# Patient Record
Sex: MALE | Race: WHITE | NOT HISPANIC OR LATINO | Employment: OTHER | URBAN - METROPOLITAN AREA
[De-identification: names, ages, dates, MRNs, and addresses within clinical notes are randomized per-mention and may not be internally consistent; named-entity substitution may affect disease eponyms.]

---

## 2017-01-03 ENCOUNTER — OFFICE VISIT (OUTPATIENT)
Dept: CARDIOLOGY | Facility: CLINIC | Age: 77
End: 2017-01-03
Payer: MEDICARE

## 2017-01-03 ENCOUNTER — CLINICAL SUPPORT (OUTPATIENT)
Dept: CARDIOLOGY | Facility: CLINIC | Age: 77
End: 2017-01-03
Payer: MEDICARE

## 2017-01-03 ENCOUNTER — LAB VISIT (OUTPATIENT)
Dept: LAB | Facility: HOSPITAL | Age: 77
End: 2017-01-03
Attending: FAMILY MEDICINE
Payer: MEDICARE

## 2017-01-03 VITALS
HEIGHT: 70 IN | HEART RATE: 53 BPM | DIASTOLIC BLOOD PRESSURE: 70 MMHG | BODY MASS INDEX: 27.06 KG/M2 | SYSTOLIC BLOOD PRESSURE: 146 MMHG | WEIGHT: 189 LBS

## 2017-01-03 DIAGNOSIS — E78.2 MIXED HYPERLIPIDEMIA: Chronic | ICD-10-CM

## 2017-01-03 DIAGNOSIS — Z86.73 HISTORY OF CVA (CEREBROVASCULAR ACCIDENT): ICD-10-CM

## 2017-01-03 DIAGNOSIS — Z79.01 CHRONIC ANTICOAGULATION: Chronic | ICD-10-CM

## 2017-01-03 DIAGNOSIS — I10 HTN (HYPERTENSION), BENIGN: Primary | Chronic | ICD-10-CM

## 2017-01-03 DIAGNOSIS — R53.82 CHRONIC FATIGUE: ICD-10-CM

## 2017-01-03 DIAGNOSIS — I65.23 OCCLUSION AND STENOSIS OF BILATERAL CAROTID ARTERIES: ICD-10-CM

## 2017-01-03 DIAGNOSIS — I63.239 CAROTID ARTERY STENOSIS WITH CEREBRAL INFARCTION: ICD-10-CM

## 2017-01-03 DIAGNOSIS — Z79.01 ANTICOAGULATED ON COUMADIN: ICD-10-CM

## 2017-01-03 DIAGNOSIS — R94.31 ABNORMAL ECG: ICD-10-CM

## 2017-01-03 DIAGNOSIS — I10 ESSENTIAL HYPERTENSION: Primary | ICD-10-CM

## 2017-01-03 DIAGNOSIS — R06.02 SHORTNESS OF BREATH: ICD-10-CM

## 2017-01-03 DIAGNOSIS — I77.9 BILATERAL CAROTID ARTERY DISEASE: Chronic | ICD-10-CM

## 2017-01-03 DIAGNOSIS — I10 ESSENTIAL HYPERTENSION: ICD-10-CM

## 2017-01-03 LAB
INR PPP: 2.9
PROTHROMBIN TIME: 29.4 SEC

## 2017-01-03 PROCEDURE — 3077F SYST BP >= 140 MM HG: CPT | Mod: S$GLB,,, | Performed by: INTERNAL MEDICINE

## 2017-01-03 PROCEDURE — 1126F AMNT PAIN NOTED NONE PRSNT: CPT | Mod: S$GLB,,, | Performed by: INTERNAL MEDICINE

## 2017-01-03 PROCEDURE — 3078F DIAST BP <80 MM HG: CPT | Mod: S$GLB,,, | Performed by: INTERNAL MEDICINE

## 2017-01-03 PROCEDURE — 1159F MED LIST DOCD IN RCRD: CPT | Mod: S$GLB,,, | Performed by: INTERNAL MEDICINE

## 2017-01-03 PROCEDURE — 99999 PR PBB SHADOW E&M-EST. PATIENT-LVL III: CPT | Mod: PBBFAC,,, | Performed by: INTERNAL MEDICINE

## 2017-01-03 PROCEDURE — 99499 UNLISTED E&M SERVICE: CPT | Mod: S$GLB,,, | Performed by: INTERNAL MEDICINE

## 2017-01-03 PROCEDURE — 1160F RVW MEDS BY RX/DR IN RCRD: CPT | Mod: S$GLB,,, | Performed by: INTERNAL MEDICINE

## 2017-01-03 PROCEDURE — 1157F ADVNC CARE PLAN IN RCRD: CPT | Mod: S$GLB,,, | Performed by: INTERNAL MEDICINE

## 2017-01-03 PROCEDURE — 93000 ELECTROCARDIOGRAM COMPLETE: CPT | Mod: S$GLB,,, | Performed by: INTERNAL MEDICINE

## 2017-01-03 PROCEDURE — 99214 OFFICE O/P EST MOD 30 MIN: CPT | Mod: S$GLB,,, | Performed by: INTERNAL MEDICINE

## 2017-01-03 RX ORDER — METOPROLOL SUCCINATE 50 MG/1
TABLET, EXTENDED RELEASE ORAL
Qty: 30 TABLET | Refills: 6 | Status: SHIPPED | OUTPATIENT
Start: 2017-01-03 | End: 2017-09-01 | Stop reason: DRUGHIGH

## 2017-01-03 NOTE — MR AVS SNAPSHOT
[facility] Cardiology  [address] [place] [address]  Phone: [phone]  Fax: [phone]                  Heraclio Wall   1/3/2017 11:20 AM   Office Visit    Description:  Male : 1940   Provider:  Gera Wells MD   Department:  [facility] - Cardiology           Reason for Visit     Hyperlipidemia     Hypertension     Carotid Artery Disease           Diagnoses this Visit        Comments    HTN (hypertension), benign    -  Primary     Mixed hyperlipidemia         Bilateral carotid artery disease         History of CVA (cerebrovascular accident)         Abnormal ECG         Chronic anticoagulation         Chronic fatigue         Shortness of breath         Carotid artery stenosis with cerebral infarction         Occlusion and stenosis of bilateral carotid arteries                To Do List           Future Appointments        Provider Department Dept Phone    2017 11:00 AM Ion Dumont MD [facility] Sleep Clinic [phone]    2017 1:00 PM [facility] [phone]    2017 1:15 PM Srinivas Kevin MD [facility] Ophthalmology [phone]      Goals (5 Years of Data)              Today    16    Blood Pressure <= 140/90   146/70  138/71  139/67    Notes - Note created  6/3/2016  8:58 AM by Nader MontanoD    It is important to consistently maintain a controlled blood pressure.      Exercise at least 150 minutes per week.           Maintain a low sodium diet           Take at least one BP reading per week at various times of the day             Follow-Up and Disposition     Return in about 1 year (around 1/3/2018).      [facility]     [facility] -  Assistance  Registered nurses in the [facility] provide clinical advisement, health education, appointment booking, and other advisory services.  Call for this free service at [phone].             Medications            Message regarding Medications     Verify the changes and/or additions to your medication regime listed below are the same as discussed with your clinician today.  If any of these changes or additions are incorrect, please notify your healthcare provider.        STOP taking these medications     RAPAFLO 8 mg Cap capsule TAKE 1 CAPSULE(8 MG) BY MOUTH EVERY DAY           Verify that the below list of medications is an accurate representation of the medications you are currently taking.  If none reported, the list may be blank. If incorrect, please contact your healthcare provider. Carry this list with you in case of emergency.           Current Medications     arginine 500 mg tablet Take 1 tablet by mouth 2 (two) times daily.    ascorbic acid (VITAMIN C) 1000 MG tablet Take 3 tablets by mouth Daily.    cholecalciferol, vitamin D3, 1,000 unit capsule Take 5 capsules by mouth Daily.    COUMADIN 2.5 mg tablet TAKE 1 TABLET BY MOUTH EVERY DAY    cyanocobalamin, vitamin B-12, 1,000 mcg/15 mL Liqd Take 1 drop by mouth once daily.     cycloSPORINE (RESTASIS) 0.05 % ophthalmic emulsion Place 0.4 mLs (1 drop total) into both eyes 2 (two) times daily.    dextran 70-hypromellose (ARTIFICIAL TEARS) ophthalmic solution Place 1 drop into both eyes Use as needed.    fluvastatin XL (LESCOL XL) 80 mg Tb24 Take 1 tablet (80 mg total) by mouth once daily.    furosemide (LASIX) 40 MG tablet Take 1 tablet (40 mg total) by mouth every morning.    HYDROCORTISONE ORAL Take 2.5 capsules by mouth once daily.    PROGESTERONE MISC 6.25 mg by Misc.(Non-Drug; Combo Route) route every evening.    PROTONIX 40 mg tablet TAKE 1 TABLET BY MOUTH DAILY    saw palmetto 160 MG capsule Take 1 capsule by mouth 2 (two) times daily.    selenium 200 mcg Tab Take 1 tablet by mouth Daily.    thyroid, pork, (ARMOUR THYROID) 60 mg Tab     TOPROL XL 50 mg 24 hr tablet TAKE 1 TABLET BY MOUTH EVERY DAY IF SYSTOLIC IS GREATER THAN 180 AND OR DIASTOLIC IS  "GREATER THAN 96. NEED PULSE TO BE GREATER THAN 60    UNABLE TO FIND medication name: Red out eye drops    UNABLE TO FIND Take by mouth once daily. medication name: Triiodo-L-Thyronine-Levothyroxine 4.5/19mcg    verapamil (VERELAN) 240 MG C24P TAKE ONE CAPSULE BY MOUTH TWICE A DAY           Clinical Reference Information           Vital Signs - Last Recorded  Most recent update: 1/3/2017 11:28 AM by Carolina Wild LPN    BP Pulse Ht Wt BMI    (!) 146/70 (BP Location: Left arm, Patient Position: Sitting, BP Method: Manual) (!) 53 5' 10" (1.778 m) 85.7 kg (189 lb) 27.12 kg/m2      Blood Pressure          Most Recent Value    BP  (!)  146/70      Allergies as of 1/3/2017     Milk    Olive Extract    Tea Tree    Apple    Benicar [Olmesartan]    Clonidine    Clonidine Hcl    Clopidogrel    Cardizem [Diltiazem Hcl]    Dicyclomine    Hydralazine Analogues    Lopressor [Metoprolol Tartrate]    Lipitor [Atorvastatin]    Norvasc [Amlodipine]    Pineapple    Bactrim [Sulfamethoxazole-trimethoprim]      Immunizations Administered on Date of Encounter - 1/3/2017     None      Orders Placed During Today's Visit     Future Labs/Procedures Expected by Expires    NM Myocardial Perfusion Spect Multi Pharmacologic  1/3/2017 1/3/2018    CAR Ultrasound doppler carotid bliateral  1/3/2018 (Approximate) 1/3/2018    NM Multi Pharm Stress Cardiac Component  As directed 1/3/2018      "

## 2017-01-03 NOTE — PROGRESS NOTES
Subjective:    Patient ID:  Heraclio Wall is a 76 y.o. male who presents for evaluation of Hyperlipidemia; Hypertension; and Carotid Artery Disease      HPI Mr. Wall returns for yearly  f/u.   His current medical conditions include HTN, carotid artery disease, hyperlipidemia.  Nonsmoker.  Past history pertinent for following:  H/o  CVA 7/13 and was put on Plavix (was on ASA at time). He had admission 5/14 for splenic infarct and had surgery. Dr. Metz did abdominal angio with report showing no evidence of mesenteric stenosis. SAIRA showed no intracardiac source of emboli. He was d/cd home on asa, plavix and coumadin was started. He is now on coumadin only.  Now here for f/u.  Has some mild SANTIAGO and fatigue with exercise outside.  No cp sxs.    He exercises 3 days/week and feels ok with exercise.  ecg today shows mild sinus bradycardia 53 bpm, t wave inversions V1-V3.  ecgs variably abnl in past.  HTN controlled, enrolled in digital HTN program.  No associated sxs.  Lipids controlled on statin tx.  No recurrent tia/cva sxs.  No abnl bleeding on coumadin.  Compliant with checks.  Echo shows normal LV function.   Carotid us shows no progression from a year ago.    Patient Active Problem List   Diagnosis    Hypogonadism male    Bilateral hearing loss    HTN (hypertension), benign    Dysphagia as late effect of stroke    Refraction error - Both Eyes    CHESTER on CPAP    Tinnitus of both ears    GERD (gastroesophageal reflux disease)    Hyperlipidemia    Anemia    Hypothyroidism due to acquired atrophy of thyroid    Bilateral carotid artery disease    Atherosclerosis of aorta    Chronic anticoagulation    Chronic kidney disease, stage 3    Abnormal ECG    Insufficiency of tear film of both eyes    History of CVA (cerebrovascular accident)    Numbness and tingling in left arm    Benign prostatic hyperplasia without lower urinary tract symptoms    Chronic fatigue    Shortness of breath     Past  "Medical History   Diagnosis Date    Anemia     Anxiety     Back pain      had some pain occasionally r/t a fall    Bilateral hearing loss     Cancer      skin cancer    Dermatitis     Disorder of kidney and ureter     Diverticulosis     Diverticulosis of colon (without mention of hemorrhage) 4/29/2014    DJD (degenerative joint disease)     Dysphagia as late effect of stroke     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   HENT: Negative.    Eyes: Negative.    Cardiovascular: Positive for dyspnea on exertion.   Respiratory: Positive for shortness of breath.    Endocrine: Negative.    Hematologic/Lymphatic: Negative.    Skin: Negative.    Musculoskeletal: Positive for arthritis.   Gastrointestinal: Positive for heartburn.   Genitourinary: Negative.    Neurological: Negative.    Psychiatric/Behavioral: Negative.    Allergic/Immunologic: Negative.        Visit Vitals    BP (!) 146/70 (BP Location: Left arm, Patient Position: Sitting, BP Method: Manual)    Pulse (!) 53    Ht 5' 10" (1.778 m)    Wt 85.7 kg (189 lb)    BMI 27.12 kg/m2       Wt Readings from Last 3 Encounters:   01/03/17 85.7 kg (189 lb)   12/12/16 85.8 kg (189 lb 2.5 oz)   10/03/16 84.1 kg (185 lb 6.5 oz)     Temp Readings from Last 3 Encounters:   12/12/16 97.4 °F (36.3 °C) (Tympanic)   06/01/16 98 °F (36.7 °C) (Tympanic)   01/18/16 98 °F (36.7 °C) (Oral)     BP Readings from Last 3 Encounters: "   01/03/17 (!) 146/70   12/12/16 124/82   10/03/16 (!) 160/78     Pulse Readings from Last 3 Encounters:   01/03/17 (!) 53   12/12/16 64   10/03/16 60          Objective:    Physical Exam   Constitutional: He is oriented to person, place, and time. He appears well-developed and well-nourished.   HENT:   Head: Normocephalic.   Neck: Normal range of motion. Neck supple. Normal carotid pulses, no hepatojugular reflux and no JVD present. Carotid bruit is not present. No thyromegaly present.   Cardiovascular: Regular rhythm, S1 normal and S2 normal.  Bradycardia present.  PMI is not displaced.  Exam reveals no S3, no S4, no distant heart sounds, no friction rub, no midsystolic click and no opening snap.    No murmur heard.  Pulses:       Radial pulses are 2+ on the right side, and 2+ on the left side.   Pulmonary/Chest: Effort normal and breath sounds normal. He has no wheezes. He has no rales.   Abdominal: Soft. Bowel sounds are normal. He exhibits no distension, no abdominal bruit, no ascites and no mass. There is no tenderness.   Musculoskeletal: He exhibits no edema.   Neurological: He is alert and oriented to person, place, and time.   Skin: Skin is warm.   Psychiatric: He has a normal mood and affect. His behavior is normal.   Nursing note and vitals reviewed.        I have reviewed all pertinent labs and cardiac studies.      Chemistry        Component Value Date/Time     11/02/2016 0916    K 4.9 11/02/2016 0916     11/02/2016 0916    CO2 30 (H) 11/02/2016 0916    BUN 15 11/02/2016 0916    CREATININE 1.4 11/02/2016 0916    GLU 97 11/02/2016 0916        Component Value Date/Time    CALCIUM 9.8 11/02/2016 0916    ALKPHOS 79 11/02/2016 0916    AST 28 11/02/2016 0916    ALT 21 11/02/2016 0916    BILITOT 1.1 (H) 11/02/2016 0916        Lab Results   Component Value Date    WBC 6.15 11/02/2016    HGB 13.7 (L) 11/02/2016    HCT 42.2 11/02/2016    MCV 95 11/02/2016     11/02/2016     Lab Results    Component Value Date    HGBA1C 5.2 06/01/2016     Lab Results   Component Value Date    CHOL 141 11/02/2016    CHOL 131 11/12/2015    CHOL 118 (L) 06/30/2014     Lab Results   Component Value Date    HDL 37 (L) 11/02/2016    HDL 44 11/12/2015    HDL 35 (L) 06/30/2014     Lab Results   Component Value Date    LDLCALC 74.4 11/02/2016    LDLCALC 69.4 11/12/2015    LDLCALC 62.2 (L) 06/30/2014     Lab Results   Component Value Date    TRIG 148 11/02/2016    TRIG 88 11/12/2015    TRIG 104 06/30/2014     Lab Results   Component Value Date    CHOLHDL 26.2 11/02/2016    CHOLHDL 33.6 11/12/2015    CHOLHDL 29.7 06/30/2014   TEST DESCRIPTION   Technical Quality: This is a technically challenging study. There is poor endocardial definition.     Aorta: The aortic root is normal in size, measuring 2.8 cm at sinotubular junction and 2.9 cm at Sinuses of Valsalva. The proximal ascending aorta is upper limit of normal, measuring 3.5 cm across.     Left Atrium: The left atrial volume index is normal, measuring 28.95 cc/m2.     Left Ventricle: The left ventricle is normal in size, with an end-diastolic diameter of 4.1 cm, and an end-systolic diameter of 2.9 cm. Posterior wall thickness is increased, with the septum measuring 1.3 cm and the posterior wall measuring 1.5 cm   across. Relative wall thickness was increased at 0.73, and the LV mass index was increased at 125.7 g/m2 consistent with concentric left ventricular hypertrophy. Global left ventricular systolic function appears normal. Visually estimated ejection   fraction is 55-60%. The LV Doppler derived stroke volume equals 73.0 ccs.   The E/e'(lat) is 7, consistent with normal diastolic function.     Right Atrium: The right atrium is normal in size, measuring 5.7 cm in length and 3.9 cm in width in the apical view.     Right Ventricle: The right ventricle is normal in size measuring 3.1 cm at the base in the apical right ventricle-focused view. Global right ventricular  systolic function appears normal. Tricuspid annular plane systolic excursion (TAPSE) is 2.1 cm. The   estimated PA systolic pressure is 34 mmHg.     Aortic Valve:  The aortic valve is mildly sclerotic with normal leaflet mobility.     Mitral Valve:  The mitral valve is normal in structure. There is mild mitral regurgitation.     Tricuspid Valve:  The tricuspid valve is normal in structure. There is mild tricuspid regurgitation.     Pulmonary Valve:  The pulmonic valve is not well seen.     IVC: IVC is normal in size and collapses > 50% with a sniff, suggesting normal right atrial pressure of 3 mmHg.     Intracavitary: There is no evidence of pericardial effusion, intracavity mass, thrombi, or vegetation.         CONCLUSIONS     1 - Normal left ventricular systolic function (EF 55-60%).     2 - Normal left ventricular diastolic function.     3 - Normal right ventricular systolic function .             This document has been electronically    SIGNED BY: Gera Wells MD On: 12/27/2016 16:53        TEST DESCRIPTION     RIGHT  The right Proximal Common Carotid Artery is visualized.   The right carotid bulb artery is visualized.   The right Mid Internal Carotid Artery has 20 - 39% stenosis.   There is acceleration in the right external carotid artery.   The right vertebral artery is visualized, associated with anterograde flow.   The right ICA/CCA ratio is: .97    LEFT  The left Mid Common Carotid Artery is visualized.   The left carotid bulb artery is visualized.   The left Distal Internal Carotid Artery has 0 - 19% stenosis.   The left external carotid artery is visualized.   The left vertebral artery is visualized, associated with anterograde flow.   The left ICA/CCA ratio is: .75      CONCLUSIONS   There is 20 - 39% right Internal Carotid stenosis.  There is 0 - 19% left Internal Carotid stenosis.          This document has been electronically    SIGNED BY: Gera Wells MD On: 12/27/2016 17:09      Lab Results    Component Value Date    INR 3.3 (H) 12/12/2016    INR 3.2 (H) 11/28/2016    INR 3.1 (H) 11/11/2016         Assessment:       1. HTN (hypertension), benign    2. Mixed hyperlipidemia    3. Bilateral carotid artery disease    4. History of CVA (cerebrovascular accident)    5. Abnormal ECG    6. Chronic anticoagulation    7. Chronic fatigue    8. Shortness of breath         Plan:             CHRONIC STABLE CV CONDITIONS.  TEST RESULTS REVIEWED IN DETAIL WITH PT.  HAS SOME SANTIAGO, FATIGUE WITH EXERTION AND ABNL ECG (INTERMITTENTLY ABNORMAL).  RECOMMEND STRESS MPI TO RULE OUT ISCHEMIA.  PHONE REVIEW FOR TEST RESULTS.  CONTINUE CURRENT MEDS.  CARDIAC DIET  F/U 1 YEAR WITH CAROTID US.

## 2017-01-11 ENCOUNTER — TELEPHONE (OUTPATIENT)
Dept: PULMONOLOGY | Facility: CLINIC | Age: 77
End: 2017-01-11

## 2017-01-11 DIAGNOSIS — G47.33 OSA (OBSTRUCTIVE SLEEP APNEA): Primary | ICD-10-CM

## 2017-01-12 ENCOUNTER — HOSPITAL ENCOUNTER (OUTPATIENT)
Dept: RADIOLOGY | Facility: HOSPITAL | Age: 77
Discharge: HOME | End: 2017-01-12
Attending: INTERNAL MEDICINE
Payer: MEDICARE

## 2017-01-12 ENCOUNTER — OFFICE VISIT (OUTPATIENT)
Dept: SLEEP MEDICINE | Facility: CLINIC | Age: 77
End: 2017-01-12
Payer: MEDICARE

## 2017-01-12 VITALS
HEIGHT: 70 IN | HEART RATE: 53 BPM | BODY MASS INDEX: 27.15 KG/M2 | OXYGEN SATURATION: 98 % | SYSTOLIC BLOOD PRESSURE: 162 MMHG | RESPIRATION RATE: 18 BRPM | DIASTOLIC BLOOD PRESSURE: 66 MMHG | WEIGHT: 189.63 LBS

## 2017-01-12 DIAGNOSIS — G47.33 OSA ON CPAP: Primary | Chronic | ICD-10-CM

## 2017-01-12 DIAGNOSIS — G47.33 OSA (OBSTRUCTIVE SLEEP APNEA): ICD-10-CM

## 2017-01-12 PROBLEM — R06.02 SHORTNESS OF BREATH: Status: RESOLVED | Noted: 2017-01-03 | Resolved: 2017-01-12

## 2017-01-12 PROCEDURE — 3078F DIAST BP <80 MM HG: CPT | Mod: S$GLB,,, | Performed by: INTERNAL MEDICINE

## 2017-01-12 PROCEDURE — 1157F ADVNC CARE PLAN IN RCRD: CPT | Mod: S$GLB,,, | Performed by: INTERNAL MEDICINE

## 2017-01-12 PROCEDURE — 99213 OFFICE O/P EST LOW 20 MIN: CPT | Mod: S$GLB,,, | Performed by: INTERNAL MEDICINE

## 2017-01-12 PROCEDURE — 1126F AMNT PAIN NOTED NONE PRSNT: CPT | Mod: S$GLB,,, | Performed by: INTERNAL MEDICINE

## 2017-01-12 PROCEDURE — 3077F SYST BP >= 140 MM HG: CPT | Mod: S$GLB,,, | Performed by: INTERNAL MEDICINE

## 2017-01-12 PROCEDURE — 1160F RVW MEDS BY RX/DR IN RCRD: CPT | Mod: S$GLB,,, | Performed by: INTERNAL MEDICINE

## 2017-01-12 PROCEDURE — 99999 PR PBB SHADOW E&M-EST. PATIENT-LVL III: CPT | Mod: PBBFAC,,, | Performed by: INTERNAL MEDICINE

## 2017-01-12 PROCEDURE — 1159F MED LIST DOCD IN RCRD: CPT | Mod: S$GLB,,, | Performed by: INTERNAL MEDICINE

## 2017-01-12 PROCEDURE — 71020 XR CHEST PA AND LATERAL: CPT | Mod: 26,,, | Performed by: RADIOLOGY

## 2017-01-12 NOTE — MR AVS SNAPSHOT
[facility]  [address] [place] [address]  Phone: [phone]                  Heraclio Wall   2017 11:00 AM   Office Visit    Description:  Male : 1940   Provider:  Ion Dumont MD   Department:  [facility] - Sleep Clinic           Reason for Visit     Sleep Apnea           Diagnoses this Visit        Comments    CHESTER on CPAP    -  Primary            To Do List           Future Appointments        Provider Department Dept Phone    2017 11:15 AM [facility] CARDIAC [facility] [phone]    2017 11:20 AM [facility] [phone]    2017 1:15 PM [facility] NM[facility] CARDIAC [facility] [phone]    2017 10:00 AM [place]ORY, [place] [facility] [phone]    2017 1:00 PM [facility] [phone]      Goals (5 Years of Data)              Today    1/5/17    1/3/17    Blood Pressure <= 140/90   162/66  132/89  146/70    Notes - Note created  6/3/2016  8:58 AM by Juana Allen PharmD    It is important to consistently maintain a controlled blood pressure.      Exercise at least 150 minutes per week.           Maintain a low sodium diet           Take at least one BP reading per week at various times of the day             Follow-Up and Disposition     Return in about 1 year (around 2018) for download.      [facility]     [facility] Care [facility] - / Assistance  Registered nurses in the [facility] provide clinical advisement, health education, appointment booking, and other advisory services.  Call for this free service at [phone].             Medications           Message regarding Medications     Verify the changes and/or additions to your medication regime listed below are the same as discussed with your clinician today.  If any of these changes or additions are incorrect,  please notify your healthcare provider.             Verify that the below list of medications is an accurate representation of the medications you are currently taking.  If none reported, the list may be blank. If incorrect, please contact your healthcare provider. Carry this list with you in case of emergency.           Current Medications     arginine 500 mg tablet Take 1 tablet by mouth 2 (two) times daily.    ascorbic acid (VITAMIN C) 1000 MG tablet Take 3 tablets by mouth Daily.    cholecalciferol, vitamin D3, 1,000 unit capsule Take 5 capsules by mouth Daily.    COUMADIN 2.5 mg tablet TAKE 1 TABLET BY MOUTH EVERY DAY    cyanocobalamin, vitamin B-12, 1,000 mcg/15 mL Liqd Take 1 drop by mouth once daily.     cycloSPORINE (RESTASIS) 0.05 % ophthalmic emulsion Place 0.4 mLs (1 drop total) into both eyes 2 (two) times daily.    dextran 70-hypromellose (ARTIFICIAL TEARS) ophthalmic solution Place 1 drop into both eyes Use as needed.    fluvastatin XL (LESCOL XL) 80 mg Tb24 Take 1 tablet (80 mg total) by mouth once daily.    furosemide (LASIX) 40 MG tablet Take 1 tablet (40 mg total) by mouth every morning.    HYDROCORTISONE ORAL Take 2.5 capsules by mouth once daily.    PROGESTERONE MISC 6.25 mg by Misc.(Non-Drug; Combo Route) route every evening.    PROTONIX 40 mg tablet TAKE 1 TABLET BY MOUTH DAILY    saw palmetto 160 MG capsule Take 1 capsule by mouth 2 (two) times daily.    selenium 200 mcg Tab Take 1 tablet by mouth Daily.    thyroid, pork, (ARMOUR THYROID) 60 mg Tab     TOPROL XL 50 mg 24 hr tablet TAKE 1 TABLET BY MOUTH EVERY DAY IF SYSTOLIC IS GREATER THAN 180 AND OR DIASTOLIC IS GREATER THAN 96. NEED PULSE TO BE GREATER THAN 60    UNABLE TO FIND medication name: Red out eye drops    UNABLE TO FIND Take by mouth once daily. medication name: Triiodo-L-Thyronine-Levothyroxine 4.5/19mcg    verapamil (VERELAN) 240 MG C24P TAKE ONE CAPSULE BY MOUTH TWICE A DAY           Clinical Reference Information          "  Vital Signs - Last Recorded  Most recent update: 1/12/2017 11:47 AM by Timothy Lomax LPN    BP Pulse Resp Ht Wt SpO2    (!) 162/66 (!) 53 18 5' 10" (1.778 m) 86 kg (189 lb 9.5 oz) 98%    BMI                27.2 kg/m2          Blood Pressure          Most Recent Value    BP  (!)  162/66      Allergies as of 1/12/2017     Milk    Olive Extract    Tea Tree    Apple    Benicar [Olmesartan]    Clonidine    Clonidine Hcl    Clopidogrel    Cardizem [Diltiazem Hcl]    Dicyclomine    Hydralazine Analogues    Lopressor [Metoprolol Tartrate]    Lipitor [Atorvastatin]    Norvasc [Amlodipine]    Pineapple    Bactrim [Sulfamethoxazole-trimethoprim]      Immunizations Administered on Date of Encounter - 1/12/2017     None      "

## 2017-01-12 NOTE — PROGRESS NOTES
Subjective:       Heraclio Wall is a 76 y.o. male  is a follow-up appointment.    I last saw him November 2013.  Physical last saw him he had a stroke  He has obstructive sleep apnea currently on CPAP 9 cm water pressure.    [place] sleepiness score is 2.  He is getting good benefit from the CPAP machine  The download shows he is using it greater than 4 hours 496.7% of the time.  Patient has used the device every night for the last 30 days.  His blood pressure is slightly elevated however he is enrolled in the ambulatory blood pressure monitoring program.  Bedtime is between 9:30 PM and 10 PM and wake up time between 7 AM and 8 AM.  Patient tells me that he may have some occasional noise from his machine.    I like him to make an appointment to review the device with Iris.      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  He  does not have any pertinent problems on file.  He  has a past surgical history that includes Circumcision, primary (1940); Tonsillectomy (1942); Adenoidectomy (1942); Cholecystectomy (2/2015); Cataract extraction (Bilateral); Small  intestine surgery (5/4/2014); and Eye surgery (Bilateral, 2012).  His family history includes Aortic aneurysm in his father; Cancer (age of onset: 64) in his sister; Cataracts in his mother; Glaucoma in his mother; Hearing loss in his sister; Heart disease in his mother; Hyperlipidemia in his mother; Hypertension in his father and mother; Peripheral vascular disease in his father; Stroke in his mother and sister. There is no history of Diabetes, Blindness, Macular degeneration, Retinal detachment, Strabismus, Asthma, COPD, Mental illness, Mental retardation, Drug abuse, Alcohol abuse, or Kidney disease.  He  reports that he has never smoked. He has never used smokeless tobacco. He reports that he does not drink alcohol or use illicit drugs.  He has a current medication list which includes the following prescription(s): arginine, ascorbic acid (vitamin c), cholecalciferol (vitamin d3), coumadin, cyanocobalamin (vitamin b-12), cyclosporine, dextran 70-hypromellose, fluvastatin xl, furosemide, hydrocortisone, progesterone, protonix, saw palmetto, selenium, thyroid (pork), toprol xl, UNABLE TO FIND, verapamil, and UNABLE TO FIND.  Current Outpatient Prescriptions on File Prior to Visit   Medication Sig Dispense Refill    arginine 500 mg tablet Take 1 tablet by mouth 2 (two) times daily.      ascorbic acid (VITAMIN C) 1000 MG tablet Take 3 tablets by mouth Daily.      cholecalciferol, vitamin D3, 1,000 unit capsule Take 5 capsules by mouth Daily.      COUMADIN 2.5 mg tablet TAKE 1 TABLET BY MOUTH EVERY DAY 30 tablet 6    cyanocobalamin, vitamin B-12, 1,000 mcg/15 mL Liqd Take 1 drop by mouth once daily.       cycloSPORINE (RESTASIS) 0.05 % ophthalmic emulsion Place 0.4 mLs (1 drop total) into both eyes 2 (two) times daily. 60 vial 12    dextran 70-hypromellose (ARTIFICIAL TEARS) ophthalmic solution Place 1 drop into both eyes Use as needed.      fluvastatin XL (LESCOL XL) 80 mg Tb24 Take 1 tablet (80 mg total) by  "mouth once daily. 90 tablet 3    furosemide (LASIX) 40 MG tablet Take 1 tablet (40 mg total) by mouth every morning. 90 tablet 1    HYDROCORTISONE ORAL Take 2.5 capsules by mouth once daily.      PROGESTERONE MISC 6.25 mg by Misc.(Non-Drug; Combo Route) route every evening.      PROTONIX 40 mg tablet TAKE 1 TABLET BY MOUTH DAILY 30 tablet 11    saw palmetto 160 MG capsule Take 1 capsule by mouth 2 (two) times daily.      selenium 200 mcg Tab Take 1 tablet by mouth Daily.      thyroid, pork, (ARMOUR THYROID) 60 mg Tab       TOPROL XL 50 mg 24 hr tablet TAKE 1 TABLET BY MOUTH EVERY DAY IF SYSTOLIC IS GREATER THAN 180 AND OR DIASTOLIC IS GREATER THAN 96. NEED PULSE TO BE GREATER THAN 60 30 tablet 6    UNABLE TO FIND Take by mouth once daily. medication name: Triiodo-L-Thyronine-Levothyroxine 4.5/19mcg      verapamil (VERELAN) 240 MG C24P TAKE ONE CAPSULE BY MOUTH TWICE A  capsule 1    UNABLE TO FIND medication name: Red out eye drops       No current facility-administered medications on file prior to visit.      He is allergic to milk; olive extract; tea tree; apple; benicar [olmesartan]; clonidine; clonidine hcl; clopidogrel; cardizem [diltiazem hcl]; dicyclomine; hydralazine analogues; lopressor [metoprolol tartrate]; lipitor [atorvastatin]; norvasc [amlodipine]; pineapple; and bactrim [sulfamethoxazole-trimethoprim]..    Review of Systems  A comprehensive review of systems was negative.      Objective:        Visit Vitals    BP (!) 162/66    Pulse (!) 53    Resp 18    Ht 5' 10" (1.778 m)    Wt 86 kg (189 lb 9.5 oz)    SpO2 98%    BMI 27.2 kg/m2       General Appearance:    Alert, cooperative, no distress, appears stated age   Head:    Normocephalic, without obvious abnormality, atraumatic   Throat:   Lips, mucosa, and tongue normal; teeth and gums normal   Neck:   Supple, symmetrical, trachea midline, no adenopathy;        thyroid:  No enlargement/tenderness/nodules; no carotid    bruit or " JVD   Lungs:     Clear to auscultation bilaterally, respirations unlabored   Chest wall:    No tenderness or deformity   Heart:    Regular rate and rhythm, S1 and S2 normal, no murmur, rub   or gallop   Abdomen:     Soft, non-tender, bowel sounds active all four quadrants,     no masses, no organomegaly   Extremities:   Extremities normal, atraumatic, no cyanosis or edema   Pulses:   2+ and symmetric all extremities   Skin:   Skin color, texture, turgor normal, no rashes or lesions   Lymph nodes:   Cervical, supraclavicular, and axillary nodes normal   Neurologic:   CNII-XII intact. Normal strength, sensation and reflexes       throughout         DOWNLOAD  12/13/2016 - 1/11/2017  YOB: 1940  Mask:  Compliance Summary  12/13/2016 - 1/11/2017 (30 days)  Days with Device Usage 30 days  Days without Device Usage 0 days  Percent Days with Device Usage 100.0%  Cumulative Usage 8 days 4 hrs. 14 mins. 5 secs.  Maximum Usage (1 Day) 8 hrs. 9 mins. 45 secs.  Average Usage (All Days) 6 hrs. 32 mins. 28 secs.  Average Usage (Days Used) 6 hrs. 32 mins. 28 secs.  Minimum Usage (1 Day) 3 hrs. 29 mins. 2 secs.  Percent of Days with Usage >= 4 Hours 96.7%  Percent of Days with Usage < 4 Hours 3.3%  Date Range  Printed By:    CXR reviewed    Heart size normal. Aortic sclerosis and ectasia. Multilevel degenerative change in the spine. No acute consolidation seen in the lungs. No pleural effusions. No change compared to November 29, 2014.  Assessment:      Problem List Items Addressed This Visit     CHESTER on CPAP - Primary (Chronic)        Complainant and benefits from PAP     Plan:      Return in about 1 year (around 1/12/2018) for download.    This note was prepared using voice recognition system and is likely to have sound alike errors that may have been overlooked even after proof reading.  Please call me with any questions    Discussed diagnosis, its evaluation, treatment and usual course. All questions  answered.    Thank you for the courtesy of participating in the care of this patient    Ion Dumont MD

## 2017-01-17 ENCOUNTER — HOSPITAL ENCOUNTER (OUTPATIENT)
Dept: RADIOLOGY | Facility: HOSPITAL | Age: 77
Discharge: HOME | End: 2017-01-17
Attending: INTERNAL MEDICINE
Payer: MEDICARE

## 2017-01-17 ENCOUNTER — TELEPHONE (OUTPATIENT)
Dept: CARDIOLOGY | Facility: CLINIC | Age: 77
End: 2017-01-17

## 2017-01-17 ENCOUNTER — CLINICAL SUPPORT (OUTPATIENT)
Dept: CARDIOLOGY | Facility: CLINIC | Age: 77
End: 2017-01-17
Payer: MEDICARE

## 2017-01-17 DIAGNOSIS — Z86.73 HISTORY OF CVA (CEREBROVASCULAR ACCIDENT): ICD-10-CM

## 2017-01-17 DIAGNOSIS — R53.82 CHRONIC FATIGUE: ICD-10-CM

## 2017-01-17 DIAGNOSIS — I77.9 BILATERAL CAROTID ARTERY DISEASE: Chronic | ICD-10-CM

## 2017-01-17 DIAGNOSIS — R06.02 SHORTNESS OF BREATH: ICD-10-CM

## 2017-01-17 DIAGNOSIS — R94.31 ABNORMAL ECG: ICD-10-CM

## 2017-01-17 LAB — DIASTOLIC DYSFUNCTION: NO

## 2017-01-17 PROCEDURE — 93015 CV STRESS TEST SUPVJ I&R: CPT | Mod: S$GLB,,, | Performed by: INTERNAL MEDICINE

## 2017-01-17 PROCEDURE — 78452 HT MUSCLE IMAGE SPECT MULT: CPT | Mod: 26,,, | Performed by: INTERNAL MEDICINE

## 2017-01-24 ENCOUNTER — PATIENT OUTREACH (OUTPATIENT)
Dept: OTHER | Facility: OTHER | Age: 77
End: 2017-01-24
Payer: MEDICARE

## 2017-01-24 NOTE — PROGRESS NOTES
Last 5 Patient Entered Readings                                                               Current 30 Day Average: 138/76     Recent Readings 1/22/2017 1/5/2017 12/28/2016 12/22/2016 12/10/2016    Systolic BP (mmHg) 144 132 138 139 141    Diastolic BP (mmHg) 70 89 71 67 70    Pulse 54 53 51 71 56          Follow up with Mr. Heraclio Wall completed. Patient is maintaining a low sodium diet and continuing his exercise regime. I spoke with his wife and she informed me that Mr. Wall is doing well and that they just missed a few readings because they have been busy with MD appts etc. He has some tests done and his cards MD reported to them that everything is looking good. Patient did not have any further questions or concerns. I will follow up in a few weeks to see how he is doing and progressing.

## 2017-02-01 ENCOUNTER — LAB VISIT (OUTPATIENT)
Dept: LAB | Facility: HOSPITAL | Age: 77
End: 2017-02-01
Attending: FAMILY MEDICINE
Payer: MEDICARE

## 2017-02-01 DIAGNOSIS — Z79.01 ANTICOAGULATED ON COUMADIN: ICD-10-CM

## 2017-02-01 LAB
INR PPP: 2.7
PROTHROMBIN TIME: 26.7 SEC

## 2017-02-01 PROCEDURE — 36415 COLL VENOUS BLD VENIPUNCTURE: CPT | Mod: PO

## 2017-02-01 PROCEDURE — 85610 PROTHROMBIN TIME: CPT

## 2017-02-02 ENCOUNTER — PATIENT MESSAGE (OUTPATIENT)
Dept: INTERNAL MEDICINE | Facility: CLINIC | Age: 77
End: 2017-02-02

## 2017-02-17 ENCOUNTER — PATIENT OUTREACH (OUTPATIENT)
Dept: OTHER | Facility: OTHER | Age: 77
End: 2017-02-17
Payer: MEDICARE

## 2017-02-17 NOTE — PROGRESS NOTES
Mr. Wall is doing ok. He has been having trouble sleeping the last few days. He turned up moisture on CPAP machine and that seemed to help, but still not sleeping well. Admits to slightly less activity recently because of other things going on. Also has been eating later than usual.     Last 5 Patient Entered Readings                                                               Current 30 Day Average: 144/71     Recent Readings 2/9/2017 1/26/2017 1/22/2017 1/5/2017 12/28/2016    Systolic BP (mmHg) 144 144 144 132 138    Diastolic BP (mmHg) 78 67 70 89 71    Pulse 56 50 54 53 51      BP slightly above goal  Continue monitoring  Track sleep pattern

## 2017-02-21 ENCOUNTER — PATIENT OUTREACH (OUTPATIENT)
Dept: OTHER | Facility: OTHER | Age: 77
End: 2017-02-21
Payer: MEDICARE

## 2017-02-21 NOTE — PROGRESS NOTES
Last 5 Patient Entered Readings                                                               Current 30 Day Average: 142/77     Recent Readings 2/19/2017 2/9/2017 1/26/2017 1/22/2017 1/5/2017    Systolic BP (mmHg) 138 144 144 144 132    Diastolic BP (mmHg) 95 78 67 70 89    Pulse 54 56 50 54 53          Follow up with Mr. Heraclio Wall completed. Patient is maintaining a low sodium diet and continuing his exercise regime. He reports that overall, he is doing well. Still having issues with getting a good nights sleep on days he goes to the gym. He is doing well with wearing his CPAP and even received a new mask recently which is much better and more effective for the patient. He denies symptoms related to his BP and he will take another reading today. Will continue to follow. Patient did not have any further questions or concerns. I will follow up in a few weeks to see how he is doing and progressing.

## 2017-03-02 ENCOUNTER — LAB VISIT (OUTPATIENT)
Dept: LAB | Facility: HOSPITAL | Age: 77
End: 2017-03-02
Attending: FAMILY MEDICINE
Payer: MEDICARE

## 2017-03-02 DIAGNOSIS — Z79.01 ANTICOAGULATED ON COUMADIN: ICD-10-CM

## 2017-03-02 LAB
INR PPP: 3.1
PROTHROMBIN TIME: 31 SEC

## 2017-03-02 PROCEDURE — 36415 COLL VENOUS BLD VENIPUNCTURE: CPT | Mod: PO

## 2017-03-02 PROCEDURE — 85610 PROTHROMBIN TIME: CPT

## 2017-03-03 ENCOUNTER — ANTI-COAG VISIT (OUTPATIENT)
Dept: INTERNAL MEDICINE | Facility: CLINIC | Age: 77
End: 2017-03-03

## 2017-03-03 ENCOUNTER — TELEPHONE (OUTPATIENT)
Dept: INTERNAL MEDICINE | Facility: CLINIC | Age: 77
End: 2017-03-03

## 2017-03-03 DIAGNOSIS — Z79.01 ANTICOAGULATED ON COUMADIN: Primary | ICD-10-CM

## 2017-03-03 NOTE — TELEPHONE ENCOUNTER
Notified pt of results and recommendations. He states,  likes the number to be 2.7 or under. He's thinking it's too high. He wants to know what  thinks. Informed pt that  is out of the office until Monday. I will send the message to her and call him then if  has any other recommendations. He verbalized understanding.

## 2017-03-06 NOTE — TELEPHONE ENCOUNTER
Called pt and let know that Dr. Christopher said 3.1 is fine and will repeat in 3 weeks.    There is no order in that I can see.  Can you enter order?      Pt also said he feels very fatigued and blah, wants an appt with you and then possibly more labs.  Did you want to work him in?  First available not until end of month

## 2017-03-06 NOTE — TELEPHONE ENCOUNTER
i need order for the PT/INR, none in for me to book lab.     Booked for Rebecca, 3-8-17 at 10:00am

## 2017-03-08 ENCOUNTER — LAB VISIT (OUTPATIENT)
Dept: LAB | Facility: HOSPITAL | Age: 77
End: 2017-03-08
Attending: PHYSICIAN ASSISTANT
Payer: MEDICARE

## 2017-03-08 ENCOUNTER — OFFICE VISIT (OUTPATIENT)
Dept: INTERNAL MEDICINE | Facility: CLINIC | Age: 77
End: 2017-03-08
Payer: MEDICARE

## 2017-03-08 VITALS
WEIGHT: 189 LBS | HEIGHT: 70 IN | DIASTOLIC BLOOD PRESSURE: 64 MMHG | HEART RATE: 64 BPM | BODY MASS INDEX: 27.06 KG/M2 | SYSTOLIC BLOOD PRESSURE: 120 MMHG | TEMPERATURE: 98 F

## 2017-03-08 DIAGNOSIS — R53.83 FATIGUE, UNSPECIFIED TYPE: Primary | ICD-10-CM

## 2017-03-08 DIAGNOSIS — Z79.01 ANTICOAGULATION GOAL OF INR 2 TO 3: ICD-10-CM

## 2017-03-08 DIAGNOSIS — E61.1 IRON DEFICIENCY: ICD-10-CM

## 2017-03-08 DIAGNOSIS — Z51.81 ANTICOAGULATION GOAL OF INR 2 TO 3: ICD-10-CM

## 2017-03-08 DIAGNOSIS — R53.83 FATIGUE, UNSPECIFIED TYPE: ICD-10-CM

## 2017-03-08 DIAGNOSIS — E03.4 HYPOTHYROIDISM DUE TO ACQUIRED ATROPHY OF THYROID: Chronic | ICD-10-CM

## 2017-03-08 LAB
BASOPHILS # BLD AUTO: 0.03 K/UL
BASOPHILS NFR BLD: 0.4 %
BILIRUB UR QL STRIP: NEGATIVE
CLARITY UR REFRACT.AUTO: ABNORMAL
COLOR UR AUTO: YELLOW
DIFFERENTIAL METHOD: ABNORMAL
EOSINOPHIL # BLD AUTO: 0.2 K/UL
EOSINOPHIL NFR BLD: 3.2 %
ERYTHROCYTE [DISTWIDTH] IN BLOOD BY AUTOMATED COUNT: 12.4 %
GLUCOSE UR QL STRIP: NEGATIVE
HCT VFR BLD AUTO: 39.9 %
HGB BLD-MCNC: 13.5 G/DL
HGB UR QL STRIP: NEGATIVE
KETONES UR QL STRIP: NEGATIVE
LEUKOCYTE ESTERASE UR QL STRIP: NEGATIVE
LYMPHOCYTES # BLD AUTO: 2.9 K/UL
LYMPHOCYTES NFR BLD: 39.7 %
MCH RBC QN AUTO: 31.3 PG
MCHC RBC AUTO-ENTMCNC: 33.8 %
MCV RBC AUTO: 92 FL
MICROSCOPIC COMMENT: NORMAL
MONOCYTES # BLD AUTO: 0.6 K/UL
MONOCYTES NFR BLD: 8.6 %
NEUTROPHILS # BLD AUTO: 3.5 K/UL
NEUTROPHILS NFR BLD: 47.8 %
NITRITE UR QL STRIP: NEGATIVE
PH UR STRIP: 7 [PH] (ref 5–8)
PLATELET # BLD AUTO: 188 K/UL
PMV BLD AUTO: 9.9 FL
PROT UR QL STRIP: NEGATIVE
RBC # BLD AUTO: 4.32 M/UL
RBC #/AREA URNS AUTO: 1 /HPF (ref 0–4)
SP GR UR STRIP: 1.01 (ref 1–1.03)
URN SPEC COLLECT METH UR: ABNORMAL
UROBILINOGEN UR STRIP-ACNC: NEGATIVE EU/DL
WBC # BLD AUTO: 7.21 K/UL
WBC #/AREA URNS AUTO: 0 /HPF (ref 0–5)

## 2017-03-08 PROCEDURE — 83540 ASSAY OF IRON: CPT

## 2017-03-08 PROCEDURE — 99499 UNLISTED E&M SERVICE: CPT | Mod: S$GLB,,, | Performed by: PHYSICIAN ASSISTANT

## 2017-03-08 PROCEDURE — 84439 ASSAY OF FREE THYROXINE: CPT

## 2017-03-08 PROCEDURE — 3074F SYST BP LT 130 MM HG: CPT | Mod: S$GLB,,, | Performed by: PHYSICIAN ASSISTANT

## 2017-03-08 PROCEDURE — 1159F MED LIST DOCD IN RCRD: CPT | Mod: S$GLB,,, | Performed by: PHYSICIAN ASSISTANT

## 2017-03-08 PROCEDURE — 1160F RVW MEDS BY RX/DR IN RCRD: CPT | Mod: S$GLB,,, | Performed by: PHYSICIAN ASSISTANT

## 2017-03-08 PROCEDURE — 99999 PR PBB SHADOW E&M-EST. PATIENT-LVL IV: CPT | Mod: PBBFAC,,, | Performed by: PHYSICIAN ASSISTANT

## 2017-03-08 PROCEDURE — 1126F AMNT PAIN NOTED NONE PRSNT: CPT | Mod: S$GLB,,, | Performed by: PHYSICIAN ASSISTANT

## 2017-03-08 PROCEDURE — 3078F DIAST BP <80 MM HG: CPT | Mod: S$GLB,,, | Performed by: PHYSICIAN ASSISTANT

## 2017-03-08 PROCEDURE — 36415 COLL VENOUS BLD VENIPUNCTURE: CPT | Mod: PO

## 2017-03-08 PROCEDURE — 85025 COMPLETE CBC W/AUTO DIFF WBC: CPT

## 2017-03-08 PROCEDURE — 80053 COMPREHEN METABOLIC PANEL: CPT

## 2017-03-08 PROCEDURE — 84443 ASSAY THYROID STIM HORMONE: CPT

## 2017-03-08 PROCEDURE — 1157F ADVNC CARE PLAN IN RCRD: CPT | Mod: S$GLB,,, | Performed by: PHYSICIAN ASSISTANT

## 2017-03-08 PROCEDURE — 99214 OFFICE O/P EST MOD 30 MIN: CPT | Mod: S$GLB,,, | Performed by: PHYSICIAN ASSISTANT

## 2017-03-08 NOTE — PROGRESS NOTES
Subjective:       Patient ID: Heraclio Wall is a 77 y.o. male.    Chief Complaint: Fatigue and Generalized Body Aches    HPI  Patient comes in today for generalized fatigue, cold intolerance.  He states he thinks it has to do with his Coumadin.  He's been on Coumadin for a while for prophylaxis of emboli.  His Coumadin level should be between 2 and 3.  Right now is 3.1.  Patient states he is wondering if it is too high but for the last year his INR is been around 3.  He has no GI symptoms, no urinary symptoms.  He has no headaches no dizziness.  He has some sinus congestion as on and off due to ALLERGIES.  No rashes.  He has no signs of bleeding.  He has no cough, no fever, chills, or night sweats.      Past Medical History:   Diagnosis Date    Anemia     Anxiety     Back pain     had some pain occasionally r/t a fall    Bilateral hearing loss     Cancer     skin cancer    Dermatitis     Disorder of kidney and ureter     Diverticulosis     Diverticulosis of colon (without mention of hemorrhage) 4/29/2014    DJD (degenerative joint disease)     Dysphagia as late effect of stroke     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    Gallstones 2012    asympt    GERD (gastroesophageal reflux disease)     HIATAL HERNIA    Hernia, diaphragmatic 6/12/12    HTN (hypertension), benign 1985    Hyperlipidemia     Hypogonadism male     Hypothyroidism     Lens replaced by other means 10/3/2013    Pterygium - Right Eye 10/3/2013    Sleep apnea, obstructive     Splenic infarct 5/4/14    Stroke 7/31/13    left lacunar    Thyroid disease     Trouble in sleeping     Unspecified vitamin D deficiency        Current Outpatient Prescriptions   Medication Sig Dispense Refill    arginine 500 mg tablet Take 1 tablet by mouth 2 (two) times daily.      ascorbic acid (VITAMIN C) 1000 MG tablet Take 3 tablets by mouth Daily.      cholecalciferol, vitamin D3, 1,000 unit capsule Take 5 capsules by mouth  Daily.      COUMADIN 2.5 mg tablet TAKE 1 TABLET BY MOUTH EVERY DAY 30 tablet 6    cyanocobalamin, vitamin B-12, 1,000 mcg/15 mL Liqd Take 1 drop by mouth once daily.       cycloSPORINE (RESTASIS) 0.05 % ophthalmic emulsion Place 0.4 mLs (1 drop total) into both eyes 2 (two) times daily. 60 vial 12    dextran 70-hypromellose (ARTIFICIAL TEARS) ophthalmic solution Place 1 drop into both eyes Use as needed.      fluvastatin XL (LESCOL XL) 80 mg Tb24 Take 1 tablet (80 mg total) by mouth once daily. 90 tablet 3    furosemide (LASIX) 40 MG tablet Take 1 tablet (40 mg total) by mouth every morning. 90 tablet 1    HYDROCORTISONE ORAL Take 2.5 capsules by mouth once daily.      PROGESTERONE MISC 6.25 mg by Misc.(Non-Drug; Combo Route) route every evening.      PROTONIX 40 mg tablet TAKE 1 TABLET BY MOUTH DAILY 30 tablet 11    saw palmetto 160 MG capsule Take 1 capsule by mouth 2 (two) times daily.      selenium 200 mcg Tab Take 1 tablet by mouth Daily.      thyroid, pork, (ARMOUR THYROID) 60 mg Tab       TOPROL XL 50 mg 24 hr tablet TAKE 1 TABLET BY MOUTH EVERY DAY IF SYSTOLIC IS GREATER THAN 180 AND OR DIASTOLIC IS GREATER THAN 96. NEED PULSE TO BE GREATER THAN 60 30 tablet 6    UNABLE TO FIND medication name: Red out eye drops      UNABLE TO FIND Take by mouth once daily. medication name: Triiodo-L-Thyronine-Levothyroxine 4.5/19mcg      verapamil (VERELAN) 240 MG C24P TAKE ONE CAPSULE BY MOUTH TWICE A  capsule 1     No current facility-administered medications for this visit.        Review of Systems   Constitutional: Positive for fatigue. Negative for fever and unexpected weight change.   HENT: Positive for congestion.    Respiratory: Negative.    Cardiovascular: Negative.    Gastrointestinal: Negative.    Endocrine: Negative.    Genitourinary: Negative.    Musculoskeletal: Negative.    Allergic/Immunologic: Negative.    Neurological: Negative.    Hematological: Negative.    Psychiatric/Behavioral:  "Negative.        Objective:   /64  Pulse 64  Temp 98.1 °F (36.7 °C) (Tympanic)   Ht 5' 10" (1.778 m)  Wt 85.7 kg (189 lb)  BMI 27.12 kg/m2     Physical Exam      Lab Results   Component Value Date    WBC 6.15 11/02/2016    HGB 13.7 (L) 11/02/2016    HCT 42.2 11/02/2016     11/02/2016    CHOL 141 11/02/2016    TRIG 148 11/02/2016    HDL 37 (L) 11/02/2016    ALT 21 11/02/2016    AST 28 11/02/2016     11/02/2016    K 4.9 11/02/2016     11/02/2016    CREATININE 1.4 11/02/2016    BUN 15 11/02/2016    CO2 30 (H) 11/02/2016    TSH 0.947 11/02/2016    PSA 1.5 11/02/2016    INR 3.1 (H) 03/02/2017    HGBA1C 5.2 06/01/2016       Assessment:       1. Fatigue, unspecified type    2. Iron deficiency    3. Anticoagulation goal of INR 2 to 3    4. Hypothyroidism due to acquired atrophy of thyroid        Plan:   Fatigue, unspecified type- very non specific.  Likely cold interolerace due to coumadin.  Will work up with labs.  Patient also has thyroid issues, on armor thyroid.   -     Comprehensive metabolic panel; Future; Expected date: 3/8/17  -     CBC auto differential; Future; Expected date: 3/8/17  -     TSH; Future; Expected date: 3/8/17  -     T4, free; Future; Expected date: 3/8/17  -     Cancel: POCT URINE DIPSTICK WITHOUT MICROSCOPE  -     URINALYSIS  Hypothyroid   TSH  T4, free   Iron deficiency  -     Iron and TIBC; Future; Expected date: 3/8/17    Anticoagulation goal of INR 2 to 3  Currently 3.1, without signs of bleeding.   "

## 2017-03-08 NOTE — MR AVS SNAPSHOT
[facility]  [address] [place] [address]  Phone: [phone]  Fax: [phone]                  Heraclio Wall   3/8/2017 10:00 AM   Office Visit    Description:  Male : 1940   Provider:  FINN Xavier   [facility]  [facility]           Reason for Visit     Fatigue     Generalized Body Aches           Diagnoses this Visit        Comments    Fatigue, unspecified type    -  Primary     Iron deficiency                To Do List           Future Appointments        Provider Department Dept Phone    3/8/2017 12:20 PM LAB[facility]ATORY, [place] [facility] [place] [phone]    3/27/2017 10:30 AM LAB[facility]ATORY, [place] [facility] [place] [phone]    2017 1:00 PM David Christopher MD [facility] [phone]    2017 1:15 PM Srinivas Kevin MD [facility] - Ophthalmology [phone]    2018 11:00 AM Ion Dumont MD [facility] - Sleep Clinic [phone]      Goals (5 Years of Data)              Today    3/3/17    2/28/17    Blood Pressure <= 140/90   120/64  120/64  120/64    Notes - Note created  6/3/2016  8:58 AM by Juana Allen, Cash    It is important to consistently maintain a controlled blood pressure.      Exercise at least 150 minutes per week.           Notes - Note created  2017  9:17 AM by Toyin Lim    Continue exercising 2-3 times per week.      Maintain a low sodium diet           Take at least one BP reading per week at various times of the day             [facility] On Call     [facility] On Call Nurse Care Line -  Assistance  Registered nurses in the [facility] On Call Center provide clinical advisement, health education, appointment booking, and other advisory services.  Call for this free service at [phone].             Medications           Message regarding Medications     Verify the changes and/or additions to your medication regime  listed below are the same as discussed with your clinician today.  If any of these changes or additions are incorrect, please notify your healthcare provider.             Verify that the below list of medications is an accurate representation of the medications you are currently taking.  If none reported, the list may be blank. If incorrect, please contact your healthcare provider. Carry this list with you in case of emergency.           Current Medications     arginine 500 mg tablet Take 1 tablet by mouth 2 (two) times daily.    ascorbic acid (VITAMIN C) 1000 MG tablet Take 3 tablets by mouth Daily.    cholecalciferol, vitamin D3, 1,000 unit capsule Take 5 capsules by mouth Daily.    COUMADIN 2.5 mg tablet TAKE 1 TABLET BY MOUTH EVERY DAY    cyanocobalamin, vitamin B-12, 1,000 mcg/15 mL Liqd Take 1 drop by mouth once daily.     cycloSPORINE (RESTASIS) 0.05 % ophthalmic emulsion Place 0.4 mLs (1 drop total) into both eyes 2 (two) times daily.    dextran 70-hypromellose (ARTIFICIAL TEARS) ophthalmic solution Place 1 drop into both eyes Use as needed.    fluvastatin XL (LESCOL XL) 80 mg Tb24 Take 1 tablet (80 mg total) by mouth once daily.    furosemide (LASIX) 40 MG tablet Take 1 tablet (40 mg total) by mouth every morning.    HYDROCORTISONE ORAL Take 2.5 capsules by mouth once daily.    PROGESTERONE MISC 6.25 mg by Misc.(Non-Drug; Combo Route) route every evening.    PROTONIX 40 mg tablet TAKE 1 TABLET BY MOUTH DAILY    saw palmetto 160 MG capsule Take 1 capsule by mouth 2 (two) times daily.    selenium 200 mcg Tab Take 1 tablet by mouth Daily.    thyroid, pork, (ARMOUR THYROID) 60 mg Tab     TOPROL XL 50 mg 24 hr tablet TAKE 1 TABLET BY MOUTH EVERY DAY IF SYSTOLIC IS GREATER THAN 180 AND OR DIASTOLIC IS GREATER THAN 96. NEED PULSE TO BE GREATER THAN 60    UNABLE TO FIND medication name: Red out eye drops    UNABLE TO FIND Take by mouth once daily. medication name: Triiodo-L-Thyronine-Levothyroxine 4.5/19mcg     "verapamil (VERELAN) 240 MG C24P TAKE ONE CAPSULE BY MOUTH TWICE A DAY           Clinical Reference Information           Your Vitals Were     BP Pulse Temp Height Weight BMI    120/64 64 98.1 °F (36.7 °C) (Tympanic) 5' 10" (1.778 m) 85.7 kg (189 lb) 27.12 kg/m2      Blood Pressure          Most Recent Value    BP  120/64      Allergies as of 3/8/2017     Milk    Olive Extract    Tea Tree    Apple    Benicar [Olmesartan]    Clonidine    Clonidine Hcl    Clopidogrel    Cardizem [Diltiazem Hcl]    Dicyclomine    Hydralazine Analogues    Lopressor [Metoprolol Tartrate]    Lipitor [Atorvastatin]    Norvasc [Amlodipine]    Pineapple    Bactrim [Sulfamethoxazole-trimethoprim]      Immunizations Administered on Date of Encounter - 3/8/2017     None      Orders Placed During Today's Visit      Normal Orders This Visit    POCT URINE DIPSTICK WITHOUT MICROSCOPE     Future Labs/Procedures Expected by Expires    CBC auto differential  3/8/2017 5/7/2018    Comprehensive metabolic panel  3/8/2017 5/7/2018    Iron and TIBC  3/8/2017 5/7/2018    T4, free  3/8/2017 5/7/2018    TSH  3/8/2017 5/7/2018      Language Assistance Services     ATTENTION: Language assistance services are available, free of charge. Please call [phone].      ATENCIÓN: Si aroldo maldonado, tiene a gonzalez disposición servicios gratuitos de asistencia lingüística. Llame al [phone].     [facility] Ý: N?u b?n nói Ti?ng Vi?t, có các d?ch v? h? tr? ngôn ng? mi?n phí dành cho b?n. G?i s? [phone].         [facility] complies with applicable Federal civil rights laws and does not discriminate on the basis of race, color, national origin, age, disability, or sex.        "

## 2017-03-09 ENCOUNTER — PATIENT OUTREACH (OUTPATIENT)
Dept: OTHER | Facility: OTHER | Age: 77
End: 2017-03-09
Payer: MEDICARE

## 2017-03-09 DIAGNOSIS — I10 ESSENTIAL HYPERTENSION: ICD-10-CM

## 2017-03-09 LAB
ALBUMIN SERPL BCP-MCNC: 4.1 G/DL
ALP SERPL-CCNC: 68 U/L
ALT SERPL W/O P-5'-P-CCNC: 25 U/L
ANION GAP SERPL CALC-SCNC: 10 MMOL/L
AST SERPL-CCNC: 27 U/L
BILIRUB SERPL-MCNC: 1.1 MG/DL
BUN SERPL-MCNC: 20 MG/DL
CALCIUM SERPL-MCNC: 9.2 MG/DL
CHLORIDE SERPL-SCNC: 105 MMOL/L
CO2 SERPL-SCNC: 26 MMOL/L
CREAT SERPL-MCNC: 1.5 MG/DL
EST. GFR  (AFRICAN AMERICAN): 50.5 ML/MIN/1.73 M^2
EST. GFR  (NON AFRICAN AMERICAN): 43.7 ML/MIN/1.73 M^2
GLUCOSE SERPL-MCNC: 89 MG/DL
IRON SERPL-MCNC: 68 UG/DL
POTASSIUM SERPL-SCNC: 4.8 MMOL/L
PROT SERPL-MCNC: 7.4 G/DL
SATURATED IRON: 17 %
SODIUM SERPL-SCNC: 141 MMOL/L
T4 FREE SERPL-MCNC: 1.08 NG/DL
TOTAL IRON BINDING CAPACITY: 401 UG/DL
TRANSFERRIN SERPL-MCNC: 271 MG/DL
TSH SERPL DL<=0.005 MIU/L-ACNC: 1.06 UIU/ML

## 2017-03-09 RX ORDER — FUROSEMIDE 40 MG/1
TABLET ORAL
Qty: 90 TABLET | Refills: 0 | Status: SHIPPED | OUTPATIENT
Start: 2017-03-09 | End: 2017-05-30 | Stop reason: SDUPTHER

## 2017-03-09 NOTE — PROGRESS NOTES
"Called patient to follow up office visit yesterday. Patient continues to have fatigue. He notes it happens about 2x per week. He tries to fight it, but "it's a drag". He states it usually starts about 2 hours after morning meds (Verapamil, 1/2 toprol, furosemide). He states he is drinking plenty of fluids including 8oz of pedialyte every day per his daughter's recommendation.     Last 5 Patient Entered Readings                                                               Current 30 Day Average: 139/77     Recent Readings 3/3/2017 3/3/2017 2/28/2017 2/25/2017 2/21/2017    Systolic BP (mmHg) 136 136 133 142 144    Diastolic BP (mmHg) 62 - 87 70 71    Pulse 50 62 50 53 55      BP at goal  Change toprol dose to dinner time  Continue monitoring    Hypertension Medications             furosemide (LASIX) 40 MG tablet Take 1 tablet (40 mg total) by mouth every morning.    TOPROL XL 50 mg 24 hr tablet TAKE 1 TABLET BY MOUTH EVERY DAY IF SYSTOLIC IS GREATER THAN 180 AND OR DIASTOLIC IS GREATER THAN 96. NEED PULSE TO BE GREATER THAN 60    verapamil (VERELAN) 240 MG C24P TAKE ONE CAPSULE BY MOUTH TWICE A DAY          "

## 2017-03-14 ENCOUNTER — PATIENT MESSAGE (OUTPATIENT)
Dept: INTERNAL MEDICINE | Facility: CLINIC | Age: 77
End: 2017-03-14

## 2017-03-17 ENCOUNTER — PATIENT OUTREACH (OUTPATIENT)
Dept: OTHER | Facility: OTHER | Age: 77
End: 2017-03-17
Payer: MEDICARE

## 2017-03-17 NOTE — PROGRESS NOTES
Mr. Wall is doing well. No questions or concerns. He is feeling better since we switched toprol dose to bedtime. He has not made it to [facility] yet because of a death in the family.     Last 5 Patient Entered Readings                                                               Current 30 Day Average: 138/77     Recent Readings 3/3/2017 3/3/2017 2/28/2017 2/25/2017 2/21/2017    Systolic BP (mmHg) 136 136 133 142 144    Diastolic BP (mmHg) 62 - 87 70 71    Pulse 50 62 50 53 55      BP at goal  Continue monitoring    Hypertension Medications             LASIX 40 mg tablet TAKE 1 TABLET BY MOUTH EVERY MORNING    TOPROL XL 50 mg 24 hr tablet TAKE 1 TABLET BY MOUTH EVERY DAY IF SYSTOLIC IS GREATER THAN 180 AND OR DIASTOLIC IS GREATER THAN 96. NEED PULSE TO BE GREATER THAN 60    verapamil (VERELAN) 240 MG C24P TAKE ONE CAPSULE BY MOUTH TWICE A DAY

## 2017-03-21 ENCOUNTER — PATIENT OUTREACH (OUTPATIENT)
Dept: OTHER | Facility: OTHER | Age: 77
End: 2017-03-21
Payer: MEDICARE

## 2017-03-21 NOTE — PROGRESS NOTES
Last 5 Patient Entered Readings                                                               Current 30 Day Average: 138/75     Recent Readings 3/20/2017 3/3/2017 3/3/2017 2/28/2017 2/25/2017    Systolic BP (mmHg) 139 136 136 133 142    Diastolic BP (mmHg) 66 62 - 87 70    Pulse 53 50 62 50 53          Follow up with Mr. Heraclio Wall completed. Patient is maintaining a low sodium diet and continuing his exercise regime. He reports that overall, he is doing well and reports that he has more better days then bad days which is good. I spoke to him about him and his wife having the same reading form yesterday. I believe something got held up in the cuff len so he will restart his phone and take the readings again today. He will call me if he notices any issues. Patient did not have any further questions or concerns. I will follow up in a few weeks to see how he is doing and progressing.

## 2017-03-22 ENCOUNTER — PATIENT MESSAGE (OUTPATIENT)
Dept: ADMINISTRATIVE | Facility: OTHER | Age: 77
End: 2017-03-22

## 2017-03-27 ENCOUNTER — LAB VISIT (OUTPATIENT)
Dept: LAB | Facility: HOSPITAL | Age: 77
End: 2017-03-27
Attending: FAMILY MEDICINE
Payer: MEDICARE

## 2017-03-27 DIAGNOSIS — Z79.01 ANTICOAGULATED ON COUMADIN: ICD-10-CM

## 2017-03-27 LAB
INR PPP: 3.7
PROTHROMBIN TIME: 37.8 SEC

## 2017-03-27 PROCEDURE — 85610 PROTHROMBIN TIME: CPT

## 2017-03-27 PROCEDURE — 36415 COLL VENOUS BLD VENIPUNCTURE: CPT | Mod: PO

## 2017-03-28 ENCOUNTER — ANTI-COAG VISIT (OUTPATIENT)
Dept: INTERNAL MEDICINE | Facility: CLINIC | Age: 77
End: 2017-03-28

## 2017-03-28 ENCOUNTER — TELEPHONE (OUTPATIENT)
Dept: INTERNAL MEDICINE | Facility: CLINIC | Age: 77
End: 2017-03-28

## 2017-03-28 DIAGNOSIS — Z79.01 ANTICOAGULATED ON COUMADIN: Primary | ICD-10-CM

## 2017-03-28 NOTE — TELEPHONE ENCOUNTER
Notified pt of results and recommendations. He verbalized understanding.     Please enter PT/INR order

## 2017-04-11 ENCOUNTER — LAB VISIT (OUTPATIENT)
Dept: LAB | Facility: HOSPITAL | Age: 77
End: 2017-04-11
Attending: INTERNAL MEDICINE
Payer: MEDICARE

## 2017-04-11 DIAGNOSIS — Z79.01 ANTICOAGULATED ON COUMADIN: ICD-10-CM

## 2017-04-11 LAB
INR PPP: 1.3
PROTHROMBIN TIME: 13.4 SEC

## 2017-04-11 PROCEDURE — 36415 COLL VENOUS BLD VENIPUNCTURE: CPT | Mod: PO

## 2017-04-11 PROCEDURE — 85610 PROTHROMBIN TIME: CPT

## 2017-04-12 ENCOUNTER — ANTI-COAG VISIT (OUTPATIENT)
Dept: INTERNAL MEDICINE | Facility: CLINIC | Age: 77
End: 2017-04-12

## 2017-04-12 ENCOUNTER — TELEPHONE (OUTPATIENT)
Dept: INTERNAL MEDICINE | Facility: CLINIC | Age: 77
End: 2017-04-12

## 2017-04-12 ENCOUNTER — PATIENT MESSAGE (OUTPATIENT)
Dept: ADMINISTRATIVE | Facility: OTHER | Age: 77
End: 2017-04-12

## 2017-04-12 RX ORDER — WARFARIN SODIUM 5 MG/1
5 TABLET ORAL
COMMUNITY
End: 2017-04-12 | Stop reason: SDUPTHER

## 2017-04-12 RX ORDER — WARFARIN 2.5 MG/1
2.5 TABLET ORAL
Qty: 30 TABLET | Refills: 6 | Status: SHIPPED | OUTPATIENT
Start: 2017-04-13 | End: 2017-04-19 | Stop reason: SDUPTHER

## 2017-04-12 RX ORDER — WARFARIN SODIUM 5 MG/1
5 TABLET ORAL
Qty: 30 TABLET | Refills: 11 | Status: SHIPPED | OUTPATIENT
Start: 2017-04-12 | End: 2017-04-19

## 2017-04-12 NOTE — TELEPHONE ENCOUNTER
"Got abnormal inr today.  Patient asked about his "surgery". I don't know anything about any surgery.  I don't see anything in his chart about a surgery.  "

## 2017-04-12 NOTE — PROGRESS NOTES
Called pt and let know that INR 1.3, need to take coumadin 5mg Mon, Wed and Friday and 2.5mg all other days.  Booked lab for recheck 5-3-17 at 10:30 am.  They will need 5 mg and 2.5 mg sent to [facility] [address].  Will sent refill request.  She wanted to make sure that Dr. Lang knew that after his surgery, Dr. Rascon said that pt's INR needs to be between 1.5 - 2.0.

## 2017-04-18 ENCOUNTER — PATIENT OUTREACH (OUTPATIENT)
Dept: OTHER | Facility: OTHER | Age: 77
End: 2017-04-18
Payer: MEDICARE

## 2017-04-18 NOTE — PROGRESS NOTES
Last 5 Patient Entered Readings                                                               Current 30 Day Average: 137/70     Recent Readings 4/7/2017 4/4/2017 3/31/2017 3/21/2017 3/20/2017    Systolic BP (mmHg) 140 135 137 137 139    Diastolic BP (mmHg) 68 - 68 80 66    Pulse 52 74 50 54 53          Follow up with Mr. Heraclio Wall completed. Patient is maintaining a low sodium diet and continuing his exercise regime. He reports that he is doing well and feeling great. He admits that he has been a little behind on readings due to the recent holidays but will get back on track this week. Patient did not have any further questions or concerns. I will follow up in a few weeks to see how he is doing and progressing.

## 2017-04-19 RX ORDER — WARFARIN 2.5 MG/1
TABLET ORAL
Qty: 42 TABLET | Refills: 6 | Status: SHIPPED | OUTPATIENT
Start: 2017-04-19 | End: 2017-11-30 | Stop reason: DRUGHIGH

## 2017-04-19 NOTE — TELEPHONE ENCOUNTER
Called pt and he said that he is not having any surgery.  Must have been a misunderstanding.     Pt wants a new Rx for 2.5 mgs to take for all the doses as the 5 mg tabs were $60.00 for 12 tablets.  He will just use the 2.5 mg and not 5 mgs.   Will need the directions changed on the 2.5 mg.  Want sent to [facility] [address].

## 2017-04-20 ENCOUNTER — TELEPHONE (OUTPATIENT)
Dept: INTERNAL MEDICINE | Facility: CLINIC | Age: 77
End: 2017-04-20

## 2017-04-20 NOTE — TELEPHONE ENCOUNTER
Pt notified. He verbalized understanding. He will continue coumadin at dosage recommended. If problem persist or worsens, he will come to urgent care or call to schedule an appt

## 2017-04-20 NOTE — TELEPHONE ENCOUNTER
----- Message from Salinas Hunter sent at 4/20/2017  1:15 PM CDT -----  Contact: pt wife Anitha   Pt is calling nurse staff regarding the dosage of coumadin that pt is now receiving. Pt is having some chills now.  Pt call back [phone] to be advised thanks

## 2017-04-20 NOTE — TELEPHONE ENCOUNTER
The warfarin should not have any impact on how hot or cold he feels.  He should make an appt to have these symptoms assessed.

## 2017-04-20 NOTE — TELEPHONE ENCOUNTER
Pt says, since he started taking warfarin 5 mg Mon, Wed, Fri and 2.5 mg all other days, his hands have been very cold and he has chills all the time. He didn't feel like this when he took 2.5 mg every day.

## 2017-05-01 ENCOUNTER — TELEPHONE (OUTPATIENT)
Dept: INTERNAL MEDICINE | Facility: CLINIC | Age: 77
End: 2017-05-01

## 2017-05-01 DIAGNOSIS — Z79.01 ANTICOAGULATED ON COUMADIN: Primary | ICD-10-CM

## 2017-05-01 NOTE — TELEPHONE ENCOUNTER
----- Message from Nat Sam sent at 5/1/2017 12:46 PM CDT -----  Pt is scheduled for bloodwork tomorrow but I don't see any orders linked.  I wanted to see if orders could be put in and linked to the appt. Thanks.

## 2017-05-01 NOTE — TELEPHONE ENCOUNTER
----- Message from Melissa Zeng sent at 5/1/2017 12:35 PM CDT -----  Contact: patient  Patient is scheduled for labs on 5/2/17; there are no orders attached.    Thanks,  Melissa

## 2017-05-02 ENCOUNTER — PATIENT MESSAGE (OUTPATIENT)
Dept: INTERNAL MEDICINE | Facility: CLINIC | Age: 77
End: 2017-05-02

## 2017-05-02 ENCOUNTER — LAB VISIT (OUTPATIENT)
Dept: LAB | Facility: HOSPITAL | Age: 77
End: 2017-05-02
Attending: FAMILY MEDICINE
Payer: MEDICARE

## 2017-05-02 DIAGNOSIS — Z79.01 ANTICOAGULATED ON COUMADIN: ICD-10-CM

## 2017-05-02 DIAGNOSIS — Z79.01 ANTICOAGULATED ON COUMADIN: Primary | ICD-10-CM

## 2017-05-02 LAB
INR PPP: 4.8
PROTHROMBIN TIME: 47.3 SEC

## 2017-05-02 PROCEDURE — 85610 PROTHROMBIN TIME: CPT

## 2017-05-02 PROCEDURE — 36415 COLL VENOUS BLD VENIPUNCTURE: CPT | Mod: PO

## 2017-05-03 ENCOUNTER — PATIENT MESSAGE (OUTPATIENT)
Dept: INTERNAL MEDICINE | Facility: CLINIC | Age: 77
End: 2017-05-03

## 2017-05-05 ENCOUNTER — PATIENT OUTREACH (OUTPATIENT)
Dept: OTHER | Facility: OTHER | Age: 77
End: 2017-05-05
Payer: MEDICARE

## 2017-05-05 NOTE — PROGRESS NOTES
Mr. Wall is doing well. He continues to feel cold all the time, which brings down his energy. He recently had elevated INR. He has appt to follow up with Dr. Christopher regarding anticoagulation.     Last 5 Patient Entered Readings                                                               Current 30 Day Average: 136/63     Recent Readings 4/29/2017 4/23/2017 4/18/2017 4/7/2017 4/4/2017    Systolic BP (mmHg) 134 142 130 140 135    Diastolic BP (mmHg) 65 58 61 68 -    Pulse 52 51 52 52 74      BP at goal  Continue monitoring    Hypertension Medications             LASIX 40 mg tablet TAKE 1 TABLET BY MOUTH EVERY MORNING    TOPROL XL 50 mg 24 hr tablet TAKE 1 TABLET BY MOUTH EVERY DAY IF SYSTOLIC IS GREATER THAN 180 AND OR DIASTOLIC IS GREATER THAN 96. NEED PULSE TO BE GREATER THAN 60    verapamil (VERELAN) 240 MG C24P TAKE ONE CAPSULE BY MOUTH TWICE A DAY

## 2017-05-12 ENCOUNTER — PATIENT MESSAGE (OUTPATIENT)
Dept: ADMINISTRATIVE | Facility: OTHER | Age: 77
End: 2017-05-12

## 2017-05-17 ENCOUNTER — LAB VISIT (OUTPATIENT)
Dept: LAB | Facility: HOSPITAL | Age: 77
End: 2017-05-17
Attending: FAMILY MEDICINE
Payer: MEDICARE

## 2017-05-17 DIAGNOSIS — Z79.01 ANTICOAGULATED ON COUMADIN: ICD-10-CM

## 2017-05-17 LAB
INR PPP: 2.5
PROTHROMBIN TIME: 25.6 SEC

## 2017-05-17 PROCEDURE — 36415 COLL VENOUS BLD VENIPUNCTURE: CPT | Mod: PO

## 2017-05-17 PROCEDURE — 85610 PROTHROMBIN TIME: CPT

## 2017-05-18 ENCOUNTER — PATIENT OUTREACH (OUTPATIENT)
Dept: OTHER | Facility: OTHER | Age: 77
End: 2017-05-18
Payer: MEDICARE

## 2017-05-18 ENCOUNTER — PATIENT MESSAGE (OUTPATIENT)
Dept: INTERNAL MEDICINE | Facility: CLINIC | Age: 77
End: 2017-05-18

## 2017-05-18 NOTE — PROGRESS NOTES
Last 5 Patient Entered Readings                                                               Current 30 Day Average: 135/61     Recent Readings 4/29/2017 4/23/2017 4/18/2017 4/7/2017 4/4/2017    Systolic BP (mmHg) 134 142 130 140 135    Diastolic BP (mmHg) 65 58 61 68 -    Pulse 52 51 52 52 74        LVM and requested patient call back so we can discuss the lack of readings.

## 2017-05-30 DIAGNOSIS — I10 ESSENTIAL HYPERTENSION: ICD-10-CM

## 2017-05-30 RX ORDER — FUROSEMIDE 40 MG/1
TABLET ORAL
Qty: 90 TABLET | Refills: 1 | Status: SHIPPED | OUTPATIENT
Start: 2017-05-30 | End: 2017-09-18 | Stop reason: SDUPTHER

## 2017-05-30 RX ORDER — VERAPAMIL HYDROCHLORIDE 240 MG/1
CAPSULE, EXTENDED RELEASE ORAL
Qty: 180 CAPSULE | Refills: 1 | Status: SHIPPED | OUTPATIENT
Start: 2017-05-30 | End: 2017-08-21 | Stop reason: ALTCHOICE

## 2017-06-05 NOTE — PROGRESS NOTES
Last 5 Patient Entered Readings                                                               Current 30 Day Average: 135/64     Recent Readings 5/22/2017 4/29/2017 4/23/2017 4/18/2017 4/7/2017    Systolic BP (mmHg) 135 134 142 130 140    Diastolic BP (mmHg) 64 65 58 61 68    Pulse 50 52 51 52 52          Follow up with Mr. Heraclio Wall completed. Patient is maintaining a low sodium diet and continuing his exercise regime. He reports that he is doing well and feeling great. He admits that they just have been slacking on taking their readings will be work on doing better. He will let me know if they have any issues with their cuff whenever they take their readings. Patient did not have any further questions or concerns. I will follow up in a few weeks to see how he is doing and progressing.

## 2017-06-12 ENCOUNTER — OFFICE VISIT (OUTPATIENT)
Dept: INTERNAL MEDICINE | Facility: CLINIC | Age: 77
End: 2017-06-12
Payer: MEDICARE

## 2017-06-12 ENCOUNTER — LAB VISIT (OUTPATIENT)
Dept: LAB | Facility: HOSPITAL | Age: 77
End: 2017-06-12
Attending: FAMILY MEDICINE
Payer: MEDICARE

## 2017-06-12 VITALS
DIASTOLIC BLOOD PRESSURE: 72 MMHG | TEMPERATURE: 98 F | HEART RATE: 60 BPM | SYSTOLIC BLOOD PRESSURE: 130 MMHG | BODY MASS INDEX: 25.75 KG/M2 | WEIGHT: 179.88 LBS | HEIGHT: 70 IN

## 2017-06-12 DIAGNOSIS — I10 HTN (HYPERTENSION), BENIGN: Primary | ICD-10-CM

## 2017-06-12 DIAGNOSIS — N18.3 CKD (CHRONIC KIDNEY DISEASE), STAGE 3 (MODERATE): ICD-10-CM

## 2017-06-12 DIAGNOSIS — I10 HTN (HYPERTENSION), BENIGN: ICD-10-CM

## 2017-06-12 DIAGNOSIS — Z12.11 COLON CANCER SCREENING: ICD-10-CM

## 2017-06-12 DIAGNOSIS — G47.33 OSA ON CPAP: ICD-10-CM

## 2017-06-12 DIAGNOSIS — Z79.01 ANTICOAGULATED ON COUMADIN: ICD-10-CM

## 2017-06-12 DIAGNOSIS — I70.0 ATHEROSCLEROSIS OF AORTA: ICD-10-CM

## 2017-06-12 DIAGNOSIS — I77.9 BILATERAL CAROTID ARTERY DISEASE: ICD-10-CM

## 2017-06-12 LAB
ANION GAP SERPL CALC-SCNC: 10 MMOL/L
BASOPHILS # BLD AUTO: 0.02 K/UL
BASOPHILS NFR BLD: 0.3 %
BUN SERPL-MCNC: 18 MG/DL
CALCIUM SERPL-MCNC: 9.5 MG/DL
CHLORIDE SERPL-SCNC: 104 MMOL/L
CO2 SERPL-SCNC: 28 MMOL/L
CREAT SERPL-MCNC: 1.5 MG/DL
DIFFERENTIAL METHOD: ABNORMAL
EOSINOPHIL # BLD AUTO: 0.2 K/UL
EOSINOPHIL NFR BLD: 2.2 %
ERYTHROCYTE [DISTWIDTH] IN BLOOD BY AUTOMATED COUNT: 12.6 %
EST. GFR  (AFRICAN AMERICAN): 50 ML/MIN/1.73 M^2
EST. GFR  (NON AFRICAN AMERICAN): 44 ML/MIN/1.73 M^2
GLUCOSE SERPL-MCNC: 95 MG/DL
HCT VFR BLD AUTO: 40.6 %
HGB BLD-MCNC: 13.8 G/DL
INR PPP: 2.9
LYMPHOCYTES # BLD AUTO: 2.6 K/UL
LYMPHOCYTES NFR BLD: 35.1 %
MCH RBC QN AUTO: 31.7 PG
MCHC RBC AUTO-ENTMCNC: 34 %
MCV RBC AUTO: 93 FL
MONOCYTES # BLD AUTO: 0.6 K/UL
MONOCYTES NFR BLD: 8 %
NEUTROPHILS # BLD AUTO: 4 K/UL
NEUTROPHILS NFR BLD: 54.4 %
PLATELET # BLD AUTO: 170 K/UL
PMV BLD AUTO: 9.8 FL
POTASSIUM SERPL-SCNC: 4.9 MMOL/L
PROTHROMBIN TIME: 28.8 SEC
RBC # BLD AUTO: 4.35 M/UL
SODIUM SERPL-SCNC: 142 MMOL/L
WBC # BLD AUTO: 7.33 K/UL

## 2017-06-12 PROCEDURE — 1126F AMNT PAIN NOTED NONE PRSNT: CPT | Mod: S$GLB,,, | Performed by: FAMILY MEDICINE

## 2017-06-12 PROCEDURE — 80048 BASIC METABOLIC PNL TOTAL CA: CPT

## 2017-06-12 PROCEDURE — 85610 PROTHROMBIN TIME: CPT

## 2017-06-12 PROCEDURE — 85025 COMPLETE CBC W/AUTO DIFF WBC: CPT

## 2017-06-12 PROCEDURE — 99499 UNLISTED E&M SERVICE: CPT | Mod: S$GLB,,, | Performed by: FAMILY MEDICINE

## 2017-06-12 PROCEDURE — 1159F MED LIST DOCD IN RCRD: CPT | Mod: S$GLB,,, | Performed by: FAMILY MEDICINE

## 2017-06-12 PROCEDURE — 99999 PR PBB SHADOW E&M-EST. PATIENT-LVL III: CPT | Mod: PBBFAC,,, | Performed by: FAMILY MEDICINE

## 2017-06-12 PROCEDURE — 99214 OFFICE O/P EST MOD 30 MIN: CPT | Mod: S$GLB,,, | Performed by: FAMILY MEDICINE

## 2017-06-12 PROCEDURE — 36415 COLL VENOUS BLD VENIPUNCTURE: CPT | Mod: PO

## 2017-06-13 ENCOUNTER — PATIENT MESSAGE (OUTPATIENT)
Dept: INTERNAL MEDICINE | Facility: CLINIC | Age: 77
End: 2017-06-13

## 2017-06-13 NOTE — PROGRESS NOTES
Subjective:      Patient ID: Heraclio Wall is a 77 y.o. male.    Chief Complaint:  F/U chronic conditions    HPI  78 yo male here today for f/u on chronic conditions.  He is taking all meds, being monitored by HTN program and BP is stable.  Taking coumadin, no problems with hematuria/melena or bleeding with brushing teeth.  No CP/SOB.  Bowels moving well.  Has very gassy stomach.  Always been this way.  Been eating more healthy, buying prepared meals from company with his wife.  Tries to get to the gym for exercise.  Weight is down some.    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    Unspecified vitamin D deficiency      Family History   Problem Relation Age of Onset    Hypertension Mother     Heart disease Mother     Stroke Mother     Glaucoma Mother     Cataracts Mother     Hyperlipidemia Mother     Hypertension Father     Peripheral vascular disease Father     Aortic aneurysm Father     Cancer Sister 64     breast 64, tongue 79    Stroke Sister     Hearing loss Sister     Diabetes Neg Hx     Blindness Neg Hx     Macular degeneration Neg Hx     Retinal detachment Neg Hx     Strabismus Neg Hx     Asthma Neg Hx     COPD Neg Hx     Mental illness Neg Hx     Mental retardation Neg Hx     Drug abuse Neg Hx     Alcohol abuse Neg Hx     Kidney disease Neg Hx      Past Surgical History:   Procedure Laterality Date    ADENOIDECTOMY  1942    CATARACT EXTRACTION Bilateral     CHOLECYSTECTOMY  2/2015    CIRCUMCISION, PRIMARY  1938  "   EYE SURGERY Bilateral 2012    cataracts w/IOL    SMALL INTESTINE SURGERY  5/4/2014    TONSILLECTOMY  1942     Social History   Substance Use Topics    Smoking status: Never Smoker    Smokeless tobacco: Never Used    Alcohol use No       /72   Pulse 60   Temp 97.8 °F (36.6 °C) (Tympanic)   Ht 5' 10" (1.778 m)   Wt 81.6 kg (179 lb 14.3 oz)   BMI 25.81 kg/m²     Review of Systems   Constitutional: Negative for activity change, appetite change, chills, diaphoresis, fatigue, fever and unexpected weight change.   HENT: Negative for hearing loss and tinnitus.    Eyes: Negative for visual disturbance.   Respiratory: Negative for cough, chest tightness, shortness of breath and wheezing.    Cardiovascular: Negative for chest pain, palpitations and leg swelling.   Gastrointestinal: Negative for abdominal distention, blood in stool, constipation and diarrhea.   Genitourinary: Negative for difficulty urinating.   Musculoskeletal: Positive for arthralgias. Negative for back pain.   Skin: Negative.    Neurological: Negative for dizziness and headaches.   Psychiatric/Behavioral: Negative.      Objective:     Physical Exam   Constitutional: He is oriented to person, place, and time. He appears well-developed and well-nourished. No distress.   HENT:   Right Ear: External ear normal.   Left Ear: External ear normal.   Nose: Nose normal.   Mouth/Throat: Oropharynx is clear and moist.   Eyes: Pupils are equal, round, and reactive to light.   Neck: Normal range of motion. Neck supple.   Cardiovascular: Normal rate and regular rhythm.    No murmur heard.  Pulmonary/Chest: Effort normal and breath sounds normal. No respiratory distress. He has no wheezes.   Abdominal: Soft. Bowel sounds are normal. He exhibits no distension.   Musculoskeletal: He exhibits no edema.   Neurological: He is alert and oriented to person, place, and time. No cranial nerve deficit.   Skin: Skin is warm and dry. He is not diaphoretic. "   Psychiatric: He has a normal mood and affect. His behavior is normal. Thought content normal.   Nursing note and vitals reviewed.      Lab Results   Component Value Date    WBC 7.33 06/12/2017    HGB 13.8 (L) 06/12/2017    HCT 40.6 06/12/2017     06/12/2017    CHOL 141 11/02/2016    TRIG 148 11/02/2016    HDL 37 (L) 11/02/2016    ALT 25 03/08/2017    AST 27 03/08/2017     06/12/2017    K 4.9 06/12/2017     06/12/2017    CREATININE 1.5 (H) 06/12/2017    BUN 18 06/12/2017    CO2 28 06/12/2017    TSH 1.059 03/08/2017    PSA 1.5 11/02/2016    INR 2.9 (H) 06/12/2017    HGBA1C 5.2 06/01/2016       Assessment:     1. HTN (hypertension), benign    2. CHESTER on CPAP    3. CKD (chronic kidney disease), stage 3 (moderate)    4. Anticoagulated on Coumadin    5. Colon cancer screening    6. Bilateral carotid artery disease    7. Atherosclerosis of aorta       Plan:   HTN (hypertension), benign  -     Basic metabolic panel; Future; Expected date: 06/12/2017  -     CBC auto differential; Future; Expected date: 06/12/2017    CHESTER on CPAP    CKD (chronic kidney disease), stage 3 (moderate)    Anticoagulated on Coumadin    Colon cancer screening  -     Ambulatory consult to Gastroenterology    Bilateral carotid artery disease    Atherosclerosis of aorta    Bp stable, cont to monitor with program.  Cont current meds.  Check PT/INR.  Taking 2.5mg of Coumadin daily at this time.  CKD stable, avoid NSAIDs  Will have pt meet with Dr. Alejandro to discuss need for further colon screening.  Cont current meds  Cont with specialty f/u  Recheck in 6 mos

## 2017-06-20 ENCOUNTER — PATIENT MESSAGE (OUTPATIENT)
Dept: ADMINISTRATIVE | Facility: OTHER | Age: 77
End: 2017-06-20

## 2017-06-26 RX ORDER — PANTOPRAZOLE SODIUM 40 MG
TABLET, DELAYED RELEASE (ENTERIC COATED) ORAL
Qty: 30 TABLET | Refills: 6 | Status: SHIPPED | OUTPATIENT
Start: 2017-06-26 | End: 2018-01-22 | Stop reason: SDUPTHER

## 2017-07-06 ENCOUNTER — PATIENT OUTREACH (OUTPATIENT)
Dept: OTHER | Facility: OTHER | Age: 77
End: 2017-07-06

## 2017-07-06 NOTE — PROGRESS NOTES
Last 5 Patient Entered Readings                                                               Current 30 Day Average: 143/74     Recent Readings 7/5/2017 7/5/2017 7/5/2017 7/5/2017 7/5/2017    Systolic BP (mmHg) 139 148 150 154 156    Diastolic BP (mmHg) 73 74 80 78 83    Pulse 64 65 65 65 65          Follow up with Mr. Heraclio Wall completed. Patient is maintaining a low sodium diet and continuing his exercise regime. He reports that he is doing well and feeling good. He feels like they have everything fixed with his cuff since he got a new one. He is getting back on track with taking readings weekly again. He mentioned that he and his wife went to the gym yesterday and they did good. He will continue to keep this up. Patient did not have any further questions or concerns. I will follow up in a few weeks to see how he is doing and progressing.

## 2017-07-12 ENCOUNTER — INITIAL CONSULT (OUTPATIENT)
Dept: GASTROENTEROLOGY | Facility: CLINIC | Age: 77
End: 2017-07-12
Payer: MEDICARE

## 2017-07-12 ENCOUNTER — PATIENT MESSAGE (OUTPATIENT)
Dept: INTERNAL MEDICINE | Facility: CLINIC | Age: 77
End: 2017-07-12

## 2017-07-12 ENCOUNTER — LAB VISIT (OUTPATIENT)
Dept: LAB | Facility: HOSPITAL | Age: 77
End: 2017-07-12
Attending: FAMILY MEDICINE
Payer: MEDICARE

## 2017-07-12 VITALS
HEIGHT: 71 IN | DIASTOLIC BLOOD PRESSURE: 60 MMHG | SYSTOLIC BLOOD PRESSURE: 138 MMHG | BODY MASS INDEX: 27.19 KG/M2 | WEIGHT: 194.25 LBS

## 2017-07-12 DIAGNOSIS — Z12.11 COLON CANCER SCREENING: Primary | ICD-10-CM

## 2017-07-12 DIAGNOSIS — Z79.01 CURRENT USE OF LONG TERM ANTICOAGULATION: ICD-10-CM

## 2017-07-12 DIAGNOSIS — Z79.01 ANTICOAGULATED ON COUMADIN: ICD-10-CM

## 2017-07-12 DIAGNOSIS — R14.3 FLATULENCE: ICD-10-CM

## 2017-07-12 LAB
INR PPP: 1.9
PROTHROMBIN TIME: 19 SEC

## 2017-07-12 PROCEDURE — 1159F MED LIST DOCD IN RCRD: CPT | Mod: S$GLB,,, | Performed by: NURSE PRACTITIONER

## 2017-07-12 PROCEDURE — 1126F AMNT PAIN NOTED NONE PRSNT: CPT | Mod: S$GLB,,, | Performed by: NURSE PRACTITIONER

## 2017-07-12 PROCEDURE — 99999 PR PBB SHADOW E&M-EST. PATIENT-LVL II: CPT | Mod: PBBFAC,,, | Performed by: NURSE PRACTITIONER

## 2017-07-12 PROCEDURE — 99212 OFFICE O/P EST SF 10 MIN: CPT | Mod: S$GLB,,, | Performed by: NURSE PRACTITIONER

## 2017-07-12 NOTE — LETTER
July 12, 2017      David Christopher MD  [id number] [place] [id number]           [facility]  [address] [place] [id number]  Phone: [phone]  Fax: [phone]          Patient: Heraclio Wall   MR Number: [id number]   YOB: 1940   Date of Visit: 7/12/2017       Dear Dr. David Christopher:    Thank you for referring Heraclio Wall to me for evaluation. Attached you will find relevant portions of my assessment and plan of care.    If you have questions, please do not hesitate to call me. I look forward to following Heraclio Wall along with you.    Sincerely,    Marianela Capone, GURPREET    Enclosure  CC:  No Recipients    If you would like to receive this communication electronically, please contact [email or web address] or [phone] to request more information on [employer] Link access.    For providers and/or their staff who would like to refer a patient to [facility], please contact us through our one-stop-shop provider referral line, [facility], at [phone].    If you feel you have received this communication in error or would no longer like to receive these types of communications, please e-mail [email or web address]

## 2017-07-12 NOTE — PROGRESS NOTES
Clinic Consult:  [facility] Consultation Note    Reason for Consult:  The primary encounter diagnosis was Colon cancer screening. Diagnoses of Current use of long term anticoagulation and Flatulence were also pertinent to this visit.    PCP: David Christopher   [id number] [id number]    HPI:  This is a 77 y.o. male here for evaluation of the above. Patient received notification that it was time for his colonoscopy for colon cancer screening. He has spoken to his primary care about whether or not to continue screenings. The decision was to consult me for my input. I have personally reviewed his last two colonoscopies on file from 2009 and 2010. Both were indicated for abdominall pain and were normal. There were no polyps noted, however, Dr. Alejandro recommended repeating in 5 years. Discussed with patient and wife about risks and benefits of the endoscopy. He is in fairly good health and is active but is on coumadin S/P stroke secondary to blood clot in 2013. He complains about increased flatulence but this has been present for several years. He admits he does eat things that he knows causes him abdominal cramping (ex: ice cream). He denies any hematochezia, melena, abdominal pain, or weight loss.     Review of Systems   Constitutional: Negative for fever, malaise/fatigue and weight loss.   HENT: Negative for sore throat.    Respiratory: Negative for cough and wheezing.    Cardiovascular: Negative for chest pain and palpitations.   Gastrointestinal: Negative for abdominal pain, blood in stool, constipation, diarrhea, heartburn, melena, nausea and vomiting.        Flatulence   Genitourinary: Negative for dysuria and frequency.   Musculoskeletal: Negative for back pain, joint pain, myalgias and neck pain.   Skin: Negative for itching and rash.   Neurological: Negative for dizziness, speech change, seizures, loss of consciousness and headaches.   Psychiatric/Behavioral: Negative for  depression and substance abuse. The patient is not nervous/anxious.        Medical History:  has a past medical history of Anxiety; Back pain; Bilateral hearing loss; Cancer; Disorder of kidney and ureter; Diverticulosis; DJD (degenerative joint disease); Embolism and thrombosis of unspecified artery (5/14/2014); Hernia, diaphragmatic (6/12/12); Hypogonadism male; Lens replaced by other means (10/3/2013); Pterygium - Right Eye (10/3/2013); Splenic infarct (5/4/14); Stroke (7/31/13); and Unspecified vitamin D deficiency.    Surgical History:  has a past surgical history that includes Circumcision, primary (1938); Tonsillectomy (1942); Adenoidectomy (1942); Cholecystectomy (2/2015); Cataract extraction (Bilateral); Small intestine surgery (5/4/2014); and Eye surgery (Bilateral, 2012).    Family History: family history includes Aortic aneurysm in his father; Cancer (age of onset: 64) in his sister; Cataracts in his mother; Glaucoma in his mother; Hearing loss in his sister; Heart disease in his mother; Hyperlipidemia in his mother; Hypertension in his father and mother; Peripheral vascular disease in his father; Stroke in his mother and sister..     Social History:  reports that he has never smoked. He has never used smokeless tobacco. He reports that he does not drink alcohol or use drugs.    Allergies: Reviewed    Home Medications:   Current Outpatient Prescriptions on File Prior to Visit   Medication Sig Dispense Refill    arginine 500 mg tablet Take 1 tablet by mouth 2 (two) times daily.      ascorbic acid (VITAMIN C) 1000 MG tablet Take 3 tablets by mouth Daily.      cholecalciferol, vitamin D3, 1,000 unit capsule Take 5 capsules by mouth Daily.      cyanocobalamin, vitamin B-12, 1,000 mcg/15 mL Liqd Take 1 drop by mouth once daily.       cycloSPORINE (RESTASIS) 0.05 % ophthalmic emulsion Place 0.4 mLs (1 drop total) into both eyes 2 (two) times daily. 60 vial 12    dextran 70-hypromellose (ARTIFICIAL TEARS)  "ophthalmic solution Place 1 drop into both eyes Use as needed.      fluvastatin XL (LESCOL XL) 80 mg Tb24 Take 1 tablet (80 mg total) by mouth once daily. 90 tablet 3    HYDROCORTISONE ORAL Take 2.5 capsules by mouth once daily.      LASIX 40 mg tablet TAKE 1 TABLET BY MOUTH EVERY MORNING 90 tablet 1    PROGESTERONE MISC 6.25 mg by Misc.(Non-Drug; Combo Route) route every evening.      PROTONIX 40 mg tablet TAKE 1 TABLET BY MOUTH DAILY 30 tablet 6    saw palmetto 160 MG capsule Take 1 capsule by mouth 2 (two) times daily.      selenium 200 mcg Tab Take 1 tablet by mouth Daily.      thyroid, pork, (ARMOUR THYROID) 60 mg Tab       TOPROL XL 50 mg 24 hr tablet TAKE 1 TABLET BY MOUTH EVERY DAY IF SYSTOLIC IS GREATER THAN 180 AND OR DIASTOLIC IS GREATER THAN 96. NEED PULSE TO BE GREATER THAN 60 30 tablet 6    UNABLE TO FIND medication name: Red out eye drops      UNABLE TO FIND Take by mouth once daily. medication name: Triiodo-L-Thyronine-Levothyroxine 4.5/19mcg      verapamil (VERELAN) 240 MG C24P TAKE 1 CAPSULE BY MOUTH TWO TIMES A  capsule 1    warfarin (COUMADIN) 2.5 MG tablet Take 5mg Mon/Wed/Fri.  Take 2.5mg all other days. (Patient taking differently: Take 2.5 mg by mouth Daily. Take 5mg Mon/Wed/Fri.  Take 2.5mg all other days.) 42 tablet 6     No current facility-administered medications on file prior to visit.        Physical Exam:  Vital Signs:  /60   Ht 5' 11" (1.803 m)   Wt 88.1 kg (194 lb 3.6 oz)   BMI 27.09 kg/m²   Body mass index is 27.09 kg/m².  Physical Exam   Constitutional: He is oriented to person, place, and time and well-developed, well-nourished, and in no distress. No distress.   HENT:   Head: Normocephalic.   Eyes: Conjunctivae are normal. Pupils are equal, round, and reactive to light.   Cardiovascular: Normal rate, regular rhythm and normal heart sounds.    Pulmonary/Chest: Effort normal and breath sounds normal. No respiratory distress.   Abdominal: Soft. Bowel " sounds are normal. He exhibits no distension. There is no tenderness.   Neurological: He is alert and oriented to person, place, and time. No cranial nerve deficit.   Skin: Skin is warm and dry. No rash noted.   Psychiatric: Mood and affect normal.       Labs: Pertinent labs reviewed.    CRC Screening: Due    Assessment:  1. Colon cancer screening    2. Current use of long term anticoagulation    3. Flatulence           Recommendations:  He may continue GasX for increased gas. Discussed with patient and wife risks and benefits of colonoscopy screening. His abdominal complaints are chronic and have not changed. He is at greater risk for complications with being on coumadin and having to hold it prior to endoscopy. Patient has elected to stop colon cancer screenings. I support his decision to stop screening. He should contact my office with any GI problems.     Return to clinic as needed.     Thank you so much for allowing me to participate in the care of LISA Farley

## 2017-07-25 RX ORDER — FLUVASTATIN SODIUM 80 MG/1
80 TABLET, FILM COATED, EXTENDED RELEASE ORAL DAILY
Qty: 90 TABLET | Refills: 3 | Status: SHIPPED | OUTPATIENT
Start: 2017-07-25 | End: 2018-06-19 | Stop reason: SDUPTHER

## 2017-08-09 ENCOUNTER — PATIENT MESSAGE (OUTPATIENT)
Dept: INTERNAL MEDICINE | Facility: CLINIC | Age: 77
End: 2017-08-09

## 2017-08-09 ENCOUNTER — LAB VISIT (OUTPATIENT)
Dept: LAB | Facility: HOSPITAL | Age: 77
End: 2017-08-09
Attending: FAMILY MEDICINE
Payer: MEDICARE

## 2017-08-09 ENCOUNTER — PATIENT OUTREACH (OUTPATIENT)
Dept: OTHER | Facility: OTHER | Age: 77
End: 2017-08-09

## 2017-08-09 DIAGNOSIS — Z79.01 ANTICOAGULATED ON COUMADIN: ICD-10-CM

## 2017-08-09 LAB
INR PPP: 2.2
PROTHROMBIN TIME: 21.8 SEC

## 2017-08-09 PROCEDURE — 36415 COLL VENOUS BLD VENIPUNCTURE: CPT | Mod: PO

## 2017-08-09 PROCEDURE — 85610 PROTHROMBIN TIME: CPT

## 2017-08-09 NOTE — PROGRESS NOTES
Last 5 Patient Entered [employer] Current 30 Day Average: 146/74     Recent Readings 8/8/2017 8/8/2017 8/8/2017 8/2/2017 8/2/2017    Systolic BP (mmHg) 155 150 156 144 146    Diastolic BP (mmHg) 71 76 77 73 73    Pulse 54 54 55 54 55        Follow up with Mr. Heraclio Wall completed with his wife. His wife reports that he is doing well and feeling good. Patients wife did not have any further questions or concerns. I will follow up in a few weeks to see how he is doing and progressing.      -Patient is maintaining a low sodium diet : In progress  How or how not : Patient does his best to manage his sodium intake (wiht the help of his wife) however, I noticed that his readings are still a little higher then we would like to see. His wife and I spoke in depth about this and started to break down the food items that they eat everyday. It appears that they started a new home meal program less then 2 months ago called [employer]. Her and I went through all of the nutrition label on the meals they had in the freezer and the sodium content ranged from 600-900mg of sodium per meal. They only eat on per day but after discuss what they do for breakfast and lunch, it appears that they are taking in more sodium each day then recommended. Her and I spoke about alternatives and what she should cut out. I explained the importance of this again and patient expressed understanding. They will reach out to me if she comes across any specific questions.   -What has the patient done for physical activity? Patient goes to the gym a few times each week.   -Is the patient having any side effects from their BP medication? no  If so, what kind? NA  -Is the patient experiencing any symptoms related to their blood pressure? no   If so, what symptoms and how often? NA  -Is the patient taking regular BP readings at least one time per week? yes  If not, did you remind the patient that it is a requirement of the program to take at least one BP  reading each week in order to manage their care properly? yes  Is the patient taking their BP medication as prescribed? Per patient yes    Patient is aware of what to do incase of a medical emergency (call 911, call [employer] on call or go to the ER).

## 2017-08-21 ENCOUNTER — PATIENT OUTREACH (OUTPATIENT)
Dept: OTHER | Facility: OTHER | Age: 77
End: 2017-08-21

## 2017-08-21 DIAGNOSIS — I10 HTN (HYPERTENSION), BENIGN: Primary | Chronic | ICD-10-CM

## 2017-08-21 RX ORDER — VERAPAMIL HYDROCHLORIDE 240 MG/1
240 CAPSULE, EXTENDED RELEASE ORAL DAILY
COMMUNITY
End: 2017-09-01 | Stop reason: SDUPTHER

## 2017-08-21 RX ORDER — VERAPAMIL HYDROCHLORIDE 240 MG/1
240 TABLET, FILM COATED, EXTENDED RELEASE ORAL NIGHTLY
Qty: 90 TABLET | Refills: 2 | Status: SHIPPED | OUTPATIENT
Start: 2017-08-21 | End: 2017-08-21 | Stop reason: ALTCHOICE

## 2017-08-21 NOTE — PROGRESS NOTES
Confirmed with patient that he is taking Mylan generic of Verelan which he receives from [facility]. Patient verbalizes understanding. Off pre-prepared meals, hoping sodium intake will decrease.    Last 5 Patient Entered [employer] Current 30 Day Average: 143/70     Recent Readings 8/17/2017 8/17/2017 8/17/2017 8/13/2017 8/13/2017    Systolic BP (mmHg) 145 143 154 122 159    Diastolic BP (mmHg) 68 76 69 50 70    Pulse 51 51 53 54 55          BP above goal  Continue monitoring    Hypertension Medications             verapamil (VERELAN) 240 MG C24P Take 240 mg by mouth once daily. MYLAN generic only    LASIX 40 mg tablet TAKE 1 TABLET BY MOUTH EVERY MORNING    TOPROL XL 50 mg 24 hr tablet TAKE 1 TABLET BY MOUTH EVERY DAY IF SYSTOLIC IS GREATER THAN 180 AND OR DIASTOLIC IS GREATER THAN 96. NEED PULSE TO BE GREATER THAN 60

## 2017-09-01 ENCOUNTER — PATIENT OUTREACH (OUTPATIENT)
Dept: OTHER | Facility: OTHER | Age: 77
End: 2017-09-01

## 2017-09-01 DIAGNOSIS — I10 ESSENTIAL HYPERTENSION: Primary | ICD-10-CM

## 2017-09-01 RX ORDER — VERAPAMIL HYDROCHLORIDE 240 MG/1
240 CAPSULE, EXTENDED RELEASE ORAL 2 TIMES DAILY
Qty: 180 CAPSULE | Refills: 3 | Status: SHIPPED | OUTPATIENT
Start: 2017-09-01 | End: 2018-01-29 | Stop reason: SDUPTHER

## 2017-09-01 RX ORDER — METOPROLOL SUCCINATE 25 MG/1
25 TABLET, EXTENDED RELEASE ORAL DAILY
Qty: 60 TABLET | Refills: 11 | Status: SHIPPED | OUTPATIENT
Start: 2017-09-01 | End: 2018-01-24 | Stop reason: DRUGHIGH

## 2017-09-01 NOTE — PROGRESS NOTES
Last 5 Patient Entered [employer] Current 30 Day Average: 141/66     Recent Readings 8/31/2017 8/31/2017 8/31/2017 8/17/2017 8/17/2017    Systolic BP (mmHg) 141 139 144 145 143    Diastolic BP (mmHg) 67 66 74 68 76    Pulse 52 53 53 51 51        Hypertension Medications             LASIX 40 mg tablet TAKE 1 TABLET BY MOUTH EVERY MORNING    TOPROL XL 50 mg 24 hr tablet TAKE 1 TABLET BY MOUTH EVERY DAY IF SYSTOLIC IS GREATER THAN 180 AND OR DIASTOLIC IS GREATER THAN 96. NEED PULSE TO BE GREATER THAN 60-  Spouse states patient has been taking 1/2 tab (25mg) BID.    verapamil (VERELAN) 240 MG C24P Take 240 mg by mouth once daily. MYLAN generic only - spouse states he takes BID        Called patient, spoke to spouse.  Spouse states patient is c/o feeling weak.  Instructed spouse to decrease patient's metoprolol to 25mg once daily, spouse confirms understanding, will send in updated RXs. Patient's Pharm.D. Juana Allen will f/u on 9/5.

## 2017-09-05 ENCOUNTER — PATIENT OUTREACH (OUTPATIENT)
Dept: OTHER | Facility: OTHER | Age: 77
End: 2017-09-05

## 2017-09-05 NOTE — PROGRESS NOTES
Called patient to review symptoms. C/O weakness last week. Feeling better on toprol 25mg dose.     Last 5 Patient Entered [facility] Current 30 Day Average: 143/66     Recent Readings 9/3/2017 9/3/2017 9/3/2017 8/31/2017 8/31/2017    Systolic BP (mmHg) 153 146 156 141 139    Diastolic BP (mmHg) 73 74 76 67 66    Pulse 51 53 52 52 53        BP above goal   Med list reviewed  Continue monitoring    Hypertension Medications             LASIX 40 mg tablet TAKE 1 TABLET BY MOUTH EVERY MORNING    metoprolol succinate (TOPROL-XL) 25 MG 24 hr tablet Take 1 tablet (25 mg total) by mouth once daily. Take an additional tablet if BP > 160. KALIN 1.    verapamil (VERELAN) 240 MG C24P Take 1 capsule (240 mg total) by mouth 2 (two) times daily. MYLAN generic only.

## 2017-09-07 ENCOUNTER — LAB VISIT (OUTPATIENT)
Dept: LAB | Facility: HOSPITAL | Age: 77
End: 2017-09-07
Attending: FAMILY MEDICINE
Payer: MEDICARE

## 2017-09-07 ENCOUNTER — PATIENT MESSAGE (OUTPATIENT)
Dept: INTERNAL MEDICINE | Facility: CLINIC | Age: 77
End: 2017-09-07

## 2017-09-07 DIAGNOSIS — Z79.01 ANTICOAGULATED ON COUMADIN: ICD-10-CM

## 2017-09-07 LAB
INR PPP: 2.4
PROTHROMBIN TIME: 24 SEC

## 2017-09-07 PROCEDURE — 85610 PROTHROMBIN TIME: CPT

## 2017-09-07 PROCEDURE — 36415 COLL VENOUS BLD VENIPUNCTURE: CPT | Mod: PO

## 2017-09-08 ENCOUNTER — OFFICE VISIT (OUTPATIENT)
Dept: OPHTHALMOLOGY | Facility: CLINIC | Age: 77
End: 2017-09-08
Payer: MEDICARE

## 2017-09-08 DIAGNOSIS — H04.123 DRY EYES, BILATERAL: Primary | ICD-10-CM

## 2017-09-08 DIAGNOSIS — Z96.1 PSEUDOPHAKIA OF BOTH EYES: ICD-10-CM

## 2017-09-08 PROCEDURE — 99999 PR PBB SHADOW E&M-EST. PATIENT-LVL II: CPT | Mod: PBBFAC,,, | Performed by: OPHTHALMOLOGY

## 2017-09-08 PROCEDURE — 92012 INTRM OPH EXAM EST PATIENT: CPT | Mod: S$GLB,,, | Performed by: OPHTHALMOLOGY

## 2017-09-08 NOTE — PROGRESS NOTES
"SUBJECTIVE:   Heraclio Wall is a 77 y.o. male   Corrected distance visual acuity was 20/25 -2 in the right eye and 20/30 -2 in the left eye.   Chief Complaint   Patient presents with    Eye Problem     states that vision is blurry when reading newspaper. c/o burning        HPI:  HPI     Eye Problem    Additional comments: states that vision is blurry when reading newspaper.   c/o burning           Comments   Pt states he does not use his restasis because "visine makes the same as   restasis"    1. PCIOL OU   Yag os 11/2/15  2. ERM OD  3. Dry Eyes  4. Pterygium OD  5. Dermatochalasis  6. fhx of glaucoma    Restasis BID OU  AT's prn OU       Last edited by Kathryn Yadav MA on 9/8/2017  8:03 AM. (History)        Assessment /Plan :  1. Dry eyes, bilateral recommend artificial tears prn OU and Xiidra OU BID   2. Pseudophakia of both eyes stable     RTC as scheduled or prn          "

## 2017-09-15 ENCOUNTER — PATIENT OUTREACH (OUTPATIENT)
Dept: OTHER | Facility: OTHER | Age: 77
End: 2017-09-15

## 2017-09-15 NOTE — PROGRESS NOTES
Heraclio Wall continues to monitor BP readings. Tolerating  toprol dose adjustment. No more weakness. No questions or concerns.    Last 5 Patient Entered [employer] Current 30 Day Average: 147/71     Recent Readings 9/12/2017 9/12/2017 9/12/2017 9/3/2017 9/3/2017    Systolic BP (mmHg) 149 150 152 153 146    Diastolic BP (mmHg) 75 75 72 73 74    Pulse 54 53 53 51 53          BP above goal  Continue monitoring    Hypertension Medications             LASIX 40 mg tablet TAKE 1 TABLET BY MOUTH EVERY MORNING    metoprolol succinate (TOPROL-XL) 25 MG 24 hr tablet Take 1 tablet (25 mg total) by mouth once daily. Take an additional tablet if BP > 160. KALIN 1.    verapamil (VERELAN) 240 MG C24P Take 1 capsule (240 mg total) by mouth 2 (two) times daily. MYLAN generic only. RP: please profile.

## 2017-09-18 DIAGNOSIS — I10 ESSENTIAL HYPERTENSION: ICD-10-CM

## 2017-09-18 RX ORDER — FUROSEMIDE 40 MG/1
TABLET ORAL
Qty: 90 TABLET | Refills: 1 | Status: SHIPPED | OUTPATIENT
Start: 2017-09-18 | End: 2017-11-30

## 2017-09-22 ENCOUNTER — PATIENT OUTREACH (OUTPATIENT)
Dept: OTHER | Facility: OTHER | Age: 77
End: 2017-09-22

## 2017-09-22 NOTE — PROGRESS NOTES
Last 5 Patient Entered [employer] Current 30 Day Average: 148/72     Recent Readings 9/12/2017 9/12/2017 9/12/2017 9/3/2017 9/3/2017    Systolic BP (mmHg) 149 150 152 153 146    Diastolic BP (mmHg) 75 75 72 73 74    Pulse 54 53 53 51 53          Hypertension Digital Medicine (HDMP) Health  Follow Up    LVM to follow up with Mr. Heraclio Wall.    Per 30 day average, blood pressure is not well controlled 148/72 mmHg. Requested patient take a BP reading or to let me know if he is having any IT issues so we can assist him. Encouraged adherence to low sodium diet and physical activity guidelines. Requested patient call or message back so we can discuss his readings/obtain an update. Will continue to monitor/follow up.     9/25 - Patient called back and informed me that he is doing well. He took some BP readings today and will take a few more this week. He will continue to work on staying on track with this.

## 2017-10-05 ENCOUNTER — PATIENT MESSAGE (OUTPATIENT)
Dept: INTERNAL MEDICINE | Facility: CLINIC | Age: 77
End: 2017-10-05

## 2017-10-05 ENCOUNTER — LAB VISIT (OUTPATIENT)
Dept: LAB | Facility: HOSPITAL | Age: 77
End: 2017-10-05
Attending: FAMILY MEDICINE
Payer: MEDICARE

## 2017-10-05 DIAGNOSIS — Z79.01 ANTICOAGULATED ON COUMADIN: ICD-10-CM

## 2017-10-05 LAB
INR PPP: 2.4
PROTHROMBIN TIME: 24.4 SEC

## 2017-10-05 PROCEDURE — 36415 COLL VENOUS BLD VENIPUNCTURE: CPT | Mod: PO

## 2017-10-05 PROCEDURE — 85610 PROTHROMBIN TIME: CPT

## 2017-10-06 ENCOUNTER — IMMUNIZATION (OUTPATIENT)
Dept: INTERNAL MEDICINE | Facility: CLINIC | Age: 77
End: 2017-10-06
Payer: MEDICARE

## 2017-10-06 PROCEDURE — G0008 ADMIN INFLUENZA VIRUS VAC: HCPCS | Mod: S$GLB,,, | Performed by: FAMILY MEDICINE

## 2017-10-06 PROCEDURE — 90662 IIV NO PRSV INCREASED AG IM: CPT | Mod: S$GLB,,, | Performed by: FAMILY MEDICINE

## 2017-10-17 ENCOUNTER — PATIENT OUTREACH (OUTPATIENT)
Dept: OTHER | Facility: OTHER | Age: 77
End: 2017-10-17

## 2017-10-17 NOTE — PROGRESS NOTES
Last 5 Patient Entered [employer] Current 30 Day Average: 142/70     Recent Readings 10/11/2017 10/11/2017 10/11/2017 9/28/2017 9/28/2017    Systolic BP (mmHg) 141 138 145 143 147    Diastolic BP (mmHg) 71 70 67 71 68    Pulse 54 54 53 54 54          Hypertension Digital Medicine Program (HDMP): Health  Follow Up    Lifestyle Modifications:    1.Low sodium diet: yes : Patients wife takes care of the cook and shopping so she reads all food labels before putting food into her shopping cart to make sure she does not choose a high sodium product. She also does not add salt or salt seasonings when cooking.     2.Physical activity: yes : Patient goes to the gym 3 times a week and mentioned that he has been adding a little more to his regimen each time he goes to increase his activity.     3.Hypotension/Hypertension symptoms: no   Frequency/Alleviating factors/Precipitating factors, etc.     4.Patient has been compliant with the medication regimen.     Follow up with Mr. Heraclio Wall completed. No further questions or concerns. I will follow up in a few weeks to assess progress.

## 2017-10-23 ENCOUNTER — PATIENT OUTREACH (OUTPATIENT)
Dept: OTHER | Facility: OTHER | Age: 77
End: 2017-10-23

## 2017-10-23 DIAGNOSIS — I10 ESSENTIAL HYPERTENSION: Primary | ICD-10-CM

## 2017-10-23 RX ORDER — VALSARTAN 40 MG/1
40 TABLET ORAL NIGHTLY
Qty: 30 TABLET | Refills: 1 | Status: SHIPPED | OUTPATIENT
Start: 2017-10-23 | End: 2017-11-07 | Stop reason: DRUGHIGH

## 2017-10-30 ENCOUNTER — PATIENT OUTREACH (OUTPATIENT)
Dept: OTHER | Facility: OTHER | Age: 77
End: 2017-10-30

## 2017-10-30 NOTE — PROGRESS NOTES
"Mr. Wall start taking valsartan 40mg on Wednesday of last week. He denies any side effects besides "an excess of gas". He can tolerate for now. He reports he stopped verapamil when he started valsartan, per his wive's recommendations.    Last 5 Patient Entered [employer] Current 30 Day Average: 149/72     Recent Readings 10/27/2017 10/27/2017 10/27/2017 10/23/2017 10/23/2017    Systolic BP (mmHg) 148 150 150 152 151    Diastolic BP (mmHg) 72 70 81 74 72    Pulse 60 61 63 56 57          BP above goal  Asked patient to resume verapamil- valsartan should be taken IN ADDITION to current meds. Not instead of.   Continue monitoring    Hypertension Medications             LASIX 40 mg tablet TAKE 1 TABLET BY MOUTH EVERY MORNING    metoprolol succinate (TOPROL-XL) 25 MG 24 hr tablet Take 1 tablet (25 mg total) by mouth once daily. Take an additional tablet if BP > 160. KALIN 1.    valsartan (DIOVAN) 40 MG tablet Take 1 tablet (40 mg total) by mouth every evening.    verapamil (VERELAN) 240 MG C24P Take 1 capsule (240 mg total) by mouth 2 (two) times daily. MYLAN generic only. [facility]: please profile.          "

## 2017-11-02 ENCOUNTER — PATIENT MESSAGE (OUTPATIENT)
Dept: INTERNAL MEDICINE | Facility: CLINIC | Age: 77
End: 2017-11-02

## 2017-11-02 ENCOUNTER — LAB VISIT (OUTPATIENT)
Dept: LAB | Facility: HOSPITAL | Age: 77
End: 2017-11-02
Attending: FAMILY MEDICINE
Payer: MEDICARE

## 2017-11-02 DIAGNOSIS — Z79.01 ANTICOAGULATED ON COUMADIN: ICD-10-CM

## 2017-11-02 LAB
INR PPP: 3.2
PROTHROMBIN TIME: 32.2 SEC

## 2017-11-02 PROCEDURE — 36415 COLL VENOUS BLD VENIPUNCTURE: CPT | Mod: PO

## 2017-11-02 PROCEDURE — 85610 PROTHROMBIN TIME: CPT

## 2017-11-06 ENCOUNTER — PATIENT MESSAGE (OUTPATIENT)
Dept: ADMINISTRATIVE | Facility: OTHER | Age: 77
End: 2017-11-06

## 2017-11-07 ENCOUNTER — PATIENT OUTREACH (OUTPATIENT)
Dept: OTHER | Facility: OTHER | Age: 77
End: 2017-11-07

## 2017-11-07 DIAGNOSIS — I10 HTN (HYPERTENSION), BENIGN: Primary | Chronic | ICD-10-CM

## 2017-11-07 RX ORDER — VALSARTAN 80 MG/1
80 TABLET ORAL NIGHTLY
Qty: 90 TABLET | Refills: 1 | Status: SHIPPED | OUTPATIENT
Start: 2017-11-07 | End: 2017-11-10 | Stop reason: DRUGHIGH

## 2017-11-07 NOTE — PROGRESS NOTES
Mr. Wall is tolerating valsartan without problems. Continues to monitor sodium intake - switched away from pre portioned meals.    Last 5 Patient Entered [facility] Current 30 Day Average: 149/72     Recent Readings 10/31/2017 10/31/2017 10/31/2017 10/27/2017 10/27/2017    Systolic BP (mmHg) 150 147 160 148 150    Diastolic BP (mmHg) 70 73 74 72 70    Pulse 51 52 54 60 61          BP above goal  Increase valsartan to 80mg daily  Schedule BMP at next encounter    Hypertension Medications             LASIX 40 mg tablet TAKE 1 TABLET BY MOUTH EVERY MORNING    metoprolol succinate (TOPROL-XL) 25 MG 24 hr tablet Take 1 tablet (25 mg total) by mouth once daily. Take an additional tablet if BP > 160. KALIN 1.    valsartan (DIOVAN) 80 MG tablet Take 1 tablet (80 mg total) by mouth every evening.    verapamil (VERELAN) 240 MG C24P Take 1 capsule (240 mg total) by mouth 2 (two) times daily. MYLAN generic only. [facility]: please profile.

## 2017-11-10 RX ORDER — VALSARTAN 40 MG/1
40 TABLET ORAL 2 TIMES DAILY
COMMUNITY
End: 2017-12-05 | Stop reason: DRUGHIGH

## 2017-11-10 NOTE — PROGRESS NOTES
Patient called back to report he is having dizziness with higher valsartan dose.     Last 5 Patient Entered [facility] Current 30 Day Average: 149/73     Recent Readings 11/9/2017 11/9/2017 11/9/2017 10/31/2017 10/31/2017    Systolic BP (mmHg) 149 156 160 150 147    Diastolic BP (mmHg) 80 72 77 70 73    Pulse 55 55 55 51 52        BP trending down  Split valsartan dose to 40mg BID to improve dizziness  Continue monitoring    .  Hypertension Medications             LASIX 40 mg tablet TAKE 1 TABLET BY MOUTH EVERY MORNING    metoprolol succinate (TOPROL-XL) 25 MG 24 hr tablet Take 1 tablet (25 mg total) by mouth once daily. Take an additional tablet if BP > 160. KALIN 1.    valsartan (DIOVAN) 80 MG tablet Take 1 tablet (80 mg total) by mouth every evening.    verapamil (VERELAN) 240 MG C24P Take 1 capsule (240 mg total) by mouth 2 (two) times daily. MYLAN generic only. [facility]: please profile.

## 2017-11-15 ENCOUNTER — OFFICE VISIT (OUTPATIENT)
Dept: OPHTHALMOLOGY | Facility: CLINIC | Age: 77
End: 2017-11-15
Payer: MEDICARE

## 2017-11-15 DIAGNOSIS — H35.371 EPIRETINAL MEMBRANE (ERM) OF RIGHT EYE: ICD-10-CM

## 2017-11-15 DIAGNOSIS — H11.001 PTERYGIUM, RIGHT: ICD-10-CM

## 2017-11-15 DIAGNOSIS — Z96.1 PSEUDOPHAKIA OF BOTH EYES: Primary | ICD-10-CM

## 2017-11-15 DIAGNOSIS — Z83.511 FAMILY HISTORY OF GLAUCOMA: ICD-10-CM

## 2017-11-15 DIAGNOSIS — H04.123 DRY EYES, BILATERAL: ICD-10-CM

## 2017-11-15 PROCEDURE — 92014 COMPRE OPH EXAM EST PT 1/>: CPT | Mod: S$GLB,,, | Performed by: OPHTHALMOLOGY

## 2017-11-15 PROCEDURE — 99999 PR PBB SHADOW E&M-EST. PATIENT-LVL II: CPT | Mod: PBBFAC,,, | Performed by: OPHTHALMOLOGY

## 2017-11-15 NOTE — PROGRESS NOTES
SUBJECTIVE:   Heraclio Wall is a 77 y.o. male   Corrected distance visual acuity was 20/20 -1 in the right eye and 20/25 -1 in the left eye.   Chief Complaint   Patient presents with    Dry Eye     pt states ou are still dry using visine often        HPI:  HPI     Dry Eye    Additional comments: pt states ou are still dry using visine often           Comments   1. PCIOL OU   Yag os 11/2/15  2. ERM OD  3. Dry Eyes  Restasis burned  4. Pterygium OD  5. Dermatochalasis  6. fhx of glaucoma      Visine bid ou       Last edited by Irma Walker MA on 11/15/2017  1:28 PM. (History)        Assessment /Plan :  1. Pseudophakia of both eyes stable   2. Dry eyes, bilateral recommend artificial tears prn OU   3. Family history of glaucoma no disease now   4. Pterygium, right stable   5. Epiretinal membrane (ERM) of right eye stable       RTC in 1 year or prn any changes

## 2017-11-16 ENCOUNTER — PATIENT OUTREACH (OUTPATIENT)
Dept: OTHER | Facility: OTHER | Age: 77
End: 2017-11-16

## 2017-11-16 NOTE — PROGRESS NOTES
Called patient to review valsartan dose toelrability and review new BP goal, <130/80 2/2 ASCVD risk 38/9% and hx of  CVD. He verbalizes understanding and is tolerating valsartan 80mg without problems.     Last 5 Patient Entered Readings Current 30 Day Average: 151/73     Recent Readings 11/14/2017 11/14/2017 11/14/2017 11/13/2017 11/13/2017    Systolic BP (mmHg) 143 141 151 160 159    Diastolic BP (mmHg) 74 76 76 71 77    Pulse 54 57 55 55 56          BP trending down?  Continue monitoring    Hypertension Medications             LASIX 40 mg tablet TAKE 1 TABLET BY MOUTH EVERY MORNING    metoprolol succinate (TOPROL-XL) 25 MG 24 hr tablet Take 1 tablet (25 mg total) by mouth once daily. Take an additional tablet if BP > 160. KALIN 1.    valsartan (DIOVAN) 40 MG tablet Take 40 mg by mouth 2 (two) times daily.    verapamil (VERELAN) 240 MG C24P Take 1 capsule (240 mg total) by mouth 2 (two) times daily. MYLAN generic only. [facility]: please profile.

## 2017-11-27 PROBLEM — M54.50 LOW BACK PAIN: Status: ACTIVE | Noted: 2017-11-27

## 2017-11-27 PROCEDURE — 99499 UNLISTED E&M SERVICE: CPT | Mod: S$GLB,,, | Performed by: FAMILY MEDICINE

## 2017-11-27 RX ORDER — FLUVASTATIN SODIUM 80 MG/1
TABLET, EXTENDED RELEASE ORAL
Qty: 90 TABLET | Refills: 3 | Status: SHIPPED | OUTPATIENT
Start: 2017-11-27 | End: 2017-11-29 | Stop reason: SDUPTHER

## 2017-11-29 ENCOUNTER — OFFICE VISIT (OUTPATIENT)
Dept: INTERNAL MEDICINE | Facility: CLINIC | Age: 77
End: 2017-11-29
Payer: MEDICARE

## 2017-11-29 VITALS
SYSTOLIC BLOOD PRESSURE: 140 MMHG | BODY MASS INDEX: 28.18 KG/M2 | HEART RATE: 55 BPM | HEIGHT: 70 IN | WEIGHT: 196.88 LBS | DIASTOLIC BLOOD PRESSURE: 60 MMHG | OXYGEN SATURATION: 98 %

## 2017-11-29 DIAGNOSIS — E29.1 HYPOGONADISM MALE: ICD-10-CM

## 2017-11-29 DIAGNOSIS — I10 ESSENTIAL HYPERTENSION: Chronic | ICD-10-CM

## 2017-11-29 DIAGNOSIS — I77.9 RIGHT-SIDED CAROTID ARTERY DISEASE: ICD-10-CM

## 2017-11-29 DIAGNOSIS — Z00.00 ENCOUNTER FOR PREVENTIVE HEALTH EXAMINATION: Primary | ICD-10-CM

## 2017-11-29 DIAGNOSIS — I70.0 ATHEROSCLEROSIS OF AORTA: Chronic | ICD-10-CM

## 2017-11-29 DIAGNOSIS — G47.33 OSA ON CPAP: Chronic | ICD-10-CM

## 2017-11-29 DIAGNOSIS — H93.13 TINNITUS OF BOTH EARS: Chronic | ICD-10-CM

## 2017-11-29 DIAGNOSIS — N40.0 BENIGN PROSTATIC HYPERPLASIA WITHOUT LOWER URINARY TRACT SYMPTOMS: Chronic | ICD-10-CM

## 2017-11-29 DIAGNOSIS — E78.2 MIXED HYPERLIPIDEMIA: Chronic | ICD-10-CM

## 2017-11-29 DIAGNOSIS — M54.50 CHRONIC MIDLINE LOW BACK PAIN WITHOUT SCIATICA: ICD-10-CM

## 2017-11-29 DIAGNOSIS — K21.9 GASTROESOPHAGEAL REFLUX DISEASE WITHOUT ESOPHAGITIS: Chronic | ICD-10-CM

## 2017-11-29 DIAGNOSIS — Z79.01 CHRONIC ANTICOAGULATION: Chronic | ICD-10-CM

## 2017-11-29 DIAGNOSIS — Z86.73 HISTORY OF CVA (CEREBROVASCULAR ACCIDENT): Chronic | ICD-10-CM

## 2017-11-29 DIAGNOSIS — N18.30 CHRONIC KIDNEY DISEASE, STAGE 3: Chronic | ICD-10-CM

## 2017-11-29 DIAGNOSIS — D64.9 ANEMIA, UNSPECIFIED TYPE: Chronic | ICD-10-CM

## 2017-11-29 DIAGNOSIS — Z91.89 POTENTIAL FOR COGNITIVE IMPAIRMENT: ICD-10-CM

## 2017-11-29 DIAGNOSIS — G89.29 CHRONIC MIDLINE LOW BACK PAIN WITHOUT SCIATICA: ICD-10-CM

## 2017-11-29 PROCEDURE — 99499 UNLISTED E&M SERVICE: CPT | Mod: S$GLB,,, | Performed by: INTERNAL MEDICINE

## 2017-11-29 PROCEDURE — 99999 PR PBB SHADOW E&M-EST. PATIENT-LVL III: CPT | Mod: PBBFAC,,, | Performed by: INTERNAL MEDICINE

## 2017-11-29 PROCEDURE — G0439 PPPS, SUBSEQ VISIT: HCPCS | Mod: S$GLB,,, | Performed by: INTERNAL MEDICINE

## 2017-11-29 NOTE — PATIENT INSTRUCTIONS
Counseling and Referral of Other Preventative  (Italic type indicates deductible and co-insurance are waived)    Patient Name: Heraclio Wall  Today's Date: 11/29/2017      SERVICE LIMITATIONS RECOMMENDATION    Vaccines    · Pneumococcal (once after 65)    · Influenza (annually)    · Hepatitis B (if medium/high risk)    · Prevnar 13      Hepatitis B medium/high risk factors:       - End-stage renal disease       - Hemophiliacs who received Factor VII or         IX concentrates       - Clients of institutions for the mentally             retarded       - Persons who live in the same house as          a HepB carrier       - Homosexual men       - Illicit injectable drug abusers     Pneumococcal: Done, no repeat necessary     Influenza: Done, repeat in one year     Hepatitis B: N/A     Prevnar 13: Done, no repeat necessary    Prostate cancer screening (annually to age 75)     Prostate specific antigen (PSA) Shared decision making with Provider. Sometimes a co-pay may be required if the patient decides to have this test. The USPSTF no longer recommends prostate cancer screening routinely in medicine: not to follow    Colorectal cancer screening (to age 75)    · Fecal occult blood test (annual)  · Flexible sigmoidoscopy (5y)  · Screening colonoscopy (10y)  · Barium enema   N/A    Diabetes self-management training (no USPSTF recommendations)  Requires referral by treating physician for patient with diabetes or renal disease. 10 hours of initial DSMT sessions of no less than 30 minutes each in a continuous 12-month period. 2 hours of follow-up DSMT in subsequent years.  N/A    Glaucoma screening (no USPSTF recommendation)  Diabetes mellitus, family history   , age 50 or over    American, age 65 or over  Done this year, repeat every year    Medical nutrition therapy for diabetes or renal disease (no recommended schedule)  Requires referral by treating physician for patient with diabetes or renal  disease or kidney transplant within the past 3 years.  Can be provided in same year as diabetes self-management training (DSMT), and CMS recommends medical nutrition therapy take place after DSMT. Up to 3 hours for initial year and 2 hours in subsequent years.  N/A    Cardiovascular screening blood tests (every 5 years)  · Fasting lipid panel  Order as a panel if possible  Last done 11/2016, recommend to repeat every 1  years    Diabetes screening tests (at least every 3 years, Medicare covers annually or at 6-month intervals for prediabetic patients)  · Fasting blood sugar (FBS) or glucose tolerance test (GTT)  Patient must be diagnosed with one of the following:       - Hypertension       - Dyslipidemia       - Obesity (BMI 30kg/m2)       - Previous elevated impaired FBS or GTT       ... or any two of the following:       - Overweight (BMI 25 but <30)       - Family history of diabetes       - Age 65 or older       - History of gestational diabetes or birth of baby weighing more than 9 pounds  Done this year, repeat every year    Abdominal aortic aneurysm screening (once)  · Sonogram   Limited to patients who meet one of the following criteria:       - Men who are 65-75 years old and have smoked more than 100 cigarette in their lifetime       - Anyone with a family history of abdominal aortic aneurysm       - Anyone recommended for screening by the USPSTF  N/A    HIV screening (annually for increased risk patients)  · HIV-1 and HIV-2 by EIA, or FINN, rapid antibody test or oral mucosa transudate  Patients must be at increased risk for HIV infection per USPSTF guidelines or pregnant. Tests covered annually for patient at increased risk or as requested by the patient. Pregnant patients may receive up to 3 tests during pregnancy.  Risks discussed, screening is not recommended    Smoking cessation counseling (up to 8 sessions per year)  Patients must be asymptomatic of tobacco-related conditions to receive as a  preventative service.  Non-smoker    Subsequent annual wellness visit  At least 12 months since last AWV  Return in one year     The following information is provided to all patients.  This information is to help you find resources for any of the problems found today that may be affecting your health:                Living healthy guide: www.[facility].[place].[facility]      Understanding Diabetes: www.diabetes.org      Eating healthy: www.cdc.gov/healthyweight      CDC home safety checklist: www.cdc.gov/steadi/patient.html      Agency on Aging: www.goea.[place].[facility]      Alcoholics anonymous (AA): www.aa.org      Physical Activity: www.tyshawn.nih.gov/xp6mwsu      Tobacco use: www.quitwithusla.org

## 2017-11-30 ENCOUNTER — TELEPHONE (OUTPATIENT)
Dept: INTERNAL MEDICINE | Facility: CLINIC | Age: 77
End: 2017-11-30

## 2017-11-30 ENCOUNTER — PATIENT MESSAGE (OUTPATIENT)
Dept: INTERNAL MEDICINE | Facility: CLINIC | Age: 77
End: 2017-11-30

## 2017-11-30 ENCOUNTER — PATIENT OUTREACH (OUTPATIENT)
Dept: OTHER | Facility: OTHER | Age: 77
End: 2017-11-30

## 2017-11-30 ENCOUNTER — LAB VISIT (OUTPATIENT)
Dept: LAB | Facility: HOSPITAL | Age: 77
End: 2017-11-30
Attending: FAMILY MEDICINE
Payer: MEDICARE

## 2017-11-30 DIAGNOSIS — Z79.01 ANTICOAGULATED ON COUMADIN: ICD-10-CM

## 2017-11-30 DIAGNOSIS — E78.2 MIXED HYPERLIPIDEMIA: Chronic | ICD-10-CM

## 2017-11-30 LAB
CHOLEST SERPL-MCNC: 152 MG/DL
CHOLEST/HDLC SERPL: 3.8 {RATIO}
HDLC SERPL-MCNC: 40 MG/DL
HDLC SERPL: 26.3 %
INR PPP: 3.3
LDLC SERPL CALC-MCNC: 77.8 MG/DL
NONHDLC SERPL-MCNC: 112 MG/DL
PROTHROMBIN TIME: 32.8 SEC
TRIGL SERPL-MCNC: 171 MG/DL

## 2017-11-30 PROCEDURE — 36415 COLL VENOUS BLD VENIPUNCTURE: CPT | Mod: PO

## 2017-11-30 PROCEDURE — 85610 PROTHROMBIN TIME: CPT

## 2017-11-30 PROCEDURE — 80061 LIPID PANEL: CPT

## 2017-11-30 RX ORDER — WARFARIN 2.5 MG/1
2.5 TABLET ORAL DAILY
COMMUNITY
End: 2017-12-01 | Stop reason: DRUGHIGH

## 2017-11-30 NOTE — TELEPHONE ENCOUNTER
Pt called back and said that you sent a message through the portal about his PT/INR saying:   The levels are getting a bit on the higher side.   You still taking 5mg M/W/F and 2.5mg all other days right?     He said that is not what he has been taking.  He has been taking 2.5 mg 6 days and then nothing on Sundays.  Please clarify new directions for coumadin.

## 2017-11-30 NOTE — TELEPHONE ENCOUNTER
Called pt and he said to speak to his wife about med changes.  Let know that need to take 2.5 mg all days, except for Monday, he will take 5 mg.  She repeated back, twice.  Will recheck in 4 weeks.  Booked for 12-28-17.

## 2017-11-30 NOTE — PROGRESS NOTES
Mr. Wall called to update me on preventative health assessment yesterday. Feeling well. Does not want to increase valsartan dose at this time.     Last 5 Patient Entered Readings Current 30 Day Average: 144/72     Recent Readings 11/27/2017 11/27/2017 11/27/2017 11/22/2017 11/22/2017    Systolic BP (mmHg) 145 149 154 137 141    Diastolic BP (mmHg) 63 68 78 70 67    Pulse 50 50 51 50 50          BP trending down?  Continue monitoring

## 2017-11-30 NOTE — PROGRESS NOTES
"Heraclio Wall presented for a  Medicare AWV and comprehensive Health Risk Assessment today. He is accompanied by his wife. The following components were reviewed and updated:    · Medical history  · Family History  · Social history  · Allergies and Current Medications  · Health Risk Assessment  · Health Maintenance  · Care Team     ** See Completed Assessments for Annual Wellness Visit within the encounter summary.**       The following assessments were completed:  · Living Situation  · CAGE  · Depression Screening  · Timed Get Up and Go  · Whisper Test  · Cognitive Function Screening  · Nutrition Screening  · ADL Screening  · PAQ Screening    Physical Exam    PE:   BP (!) 140/60 (BP Location: Left leg, Patient Position: Sitting)   Pulse (!) 55   Ht 5' 10.25" (1.784 m)   Wt 89.3 kg (196 lb 13.9 oz)   SpO2 98%   BMI 28.05 kg/m²     APPEARANCE: Patient is a 77 y.o.male /White . Awake, alert, in no distress, good historian.   HEENT: PERRLA, EOMI. Right eyelid droop/facial asymmetry.Tongue midline.   NECK: Supple. Carotids: 2+, no bruits. No thyromegaly. No cervical adenopathy.   HEART: Regular rate and rhythm with  No murmur , rubs or gallops.   CHEST: Lungs clear to auscultation.   BACK:  No spinous tenderness. No flank tenderness.   ABDOMEN: Bowel sounds normal. Not distended. Soft.   EXTREMITIES: No clubbing or cyanosis . 1+edema bilateral lower legs. Peripheral pulses intact.Mild varicosities  NEURO:  Hearing intact. Motor: Symmetric  grasp, flexion and extension of feet. Toes downgoing. Sensory intact to monofilament testing, symmetric. Finger to nose is intact with no tremor. No spasticity or muscle fasciculations. Gait: No ataxia.   SKIN:  No suspicious lesions or ulcerations.         Diagnoses and health risks identified today and associated recommendations/orders:    1. Encounter for preventive health examination   Completed    2. Essential hypertension  This problem is currently not controlled; " with the new guidelines changed to target less than 130/80 instead of 140/90. Taking verapamil and valsartan now.  Continue to follow up with the Hypertension [employer] project as planned.    3. CHESTER on CPAP  Stable and controlled. Continue current treatment plan as previously prescribed with your PCP.     4. Mixed hyperlipidemia  Due for recheck. Still on Lescol.Controlled from 2016.  Component      Latest Ref Rng & Units 11/2/2016   Cholesterol      120 - 199 mg/dL 141   Triglycerides      30 - 150 mg/dL 148   HDL      40 - 75 mg/dL 37 (L)   LDL Cholesterol      63.0 - 159.0 mg/dL 74.4     - Lipid panel; Future- on 11/30/17 with next coumadin draw.    5. Chronic anticoagulation  Stable and controlled on coumadin. Continue current treatment plan as previously prescribed with your PCP.     6. Chronic kidney disease, stage 3  Stable and controlled. Continue current treatment plan as previously prescribed with your PCP.   Ref Range  6/12/17 3/8/17 11/2/16 9/13/16 7/14/16 6/16/16 1/12/15   BUN 8 - 23 mg/dL 18  20  15  13  13  19  11   Cr 0.5 - 1.4 mg/dL 1.5   1.5   1.4  1.4  1.3  1.4  1.43   eGFR non Afr Am >60 mL/min/1.73 m^2 44   43.7   47.8   47.8   52.3   47.8   52.6             7. Atherosclerosis of aorta  Stable and controlled. Noted 11/29/14 CTA abd/pelvis: atheromatous change and the aorta without evidence of aneurysm formation or dissection. Not an emergent problem. Discussed need to control BP, Lipids, glucose and not smoke- to avoid further arterial wall buildup. Treatment same as for carotid artery disease. See #15. Continue current treatment plan as previously prescribed with your PCP.     8. History of CVA (cerebrovascular accident)  Stable and controlled. Left lacunar stroke 7/31/2013, no further strokes or TIA since then. Continue current treatment plan as previously prescribed with your PCP.     9. Anemia, unspecified type, and iron deficiency   Stable and controlled. Continue current treatment  plan as previously prescribed with your PCP.   Component      Ref Rng & Units 6/12/2017   WBC      3.90 - 12.70 K/uL 7.33   Hemoglobin      14.0 - 18.0 g/dL 13.8 (L)   Hematocrit      40.0 - 54.0 % 40.6      Ref Range & Units 8mo ago 1yr ago 3yr ago 10yr ago   Iron 45 - 160 ug/dL 68  66  <10   138   Transferrin 200 - 375 mg/dL 271  279  132      TIBC 250 - 450 ug/dL 401  413  195      Sat Iron 20 - 50 % 17   16    Unable ...             10. Potential for cognitive impairment  This is a new problem that has been identified during today's visit. Scored 4/7 on cognitive screen . Wife reports noting more memory issues. I have explained a screening test - just indicated need to due furter evaluation. Test can be off due to anxiety, lack of sleep depression. Please follow up with your PCP or Neurology to discuss.    11. Gastroesophageal reflux disease without esophagitis  Stable and controlled on Protonix. Continue current treatment plan as previously prescribed with your PCP.     12. Hypogonadism male  Stable and controlled. On compounded products. Continue current treatment plan as previously prescribed with Dr Giovanny Gan of [place], [place]. ( works at [employer])     13. Chronic midline low back pain without sciatica  Stable and controlled. Continue current treatment plan as previously prescribed with your PCP.     14. Tinnitus of both ears  Stable . Passed the Whisper test. Continue current treatment plan as previously prescribed with your PCP.     15. Right-sided carotid artery disease  Stable and controlled. Of note second study listed showed improved findings on left.I have explained that test may be affected by US views and angles.   12/19/14- CTA neck: Peripheral fibers plaque noted at the origin left ICA with less than 10% stenosis. Please note: The appearance of the plaque on axial and reformatted images raises the less likely possibility of small intimal flap or dissection.    12-3-15- Carotid us- 20  - 39% right Internal Carotid stenosis. 20 - 39% left Internal Carotid stenosis. Focal, eccentric calcific plaque at the right bulb and proximal ICA with approximately 35% stenosis.  12/27/16: Carotid US: 20 - 39% right Internal Carotid stenosis.  0 - 19% left Internal Carotid stenosis.  Continue current treatment plan as previously prescribed with your physicians. He is on antihypertensives, statin and coumadin.     16. Benign prostatic hyperplasia without lower urinary tract symptoms  Stable and controlled. Continue current treatment plan as previously prescribed with your physicians.       Provided Heraclio Wall with a 5-10 year written screening schedule and personal prevention plan. Recommendations were developed using the USPSTF age appropriate recommendations. Education, counseling, and referrals were provided as needed. After Visit Summary printed and given to patient which includes a list of additional screenings\tests needed.    Recommend annual HRA in 1 year.     Beverly Buchanan MD

## 2017-12-01 RX ORDER — WARFARIN 2 MG/1
2 TABLET ORAL DAILY
Qty: 30 TABLET | Refills: 3 | Status: SHIPPED | OUTPATIENT
Start: 2017-12-01 | End: 2017-12-29

## 2017-12-01 NOTE — TELEPHONE ENCOUNTER
Called pt and let pt know that Dr. Christopher wants to changed coumadin 2 mg daily including Sunday.  She sent Rx to [facility].

## 2017-12-01 NOTE — TELEPHONE ENCOUNTER
Ok, then I want to change dose to 2mg daily, including Sunday.  I have sent the script to [facility]

## 2017-12-05 RX ORDER — VALSARTAN 40 MG/1
40 TABLET ORAL 2 TIMES DAILY
COMMUNITY
End: 2018-01-19 | Stop reason: DRUGHIGH

## 2017-12-05 RX ORDER — VALSARTAN 40 MG/1
80 TABLET ORAL 2 TIMES DAILY
COMMUNITY
End: 2017-12-05 | Stop reason: DRUGHIGH

## 2017-12-05 NOTE — PROGRESS NOTES
Mr. Wall reports he has some dizziness at times and also ringing in his ears. He feels that he is gaining weight and cannot explain the reason. Still exercising regularly and no changes in diet.     Last 5 Patient Entered Readings Current 30 Day Average: 145/72     Recent Readings 12/4/2017 12/4/2017 12/4/2017 12/1/2017 12/1/2017    Systolic BP (mmHg) 156 152 162 145 145    Diastolic BP (mmHg) 69 72 74 71 72    Pulse 55 57 58 54 54            BP trending down  Continue monitoring    Hypertension Medications             metoprolol succinate (TOPROL-XL) 25 MG 24 hr tablet Take 1 tablet (25 mg total) by mouth once daily. Take an additional tablet if BP > 160. KALIN 1.    valsartan (DIOVAN) 40 MG tablet Take 40 mg by mouth 2 (two) times daily.    verapamil (VERELAN) 240 MG C24P Take 1 capsule (240 mg total) by mouth 2 (two) times daily. MYLAN generic only. [facility]: please profile.

## 2017-12-19 ENCOUNTER — TELEPHONE (OUTPATIENT)
Dept: PULMONOLOGY | Facility: CLINIC | Age: 77
End: 2017-12-19

## 2017-12-28 ENCOUNTER — LAB VISIT (OUTPATIENT)
Dept: LAB | Facility: HOSPITAL | Age: 77
End: 2017-12-28
Attending: FAMILY MEDICINE
Payer: MEDICARE

## 2017-12-28 DIAGNOSIS — Z79.01 ANTICOAGULATED ON COUMADIN: ICD-10-CM

## 2017-12-28 LAB
INR PPP: 1.3
PROTHROMBIN TIME: 14.1 SEC

## 2017-12-28 PROCEDURE — 36415 COLL VENOUS BLD VENIPUNCTURE: CPT | Mod: PO

## 2017-12-28 PROCEDURE — 85610 PROTHROMBIN TIME: CPT | Mod: PO

## 2017-12-29 ENCOUNTER — PATIENT MESSAGE (OUTPATIENT)
Dept: INTERNAL MEDICINE | Facility: CLINIC | Age: 77
End: 2017-12-29

## 2017-12-29 NOTE — TELEPHONE ENCOUNTER
Tell pt I have sent in 2.5mg to take every day instead of the 2mg or 3mg.  Recheck in 1 month, standing order in.

## 2017-12-29 NOTE — TELEPHONE ENCOUNTER
Called pt and let know that Dr. Chrsitopher sent in 2.5 mg coumadin to take daily instead of the 2 mg or 3 mg.  Will recheck in 1 month.  Booked 1-26-18.

## 2018-01-02 ENCOUNTER — TELEPHONE (OUTPATIENT)
Dept: CARDIOLOGY | Facility: CLINIC | Age: 78
End: 2018-01-02

## 2018-01-02 NOTE — TELEPHONE ENCOUNTER
----- Message from Esperanza Torres sent at 1/2/2018  9:45 AM CST -----  Contact: [phone]   Pt  Would like a new order for U/S Appt.  Pt would like reschedule appt next week order expires on 01/03/18.  Please call back at [phone].  Thx-AH

## 2018-01-04 ENCOUNTER — PATIENT OUTREACH (OUTPATIENT)
Dept: OTHER | Facility: OTHER | Age: 78
End: 2018-01-04

## 2018-01-04 NOTE — PROGRESS NOTES
Mr. Wall had the flu last week, but is feeling better now. He will resume monitoring. Continues to have ringing in the ears, but he feels that it is not due to valsartan. Will see Dr. Wells at the end of the month.    Last 5 Patient Entered Readings Current 30 Day Average: 151/73     Recent Readings 12/20/2017 12/20/2017 12/20/2017 12/11/2017 12/11/2017    SBP (mmHg) 155 152 149 145 155    DBP (mmHg) 75 72 80 68 75    Pulse 56 55 56 58 57          BP above goal, <130/80 mmHg  Await additional readings- consider valsartan dose increase    Hypertension Medications             metoprolol succinate (TOPROL-XL) 25 MG 24 hr tablet Take 1 tablet (25 mg total) by mouth once daily. Take an additional tablet if BP > 160. KALIN 1.    valsartan (DIOVAN) 40 MG tablet Take 40 mg by mouth 2 (two) times daily.    verapamil (VERELAN) 240 MG C24P Take 1 capsule (240 mg total) by mouth 2 (two) times daily. MYLAN generic only. [facility]: please profile.

## 2018-01-11 ENCOUNTER — PATIENT OUTREACH (OUTPATIENT)
Dept: OTHER | Facility: OTHER | Age: 78
End: 2018-01-11

## 2018-01-11 NOTE — PROGRESS NOTES
Last 5 Patient Entered Readings Current 30 Day Average: 149/71     Recent Readings 1/5/2018 1/5/2018 1/5/2018 12/20/2017 12/20/2017    SBP (mmHg) 143 141 144 155 152    DBP (mmHg) 66 71 71 75 72    Pulse 51 52 51 56 55          Hypertension Digital Medicine Program (HDMP): Health  Follow Up    Lifestyle Modifications:    1.Low sodium diet: yes : He continues working on maintaining a low sodium diet. He does his best and works on this with his wife. He has had the flu so his diet was likely off a bit due to that. He will continue watching the sodium and will do his best to avoid high amounts of salt while he recovers from the flu.     2.Physical activity: Deferred    3.Hypotension/Hypertension symptoms: no   Frequency/Alleviating factors/Precipitating factors, etc.     4.Patient has been compliant with the medication regimen.     Follow up with Mr. Heraclio Wall completed. No further questions or concerns. I will follow up in a few weeks to assess progress.

## 2018-01-19 ENCOUNTER — PATIENT OUTREACH (OUTPATIENT)
Dept: OTHER | Facility: OTHER | Age: 78
End: 2018-01-19

## 2018-01-19 RX ORDER — VALSARTAN 80 MG/1
80 TABLET ORAL 2 TIMES DAILY
COMMUNITY
End: 2018-01-24 | Stop reason: DRUGHIGH

## 2018-01-19 NOTE — PROGRESS NOTES
Patient was concerned about elevated reading this morning along with lower arm pain that started yesterday. He denies dizziness, weakness, HA, SOB, CP, etc. Wife confirms he has been taking verapamil and valsartan 80mg. Will see Dr. Wells in 2 weeks.     Last 5 Patient Entered Readings                                      Current 30 Day Average: 153/71     Recent Readings 1/19/2018 1/19/2018 1/19/2018 1/12/2018 1/12/2018    SBP (mmHg) 157 165 169 157 154    DBP (mmHg) 75 78 85 69 77    Pulse 52 51 51 52 55          BP above goal, <140/90 mmHg  Increase valsartan to 80mg BID  Continue monitoring    Hypertension Medications             valsartan (DIOVAN) 80 MG tablet Take 80 mg by mouth 2 (two) times daily.    metoprolol succinate (TOPROL-XL) 25 MG 24 hr tablet Take 1 tablet (25 mg total) by mouth once daily. Take an additional tablet if BP > 160. KALIN 1.    verapamil (VERELAN) 240 MG C24P Take 1 capsule (240 mg total) by mouth 2 (two) times daily. MYLAN generic only. [facility]: please profile.

## 2018-01-22 ENCOUNTER — CLINICAL SUPPORT (OUTPATIENT)
Dept: CARDIOLOGY | Facility: CLINIC | Age: 78
End: 2018-01-22
Attending: INTERNAL MEDICINE
Payer: MEDICARE

## 2018-01-22 DIAGNOSIS — I67.2 CEREBRAL ATHEROSCLEROSIS: ICD-10-CM

## 2018-01-22 DIAGNOSIS — I77.9 CAROTID ARTERY DISEASE: ICD-10-CM

## 2018-01-22 DIAGNOSIS — I77.9 CAROTID ARTERY DISEASE: Primary | ICD-10-CM

## 2018-01-22 LAB — INTERNAL CAROTID STENOSIS: NORMAL

## 2018-01-22 PROCEDURE — 93880 EXTRACRANIAL BILAT STUDY: CPT | Mod: S$GLB,,, | Performed by: INTERNAL MEDICINE

## 2018-01-23 RX ORDER — PANTOPRAZOLE SODIUM 40 MG/1
TABLET, DELAYED RELEASE ORAL
Qty: 30 TABLET | Refills: 6 | Status: SHIPPED | OUTPATIENT
Start: 2018-01-23 | End: 2018-11-28

## 2018-01-24 ENCOUNTER — PATIENT OUTREACH (OUTPATIENT)
Dept: OTHER | Facility: OTHER | Age: 78
End: 2018-01-24

## 2018-01-24 RX ORDER — VALSARTAN 80 MG/1
80 TABLET ORAL DAILY
COMMUNITY
End: 2018-02-05 | Stop reason: DRUGHIGH

## 2018-01-24 RX ORDER — METOPROLOL SUCCINATE 25 MG/1
25 TABLET, EXTENDED RELEASE ORAL 2 TIMES DAILY
COMMUNITY
End: 2018-06-19 | Stop reason: SDUPTHER

## 2018-01-24 NOTE — PROGRESS NOTES
"Mr. Wall reports ringing in the ears, headache and overall feeling "blah" since increasing dose of valsartan. Denies dizziness/weakness.     Last 5 Patient Entered Readings                                      Current 30 Day Average: 151/70     Recent Readings 1/24/2018 1/24/2018 1/24/2018 1/22/2018 1/22/2018    SBP (mmHg) 153 149 156 144 155    DBP (mmHg) 70 75 90 68 72    Pulse 52 55 56 51 51          BP above goal  Decrease valsartan to 80mg   Patient unable to tolerate additional medications despite several trials  Discuss with Dr. Wells      Hypertension Medications             valsartan (DIOVAN) 80 MG tablet Take 80 mg by mouth once daily.    metoprolol succinate (TOPROL-XL) 25 MG 24 hr tablet Take 1 tablet (25 mg total) by mouth once daily. Take an additional tablet if BP > 160. KALIN 1.    verapamil (VERELAN) 240 MG C24P Take 1 capsule (240 mg total) by mouth 2 (two) times daily. MYLAN generic only. [facility]: please profile.          "

## 2018-01-25 ENCOUNTER — OFFICE VISIT (OUTPATIENT)
Dept: SLEEP MEDICINE | Facility: CLINIC | Age: 78
End: 2018-01-25
Payer: MEDICARE

## 2018-01-25 ENCOUNTER — TELEPHONE (OUTPATIENT)
Dept: INTERNAL MEDICINE | Facility: CLINIC | Age: 78
End: 2018-01-25

## 2018-01-25 VITALS
SYSTOLIC BLOOD PRESSURE: 120 MMHG | HEART RATE: 58 BPM | OXYGEN SATURATION: 98 % | HEIGHT: 70 IN | BODY MASS INDEX: 28.06 KG/M2 | DIASTOLIC BLOOD PRESSURE: 60 MMHG | WEIGHT: 196 LBS | RESPIRATION RATE: 14 BRPM

## 2018-01-25 DIAGNOSIS — G47.33 OSA ON CPAP: Primary | Chronic | ICD-10-CM

## 2018-01-25 PROCEDURE — 99214 OFFICE O/P EST MOD 30 MIN: CPT | Mod: S$GLB,,, | Performed by: INTERNAL MEDICINE

## 2018-01-25 PROCEDURE — 99999 PR PBB SHADOW E&M-EST. PATIENT-LVL V: CPT | Mod: PBBFAC,,, | Performed by: INTERNAL MEDICINE

## 2018-01-25 NOTE — TELEPHONE ENCOUNTER
----- Message from Fabiola Ceja sent at 1/25/2018  3:49 PM CST -----  Contact: Anitha - wife  She has an appointment to establish care on February 20 at 9:40 and she wants to know if there is any way that you could work her  in at the same time so that he could establish care.  Call her at [phone].                                                       curiel

## 2018-01-25 NOTE — PROGRESS NOTES
Subjective:       Heraclio Wall is a 77 y.o. male  is a follow-up appointment.    I last saw him 01/12/2017.  Hx of stroke, on coumadin  He has obstructive sleep apnea currently on CPAP 9 cm water pressure.    [place] sleepiness score is 8.  He is getting good benefit from the CPAP machine  The download shows he is using it greater than 4 hours 93.3% of the time.  Patient has used the device every night for the last 30 days.  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  He  does not have any pertinent problems on file.  He  has a past surgical history that includes Circumcision, primary (1938); Tonsillectomy (1942); Adenoidectomy (1942); Cholecystectomy (2/2015); Small intestine surgery (5/4/2014); Eye surgery (Right, 02/15/2012); and Eye surgery (Left, 03/29/2012).  His family history includes Aortic aneurysm in his father; Cancer (age of onset: 64) in his sister; Cataracts in his mother; Glaucoma in his mother; Hearing loss in his sister; Heart disease in his mother; Hyperlipidemia in his mother; Hypertension in his father and mother; Peripheral vascular disease in his father; Stroke in his mother and sister.  He  reports that he has never smoked. He has never used smokeless tobacco. He reports that he does not drink alcohol or use drugs.  He has a current medication list which includes the following prescription(s): arginine,  ascorbic acid (vitamin c), cholecalciferol (vitamin d3), cyanocobalamin (vitamin b-12), dextran 70-hypromellose, fluvastatin xl, hydrocortisone, lifitegrast, metoprolol succinate, progesterone, protonix, saw palmetto, selenium, UNABLE TO FIND, UNABLE TO FIND, valsartan, verapamil, and warfarin.  Current Outpatient Prescriptions on File Prior to Visit   Medication Sig Dispense Refill    arginine 500 mg tablet Take 1 tablet by mouth 2 (two) times daily.      ascorbic acid (VITAMIN C) 1000 MG tablet Take 3 tablets by mouth Daily.      cholecalciferol, vitamin D3, 1,000 unit capsule Take 5 capsules by mouth Daily.      cyanocobalamin, vitamin B-12, 1,000 mcg/15 mL Liqd Take 1 drop by mouth once daily.       dextran 70-hypromellose (ARTIFICIAL TEARS) ophthalmic solution Place 1 drop into both eyes Use as needed.      fluvastatin XL (LESCOL XL) 80 mg Tb24 Take 1 tablet (80 mg total) by mouth once daily. 90 tablet 3    HYDROCORTISONE ORAL Take 2.5 capsules by mouth once daily.      lifitegrast (XIIDRA) 5 % Dpet Place 1 drop into both eyes 2 (two) times daily. 60 each 12    metoprolol succinate (TOPROL-XL) 25 MG 24 hr tablet Take 25 mg by mouth 2 (two) times daily.      PROGESTERONE MISC 6.25 mg by Misc.(Non-Drug; Combo Route) route every evening.      PROTONIX 40 mg tablet TAKE 1 TABLET BY MOUTH DAILY 30 tablet 6    saw palmetto 160 MG capsule Take 1 capsule by mouth 2 (two) times daily.      selenium 200 mcg Tab Take 1 tablet by mouth Daily.      UNABLE TO FIND medication name: Red out eye drops      UNABLE TO FIND Take by mouth once daily. medication name: Triiodo-L-Thyronine-Levothyroxine 4.5/19mcg       valsartan (DIOVAN) 80 MG tablet Take 80 mg by mouth once daily.      verapamil (VERELAN) 240 MG C24P Take 1 capsule (240 mg total) by mouth 2 (two) times daily. MYLAN generic only. [facility]: please profile. 180 capsule 3    warfarin (COUMADIN) 2.5 MG tablet Take 1 tablet (2.5 mg total) by mouth Daily. 30  "tablet 11     No current facility-administered medications on file prior to visit.      He is allergic to milk; olive extract; tea tree; apple; benicar [olmesartan]; cardizem [diltiazem hcl]; clonidine; clonidine hcl; clopidogrel; dicyclomine; hydralazine analogues; lipitor [atorvastatin]; lopressor [metoprolol tartrate]; norvasc [amlodipine]; pineapple; and bactrim [sulfamethoxazole-trimethoprim]..    Review of Systems  A comprehensive review of systems was negative.      Objective:        /60   Pulse (!) 58   Resp 14   Ht 5' 10.25" (1.784 m)   Wt 88.9 kg (196 lb)   SpO2 98%   BMI 27.92 kg/m²     General Appearance:    Alert, cooperative, no distress, appears stated age   Head:    Normocephalic, without obvious abnormality, atraumatic   Throat:   Lips, mucosa, and tongue normal; teeth and gums normal   Neck:   Supple, symmetrical, trachea midline, no adenopathy;        thyroid:  No enlargement/tenderness/nodules; no carotid    bruit or JVD   Lungs:     Clear to auscultation bilaterally, respirations unlabored   Chest wall:    No tenderness or deformity   Heart:    Regular rate and rhythm, S1 and S2 normal, no murmur, rub   or gallop   Abdomen:     Soft, non-tender, bowel sounds active all four quadrants,     no masses, no organomegaly   Extremities:   Extremities normal, atraumatic, no cyanosis or edema   Pulses:   2+ and symmetric all extremities   Skin:   Skin color, texture, turgor normal, no rashes or lesions   Lymph nodes:   Cervical, supraclavicular, and axillary nodes normal   Neurologic:   CNII-XII intact. Normal strength, sensation and reflexes       throughout         DOWNLOAD  12/26/2017 - 1/24/2018  YOB: 1940  Mask:  Compliance Summary  12/26/2017 - 1/24/2018 (30 days)  Days with Device Usage 30 days  Days without Device Usage 0 days  Percent Days with Device Usage 100.0%  Cumulative Usage 9 days 28 mins. 5 secs.  Maximum Usage (1 Day) 9 hrs. 45 mins. 53 secs.  Average Usage " (All Days) 7 hrs. 12 mins. 56 secs.  Average Usage (Days Used) 7 hrs. 12 mins. 56 secs.  Minimum Usage (1 Day) 3 hrs. 21 mins. 35 secs.  Percent of Days with Usage >= 4 Hours 93.3%  Percent of Days with Usage < 4 Hours 6.7%      Assessment:      Problem List Items Addressed This Visit     CHESTER on CPAP - Primary (Chronic)     [place]: 5  Bed time : 10 pm  Wake time 0730am  CPAP 9 cm    Using CPAP and benefits from device         Relevant Orders    CPAP/BIPAP SUPPLIES    MyChart Patient Entered CPAP Usage        Complainant and benefits from PAP     Plan:      Follow-up in about 1 year (around 1/25/2019) for Download, CPAP supplies.    This note was prepared using voice recognition system and is likely to have sound alike errors that may have been overlooked even after proof reading.  Please call me with any questions    Discussed diagnosis, its evaluation, treatment and usual course. All questions answered.    Thank you for the courtesy of participating in the care of this patient    Ion Dumont MD

## 2018-01-25 NOTE — ASSESSMENT & PLAN NOTE
[place]: 5  Bed time : 10 pm  Wake time 0730am  CPAP 9 cm    Using CPAP and benefits from device

## 2018-01-25 NOTE — TELEPHONE ENCOUNTER
Pt wife aware PCP is booked on 02/21/18 but pt scheduled via appt desk to [facility].  gloria

## 2018-01-26 ENCOUNTER — LAB VISIT (OUTPATIENT)
Dept: LAB | Facility: HOSPITAL | Age: 78
End: 2018-01-26
Attending: FAMILY MEDICINE
Payer: MEDICARE

## 2018-01-26 DIAGNOSIS — Z79.01 ANTICOAGULATED ON COUMADIN: ICD-10-CM

## 2018-01-26 LAB
INR PPP: 3.1
PROTHROMBIN TIME: 30.3 SEC

## 2018-01-26 PROCEDURE — 85610 PROTHROMBIN TIME: CPT

## 2018-01-26 PROCEDURE — 36415 COLL VENOUS BLD VENIPUNCTURE: CPT | Mod: PO

## 2018-01-29 ENCOUNTER — PATIENT MESSAGE (OUTPATIENT)
Dept: INTERNAL MEDICINE | Facility: CLINIC | Age: 78
End: 2018-01-29

## 2018-01-29 ENCOUNTER — OFFICE VISIT (OUTPATIENT)
Dept: CARDIOLOGY | Facility: CLINIC | Age: 78
End: 2018-01-29
Payer: MEDICARE

## 2018-01-29 VITALS
WEIGHT: 196 LBS | HEIGHT: 70 IN | SYSTOLIC BLOOD PRESSURE: 118 MMHG | HEART RATE: 58 BPM | BODY MASS INDEX: 28.06 KG/M2 | DIASTOLIC BLOOD PRESSURE: 60 MMHG

## 2018-01-29 DIAGNOSIS — I10 ESSENTIAL HYPERTENSION: Chronic | ICD-10-CM

## 2018-01-29 DIAGNOSIS — Z79.01 CHRONIC ANTICOAGULATION: Chronic | ICD-10-CM

## 2018-01-29 DIAGNOSIS — E78.2 MIXED HYPERLIPIDEMIA: Chronic | ICD-10-CM

## 2018-01-29 DIAGNOSIS — G47.33 OSA ON CPAP: Chronic | ICD-10-CM

## 2018-01-29 DIAGNOSIS — R94.31 ABNORMAL ECG: Primary | ICD-10-CM

## 2018-01-29 DIAGNOSIS — I77.9 RIGHT-SIDED CAROTID ARTERY DISEASE: ICD-10-CM

## 2018-01-29 DIAGNOSIS — I10 HYPERTENSION: ICD-10-CM

## 2018-01-29 DIAGNOSIS — Z86.73 HISTORY OF CVA (CEREBROVASCULAR ACCIDENT): Chronic | ICD-10-CM

## 2018-01-29 PROCEDURE — 99999 PR PBB SHADOW E&M-EST. PATIENT-LVL III: CPT | Mod: PBBFAC,,, | Performed by: INTERNAL MEDICINE

## 2018-01-29 PROCEDURE — 99499 UNLISTED E&M SERVICE: CPT | Mod: S$GLB,,, | Performed by: INTERNAL MEDICINE

## 2018-01-29 PROCEDURE — 99214 OFFICE O/P EST MOD 30 MIN: CPT | Mod: S$GLB,,, | Performed by: INTERNAL MEDICINE

## 2018-01-29 PROCEDURE — 93000 ELECTROCARDIOGRAM COMPLETE: CPT | Mod: S$GLB,,, | Performed by: NUCLEAR MEDICINE

## 2018-01-29 RX ORDER — VERAPAMIL HYDROCHLORIDE 240 MG/1
240 CAPSULE, EXTENDED RELEASE ORAL 2 TIMES DAILY
Qty: 60 CAPSULE | Refills: 12 | Status: SHIPPED | OUTPATIENT
Start: 2018-01-29 | End: 2018-12-26 | Stop reason: SDUPTHER

## 2018-01-29 RX ORDER — FUROSEMIDE 40 MG/1
40 TABLET ORAL 2 TIMES DAILY
COMMUNITY
End: 2018-06-19 | Stop reason: SDUPTHER

## 2018-01-29 NOTE — TELEPHONE ENCOUNTER
Sent portal message and called pt and left message that portal message sent, send message back letting me know he has read or he can call back for results and directions.

## 2018-01-29 NOTE — PROGRESS NOTES
Subjective:    Patient ID:  Heraclio Wall is a 77 y.o. male who presents for evaluation of Hypertension; Carotid Artery Disease; Risk Factor Management For Atherosclerosis; and Hyperlipidemia      HPI Mr. Wall returns for yearly  f/u.   His current medical conditions include HTN, carotid artery disease, hyperlipidemia.  Nonsmoker.  Past history pertinent for following:  H/o  CVA 7/13 and was put on Plavix (was on ASA at time). He had admission 5/14 for splenic infarct and had surgery. Dr. Metz did abdominal angio with report showing no evidence of mesenteric stenosis. SAIRA showed no intracardiac source of emboli. He was d/cd home on asa, plavix and coumadin was started. He is now on coumadin only.  Now here for f/u.  He is enrolled in digital HTN program.   BP today is good.  States BP runs   No cp or dyspnea.  ecg today shows sinus bradycardia 58 bpm, nonspecific t wave abnormality.  Lipids stable on statin tx, tg slightly elevated.   No tia/cva sxs.  Carotid u/s shows minimal disease.  No abnl bleeding on warfarin.    Patient Active Problem List   Diagnosis    Hypogonadism male    Bilateral hearing loss    Essential hypertension    Refraction error - Both Eyes    CHESTER on CPAP    Tinnitus of both ears    GERD (gastroesophageal reflux disease)    Mixed hyperlipidemia    Anemia    Hypothyroidism due to acquired atrophy of thyroid    Right-sided carotid artery disease    Atherosclerosis of aorta    Chronic anticoagulation    Chronic kidney disease, stage 3    Abnormal ECG    Insufficiency of tear film of both eyes    History of CVA (cerebrovascular accident)    Numbness and tingling in left arm    Benign prostatic hyperplasia without lower urinary tract symptoms    Chronic fatigue    Low back pain     Past Medical History:   Diagnosis Date    Anxiety     Back pain     had some pain occasionally r/t a fall    Bilateral hearing loss     Cancer     skin cancer    Disorder of kidney and  ureter     Diverticulosis     DJD (degenerative joint disease)     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    Hernia, diaphragmatic 6/12/12    Hypogonadism male     Lens replaced by other means 10/3/2013    Pterygium - Right Eye 10/3/2013    Splenic infarct 5/4/14    Stroke 7/31/13    left lacunar    Unspecified vitamin D deficiency        Current Outpatient Prescriptions:     arginine 500 mg tablet, Take 1 tablet by mouth 2 (two) times daily., Disp: , Rfl:     ascorbic acid (VITAMIN C) 1000 MG tablet, Take 3 tablets by mouth Daily., Disp: , Rfl:     cholecalciferol, vitamin D3, 1,000 unit capsule, Take 5 capsules by mouth Daily., Disp: , Rfl:     cyanocobalamin, vitamin B-12, 1,000 mcg/15 mL Liqd, Take 1 drop by mouth once daily. , Disp: , Rfl:     dextran 70-hypromellose (ARTIFICIAL TEARS) ophthalmic solution, Place 1 drop into both eyes Use as needed., Disp: , Rfl:     fluvastatin XL (LESCOL XL) 80 mg Tb24, Take 1 tablet (80 mg total) by mouth once daily., Disp: 90 tablet, Rfl: 3    furosemide (LASIX) 40 MG tablet, Take 40 mg by mouth 2 (two) times daily., Disp: , Rfl:     HYDROCORTISONE ORAL, Take 2.5 capsules by mouth once daily., Disp: , Rfl:     lifitegrast (XIIDRA) 5 % Dpet, Place 1 drop into both eyes 2 (two) times daily., Disp: 60 each, Rfl: 12    metoprolol succinate (TOPROL-XL) 25 MG 24 hr tablet, Take 25 mg by mouth 2 (two) times daily., Disp: , Rfl:     PROGESTERONE MISC, 6.25 mg by Misc.(Non-Drug; Combo Route) route every evening., Disp: , Rfl:     PROTONIX 40 mg tablet, TAKE 1 TABLET BY MOUTH DAILY, Disp: 30 tablet, Rfl: 6    saw palmetto 160 MG capsule, Take 1 capsule by mouth 2 (two) times daily., Disp: , Rfl:     selenium 200 mcg Tab, Take 1 tablet by mouth Daily., Disp: , Rfl:     UNABLE TO FIND, medication name: Red out eye drops, Disp: , Rfl:     UNABLE TO FIND, Take by mouth once daily. medication name: Triiodo-L-Thyronine-Levothyroxine 4.5/19mcg ,  "Disp: , Rfl:     valsartan (DIOVAN) 80 MG tablet, Take 80 mg by mouth once daily., Disp: , Rfl:     verapamil (VERELAN) 240 MG C24P, Take 1 capsule (240 mg total) by mouth 2 (two) times daily. MYLAN generic only. [facility]: please profile., Disp: 180 capsule, Rfl: 3    warfarin (COUMADIN) 2.5 MG tablet, Take 1 tablet (2.5 mg total) by mouth Daily., Disp: 30 tablet, Rfl: 11    Review of Systems   Constitution: Negative.   HENT: Negative.    Eyes: Negative.    Cardiovascular: Negative.    Respiratory: Negative.    Endocrine: Negative.    Hematologic/Lymphatic: Negative.    Skin: Negative.    Musculoskeletal: Negative.    Gastrointestinal: Negative.    Genitourinary: Negative.    Neurological: Negative.    Psychiatric/Behavioral: Negative.    Allergic/Immunologic: Negative.        /60 (BP Location: Left arm)   Pulse (!) 58   Ht 5' 10" (1.778 m)   Wt 88.9 kg (196 lb)   BMI 28.12 kg/m²     Wt Readings from Last 3 Encounters:   01/29/18 88.9 kg (196 lb)   01/25/18 88.9 kg (196 lb)   11/29/17 89.3 kg (196 lb 13.9 oz)     Temp Readings from Last 3 Encounters:   06/12/17 97.8 °F (36.6 °C) (Tympanic)   03/08/17 98.1 °F (36.7 °C) (Tympanic)   12/12/16 97.4 °F (36.3 °C) (Tympanic)     BP Readings from Last 3 Encounters:   01/29/18 118/60   01/25/18 120/60   11/29/17 (!) 140/60     Pulse Readings from Last 3 Encounters:   01/29/18 (!) 58   01/25/18 (!) 58   11/29/17 (!) 55          Objective:    Physical Exam   Constitutional: He is oriented to person, place, and time. He appears well-developed and well-nourished.   HENT:   Head: Normocephalic.   Neck: Normal range of motion. Neck supple. Normal carotid pulses, no hepatojugular reflux and no JVD present. Carotid bruit is not present. No thyromegaly present.   Cardiovascular: Normal rate, regular rhythm, S1 normal and S2 normal.  PMI is not displaced.  Exam reveals no S3, no S4, no distant heart sounds, no friction rub, no midsystolic click and no opening snap.    No " murmur heard.  Pulses:       Radial pulses are 2+ on the right side, and 2+ on the left side.   Pulmonary/Chest: Effort normal and breath sounds normal. He has no wheezes. He has no rales.   Abdominal: Soft. Bowel sounds are normal. He exhibits no distension, no abdominal bruit, no ascites and no mass. There is no tenderness.   Musculoskeletal: He exhibits no edema.   Neurological: He is alert and oriented to person, place, and time.   Skin: Skin is warm.   Psychiatric: He has a normal mood and affect. His behavior is normal.   Nursing note and vitals reviewed.      I have reviewed all pertinent labs and cardiac studies.    Lab Results   Component Value Date    INR 3.1 (H) 01/26/2018    INR 1.3 (H) 12/28/2017    INR 3.3 (H) 11/30/2017           Chemistry        Component Value Date/Time     06/12/2017 1349    K 4.9 06/12/2017 1349     06/12/2017 1349    CO2 28 06/12/2017 1349    BUN 18 06/12/2017 1349    CREATININE 1.5 (H) 06/12/2017 1349    GLU 95 06/12/2017 1349        Component Value Date/Time    CALCIUM 9.5 06/12/2017 1349    ALKPHOS 68 03/08/2017 1026    AST 27 03/08/2017 1026    ALT 25 03/08/2017 1026    BILITOT 1.1 (H) 03/08/2017 1026    ESTGFRAFRICA 50 (A) 06/12/2017 1349    EGFRNONAA 44 (A) 06/12/2017 1349        Lab Results   Component Value Date    WBC 7.33 06/12/2017    HGB 13.8 (L) 06/12/2017    HCT 40.6 06/12/2017    MCV 93 06/12/2017     06/12/2017     Lab Results   Component Value Date    HGBA1C 5.2 06/01/2016     Lab Results   Component Value Date    CHOL 152 11/30/2017    CHOL 141 11/02/2016    CHOL 131 11/12/2015     Lab Results   Component Value Date    HDL 40 11/30/2017    HDL 37 (L) 11/02/2016    HDL 44 11/12/2015     Lab Results   Component Value Date    LDLCALC 77.8 11/30/2017    LDLCALC 74.4 11/02/2016    LDLCALC 69.4 11/12/2015     Lab Results   Component Value Date    TRIG 171 (H) 11/30/2017    TRIG 148 11/02/2016    TRIG 88 11/12/2015     Lab Results   Component Value  Date    CHOLHDL 26.3 11/30/2017    CHOLHDL 26.2 11/02/2016    CHOLHDL 33.6 11/12/2015     RIGHT  There is acceleration in the right Proximal Common Carotid Artery.   The right carotid bulb artery is visualized.   The right Distal Internal Carotid Artery has 20 - 39% stenosis.   There is acceleration in the right external carotid artery.   The right vertebral artery is visualized, associated with anterograde flow.   The right ICA/CCA ratio is: .91    LEFT  The left Mid Common Carotid Artery is visualized.   The left carotid bulb artery is visualized.   The left Distal Internal Carotid Artery has 0 - 19% stenosis.   There is acceleration in the left external carotid artery.   The left vertebral artery is visualized, associated with anterograde flow.   The left ICA/CCA ratio is: .88      CONCLUSIONS   There is 20 - 39% right Internal Carotid stenosis.  There is 0 - 19% left Internal Carotid stenosis.          This document has been electronically    SIGNED BY: Ja Metz MD On: 01/22/2018 11:29      Assessment:       1. Abnormal ECG    2. Right-sided carotid artery disease    3. Essential hypertension    4. CHESTER on CPAP    5. Mixed hyperlipidemia    6. Chronic anticoagulation    7. History of CVA (cerebrovascular accident)         Plan:             - Stable CV conditions on current medical tx.  - Test results reviewed in detail with pt.  - Continue CPAP for CHESTER.  - Continue digital HTN program, seems stable overall.  - Continue warfarin for CVA protection.  - Cardiac diet  - Daily exercise x 30 minutes.     F/u 1 year.

## 2018-01-29 NOTE — TELEPHONE ENCOUNTER
Planning to cont 2.5mg of coumadin daily, hold on Wed and do not take dose.  Please add note to coumadin in med list  Recheck in 4 wks, standing order in place.

## 2018-01-31 ENCOUNTER — TELEPHONE (OUTPATIENT)
Dept: INTERNAL MEDICINE | Facility: CLINIC | Age: 78
End: 2018-01-31

## 2018-01-31 NOTE — TELEPHONE ENCOUNTER
Pt called back, let him know that Dr. Christopher said to  continue 2.5mg of coumadin daily, except, hold on Wednesdays  and do not take dose.   Recheck in 4 wks, standing order in place.  Booked lab for 2-26-18 at 10:00 am.

## 2018-01-31 NOTE — TELEPHONE ENCOUNTER
Pt called back let him know Dr. Christopher said to  continue 2.5mg of coumadin daily, except, hold on Wednesdays  and do not take dose.   Recheck in 4 wks, standing order in place.  Booked lab for 2-26-18 at 10:00 am.   updated med card.

## 2018-02-05 ENCOUNTER — PATIENT OUTREACH (OUTPATIENT)
Dept: OTHER | Facility: OTHER | Age: 78
End: 2018-02-05

## 2018-02-05 RX ORDER — VALSARTAN 80 MG/1
80 TABLET ORAL 2 TIMES DAILY
COMMUNITY
End: 2018-03-26 | Stop reason: SDUPTHER

## 2018-02-05 NOTE — PROGRESS NOTES
Last 5 Patient Entered Readings                                      Current 30 Day Average: 153/72     Recent Readings 2/4/2018 2/4/2018 2/4/2018 2/4/2018 1/29/2018    SBP (mmHg) 146 151 154 160 150    DBP (mmHg) 70 72 73 76 70    Pulse 51 51 54 55 52          Hypertension Digital Medicine Program (HDMP): Health  Follow Up    Lifestyle Modifications:    1.Low sodium diet: yes : He stated that he is still monitoring his sodium intake. He is avoiding highly salted food and not adding salt to meals.     2.Physical activity: no : He has not been going to the gym because his wife has been sick and the weather has been bad so he could not make it to the gym. He hopes to get back soon once his wife gets cleared by her physician.     3.Hypotension/Hypertension symptoms: no   Frequency/Alleviating factors/Precipitating factors, etc.     4.Patient has been compliant with the medication regimen. Patients PharmJV Allen changes his BP medication regimen a few weeks ago so he is only taking the Valsartan once a day instead of twice a day. Patient mentioned that she cut the medication back because he was complaining of severe pounding in his ear that only happens while he is awake. He reports that this did not help with the ear pounding so I will let his PharmD know because he is concerned about his readings being high. He is also taking Advil for severe shoulder pain.     Follow up with Mr. Heraclio Wall completed. No further questions or concerns. I will follow up in a few weeks to assess progress.

## 2018-02-05 NOTE — PROGRESS NOTES
Mr. Wall is concerned about elevated readings. He notes he has been experiencing some arthritis pain on his shoulder. Taking tylenol PRN. Tinnitus persists despite lower dose of valsartan. He saw Dr. Wlels last week and was found to be stable.     Last 5 Patient Entered Readings                                      Current 30 Day Average: 153/72     Recent Readings 2/4/2018 2/4/2018 2/4/2018 2/4/2018 1/29/2018    SBP (mmHg) 146 151 154 160 150    DBP (mmHg) 70 72 73 76 70    Pulse 51 51 54 55 52        BP above goal, <140/90 mmHg  Increase valsartan dose to 80mg BID  Continue monitoring    Hypertension Medications             furosemide (LASIX) 40 MG tablet Take 40 mg by mouth 2 (two) times daily.    metoprolol succinate (TOPROL-XL) 25 MG 24 hr tablet Take 25 mg by mouth 2 (two) times daily.    valsartan (DIOVAN) 80 MG tablet Take 80 mg by mouth 2 (two) times daily.    verapamil (VERELAN) 240 MG C24P Take 1 capsule (240 mg total) by mouth 2 (two) times daily. MYLAN generic only. [facility]: please profile.

## 2018-02-06 ENCOUNTER — TELEPHONE (OUTPATIENT)
Dept: INTERNAL MEDICINE | Facility: CLINIC | Age: 78
End: 2018-02-06

## 2018-02-06 ENCOUNTER — LAB VISIT (OUTPATIENT)
Dept: LAB | Facility: HOSPITAL | Age: 78
End: 2018-02-06
Attending: FAMILY MEDICINE
Payer: MEDICARE

## 2018-02-06 DIAGNOSIS — E03.9 HYPOTHYROIDISM, UNSPECIFIED TYPE: ICD-10-CM

## 2018-02-06 DIAGNOSIS — Z12.5 PROSTATE CANCER SCREENING: ICD-10-CM

## 2018-02-06 DIAGNOSIS — I10 HYPERTENSION, UNSPECIFIED TYPE: ICD-10-CM

## 2018-02-06 DIAGNOSIS — R53.83 FATIGUE, UNSPECIFIED TYPE: ICD-10-CM

## 2018-02-06 DIAGNOSIS — E78.5 HYPERLIPIDEMIA, UNSPECIFIED HYPERLIPIDEMIA TYPE: ICD-10-CM

## 2018-02-06 DIAGNOSIS — E55.9 VITAMIN D DEFICIENCY: ICD-10-CM

## 2018-02-06 DIAGNOSIS — R73.09 ABNORMAL GLUCOSE: ICD-10-CM

## 2018-02-06 DIAGNOSIS — I10 HYPERTENSION, UNSPECIFIED TYPE: Primary | ICD-10-CM

## 2018-02-06 LAB
ALBUMIN SERPL BCP-MCNC: 4.4 G/DL
ALP SERPL-CCNC: 80 U/L
ALT SERPL W/O P-5'-P-CCNC: 22 U/L
ANION GAP SERPL CALC-SCNC: 9 MMOL/L
AST SERPL-CCNC: 26 U/L
BASOPHILS # BLD AUTO: 0.04 K/UL
BASOPHILS NFR BLD: 0.6 %
BILIRUB SERPL-MCNC: 1.5 MG/DL
BUN SERPL-MCNC: 17 MG/DL
CALCIUM SERPL-MCNC: 9.8 MG/DL
CHLORIDE SERPL-SCNC: 105 MMOL/L
CHOLEST SERPL-MCNC: 162 MG/DL
CHOLEST/HDLC SERPL: 3.9 {RATIO}
CO2 SERPL-SCNC: 28 MMOL/L
CREAT SERPL-MCNC: 1.3 MG/DL
CRP SERPL-MCNC: 1.1 MG/L
DIFFERENTIAL METHOD: ABNORMAL
EOSINOPHIL # BLD AUTO: 0.1 K/UL
EOSINOPHIL NFR BLD: 1.3 %
ERYTHROCYTE [DISTWIDTH] IN BLOOD BY AUTOMATED COUNT: 12.6 %
EST. GFR  (AFRICAN AMERICAN): 60 ML/MIN/1.73 M^2
EST. GFR  (NON AFRICAN AMERICAN): 51.9 ML/MIN/1.73 M^2
ESTIMATED AVG GLUCOSE: 91 MG/DL
ESTRADIOL SERPL-MCNC: 10 PG/ML
GLUCOSE SERPL-MCNC: 96 MG/DL
HBA1C MFR BLD HPLC: 4.8 %
HCT VFR BLD AUTO: 40.6 %
HDLC SERPL-MCNC: 42 MG/DL
HDLC SERPL: 25.9 %
HGB BLD-MCNC: 13.4 G/DL
IMM GRANULOCYTES # BLD AUTO: 0.01 K/UL
IMM GRANULOCYTES NFR BLD AUTO: 0.2 %
IRON SERPL-MCNC: 117 UG/DL
LDLC SERPL CALC-MCNC: 88 MG/DL
LYMPHOCYTES # BLD AUTO: 2.1 K/UL
LYMPHOCYTES NFR BLD: 33.3 %
MAGNESIUM SERPL-MCNC: 2.4 MG/DL
MCH RBC QN AUTO: 31.8 PG
MCHC RBC AUTO-ENTMCNC: 33 G/DL
MCV RBC AUTO: 96 FL
MONOCYTES # BLD AUTO: 0.4 K/UL
MONOCYTES NFR BLD: 6.4 %
NEUTROPHILS # BLD AUTO: 3.6 K/UL
NEUTROPHILS NFR BLD: 58.2 %
NONHDLC SERPL-MCNC: 120 MG/DL
NRBC BLD-RTO: 0 /100 WBC
PLATELET # BLD AUTO: 178 K/UL
PMV BLD AUTO: 10 FL
POTASSIUM SERPL-SCNC: 4.9 MMOL/L
PROT SERPL-MCNC: 8 G/DL
RBC # BLD AUTO: 4.22 M/UL
SODIUM SERPL-SCNC: 142 MMOL/L
T4 FREE SERPL-MCNC: 1.23 NG/DL
TRIGL SERPL-MCNC: 160 MG/DL
TSH SERPL DL<=0.005 MIU/L-ACNC: 1.11 UIU/ML
WBC # BLD AUTO: 6.22 K/UL

## 2018-02-06 PROCEDURE — 83036 HEMOGLOBIN GLYCOSYLATED A1C: CPT

## 2018-02-06 PROCEDURE — 80053 COMPREHEN METABOLIC PANEL: CPT

## 2018-02-06 PROCEDURE — 82306 VITAMIN D 25 HYDROXY: CPT

## 2018-02-06 PROCEDURE — 84153 ASSAY OF PSA TOTAL: CPT

## 2018-02-06 PROCEDURE — 84403 ASSAY OF TOTAL TESTOSTERONE: CPT

## 2018-02-06 PROCEDURE — 84402 ASSAY OF FREE TESTOSTERONE: CPT

## 2018-02-06 PROCEDURE — 84443 ASSAY THYROID STIM HORMONE: CPT

## 2018-02-06 PROCEDURE — 80061 LIPID PANEL: CPT

## 2018-02-06 PROCEDURE — 82670 ASSAY OF TOTAL ESTRADIOL: CPT

## 2018-02-06 PROCEDURE — 82627 DEHYDROEPIANDROSTERONE: CPT

## 2018-02-06 PROCEDURE — 83735 ASSAY OF MAGNESIUM: CPT

## 2018-02-06 PROCEDURE — 85025 COMPLETE CBC W/AUTO DIFF WBC: CPT

## 2018-02-06 PROCEDURE — 36415 COLL VENOUS BLD VENIPUNCTURE: CPT | Mod: PO

## 2018-02-06 PROCEDURE — 83540 ASSAY OF IRON: CPT

## 2018-02-06 PROCEDURE — 83090 ASSAY OF HOMOCYSTEINE: CPT

## 2018-02-06 PROCEDURE — 84436 ASSAY OF TOTAL THYROXINE: CPT

## 2018-02-06 PROCEDURE — 84439 ASSAY OF FREE THYROXINE: CPT

## 2018-02-06 PROCEDURE — 86140 C-REACTIVE PROTEIN: CPT

## 2018-02-07 LAB
25(OH)D3+25(OH)D2 SERPL-MCNC: 48 NG/ML
COMPLEXED PSA SERPL-MCNC: 0.6 NG/ML
DHEA-S SERPL-MCNC: 51.4 UG/DL
HCYS SERPL-SCNC: 10.4 UMOL/L
T4 SERPL-MCNC: 8.8 UG/DL
TESTOST SERPL-MCNC: 251 NG/DL

## 2018-02-08 LAB — TESTOST FREE SERPL-MCNC: 3.6 PG/ML

## 2018-02-09 ENCOUNTER — PATIENT MESSAGE (OUTPATIENT)
Dept: INTERNAL MEDICINE | Facility: CLINIC | Age: 78
End: 2018-02-09

## 2018-02-16 ENCOUNTER — PATIENT OUTREACH (OUTPATIENT)
Dept: OTHER | Facility: OTHER | Age: 78
End: 2018-02-16

## 2018-02-16 NOTE — PROGRESS NOTES
Mr. Wall is tolerating higher valsartan dose. Has been stressed due to wife's illness- awaits ablation. No questions or concerns.     Last 5 Patient Entered Readings                                      Current 30 Day Average: 149/71     Recent Readings 2/15/2018 2/15/2018 2/15/2018 2/12/2018 2/12/2018    SBP (mmHg) 141 153 153 156 153    DBP (mmHg) 67 76 81 72 71    Pulse 55 55 56 56 55          BP above goal, but improved  Continue monitoring    Hypertension Medications             furosemide (LASIX) 40 MG tablet Take 40 mg by mouth 2 (two) times daily.    metoprolol succinate (TOPROL-XL) 25 MG 24 hr tablet Take 25 mg by mouth 2 (two) times daily.    valsartan (DIOVAN) 80 MG tablet Take 80 mg by mouth 2 (two) times daily.    verapamil (VERELAN) 240 MG C24P Take 1 capsule (240 mg total) by mouth 2 (two) times daily. MYLAN generic only. [facility]: please profile.

## 2018-02-23 ENCOUNTER — LAB VISIT (OUTPATIENT)
Dept: LAB | Facility: HOSPITAL | Age: 78
End: 2018-02-23
Attending: FAMILY MEDICINE
Payer: MEDICARE

## 2018-02-23 DIAGNOSIS — I10 ESSENTIAL HYPERTENSION: ICD-10-CM

## 2018-02-23 DIAGNOSIS — Z79.01 ANTICOAGULATED ON COUMADIN: ICD-10-CM

## 2018-02-23 LAB
INR PPP: 2.4
PROTHROMBIN TIME: 23.2 SEC

## 2018-02-23 PROCEDURE — 85610 PROTHROMBIN TIME: CPT

## 2018-02-23 PROCEDURE — 36415 COLL VENOUS BLD VENIPUNCTURE: CPT | Mod: PO

## 2018-02-23 RX ORDER — FUROSEMIDE 40 MG/1
TABLET ORAL
Qty: 90 TABLET | Refills: 1 | Status: SHIPPED | OUTPATIENT
Start: 2018-02-23 | End: 2018-08-20

## 2018-02-26 ENCOUNTER — PATIENT MESSAGE (OUTPATIENT)
Dept: INTERNAL MEDICINE | Facility: CLINIC | Age: 78
End: 2018-02-26

## 2018-03-02 ENCOUNTER — PATIENT OUTREACH (OUTPATIENT)
Dept: OTHER | Facility: OTHER | Age: 78
End: 2018-03-02

## 2018-03-02 NOTE — PROGRESS NOTES
27-Jan-2017 Last 5 Patient Entered Readings                                      Current 30 Day Average: 143/69     Recent Readings 2/21/2018 2/21/2018 2/21/2018 2/19/2018 2/19/2018    SBP (mmHg) 140 144 144 137 146    DBP (mmHg) 66 72 69 69 70    Pulse 51 53 52 53 53          Hypertension Digital Medicine (HDMP) Health  Follow Up    LVM to follow up with Mr. Heraclio Wall.    Per 30 day average, blood pressure is not well controlled 143/69 mmHg. Encouraged adherence to low sodium diet and physical activity guidelines. Requested patient call or message back so we can discuss his readings/obtain an update. Will continue to monitor/follow up.     Patient called back and let me know that overall, he is doing ok but does have days where he is in more pain with his joints etc. He was very active when he was younger and played a lot of sports. He thinks it finally caught up to him. He also complains of some weakness and dizziness some days. I requested that he take his BP reading on days when he is feeling bad so we can see if his BP drops too much. He agreed. He also agreed to get some more readings in this weekend before his wife goes in for surgery on 3/6. He stated that he has been more busy trying to get things done around the house before her surgery (washing clothes, making beds, cleaning, etc). This is something his wife usually does but since she has so much going on medically, he has stepped up and is helping her out.

## 2018-03-19 DIAGNOSIS — I10 HTN (HYPERTENSION), BENIGN: Chronic | ICD-10-CM

## 2018-03-19 RX ORDER — VALSARTAN 80 MG/1
TABLET ORAL
Qty: 90 TABLET | Refills: 3 | Status: SHIPPED | OUTPATIENT
Start: 2018-03-19 | End: 2018-03-26 | Stop reason: DRUGHIGH

## 2018-03-26 ENCOUNTER — PATIENT MESSAGE (OUTPATIENT)
Dept: INTERNAL MEDICINE | Facility: CLINIC | Age: 78
End: 2018-03-26

## 2018-03-26 ENCOUNTER — TELEPHONE (OUTPATIENT)
Dept: INTERNAL MEDICINE | Facility: CLINIC | Age: 78
End: 2018-03-26

## 2018-03-26 ENCOUNTER — LAB VISIT (OUTPATIENT)
Dept: LAB | Facility: HOSPITAL | Age: 78
End: 2018-03-26
Attending: FAMILY MEDICINE
Payer: MEDICARE

## 2018-03-26 DIAGNOSIS — Z79.01 ANTICOAGULATED ON COUMADIN: Primary | ICD-10-CM

## 2018-03-26 DIAGNOSIS — Z79.01 ANTICOAGULATED ON COUMADIN: ICD-10-CM

## 2018-03-26 DIAGNOSIS — I10 HTN (HYPERTENSION), BENIGN: Chronic | ICD-10-CM

## 2018-03-26 LAB
INR PPP: 2.6
PROTHROMBIN TIME: 27.2 SEC

## 2018-03-26 PROCEDURE — 36415 COLL VENOUS BLD VENIPUNCTURE: CPT | Mod: PO

## 2018-03-26 PROCEDURE — 85610 PROTHROMBIN TIME: CPT

## 2018-03-26 RX ORDER — VALSARTAN 80 MG/1
80 TABLET ORAL 2 TIMES DAILY
Qty: 180 TABLET | Refills: 3 | Status: SHIPPED | OUTPATIENT
Start: 2018-03-26 | End: 2018-06-21 | Stop reason: DRUGHIGH

## 2018-03-26 NOTE — TELEPHONE ENCOUNTER
----- Message from Clarissa Egan PharmD sent at 3/26/2018 11:23 AM CDT -----  Mr. Wall states that he has been increased to valsartan 80 mg bid. If this is correct, please send a new rx to [facility] with the current directions so that his insurance will cover it.    Thanks

## 2018-03-27 ENCOUNTER — PATIENT OUTREACH (OUTPATIENT)
Dept: OTHER | Facility: OTHER | Age: 78
End: 2018-03-27

## 2018-03-27 NOTE — PROGRESS NOTES
Last 5 Patient Entered Readings                                      Current 30 Day Average: 133/66     Recent Readings 3/17/2018 3/17/2018 3/17/2018 3/7/2018 3/7/2018    SBP (mmHg) 135 133 136 129 133    DBP (mmHg) 57 65 66 67 58    Pulse 49 52 50 52 53          Hypertension Digital Medicine Program (HDMP): Health  Follow Up    Lifestyle Modifications:    1.Low sodium diet: Deferred    2.Physical activity: Deferred    3.Hypotension/Hypertension symptoms: no   Frequency/Alleviating factors/Precipitating factors, etc.     4.Patient has been compliant with the medication regimen.     Patient was having issues accessing his [employer] account. He finally figured out the log in information for him but no this wife. He is going to continue getting BP readings in and working with [employer] to make sure his readings come through.     Follow up with Mr. Heraclio Wall completed. No further questions or concerns. I will follow up in a few weeks to assess progress.

## 2018-03-29 ENCOUNTER — PATIENT OUTREACH (OUTPATIENT)
Dept: OTHER | Facility: OTHER | Age: 78
End: 2018-03-29

## 2018-03-29 NOTE — PROGRESS NOTES
Mr. Wall is doing well. He is having tech difficulties- has not been able to take BP readings.     Last 5 Patient Entered Readings                                      Current 30 Day Average: 133/66     Recent Readings 3/17/2018 3/17/2018 3/17/2018 3/7/2018 3/7/2018    SBP (mmHg) 135 133 136 129 133    DBP (mmHg) 57 65 66 67 58    Pulse 49 52 50 52 53          BP close to goal, <130/80 mmHg  Defer to tech support- await new readings    Hypertension Medications             furosemide (LASIX) 40 MG tablet Take 40 mg by mouth 2 (two) times daily.    LASIX 40 mg tablet TAKE 1 TABLET BY MOUTH EVERY MORNING    metoprolol succinate (TOPROL-XL) 25 MG 24 hr tablet Take 25 mg by mouth 2 (two) times daily.    valsartan (DIOVAN) 80 MG tablet Take 1 tablet (80 mg total) by mouth 2 (two) times daily.    verapamil (VERELAN) 240 MG C24P Take 1 capsule (240 mg total) by mouth 2 (two) times daily. MYLAN generic only. [facility]: please profile.

## 2018-04-23 ENCOUNTER — LAB VISIT (OUTPATIENT)
Dept: LAB | Facility: HOSPITAL | Age: 78
End: 2018-04-23
Attending: FAMILY MEDICINE
Payer: MEDICARE

## 2018-04-23 DIAGNOSIS — Z79.01 ANTICOAGULATED ON COUMADIN: ICD-10-CM

## 2018-04-23 LAB
INR PPP: 3.5
PROTHROMBIN TIME: 34.1 SEC

## 2018-04-23 PROCEDURE — 36415 COLL VENOUS BLD VENIPUNCTURE: CPT | Mod: PO

## 2018-04-23 PROCEDURE — 85610 PROTHROMBIN TIME: CPT

## 2018-04-24 ENCOUNTER — PATIENT MESSAGE (OUTPATIENT)
Dept: INTERNAL MEDICINE | Facility: CLINIC | Age: 78
End: 2018-04-24

## 2018-04-24 NOTE — TELEPHONE ENCOUNTER
Called pt and let know that Dr. Christopher said Your PT/INR is up just a bit.   Anything new?     I recommend holding your coumadin for 2 days in a row, then restart your daily regimen again.  May have been something dietary, I don't seen any antibiotics.     Pt reports he has some sinus issues going on.  No fever, has drainage, cough some, mucus is clear.  Using andrey pot to rinse nasal passages.  Booked lab appt 5-22-18 .

## 2018-04-30 NOTE — PROGRESS NOTES
"Subjective:      Patient ID: Heraclio Wall is a 78 y.o. male.    Chief Complaint: Establish Care      HPI  Here for establish care, and preventive exam.  On coumadin since CVA 7/13.  Feels cold in general since started coumadin.  3d per week exercises at gym, stationary bike and weights.  No f/c/sw/cough.  No cp/sob/palp.  Bms normal.  Urine normal, 1x nocturia.  Doing well with cpap.  Taking vit D.      HM:  10/17 fluvax, 12/15 [id number], 12/16 [id number], 2/15 TDaP, 3/12 zostavax, no BMD, 7/12 Cscope no repeat.      Review of Systems   Constitutional: Negative for activity change, appetite change, chills, diaphoresis, fatigue, fever and unexpected weight change.   HENT: Positive for rhinorrhea. Negative for congestion, ear pain, hearing loss, sinus pressure and trouble swallowing.    Eyes: Negative for discharge and visual disturbance.   Respiratory: Negative for cough, chest tightness, shortness of breath and wheezing.    Cardiovascular: Negative for chest pain and palpitations.   Gastrointestinal: Negative for abdominal distention, abdominal pain, blood in stool, constipation, diarrhea, nausea and vomiting.   Endocrine: Negative for polydipsia and polyuria.   Genitourinary: Negative for difficulty urinating, dysuria, frequency, hematuria and urgency.   Musculoskeletal: Positive for arthralgias and neck pain. Negative for joint swelling.   Skin: Negative for rash.   Neurological: Negative for dizziness, weakness and headaches.   Psychiatric/Behavioral: Negative for confusion and dysphoric mood. The patient is not nervous/anxious.          Objective:   /62 (BP Location: Right arm, Patient Position: Sitting)   Pulse 60   Temp 97.9 °F (36.6 °C) (Tympanic)   Ht 5' 10" (1.778 m)   Wt 86.1 kg (189 lb 13.1 oz)   SpO2 97%   BMI 27.24 kg/m²     Physical Exam   Constitutional: He is oriented to person, place, and time. He appears well-developed and well-nourished.   HENT:   Right Ear: External ear normal. " Tympanic membrane is not injected.   Left Ear: External ear normal. Tympanic membrane is not injected.   Mouth/Throat: Oropharynx is clear and moist.   Eyes: Conjunctivae are normal.   Neck: Normal range of motion. Neck supple. No thyromegaly present.   Cardiovascular: Normal rate, regular rhythm and intact distal pulses.  Exam reveals no gallop and no friction rub.    No murmur heard.  Pulmonary/Chest: Effort normal and breath sounds normal. He has no wheezes. He has no rales.   Abdominal: Soft. Bowel sounds are normal. He exhibits no mass. There is no tenderness.   Musculoskeletal: He exhibits no edema.   Lymphadenopathy:     He has no cervical adenopathy.   Neurological: He is alert and oriented to person, place, and time.   Psychiatric: He has a normal mood and affect.       Results for SHYAM MCHUGH (MRN [id number]) as of 5/3/2018 10:04   Ref. Range 2/6/2018 09:28   WBC Latest Ref Range: 3.90 - 12.70 K/uL 6.22   RBC Latest Ref Range: 4.60 - 6.20 M/uL 4.22 (L)   Hemoglobin Latest Ref Range: 14.0 - 18.0 g/dL 13.4 (L)   Hematocrit Latest Ref Range: 40.0 - 54.0 % 40.6   MCV Latest Ref Range: 82 - 98 fL 96   MCH Latest Ref Range: 27.0 - 31.0 pg 31.8 (H)   MCHC Latest Ref Range: 32.0 - 36.0 g/dL 33.0   RDW Latest Ref Range: 11.5 - 14.5 % 12.6   Platelets Latest Ref Range: 150 - 350 K/uL 178   MPV Latest Ref Range: 9.2 - 12.9 fL 10.0   Gran% Latest Ref Range: 38.0 - 73.0 % 58.2   Gran # (ANC) Latest Ref Range: 1.8 - 7.7 K/uL 3.6   Immature Granulocytes Latest Ref Range: 0.0 - 0.5 % 0.2   Immature Grans (Abs) Latest Ref Range: 0.00 - 0.04 K/uL 0.01   Lymph% Latest Ref Range: 18.0 - 48.0 % 33.3   Lymph # Latest Ref Range: 1.0 - 4.8 K/uL 2.1   Mono% Latest Ref Range: 4.0 - 15.0 % 6.4   Mono # Latest Ref Range: 0.3 - 1.0 K/uL 0.4   Eosinophil% Latest Ref Range: 0.0 - 8.0 % 1.3   Eos # Latest Ref Range: 0.0 - 0.5 K/uL 0.1   Basophil% Latest Ref Range: 0.0 - 1.9 % 0.6   Baso # Latest Ref Range: 0.00 - 0.20 K/uL 0.04   nRBC  Latest Ref Range: 0 /100 WBC 0   Iron Latest Ref Range: 45 - 160 ug/dL 117   Sodium Latest Ref Range: 136 - 145 mmol/L 142   Potassium Latest Ref Range: 3.5 - 5.1 mmol/L 4.9   Chloride Latest Ref Range: 95 - 110 mmol/L 105   CO2 Latest Ref Range: 23 - 29 mmol/L 28   Anion Gap Latest Ref Range: 8 - 16 mmol/L 9   BUN, Bld Latest Ref Range: 8 - 23 mg/dL 17   Creatinine Latest Ref Range: 0.5 - 1.4 mg/dL 1.3   eGFR if non African American Latest Ref Range: >60 mL/min/1.73 m^2 51.9 (A)   eGFR if African American Latest Ref Range: >60 mL/min/1.73 m^2 60.0   Glucose Latest Ref Range: 70 - 110 mg/dL 96   Calcium Latest Ref Range: 8.7 - 10.5 mg/dL 9.8   Magnesium Latest Ref Range: 1.6 - 2.6 mg/dL 2.4   Alkaline Phosphatase Latest Ref Range: 55 - 135 U/L 80   Total Protein Latest Ref Range: 6.0 - 8.4 g/dL 8.0   Albumin Latest Ref Range: 3.5 - 5.2 g/dL 4.4   Total Bilirubin Latest Ref Range: 0.1 - 1.0 mg/dL 1.5 (H)   AST Latest Ref Range: 10 - 40 U/L 26   ALT Latest Ref Range: 10 - 44 U/L 22   CRP Latest Ref Range: 0.0 - 8.2 mg/L 1.1   Triglycerides Latest Ref Range: 30 - 150 mg/dL 160 (H)   Cholesterol Latest Ref Range: 120 - 199 mg/dL 162   HDL Latest Ref Range: 40 - 75 mg/dL 42   LDL Cholesterol Latest Ref Range: 63.0 - 159.0 mg/dL 88.0   Total Cholesterol/HDL Ratio Latest Ref Range: 2.0 - 5.0  3.9   Homocysteine Latest Ref Range: 4.0 - 16.5 umol/L 10.4   Vit D, 25-Hydroxy Latest Ref Range: 30 - 96 ng/mL 48   Hemoglobin A1C Latest Ref Range: 4.0 - 5.6 % 4.8   Estimated Avg Glucose Latest Ref Range: 68 - 131 mg/dL 91   TSH Latest Ref Range: 0.400 - 4.000 uIU/mL 1.111   T4 Total Latest Ref Range: 4.5 - 11.5 ug/dL 8.8   Free T4 Latest Ref Range: 0.71 - 1.51 ng/dL 1.23   PSA, SCREEN Latest Ref Range: 0.00 - 4.00 ng/mL 0.60       Assessment:       1. Encounter for preventive health examination    2. Atherosclerosis of aorta    3. Chronic anticoagulation    4. Chronic kidney disease, stage 3    5. Essential hypertension    6.  Gastroesophageal reflux disease without esophagitis    7. History of CVA (cerebrovascular accident)    8. Acquired hypothyroidism    9. Mixed hyperlipidemia    10. Right-sided carotid artery disease    11. CHESTER on CPAP    12. Bone disorder    13. Screen for colon cancer          Plan:     Encounter for preventive health examination-  -     DXA Bone Density Spine And Hip; Future; Expected date: 05/03/2018  -     Fecal Immunochemical Test (iFOBT); Future; Expected date: 05/03/2018    Atherosclerosis of aorta/ History of CVA (cerebrovascular accident), Chronic anticoagulation    Chronic kidney disease, stage 3- stable.    Essential hypertension- stable on rx.    Gastroesophageal reflux disease without esophagitis- stable on rx.    Acquired hypothyroidism - stable on rx.    Mixed hyperlipidemia- stable on rx.    Right-sided carotid artery disease    CHESTER on CPAP doing well.    RTC 3 mo.

## 2018-05-03 ENCOUNTER — OFFICE VISIT (OUTPATIENT)
Dept: INTERNAL MEDICINE | Facility: CLINIC | Age: 78
End: 2018-05-03
Payer: MEDICARE

## 2018-05-03 ENCOUNTER — PATIENT OUTREACH (OUTPATIENT)
Dept: OTHER | Facility: OTHER | Age: 78
End: 2018-05-03

## 2018-05-03 VITALS
HEART RATE: 60 BPM | DIASTOLIC BLOOD PRESSURE: 62 MMHG | SYSTOLIC BLOOD PRESSURE: 138 MMHG | TEMPERATURE: 98 F | BODY MASS INDEX: 27.17 KG/M2 | OXYGEN SATURATION: 97 % | HEIGHT: 70 IN | WEIGHT: 189.81 LBS

## 2018-05-03 DIAGNOSIS — N18.30 CHRONIC KIDNEY DISEASE, STAGE 3: Chronic | ICD-10-CM

## 2018-05-03 DIAGNOSIS — Z79.01 CHRONIC ANTICOAGULATION: Chronic | ICD-10-CM

## 2018-05-03 DIAGNOSIS — I70.0 ATHEROSCLEROSIS OF AORTA: Chronic | ICD-10-CM

## 2018-05-03 DIAGNOSIS — E78.2 MIXED HYPERLIPIDEMIA: Chronic | ICD-10-CM

## 2018-05-03 DIAGNOSIS — K21.9 GASTROESOPHAGEAL REFLUX DISEASE WITHOUT ESOPHAGITIS: Chronic | ICD-10-CM

## 2018-05-03 DIAGNOSIS — Z12.11 SCREEN FOR COLON CANCER: ICD-10-CM

## 2018-05-03 DIAGNOSIS — I77.9 RIGHT-SIDED CAROTID ARTERY DISEASE: ICD-10-CM

## 2018-05-03 DIAGNOSIS — G47.33 OSA ON CPAP: Chronic | ICD-10-CM

## 2018-05-03 DIAGNOSIS — E03.9 ACQUIRED HYPOTHYROIDISM: ICD-10-CM

## 2018-05-03 DIAGNOSIS — M89.9 BONE DISORDER: ICD-10-CM

## 2018-05-03 DIAGNOSIS — Z86.73 HISTORY OF CVA (CEREBROVASCULAR ACCIDENT): Chronic | ICD-10-CM

## 2018-05-03 DIAGNOSIS — Z00.00 ENCOUNTER FOR PREVENTIVE HEALTH EXAMINATION: Primary | ICD-10-CM

## 2018-05-03 DIAGNOSIS — I10 ESSENTIAL HYPERTENSION: Chronic | ICD-10-CM

## 2018-05-03 PROCEDURE — 3078F DIAST BP <80 MM HG: CPT | Mod: CPTII,S$GLB,, | Performed by: INTERNAL MEDICINE

## 2018-05-03 PROCEDURE — 3075F SYST BP GE 130 - 139MM HG: CPT | Mod: CPTII,S$GLB,, | Performed by: INTERNAL MEDICINE

## 2018-05-03 PROCEDURE — 99397 PER PM REEVAL EST PAT 65+ YR: CPT | Mod: S$GLB,,, | Performed by: INTERNAL MEDICINE

## 2018-05-03 PROCEDURE — 99999 PR PBB SHADOW E&M-EST. PATIENT-LVL III: CPT | Mod: PBBFAC,,, | Performed by: INTERNAL MEDICINE

## 2018-05-03 PROCEDURE — 99499 UNLISTED E&M SERVICE: CPT | Mod: S$GLB,,, | Performed by: INTERNAL MEDICINE

## 2018-05-03 NOTE — PROGRESS NOTES
Last 5 Patient Entered Readings                                      Current 30 Day Average: 135/67     Recent Readings 5/2/2018 5/2/2018 5/2/2018 4/30/2018 4/30/2018    SBP (mmHg) 132 141 144 135 139    DBP (mmHg) 75 68 71 60 66    Pulse 51 51 52 51 52            Digital Medicine: Health  Follow Up    Left voicemail to follow up with Mr. Heraclio Wall.  Current BP average 135/67 mmHg is not at goal, <130/80.

## 2018-05-07 ENCOUNTER — PATIENT MESSAGE (OUTPATIENT)
Dept: ADMINISTRATIVE | Facility: OTHER | Age: 78
End: 2018-05-07

## 2018-05-08 ENCOUNTER — LAB VISIT (OUTPATIENT)
Dept: LAB | Facility: HOSPITAL | Age: 78
End: 2018-05-08
Attending: INTERNAL MEDICINE
Payer: MEDICARE

## 2018-05-08 DIAGNOSIS — Z12.11 SCREEN FOR COLON CANCER: ICD-10-CM

## 2018-05-08 DIAGNOSIS — Z00.00 ENCOUNTER FOR PREVENTIVE HEALTH EXAMINATION: ICD-10-CM

## 2018-05-08 PROCEDURE — 82274 ASSAY TEST FOR BLOOD FECAL: CPT

## 2018-05-09 LAB — HEMOCCULT STL QL IA: NEGATIVE

## 2018-05-15 ENCOUNTER — PATIENT MESSAGE (OUTPATIENT)
Dept: INTERNAL MEDICINE | Facility: CLINIC | Age: 78
End: 2018-05-15

## 2018-05-15 DIAGNOSIS — R42 DIZZINESS: Primary | ICD-10-CM

## 2018-05-15 DIAGNOSIS — Z79.01 CHRONIC ANTICOAGULATION: Chronic | ICD-10-CM

## 2018-05-17 ENCOUNTER — LAB VISIT (OUTPATIENT)
Dept: LAB | Facility: HOSPITAL | Age: 78
End: 2018-05-17
Attending: INTERNAL MEDICINE
Payer: MEDICARE

## 2018-05-17 DIAGNOSIS — R42 DIZZINESS: ICD-10-CM

## 2018-05-17 DIAGNOSIS — Z79.01 CHRONIC ANTICOAGULATION: Chronic | ICD-10-CM

## 2018-05-17 LAB
BASOPHILS # BLD AUTO: 0.03 K/UL
BASOPHILS NFR BLD: 0.5 %
DIFFERENTIAL METHOD: ABNORMAL
EOSINOPHIL # BLD AUTO: 0.1 K/UL
EOSINOPHIL NFR BLD: 2.2 %
ERYTHROCYTE [DISTWIDTH] IN BLOOD BY AUTOMATED COUNT: 12.4 %
HCT VFR BLD AUTO: 41.1 %
HGB BLD-MCNC: 13.3 G/DL
IMM GRANULOCYTES # BLD AUTO: 0.02 K/UL
IMM GRANULOCYTES NFR BLD AUTO: 0.3 %
INR PPP: 1.8
LYMPHOCYTES # BLD AUTO: 2 K/UL
LYMPHOCYTES NFR BLD: 33.1 %
MCH RBC QN AUTO: 32 PG
MCHC RBC AUTO-ENTMCNC: 32.4 G/DL
MCV RBC AUTO: 99 FL
MONOCYTES # BLD AUTO: 0.5 K/UL
MONOCYTES NFR BLD: 8.8 %
NEUTROPHILS # BLD AUTO: 3.3 K/UL
NEUTROPHILS NFR BLD: 55.1 %
NRBC BLD-RTO: 0 /100 WBC
PLATELET # BLD AUTO: 179 K/UL
PMV BLD AUTO: 10.2 FL
PROTHROMBIN TIME: 17.3 SEC
RBC # BLD AUTO: 4.15 M/UL
WBC # BLD AUTO: 5.92 K/UL

## 2018-05-17 PROCEDURE — 85025 COMPLETE CBC W/AUTO DIFF WBC: CPT

## 2018-05-17 PROCEDURE — 36415 COLL VENOUS BLD VENIPUNCTURE: CPT | Mod: PO

## 2018-05-17 PROCEDURE — 85610 PROTHROMBIN TIME: CPT

## 2018-05-18 ENCOUNTER — PATIENT MESSAGE (OUTPATIENT)
Dept: INTERNAL MEDICINE | Facility: CLINIC | Age: 78
End: 2018-05-18

## 2018-05-31 NOTE — PROGRESS NOTES
Last 5 Patient Entered Readings                                      Current 30 Day Average: 134/66     Recent Readings 5/26/2018 5/26/2018 5/26/2018 5/20/2018 5/20/2018    SBP (mmHg) 138 138 144 130 136    DBP (mmHg) 56 68 67 64 66    Pulse 53 54 53 50 50            Digital Medicine: Health  Follow Up    Left voicemail to follow up with Mr. Heraclio Wall.  Current BP average 134/66 mmHg is not at goal, <130/80.

## 2018-06-07 ENCOUNTER — TELEPHONE (OUTPATIENT)
Dept: INTERNAL MEDICINE | Facility: CLINIC | Age: 78
End: 2018-06-07

## 2018-06-07 ENCOUNTER — OFFICE VISIT (OUTPATIENT)
Dept: URGENT CARE | Facility: CLINIC | Age: 78
End: 2018-06-07
Payer: MEDICARE

## 2018-06-07 VITALS
RESPIRATION RATE: 14 BRPM | HEART RATE: 69 BPM | WEIGHT: 189.13 LBS | OXYGEN SATURATION: 99 % | DIASTOLIC BLOOD PRESSURE: 62 MMHG | HEIGHT: 71 IN | BODY MASS INDEX: 26.48 KG/M2 | SYSTOLIC BLOOD PRESSURE: 142 MMHG | TEMPERATURE: 98 F

## 2018-06-07 DIAGNOSIS — N18.30 CHRONIC KIDNEY DISEASE, STAGE 3: ICD-10-CM

## 2018-06-07 DIAGNOSIS — M79.672 FOOT PAIN, BILATERAL: ICD-10-CM

## 2018-06-07 DIAGNOSIS — L55.9 SUNBURN, UNSPECIFIED: ICD-10-CM

## 2018-06-07 DIAGNOSIS — I10 ESSENTIAL HYPERTENSION: ICD-10-CM

## 2018-06-07 DIAGNOSIS — L03.818 CELLULITIS OF OTHER SPECIFIED SITE: Primary | ICD-10-CM

## 2018-06-07 DIAGNOSIS — Z79.01 CHRONIC ANTICOAGULATION: ICD-10-CM

## 2018-06-07 DIAGNOSIS — M79.671 FOOT PAIN, BILATERAL: ICD-10-CM

## 2018-06-07 PROCEDURE — 3077F SYST BP >= 140 MM HG: CPT | Mod: CPTII,S$GLB,, | Performed by: NURSE PRACTITIONER

## 2018-06-07 PROCEDURE — 99214 OFFICE O/P EST MOD 30 MIN: CPT | Mod: S$GLB,,, | Performed by: NURSE PRACTITIONER

## 2018-06-07 PROCEDURE — 3078F DIAST BP <80 MM HG: CPT | Mod: CPTII,S$GLB,, | Performed by: NURSE PRACTITIONER

## 2018-06-07 PROCEDURE — 99999 PR PBB SHADOW E&M-EST. PATIENT-LVL V: CPT | Mod: PBBFAC,,, | Performed by: NURSE PRACTITIONER

## 2018-06-07 RX ORDER — CEPHALEXIN 500 MG/1
500 CAPSULE ORAL EVERY 12 HOURS
Qty: 20 CAPSULE | Refills: 0 | Status: SHIPPED | OUTPATIENT
Start: 2018-06-07 | End: 2018-06-17

## 2018-06-07 NOTE — PATIENT INSTRUCTIONS
PLAN:   Advised check INR in 4-5 days  Advised use [place] compress  Advise use of cool compresses and elevate  Advise increase oral fluids  Meds: Keflex/ no refills  Advise follow up with PCP in 4-5 days  Advise go to ER if symptoms worsen or fail to improve with treatment.  AVS provided and reviewed with patient including supportive care, follow up, and red flag symptoms.   Patient verbalizes understanding and agrees with treatment plan. Discharged from Urgent Care in stable condition.

## 2018-06-07 NOTE — PROGRESS NOTES
"CHIEF COMPLAINT/REASON FOR VISIT: Reports red & swollen feet    HISTORY OF PRESENT ILLNESS:  78-year-old male wife complains of redness, swelling to both feet & right lower leg onset 6 days ago.  Patient & wife admits to spending 1 a week in the [facility].  Wife states " patient on the first day on vacation/ beach sat near the water and developed a sunburn.  Wife also commented pharmacist, next door neighbor/ beach gave first aid/ topical medications with slight relief. Wife states "pharmacist & daughter popped blisters on his feet." Patient complains of soreness & tight feeling on movement of feet & legs. Denies chest pain, shortness of breath, nausea, vomiting, fever & body aches. Discussed staph & unknown bacteria infections. Denies seeking emergency room treatment.     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    Unspecified vitamin D deficiency        Past Surgical History:   Procedure Laterality Date    ADENOIDECTOMY  1942    CHOLECYSTECTOMY  2/2015    CIRCUMCISION, PRIMARY  1938    EYE SURGERY Right 02/15/2012    cataract w/IOL  Dr Kevin    EYE SURGERY Left 03/29/2012    cataract w/IOL    SMALL INTESTINE SURGERY  5/4/2014    TONSILLECTOMY  1942            Family History   Problem Relation Age of Onset    Hypertension Mother     Heart disease Mother     Stroke Mother     Glaucoma Mother     Cataracts Mother     Hyperlipidemia Mother     Hypertension Father     Peripheral vascular disease Father     Aortic aneurysm Father     " Cancer Sister 64        breast 64, tongue 79    Stroke Sister     Hearing loss Sister     Diabetes Neg Hx     Blindness Neg Hx     Macular degeneration Neg Hx     Retinal detachment Neg Hx     Strabismus Neg Hx     Asthma Neg Hx     COPD Neg Hx     Mental illness Neg Hx     Mental retardation Neg Hx     Drug abuse Neg Hx     Alcohol abuse Neg Hx     Kidney disease Neg Hx             Social History     Social History    Marital status:      Spouse name: nita    Number of children: 5    Years of education: N/A     Occupational History    retired chemical work      Social History Main Topics    Smoking status: Never Smoker    Smokeless tobacco: Never Used    Alcohol use No    Drug use: No    Sexual activity: No     Other Topics Concern    Not on file     Social History Narrative    , lives with wife . 5 children, in good health.  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  He has a Living Will( copy is scanned in EMR).       ROS:  GENERAL: No fever, chills, fatigability or weight loss.  SKIN: Reports red skin .  HEENT: No headaches or recent head trauma. Denies ear pain, discharge or vertigo. No loss of smell, no epistaxis or postnasal drip. No hoarseness or change in voice.   CHEST: Denies cyanosis, wheezing, cough and sputum production.  CARDIOVASCULAR: Denies chest pain, shortness of breath  MUSCULOSKELETAL: redness and swelling  NEUROLOGIC: No history of seizures, paralysis, alteration of gait or coordination.  PSYCHIATRIC: Denies mood swings, depression or suicidal thoughts.    PE:   APPEARANCE: Well nourished, well developed, in mild distress.   V/S: Reviewed.  SKIN: redness, with soft tissue swelling to both feet & right lower leg, warm  to touch, minimal tenderness on palpation  HEENT:  No pharyngeal erythema or exudate. TM's clear bilateral.  CHEST: Lungs clear to auscultation.  CARDIOVASCULAR: Regular rate and rhythm.  MUSCULOSKELETAL: redness,  with soft tissue swelling to both feet & right lower leg, warm  to touch, minimal tenderness on palpation, bilateral feet & ankles with limited range of motion due to soft tissue swelling  NEUROLOGIC: No sensory deficits. Gait & Posture: ambulates slowly. No cerebellar signs.  MENTAL STATUS: Patient alert, oriented x 3 & conversant.    PLAN:   Advised check INR in 4-5 days  Advised use [place] compress  Advise use of cool compresses and elevate  Advise increase oral fluids  Meds: Keflex/ no refills  Advise follow up with PCP in 4-5 days  Advise go to ER if symptoms worsen or fail to improve with treatment.  AVS provided and reviewed with patient including supportive care, follow up, and red flag symptoms.   Patient verbalizes understanding and agrees with treatment plan. Discharged from Urgent Care in stable condition.    DIAGNOSIS:  Cellulitis  Hypertension  Chronic kidney disease  Sunburn/ bilateral feet pain  Chronic anticoagulation

## 2018-06-07 NOTE — TELEPHONE ENCOUNTER
----- Message from Arron Castorena sent at 6/7/2018  1:45 PM CDT -----  Contact: pt  Call pt at [phone] (home)   Regarding pt has a sunburn and went to urgent care and needs to follow up with primary doctor  Pt would like to see if he can be seen sooner than the 08/03/2018 available date

## 2018-06-13 ENCOUNTER — LAB VISIT (OUTPATIENT)
Dept: LAB | Facility: HOSPITAL | Age: 78
End: 2018-06-13
Attending: FAMILY MEDICINE
Payer: MEDICARE

## 2018-06-13 DIAGNOSIS — Z79.01 ANTICOAGULATED ON COUMADIN: ICD-10-CM

## 2018-06-13 LAB
INR PPP: 1.8
PROTHROMBIN TIME: 17.7 SEC

## 2018-06-13 PROCEDURE — 36415 COLL VENOUS BLD VENIPUNCTURE: CPT | Mod: PO

## 2018-06-13 PROCEDURE — 85610 PROTHROMBIN TIME: CPT

## 2018-06-14 ENCOUNTER — PATIENT MESSAGE (OUTPATIENT)
Dept: INTERNAL MEDICINE | Facility: CLINIC | Age: 78
End: 2018-06-14

## 2018-06-15 NOTE — PROGRESS NOTES
"Subjective:      Patient ID: Heraclio Wall is a 78 y.o. male.    Chief Complaint: Follow-up      HPI  Here for follow up of medical problems.  S/p keflex x 7d, cellulitis has resolved.  On coumadin since CVA 2013.  Feels cold all the time.  Occas dizziness, for years.  BPs systolic 130s.  HR 60s.  No f/c/sw/cough.  No cp/sob/palp.  BMs normal, no black or blood.    Updated/ annual due 5/19:  HM:  10/17 fluvax, 12/15 [id number], 12/16 [id number], 2/15 TDaP, 3/12 zostavax, no BMD, 7/12 Cscope no repeat, 5/18 FIT neg.     Review of Systems   Constitutional: Negative for chills, diaphoresis, fatigue and fever.   Respiratory: Negative for cough, chest tightness and shortness of breath.    Cardiovascular: Negative for chest pain, palpitations and leg swelling.   Gastrointestinal: Negative for blood in stool, constipation, diarrhea, nausea and vomiting.   Genitourinary: Negative for difficulty urinating and frequency.   Musculoskeletal: Negative for arthralgias.         Objective:   BP (!) 122/54 (BP Location: Right arm, Patient Position: Sitting, BP Method: Medium (Manual))   Pulse 61   Temp 97.1 °F (36.2 °C) (Tympanic)   Ht 5' 11" (1.803 m)   Wt 85.9 kg (189 lb 6 oz)   SpO2 98%   BMI 26.41 kg/m²     Physical Exam   Constitutional: He is oriented to person, place, and time. He appears well-developed and well-nourished.   HENT:   Mouth/Throat: Oropharynx is clear and moist.   Neck: Normal range of motion. Neck supple.   Cardiovascular: Normal rate, regular rhythm and intact distal pulses.  Exam reveals no gallop and no friction rub.    No murmur heard.  Pulmonary/Chest: Effort normal and breath sounds normal. He has no wheezes. He has no rales.   Abdominal: Soft. Bowel sounds are normal. He exhibits no mass. There is no tenderness.   Musculoskeletal: He exhibits no edema.   Lymphadenopathy:     He has no cervical adenopathy.   Neurological: He is alert and oriented to person, place, and time.   Psychiatric: He has a " normal mood and affect.       Results for orders placed or performed in visit on 06/13/18   Protime-INR   Result Value Ref Range    Prothrombin Time 17.7 (H) 9.0 - 12.5 sec    INR 1.8 (H) 0.8 - 1.2         Assessment:       1. Cellulitis of right leg    2. Dizziness    3. Essential hypertension    4. Chronic anticoagulation    5. History of CVA (cerebrovascular accident)    6. Acquired hypothyroidism          Plan:     Cellulitis of right leg now resolved.    Dizziness and cold intolerance- will have pt f/u sooner with Cards Dr. Wells, poss change from coumadin to eliquis/other.    Essential hypertension- sounds like controlled and not going too low.    Chronic anticoagulation for History of CVA (cerebrovascular accident)    Acquired hypothyroidism- stable on rx.    RTC as sched.

## 2018-06-19 ENCOUNTER — OFFICE VISIT (OUTPATIENT)
Dept: INTERNAL MEDICINE | Facility: CLINIC | Age: 78
End: 2018-06-19
Payer: MEDICARE

## 2018-06-19 VITALS
SYSTOLIC BLOOD PRESSURE: 122 MMHG | HEIGHT: 71 IN | HEART RATE: 61 BPM | WEIGHT: 189.38 LBS | OXYGEN SATURATION: 98 % | TEMPERATURE: 97 F | DIASTOLIC BLOOD PRESSURE: 54 MMHG | BODY MASS INDEX: 26.51 KG/M2

## 2018-06-19 DIAGNOSIS — Z79.01 CHRONIC ANTICOAGULATION: Chronic | ICD-10-CM

## 2018-06-19 DIAGNOSIS — E03.9 ACQUIRED HYPOTHYROIDISM: ICD-10-CM

## 2018-06-19 DIAGNOSIS — R42 DIZZINESS: ICD-10-CM

## 2018-06-19 DIAGNOSIS — L03.115 CELLULITIS OF RIGHT LEG: Primary | ICD-10-CM

## 2018-06-19 DIAGNOSIS — I10 ESSENTIAL HYPERTENSION: Chronic | ICD-10-CM

## 2018-06-19 DIAGNOSIS — Z86.73 HISTORY OF CVA (CEREBROVASCULAR ACCIDENT): Chronic | ICD-10-CM

## 2018-06-19 PROCEDURE — 99499 UNLISTED E&M SERVICE: CPT | Mod: HCNC,S$GLB,, | Performed by: INTERNAL MEDICINE

## 2018-06-19 PROCEDURE — 99213 OFFICE O/P EST LOW 20 MIN: CPT | Mod: S$GLB,,, | Performed by: INTERNAL MEDICINE

## 2018-06-19 PROCEDURE — 3074F SYST BP LT 130 MM HG: CPT | Mod: CPTII,S$GLB,, | Performed by: INTERNAL MEDICINE

## 2018-06-19 PROCEDURE — 3078F DIAST BP <80 MM HG: CPT | Mod: CPTII,S$GLB,, | Performed by: INTERNAL MEDICINE

## 2018-06-19 PROCEDURE — 99999 PR PBB SHADOW E&M-EST. PATIENT-LVL III: CPT | Mod: PBBFAC,,, | Performed by: INTERNAL MEDICINE

## 2018-06-21 ENCOUNTER — PATIENT OUTREACH (OUTPATIENT)
Dept: OTHER | Facility: OTHER | Age: 78
End: 2018-06-21

## 2018-06-21 RX ORDER — VALSARTAN 80 MG/1
240 TABLET ORAL DAILY
COMMUNITY
End: 2018-07-12 | Stop reason: ALTCHOICE

## 2018-06-21 NOTE — PROGRESS NOTES
"Mr. Wall is doing well. He is recovering from "really bad" sunburn. He continues to have persistent ringing in the ears and dizziness 2/2 many years of working in very loud environments without appropriate protection.     Last 5 Patient Entered Readings                                      Current 30 Day Average: 137/64     Recent Readings 6/21/2018 6/21/2018 6/21/2018 6/21/2018 6/15/2018    SBP (mmHg) 133 131 133 130 132    DBP (mmHg) 66 67 79 95 65    Pulse 53 53 53 54 52        BP above goal, <130/80 mmHg  Explained to patient that BP med increases and ear problems are likely unrelated. Will try to manage BP without worsening dizziness.   Increase valsartan 80/160mg BID  Continue monitoring    Hypertension Medications             furosemide (LASIX) 40 MG tablet TAKE 1 TABLET BY MOUTH EVERY MORNING    metoprolol succinate (TOPROL XL) 25 MG 24 hr tablet Take 1 tablet by mouth once daily. Take an additional tablet if blood pressure is greater than 160    valsartan (DIOVAN) 80 MG tablet Take 1 tablet (80 mg total) by mouth 2 (two) times daily.    verapamil (VERELAN) 240 MG C24P Take 1 capsule (240 mg total) by mouth 2 (two) times daily.          "

## 2018-06-29 ENCOUNTER — PATIENT OUTREACH (OUTPATIENT)
Dept: OTHER | Facility: OTHER | Age: 78
End: 2018-06-29

## 2018-06-29 NOTE — PROGRESS NOTES
Mr. Hernandez feels a bit fatigued and continues to have dizziness. Amenable to continuing current treatment.     Last 5 Patient Entered Readings                                      Current 30 Day Average: 137/66     Recent Readings 6/26/2018 6/26/2018 6/26/2018 6/26/2018 6/26/2018    SBP (mmHg) 138 135 144 145 143    DBP (mmHg) 64 65 70 68 77    Pulse 52 49 52 52 51          BP above goal, trending down  Continue monitoring    Hypertension Medications             furosemide (LASIX) 40 MG tablet TAKE 1 TABLET BY MOUTH EVERY MORNING    metoprolol succinate (TOPROL XL) 25 MG 24 hr tablet Take 1 tablet by mouth once daily. Take an additional tablet if blood pressure is greater than 160    valsartan (DIOVAN) 80 MG tablet Take 240 mg by mouth once daily.    verapamil (VERELAN) 240 MG C24P Take 1 capsule (240 mg total) by mouth 2 (two) times daily.

## 2018-07-03 ENCOUNTER — APPOINTMENT (OUTPATIENT)
Dept: RADIOLOGY | Facility: CLINIC | Age: 78
End: 2018-07-03
Attending: INTERNAL MEDICINE
Payer: MEDICARE

## 2018-07-03 DIAGNOSIS — Z00.00 ENCOUNTER FOR PREVENTIVE HEALTH EXAMINATION: ICD-10-CM

## 2018-07-03 DIAGNOSIS — M89.9 BONE DISORDER: ICD-10-CM

## 2018-07-03 PROCEDURE — 77080 DXA BONE DENSITY AXIAL: CPT | Mod: TC,PO

## 2018-07-03 PROCEDURE — 77080 DXA BONE DENSITY AXIAL: CPT | Mod: 26,,, | Performed by: RADIOLOGY

## 2018-07-09 NOTE — PROGRESS NOTES
Last 5 Patient Entered Readings                                      Current 30 Day Average: 137/66     Recent Readings 6/29/2018 6/29/2018 6/29/2018 6/29/2018 6/26/2018    SBP (mmHg) 136 138 136 142 138    DBP (mmHg) 70 79 71 73 64    Pulse 50 49 50 51 52          Digital Medicine: Health  Follow Up    Lifestyle Modifications:    1.Dietary Modifications (Sodium intake <2,000mg/day, food labels, dining out): Deferred    2.Physical Activity: Patient stated that he and his wife have not gone to the gym the last few weeks but hopes to get back into it soon. He knows it is important to keep up with his physical activity.     3.Medication Therapy: Patient has been compliant with the medication regimen.    4.Patient has the following medication side effects/concerns:   (Frequency/Alleviating factors/Precipitating factors, etc.)     Patients BP readings a slightly above goal but he is happy with them considering how they use to run. He denies symptoms and stated that he is has been feeling find. He just got over a stomach bug.     Follow up with Mr. Heraclio Wall completed. No further questions or concerns. Will continue follow up to achieve health goals.

## 2018-07-12 ENCOUNTER — OFFICE VISIT (OUTPATIENT)
Dept: INTERNAL MEDICINE | Facility: CLINIC | Age: 78
End: 2018-07-12
Payer: MEDICARE

## 2018-07-12 ENCOUNTER — OFFICE VISIT (OUTPATIENT)
Dept: CARDIOLOGY | Facility: CLINIC | Age: 78
End: 2018-07-12
Payer: MEDICARE

## 2018-07-12 VITALS
HEIGHT: 71 IN | DIASTOLIC BLOOD PRESSURE: 60 MMHG | WEIGHT: 186.31 LBS | SYSTOLIC BLOOD PRESSURE: 138 MMHG | HEART RATE: 68 BPM | BODY MASS INDEX: 26.08 KG/M2

## 2018-07-12 VITALS
BODY MASS INDEX: 26.17 KG/M2 | HEART RATE: 67 BPM | DIASTOLIC BLOOD PRESSURE: 60 MMHG | HEIGHT: 71 IN | WEIGHT: 186.94 LBS | SYSTOLIC BLOOD PRESSURE: 116 MMHG | TEMPERATURE: 98 F | OXYGEN SATURATION: 97 %

## 2018-07-12 DIAGNOSIS — I10 ESSENTIAL HYPERTENSION: Primary | Chronic | ICD-10-CM

## 2018-07-12 DIAGNOSIS — I77.9 RIGHT-SIDED CAROTID ARTERY DISEASE: ICD-10-CM

## 2018-07-12 DIAGNOSIS — I10 ESSENTIAL HYPERTENSION: Chronic | ICD-10-CM

## 2018-07-12 DIAGNOSIS — G47.33 OSA ON CPAP: Chronic | ICD-10-CM

## 2018-07-12 DIAGNOSIS — J06.9 ACUTE URI: Primary | ICD-10-CM

## 2018-07-12 DIAGNOSIS — Z79.01 CHRONIC ANTICOAGULATION: Chronic | ICD-10-CM

## 2018-07-12 DIAGNOSIS — N18.30 CHRONIC KIDNEY DISEASE, STAGE 3: Chronic | ICD-10-CM

## 2018-07-12 DIAGNOSIS — E78.2 MIXED HYPERLIPIDEMIA: Chronic | ICD-10-CM

## 2018-07-12 DIAGNOSIS — Z86.73 HISTORY OF CVA (CEREBROVASCULAR ACCIDENT): Chronic | ICD-10-CM

## 2018-07-12 PROCEDURE — 99214 OFFICE O/P EST MOD 30 MIN: CPT | Mod: S$GLB,,, | Performed by: INTERNAL MEDICINE

## 2018-07-12 PROCEDURE — 99213 OFFICE O/P EST LOW 20 MIN: CPT | Mod: S$GLB,,, | Performed by: NURSE PRACTITIONER

## 2018-07-12 PROCEDURE — 3078F DIAST BP <80 MM HG: CPT | Mod: CPTII,S$GLB,, | Performed by: NURSE PRACTITIONER

## 2018-07-12 PROCEDURE — 3074F SYST BP LT 130 MM HG: CPT | Mod: CPTII,S$GLB,, | Performed by: NURSE PRACTITIONER

## 2018-07-12 PROCEDURE — 99999 PR PBB SHADOW E&M-EST. PATIENT-LVL V: CPT | Mod: PBBFAC,,, | Performed by: NURSE PRACTITIONER

## 2018-07-12 PROCEDURE — 3075F SYST BP GE 130 - 139MM HG: CPT | Mod: CPTII,S$GLB,, | Performed by: INTERNAL MEDICINE

## 2018-07-12 PROCEDURE — 99999 PR PBB SHADOW E&M-EST. PATIENT-LVL III: CPT | Mod: PBBFAC,,, | Performed by: INTERNAL MEDICINE

## 2018-07-12 PROCEDURE — 3078F DIAST BP <80 MM HG: CPT | Mod: CPTII,S$GLB,, | Performed by: INTERNAL MEDICINE

## 2018-07-12 RX ORDER — MUPIROCIN 20 MG/G
OINTMENT TOPICAL
Refills: 2 | COMMUNITY
Start: 2018-06-20 | End: 2020-02-04

## 2018-07-12 RX ORDER — VALSARTAN 80 MG/1
80 TABLET ORAL 2 TIMES DAILY
Qty: 180 TABLET | Refills: 3
Start: 2018-07-12 | End: 2018-09-10

## 2018-07-12 RX ORDER — HYDROCORTISONE 5 MG/1
2.5 TABLET ORAL DAILY
COMMUNITY
End: 2018-08-01

## 2018-07-12 RX ORDER — FLUTICASONE PROPIONATE 50 MCG
2 SPRAY, SUSPENSION (ML) NASAL DAILY
Qty: 16 G | Refills: 0 | Status: SHIPPED | OUTPATIENT
Start: 2018-07-12 | End: 2018-08-10 | Stop reason: SDUPTHER

## 2018-07-12 NOTE — PROGRESS NOTES
Subjective:       Patient ID: Heraclio Wall is a 78 y.o. male.    Chief Complaint: Sinus Problem    Sinus Problem   This is a new problem. The current episode started today. The problem is unchanged. There has been no fever. He is experiencing no pain. Associated symptoms include congestion, headaches and sinus pressure. Pertinent negatives include no chills, coughing, diaphoresis, ear pain, hoarse voice, neck pain, shortness of breath, sneezing, sore throat or swollen glands. Treatments tried: antihistamine  The treatment provided moderate relief.           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    Unspecified vitamin D deficiency      Past Surgical History:   Procedure Laterality Date    ADENOIDECTOMY  1942    CHOLECYSTECTOMY  2/2015    CIRCUMCISION, PRIMARY  1938    EYE SURGERY Right 02/15/2012    cataract w/IOL  Dr Kevin    EYE SURGERY Left 03/29/2012    cataract w/IOL    SMALL INTESTINE SURGERY  5/4/2014    TONSILLECTOMY  1942     Past Surgical History:   Procedure Laterality Date    ADENOIDECTOMY  1942    CHOLECYSTECTOMY  2/2015    CIRCUMCISION, PRIMARY  1938    EYE SURGERY Right 02/15/2012    cataract w/IOL  Dr Kevin    EYE SURGERY Left 03/29/2012    cataract w/IOL    SMALL INTESTINE SURGERY  5/4/2014    TONSILLECTOMY  1942     Social History     Social History    Marital status:      Spouse name: nita    Number of children: 5    Years of education: N/A     Occupational History    retired chemical work       Social History Main Topics    Smoking status: Never Smoker    Smokeless tobacco: Never Used    Alcohol use No    Drug use: No    Sexual activity: No     Other Topics Concern    Not on file     Social History Narrative    , lives with wife . 5 children, in good health.  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  He has a Living Will( copy is scanned in EMR).     Review of patient's allergies indicates:   Allergen Reactions    Milk Shortness Of Breath     Diarrhea    Olive extract Anaphylaxis    Tea tree Anaphylaxis    Apple Other (See Comments)     Other reaction(s): sinus problems    Benicar [olmesartan] Other (See Comments)     Stomach upset  Diarrhea    Cardizem [diltiazem hcl] Other (See Comments)     Hallucinations    Clonidine      Dry mouth    Clonidine hcl      Pounding in the ears    Clopidogrel Other (See Comments)     Generic only-felt bad [okay with Branded Plavix}    Dicyclomine Other (See Comments)     lightheaded    Hydralazine analogues      tachycardia    Lipitor [atorvastatin] Other (See Comments)     muscle spasms    Lopressor [metoprolol tartrate]      Felt bad question milk in it    Norvasc [amlodipine] Other (See Comments)     Headache  Flushing (skin)    Pineapple Other (See Comments)     sinus problems    Bactrim [sulfamethoxazole-trimethoprim] Itching and Rash     Current Outpatient Prescriptions   Medication Sig    arginine 500 mg tablet Take 1 tablet by mouth 2 (two) times daily.    ascorbic acid (VITAMIN C) 1000 MG tablet Take 3 tablets by mouth Daily.    cholecalciferol, vitamin D3, 1,000 unit capsule Take 5 capsules by mouth Daily.    COUMADIN 2.5 mg tablet Take 1 tablet (2.5 mg total) by mouth Daily.    cyanocobalamin, vitamin B-12, 1,000 mcg/15 mL Liqd Take 1 drop by mouth once daily.     dextran 70-hypromellose (ARTIFICIAL TEARS) ophthalmic solution Place 1 drop into both eyes Use as needed.    fluvastatin XL (LESCOL  XL) 80 mg Tb24 Take one tablet by mouth once daily    furosemide (LASIX) 40 MG tablet TAKE 1 TABLET BY MOUTH EVERY MORNING    hydrocortisone (CORTEF) 5 MG Tab Take 2.5 mg by mouth once daily.    lifitegrast (XIIDRA) 5 % Dpet Place 1 drop into both eyes 2 (two) times daily.    metoprolol succinate (TOPROL XL) 25 MG 24 hr tablet Take 1 tablet by mouth once daily. Take an additional tablet if blood pressure is greater than 160    mupirocin (BACTROBAN) 2 % ointment APPLY ON THE SKIN EVERY DAY    pantoprazole (PROTONIX) 40 MG tablet TAKE 1 TABLET BY MOUTH DAILY    PROGESTERONE MISC 6.25 mg by Misc.(Non-Drug; Combo Route) route every evening.    saw palmetto 160 MG capsule Take 1 capsule by mouth 2 (two) times daily.    selenium 200 mcg Tab Take 1 tablet by mouth Daily.    UNABLE TO FIND Take 1 tablet by mouth once daily. medication name: T3/T4-SR (TRIIODO-L-LTHYRONINE-LEVOTHYROXINE)    valsartan (DIOVAN) 80 MG tablet Take 1 tablet (80 mg total) by mouth 2 (two) times daily.    verapamil (VERELAN) 240 MG C24P Take 1 capsule (240 mg total) by mouth 2 (two) times daily.    fluticasone (FLONASE) 50 mcg/actuation nasal spray Use 2 sprays (100 mcg total) by in each nostril once daily     No current facility-administered medications for this visit.            Review of Systems   Constitutional: Negative for activity change, appetite change, chills, diaphoresis, fatigue, fever and unexpected weight change.   HENT: Positive for congestion, postnasal drip, rhinorrhea, sinus pressure and voice change (mild ). Negative for ear pain, hoarse voice, sinus pain, sneezing, sore throat, tinnitus and trouble swallowing.    Eyes: Negative for photophobia, pain and visual disturbance.   Respiratory: Negative for cough, chest tightness, shortness of breath and wheezing.    Cardiovascular: Negative for chest pain, palpitations and leg swelling.   Gastrointestinal: Negative for abdominal distention, abdominal pain, constipation,  diarrhea, nausea and vomiting.   Genitourinary: Negative for dysuria.   Musculoskeletal: Negative for arthralgias, back pain, joint swelling, neck pain and neck stiffness.   Allergic/Immunologic: Negative for immunocompromised state.   Neurological: Positive for headaches. Negative for dizziness, tremors, seizures, syncope, facial asymmetry, speech difficulty, weakness, light-headedness and numbness.   Hematological: Negative for adenopathy. Does not bruise/bleed easily.   Psychiatric/Behavioral: Negative for confusion and sleep disturbance.       Objective:      Physical Exam   Constitutional: He is oriented to person, place, and time.   HENT:   Right Ear: Tympanic membrane normal.   Left Ear: Tympanic membrane normal.   Nose: Rhinorrhea present.   Mouth/Throat: Uvula is midline, oropharynx is clear and moist and mucous membranes are normal.   Neck: Normal range of motion. Neck supple.   Cardiovascular: Normal rate, regular rhythm and normal heart sounds.    Pulmonary/Chest: Effort normal and breath sounds normal.   Musculoskeletal: Normal range of motion.   Neurological: He is alert and oriented to person, place, and time.   Skin: Skin is warm and dry.       Assessment:     Vitals:    07/12/18 1501   BP: 116/60   Pulse: 67   Temp: 98 °F (36.7 °C)         1. Acute URI    2. Chronic kidney disease, stage 3    3. Essential hypertension        Plan:   Acute URI  -     fluticasone (FLONASE) 50 mcg/actuation nasal spray; Use 2 sprays (100 mcg total) by in each nostril once daily  Dispense: 16 g; Refill: 0    Chronic kidney disease, stage 3    Essential hypertension      Increase fluids  Plain mucinex every 12 hours for 7 days  Continue antihistamine  email me or RTC if symptoms worsen or fail to improve within the next 5-7 days       When to seek medical advice  Call your healthcare provider right away if any of these occur:  · Cough with lots of colored sputum (mucus)  · Severe headache; face, neck, or ear  pain  · Difficulty swallowing due to throat pain  · Fever of 100.4°F (38°C)  Call 911, or get immediate medical care  Call emergency services right away if any of these occur:  · Chest pain, shortness of breath, wheezing, or difficulty breathing  · Coughing up blood  · Inability to swallow due to throat pain

## 2018-07-12 NOTE — PROGRESS NOTES
Subjective:    Patient ID:  Heraclio Wall is a 78 y.o. male who presents for evaluation of Follow-up; Hypertension; Risk Factor Management For Atherosclerosis; Hyperlipidemia; and Carotid Artery Disease        HPI Mr. Wall returns for f/u.   His current medical conditions include HTN, carotid artery disease, hyperlipidemia.  Nonsmoker.  Past history pertinent for following:  H/o  CVA 7/13 and was put on Plavix (was on ASA at time). He had admission 5/14 for splenic infarct and had surgery. Dr. Metz did abdominal angio with report showing no evidence of mesenteric stenosis. SAIRA showed no intracardiac source of emboli. He was d/cd home on asa, plavix and coumadin was started. He is now on coumadin only.  He is enrolled in digital HTN program.   Now here for f/u.  He switched to new PCP and his former PCP checked his INR.   No abnormal bleeding on warfarin.  He came to clinic today earlier than expected to discuss coumadin tx and see if I would manage it.  No recurrent CVA events on warfarin.   He feels ok.  No chest pain sx or dyspnea.  BP controlled on current meds.  He did not tolerate 80 mg Valsartan in am, 160 at night due to ringing in ears and cut back to 80 mg bid and has done ok on this dose.  Lipids well controlled, on statin.   Compliant with cpap for CHESTER.       Current Outpatient Prescriptions:     arginine 500 mg tablet, Take 1 tablet by mouth 2 (two) times daily., Disp: , Rfl:     ascorbic acid (VITAMIN C) 1000 MG tablet, Take 3 tablets by mouth Daily., Disp: , Rfl:     cholecalciferol, vitamin D3, 1,000 unit capsule, Take 5 capsules by mouth Daily., Disp: , Rfl:     COUMADIN 2.5 mg tablet, Take 1 tablet (2.5 mg total) by mouth Daily., Disp: 30 tablet, Rfl: 11    cyanocobalamin, vitamin B-12, 1,000 mcg/15 mL Liqd, Take 1 drop by mouth once daily. , Disp: , Rfl:     dextran 70-hypromellose (ARTIFICIAL TEARS) ophthalmic solution, Place 1 drop into both eyes Use as needed., Disp: , Rfl:      "fluvastatin XL (LESCOL XL) 80 mg Tb24, Take one tablet by mouth once daily, Disp: 90 tablet, Rfl: 3    furosemide (LASIX) 40 MG tablet, TAKE 1 TABLET BY MOUTH EVERY MORNING, Disp: 90 tablet, Rfl: 1    hydrocortisone (CORTEF) 5 MG Tab, Take 2.5 mg by mouth once daily., Disp: , Rfl:     lifitegrast (XIIDRA) 5 % Dpet, Place 1 drop into both eyes 2 (two) times daily., Disp: 60 each, Rfl: 12    metoprolol succinate (TOPROL XL) 25 MG 24 hr tablet, Take 1 tablet by mouth once daily. Take an additional tablet if blood pressure is greater than 160, Disp: 60 tablet, Rfl: 11    pantoprazole (PROTONIX) 40 MG tablet, TAKE 1 TABLET BY MOUTH DAILY, Disp: 30 tablet, Rfl: 6    PROGESTERONE MISC, 6.25 mg by Misc.(Non-Drug; Combo Route) route every evening., Disp: , Rfl:     saw palmetto 160 MG capsule, Take 1 capsule by mouth 2 (two) times daily., Disp: , Rfl:     selenium 200 mcg Tab, Take 1 tablet by mouth Daily., Disp: , Rfl:     UNABLE TO FIND, Take 1 tablet by mouth once daily. medication name: T3/T4-SR (TRIIODO-L-LTHYRONINE-LEVOTHYROXINE), Disp: , Rfl:     verapamil (VERELAN) 240 MG C24P, Take 1 capsule (240 mg total) by mouth 2 (two) times daily., Disp: 60 capsule, Rfl: 12    mupirocin (BACTROBAN) 2 % ointment, APPLY ON THE SKIN EVERY DAY, Disp: , Rfl: 2    valsartan (DIOVAN) 80 MG tablet, Take 1 tablet (80 mg total) by mouth 2 (two) times daily., Disp: 180 tablet, Rfl: 3        Review of Systems   Constitution: Negative.   HENT: Positive for congestion.    Eyes: Negative.    Cardiovascular: Negative.    Respiratory: Negative.    Endocrine: Negative.    Hematologic/Lymphatic: Negative.    Skin: Negative.    Musculoskeletal: Positive for arthritis.   Gastrointestinal: Negative.    Genitourinary: Negative.    Neurological: Positive for headaches.   Psychiatric/Behavioral: Negative.    Allergic/Immunologic: Negative.        /60 (BP Location: Right arm, Patient Position: Sitting)   Pulse 68   Ht 5' 11" (1.803 " m)   Wt 84.5 kg (186 lb 4.6 oz)   BMI 25.98 kg/m²       Wt Readings from Last 3 Encounters:   07/12/18 84.5 kg (186 lb 4.6 oz)   06/19/18 85.9 kg (189 lb 6 oz)   06/07/18 85.8 kg (189 lb 2.5 oz)     Temp Readings from Last 3 Encounters:   06/19/18 97.1 °F (36.2 °C) (Tympanic)   06/07/18 98.1 °F (36.7 °C) (Tympanic)   05/03/18 97.9 °F (36.6 °C) (Tympanic)     BP Readings from Last 3 Encounters:   07/12/18 138/60   06/19/18 (!) 122/54   06/07/18 (!) 142/62     Pulse Readings from Last 3 Encounters:   07/12/18 68   06/19/18 61   06/07/18 69          Objective:    Physical Exam   Constitutional: He is oriented to person, place, and time. He appears well-developed and well-nourished.   HENT:   Head: Normocephalic.   Neck: Normal range of motion. Neck supple. Normal carotid pulses, no hepatojugular reflux and no JVD present. Carotid bruit is not present. No thyromegaly present.   Cardiovascular: Normal rate, regular rhythm, S1 normal and S2 normal.  PMI is not displaced.  Exam reveals no S3, no S4, no distant heart sounds, no friction rub, no midsystolic click and no opening snap.    No murmur heard.  Pulses:       Radial pulses are 2+ on the right side, and 2+ on the left side.   Pulmonary/Chest: Effort normal and breath sounds normal. He has no wheezes. He has no rales.   Abdominal: Soft. Bowel sounds are normal. He exhibits no distension, no abdominal bruit, no ascites and no mass. There is no tenderness.   Musculoskeletal: He exhibits no edema.   Neurological: He is alert and oriented to person, place, and time.   Skin: Skin is warm.   Psychiatric: He has a normal mood and affect. His behavior is normal.   Nursing note and vitals reviewed.      I have reviewed all pertinent labs and cardiac studies.    Lab Results   Component Value Date    INR 1.8 (H) 06/13/2018    INR 1.8 (H) 05/17/2018    INR 3.5 (H) 04/23/2018       Lab Results   Component Value Date    WBC 5.92 05/17/2018    HGB 13.3 (L) 05/17/2018    HCT 41.1  05/17/2018    MCV 99 (H) 05/17/2018     05/17/2018       Chemistry        Component Value Date/Time     02/06/2018 0928    K 4.9 02/06/2018 0928     02/06/2018 0928    CO2 28 02/06/2018 0928    BUN 17 02/06/2018 0928    CREATININE 1.3 02/06/2018 0928    GLU 96 02/06/2018 0928        Component Value Date/Time    CALCIUM 9.8 02/06/2018 0928    ALKPHOS 80 02/06/2018 0928    AST 26 02/06/2018 0928    ALT 22 02/06/2018 0928    BILITOT 1.5 (H) 02/06/2018 0928    ESTGFRAFRICA 60.0 02/06/2018 0928    EGFRNONAA 51.9 (A) 02/06/2018 0928        Lab Results   Component Value Date    CHOL 162 02/06/2018    CHOL 152 11/30/2017    CHOL 141 11/02/2016     Lab Results   Component Value Date    HDL 42 02/06/2018    HDL 40 11/30/2017    HDL 37 (L) 11/02/2016     Lab Results   Component Value Date    LDLCALC 88.0 02/06/2018    LDLCALC 77.8 11/30/2017    LDLCALC 74.4 11/02/2016     Lab Results   Component Value Date    TRIG 160 (H) 02/06/2018    TRIG 171 (H) 11/30/2017    TRIG 148 11/02/2016     Lab Results   Component Value Date    CHOLHDL 25.9 02/06/2018    CHOLHDL 26.3 11/30/2017    CHOLHDL 26.2 11/02/2016           Assessment:       1. Essential hypertension    2. Mixed hyperlipidemia    3. History of CVA (cerebrovascular accident)    4. Chronic anticoagulation    5. Right-sided carotid artery disease    6. CHESTER on CPAP         Plan:             Stable CV conditions.  Discussed indications for warfarin tx, side effects.  He needs to be enrolled in coumadin clinic.  Will schedule asap.  NOAC possible option but will be expensive for him. Prefer coumadin. Stay on coumadin, has done well for long time on it.    F/u with PCP or urgent care for headaches, attributed to sinus condition.  Continue digital HTN problem. No changes in meds today.  Continue CPAP for CHESTER.  Cardiac diet  Exercise.  F/u next year at previously scheduled appt.

## 2018-07-12 NOTE — PATIENT INSTRUCTIONS
Viral Upper Respiratory Illness (Adult)  You have a viral upper respiratory illness (URI), which is another term for the common cold. This illness is contagious during the first few days. It is spread through the air by coughing and sneezing. It may also be spread by direct contact (touching the sick person and then touching your own eyes, nose, or mouth). Frequent handwashing will decrease risk of spread. Most viral illnesses go away within 7 to 10 days with rest and simple home remedies. Sometimes the illness may last for several weeks. Antibiotics will not kill a virus, and they are generally not prescribed for this condition.    Home care  · If symptoms are severe, rest at home for the first 2 to 3 days. When you resume activity, don't let yourself get too tired.  · Avoid being exposed to cigarette smoke (yours or others).  · You may use acetaminophen or ibuprofen to control pain and fever, unless another medicine was prescribed. (Note: If you have chronic liver or kidney disease, have ever had a stomach ulcer or gastrointestinal bleeding, or are taking blood-thinning medicines, talk with your healthcare provider before using these medicines.) Aspirin should never be given to anyone under 18 years of age who is ill with a viral infection or fever. It may cause severe liver or brain damage.  · Your appetite may be poor, so a light diet is fine. Avoid dehydration by drinking 6 to 8 glasses of fluids per day (water, soft drinks, juices, tea, or soup). Extra fluids will help loosen secretions in the nose and lungs.  · Over-the-counter cold medicines will not shorten the length of time youre sick, but they may be helpful for the following symptoms: cough, sore throat, and nasal and sinus congestion. (Note: Do not use decongestants if you have high blood pressure.)  Follow-up care  Follow up with your healthcare provider, or as advised.  When to seek medical advice  Call your healthcare provider right away if any  of these occur:  · Cough with lots of colored sputum (mucus)  · Severe headache; face, neck, or ear pain  · Difficulty swallowing due to throat pain  · Fever of 100.4°F (38°C)  Call 911, or get immediate medical care  Call emergency services right away if any of these occur:  · Chest pain, shortness of breath, wheezing, or difficulty breathing  · Coughing up blood  · Inability to swallow due to throat pain  Date Last Reviewed: 9/13/2015  © 3083-4018 [employer]. [address] [place] [address], [place] [address]. All rights reserved. This information is not intended as a substitute for professional medical care. Always follow your healthcare professional's instructions.      Increase fluids  Plain mucinex every 12 hours for 7 days  Continue antihistamine  email me if symptoms worsen or fail to improve within the next 5-7 days

## 2018-07-18 ENCOUNTER — PATIENT OUTREACH (OUTPATIENT)
Dept: OTHER | Facility: OTHER | Age: 78
End: 2018-07-18

## 2018-07-18 NOTE — PROGRESS NOTES
"Patient's wife confirmed with pharmacy that valsartan supply is not part of the recall.   Patient is tolerating treatment. Still some fatigue at times (1-2x per week), but could be due to "old age"    Last 5 Patient Entered Readings                                      Current 30 Day Average: 134/65     Recent Readings 7/15/2018 7/15/2018 7/15/2018 7/11/2018 7/11/2018    SBP (mmHg) 138 138 138 124 124    DBP (mmHg) 64 65 69 59 60    Pulse 52 52 54 57 57          BP at gal, <140/90mmHg  Continue monitoring    Hypertension Medications             furosemide (LASIX) 40 MG tablet TAKE 1 TABLET BY MOUTH EVERY MORNING    metoprolol succinate (TOPROL XL) 25 MG 24 hr tablet Take 1 tablet by mouth once daily. Take an additional tablet if blood pressure is greater than 160    valsartan (DIOVAN) 80 MG tablet Take 1 tablet (80 mg total) by mouth 2 (two) times daily.    verapamil (VERELAN) 240 MG C24P Take 1 capsule (240 mg total) by mouth 2 (two) times daily.          "

## 2018-07-23 ENCOUNTER — ANTI-COAG VISIT (OUTPATIENT)
Dept: CARDIOLOGY | Facility: CLINIC | Age: 78
End: 2018-07-23
Payer: MEDICARE

## 2018-07-23 DIAGNOSIS — Z79.01 LONG TERM (CURRENT) USE OF ANTICOAGULANTS: Primary | ICD-10-CM

## 2018-07-23 LAB — INR PPP: 1.6 (ref 2–3)

## 2018-07-23 PROCEDURE — 85610 PROTHROMBIN TIME: CPT | Mod: QW,S$GLB,, | Performed by: NUCLEAR MEDICINE

## 2018-07-23 NOTE — PROGRESS NOTES
"New Patient to the Coumadin Clinic.  Reports taking 2.5 mg x 5 days per week//NONE on Tuesday and Wednesday.  Stated, "I've been on Coumadin for a long time".  Reintroduced Coumadin education.  Instructed patient to take 1.25 mg (1/2 tablet) on Tuesday and Thursday and 2.5 mg on all other days.  Recheck in 1 week - requested [facility] location.  All questions and concerns answered.  Coumadin Clinic contact number has been given:  [phone].    Taking Coumadin   Coumadin (warfarin) helps keep your blood from clotting. But it also increases your risk for bleeding. Because of this, it must be taken exactly as directed. You also need to protect yourself from injury.     Follow These Tips   Take Coumadin at the same time each day. If you miss a dose, take it as soon as you remember (if less then 4 hours); otherwise, if it's almost time for your next dose, skip the missed dose. Do not take a double dose.   Go for your blood (protime/INR) tests as often as directed. Note that diet and   medication can affect your protime/INR level.             REMEMBER:   When value decreases from therapeutic range, the blood                                           gets thicker and when value increases, the blood gets thinner.                                       Dont take any other medications without checking with your healthcare provider first. This includes aspirin, vitamins, and herbal and other dietary supplements.   Tell all healthcare providers that you take Coumadin. Its also a good idea to carry a medical ID card or wear a medical-alert bracelet.   Use a soft toothbrush and an electric razor.   Dont go barefoot. And dont trim corns or calluses yourself.    When to Call Your Healthcare Provider   Call your healthcare provider right away before you take your next dose of Coumadin if you have any of these problems:   Bleeding that doesnt stop in 10 minutes   A heavier-than-normal period or bleeding between periods   Coughing " or throwing up blood   Diarrhea or bleeding hemorrhoids   Dark urine or black stools   Red or black-and-blue marks on the skin that get larger   A fever or an illness that gets worse   Dizziness or fatigue   Chest pain or trouble breathing   A serious fall or a blow to the head    Keep Your Diet Steady   Keep your diet pretty much the same each day. Thats because many foods contain vitamin K. Vitamin K helps your blood clot. So eating foods that contain vitamin K can affect the way Coumadin works. You dont need to avoid foods that have vitamin K. But you do need to keep the amount of them you eat steady (about the same day to day). If you change your diet for any reason, such as due to illness or to lose weight, be sure to tell your doctor.     Examples of foods high in vitamin K are brussels sprouts, avocado, broccoli, cabbage, coleslaw, mustard greens, ida greens, turnip greens, kale, spinach, leigh ann lettuce, and some other leafy green vegetables. Foods that are mixed with mayonnaise, such as tuna, chicken, and potato salads.  Oils, such as soybean, canola, and Vegetable oils, are also high in vitamin K.       Other food products can affect the way Coumadin works in your body:   Food products that may affect blood clotting include cranberries and cranberry juice, grapefruit juice, fish oil supplements, garlic, mitra, licorice, and turmeric.   Herbs used in herbal teas or supplements can also affect blood clotting. Keep the amount of herbal teas and supplements you use steady.   Alcohol can increase the effect of Coumadin in your body.

## 2018-07-24 ENCOUNTER — OFFICE VISIT (OUTPATIENT)
Dept: INTERNAL MEDICINE | Facility: CLINIC | Age: 78
End: 2018-07-24
Payer: MEDICARE

## 2018-07-24 VITALS
SYSTOLIC BLOOD PRESSURE: 112 MMHG | OXYGEN SATURATION: 98 % | BODY MASS INDEX: 25.41 KG/M2 | DIASTOLIC BLOOD PRESSURE: 56 MMHG | WEIGHT: 187.63 LBS | HEIGHT: 72 IN | TEMPERATURE: 97 F | HEART RATE: 59 BPM

## 2018-07-24 DIAGNOSIS — I77.9 RIGHT-SIDED CAROTID ARTERY DISEASE: ICD-10-CM

## 2018-07-24 DIAGNOSIS — H04.123 INSUFFICIENCY OF TEAR FILM OF BOTH EYES: ICD-10-CM

## 2018-07-24 DIAGNOSIS — E78.2 MIXED HYPERLIPIDEMIA: Chronic | ICD-10-CM

## 2018-07-24 DIAGNOSIS — I10 ESSENTIAL HYPERTENSION: Chronic | ICD-10-CM

## 2018-07-24 DIAGNOSIS — G89.29 CHRONIC MIDLINE LOW BACK PAIN WITHOUT SCIATICA: ICD-10-CM

## 2018-07-24 DIAGNOSIS — Z79.01 CHRONIC ANTICOAGULATION: Chronic | ICD-10-CM

## 2018-07-24 DIAGNOSIS — K21.9 GASTROESOPHAGEAL REFLUX DISEASE WITHOUT ESOPHAGITIS: Chronic | ICD-10-CM

## 2018-07-24 DIAGNOSIS — E29.1 HYPOGONADISM MALE: ICD-10-CM

## 2018-07-24 DIAGNOSIS — I70.0 ATHEROSCLEROSIS OF AORTA: Chronic | ICD-10-CM

## 2018-07-24 DIAGNOSIS — E03.9 ACQUIRED HYPOTHYROIDISM: ICD-10-CM

## 2018-07-24 DIAGNOSIS — M54.50 CHRONIC MIDLINE LOW BACK PAIN WITHOUT SCIATICA: ICD-10-CM

## 2018-07-24 DIAGNOSIS — N18.30 CHRONIC KIDNEY DISEASE, STAGE 3: Chronic | ICD-10-CM

## 2018-07-24 DIAGNOSIS — H93.13 TINNITUS OF BOTH EARS: Chronic | ICD-10-CM

## 2018-07-24 DIAGNOSIS — Z00.00 ENCOUNTER FOR PREVENTIVE HEALTH EXAMINATION: Primary | ICD-10-CM

## 2018-07-24 DIAGNOSIS — G47.33 OSA ON CPAP: Chronic | ICD-10-CM

## 2018-07-24 DIAGNOSIS — Z86.73 HISTORY OF CVA (CEREBROVASCULAR ACCIDENT): Chronic | ICD-10-CM

## 2018-07-24 DIAGNOSIS — N40.0 BENIGN PROSTATIC HYPERPLASIA WITHOUT LOWER URINARY TRACT SYMPTOMS: Chronic | ICD-10-CM

## 2018-07-24 DIAGNOSIS — D64.9 ANEMIA, UNSPECIFIED TYPE: Chronic | ICD-10-CM

## 2018-07-24 DIAGNOSIS — H91.8X3 OTHER SPECIFIED HEARING LOSS OF BOTH EARS: Chronic | ICD-10-CM

## 2018-07-24 PROBLEM — R53.82 CHRONIC FATIGUE: Status: RESOLVED | Noted: 2017-01-03 | Resolved: 2018-07-24

## 2018-07-24 PROCEDURE — 3078F DIAST BP <80 MM HG: CPT | Mod: CPTII,S$GLB,, | Performed by: NURSE PRACTITIONER

## 2018-07-24 PROCEDURE — 3074F SYST BP LT 130 MM HG: CPT | Mod: CPTII,S$GLB,, | Performed by: NURSE PRACTITIONER

## 2018-07-24 PROCEDURE — 99999 PR PBB SHADOW E&M-EST. PATIENT-LVL V: CPT | Mod: PBBFAC,,, | Performed by: NURSE PRACTITIONER

## 2018-07-24 PROCEDURE — G0439 PPPS, SUBSEQ VISIT: HCPCS | Mod: S$GLB,,, | Performed by: NURSE PRACTITIONER

## 2018-07-24 NOTE — PATIENT INSTRUCTIONS
Counseling and Referral of Other Preventative  (Italic type indicates deductible and co-insurance are waived)    Patient Name: Heraclio Wall  Today's Date: 7/24/2018    Health Maintenance       Date Due Completion Date    Influenza Vaccine 08/01/2018 10/6/2017    Lipid Panel 02/06/2019 2/6/2018    Fecal Occult Blood Test (FOBT)/FitKit 05/08/2019 5/8/2018    Colonoscopy 11/29/2022 11/29/2017 (ClinicallyNA)    Override on 11/29/2017: Not Clinically Appropriate    Override on 10/17/2005: Done    TETANUS VACCINE 02/04/2025 2/4/2015        No orders of the defined types were placed in this encounter.    The following information is provided to all patients.  This information is to help you find resources for any of the problems found today that may be affecting your health:                Living healthy guide: www.[facility].[place].gov      Understanding Diabetes: www.diabetes.org      Eating healthy: www.cdc.gov/healthyweight      [facility] home safety checklist: www.cdc.gov/steadi/patient.html      Agency on Aging: www.goea.[place].[facility]      Alcoholics anonymous (AA): www.aa.org      Physical Activity: www.tyshawn.nih.gov/pz1ryyx      Tobacco use: www.quitwithusla.org

## 2018-07-24 NOTE — PROGRESS NOTES
"Heraclio Wall presented for a  Medicare AWV and comprehensive Health Risk Assessment today. The following components were reviewed and updated:    · Medical history  · Family History  · Social history  · Allergies and Current Medications  · Health Risk Assessment  · Health Maintenance  · Care Team     ** See Completed Assessments for Annual Wellness Visit within the encounter summary.**       The following assessments were completed:  · Living Situation  · CAGE  · Depression Screening  · Timed Get Up and Go  · Whisper Test  · Cognitive Function Screening  · Nutrition Screening  · ADL Screening  · PAQ Screening    Vitals:    07/24/18 0842   BP: (!) 112/56   Pulse: (!) 59   Temp: 97.2 °F (36.2 °C)   TempSrc: Tympanic   SpO2: 98%   Weight: 85.1 kg (187 lb 9.8 oz)   Height: 5' 11.5" (1.816 m)     Body mass index is 25.8 kg/m².  Physical Exam   Constitutional: He is oriented to person, place, and time.   Neck: Normal range of motion. Neck supple.   Cardiovascular: Normal rate, regular rhythm and normal heart sounds.    Pulmonary/Chest: Effort normal and breath sounds normal.   Abdominal: Soft. Bowel sounds are normal.   Musculoskeletal: Normal range of motion.   Generalized weakness    Neurological: He is alert and oriented to person, place, and time.   Skin: Skin is warm and dry.   Psychiatric: He has a normal mood and affect.         Diagnoses and health risks identified today and associated recommendations/orders:    1. Encounter for preventive health examination   Completed today     2. Essential hypertension  Stable. Continue to follow up with the Hypertension Digital Medicine project/PCP as planned.     3. CHESTER on CPAP  Stable and controlled. Continue current treatment plan as previously prescribed with your PCP.      4. Mixed hyperlipidemia  Stable. On Leschol. Diet and exercise discussed. Follow up with PCP.     Component      Latest Ref Rng & Units 2/6/2018   Cholesterol      120 - 199 mg/dL 162 "   Triglycerides      30 - 150 mg/dL 160 (H)   HDL      40 - 75 mg/dL 42   LDL Cholesterol      63.0 - 159.0 mg/dL 88.0   HDL/Chol Ratio      20.0 - 50.0 % 25.9   Total Cholesterol/HDL Ratio      2.0 - 5.0 3.9   Non-HDL Cholesterol      mg/dL 120        5. Chronic anticoagulation/Hx of right CVA/ Right-sided carotid artery disease  Left lacunar stroke 7/31/2013, no further strokes or TIA since then. Stable and controlled on coumadin. Discussed need to control BP, Lipids, glucose and not smoke- to avoid further arterial wall buildup. Continue current treatment plan as previously prescribed with your PCP/cardiology.     Last carotid US:  1/22/2018. CONCLUSIONS   There is 20 - 39% right Internal Carotid stenosis.  There is 0 - 19% left Internal Carotid stenosis.    1/2017 stress test:   Impression: NORMAL MYOCARDIAL PERFUSION  1. The perfusion scan is free of evidence for myocardial ischemia or injury.   2. Resting wall motion is physiologic.   3. Resting LV function is normal.   4. The ventricular volumes are normal at rest and stress.   5. The extracardiac distribution of radioactivity is normal.     1/2016 echo:  CONCLUSIONS     1 - Normal left ventricular systolic function (EF 55-60%).     2 - Normal left ventricular diastolic function.     3 - Normal right ventricular systolic function .         6. Chronic kidney disease, stage 3  Stable. Continue follow up with PCP    Component      Latest Ref Rng & Units 2/6/2018   Sodium      136 - 145 mmol/L 142   Potassium      3.5 - 5.1 mmol/L 4.9   Chloride      95 - 110 mmol/L 105   CO2      23 - 29 mmol/L 28   Glucose      70 - 110 mg/dL 96   BUN, Bld      8 - 23 mg/dL 17   Creatinine      0.5 - 1.4 mg/dL 1.3   Calcium      8.7 - 10.5 mg/dL 9.8   Total Protein      6.0 - 8.4 g/dL 8.0   Albumin      3.5 - 5.2 g/dL 4.4   Total Bilirubin      0.1 - 1.0 mg/dL 1.5 (H)   Alkaline Phosphatase      55 - 135 U/L 80   AST      10 - 40 U/L 26   ALT      10 - 44 U/L 22   Anion Gap       8 - 16 mmol/L 9   eGFR if African American      >60 mL/min/1.73 m:2 60.0   eGFR if non African American      >60 mL/min/1.73 m:2 51.9 (A)        7. Atherosclerosis of aorta  Stable and controlled. Noted 11/29/14 CTA abd/pelvis: atheromatous change and the aorta without evidence of aneurysm formation or dissection. Discussed need to control BP, Lipids, glucose and not smoke- to avoid further arterial wall buildup. Continue current treatment plan as previously prescribed with your PCP.      8. Anemia, unspecified type, and iron deficiency   Stable and controlled. Continue current treatment plan as previously prescribed with your PCP.     Component      Latest Ref Rng & Units 5/17/2018   WBC      3.90 - 12.70 K/uL 5.92   RBC      4.60 - 6.20 M/uL 4.15 (L)   Hemoglobin      14.0 - 18.0 g/dL 13.3 (L)   Hematocrit      40.0 - 54.0 % 41.1   MCV      82 - 98 fL 99 (H)   MCH      27.0 - 31.0 pg 32.0 (H)   MCHC      32.0 - 36.0 g/dL 32.4   RDW      11.5 - 14.5 % 12.4   Platelets      150 - 350 K/uL 179   MPV      9.2 - 12.9 fL 10.2   Immature Granulocytes      0.0 - 0.5 % 0.3   Gran # (ANC)      1.8 - 7.7 K/uL 3.3   Immature Grans (Abs)      0.00 - 0.04 K/uL 0.02   Lymph #      1.0 - 4.8 K/uL 2.0   Mono #      0.3 - 1.0 K/uL 0.5   Eos #      0.0 - 0.5 K/uL 0.1   Baso #      0.00 - 0.20 K/uL 0.03   nRBC      0 /100 WBC 0   Gran%      38.0 - 73.0 % 55.1   Lymph%      18.0 - 48.0 % 33.1   Mono%      4.0 - 15.0 % 8.8   Eosinophil%      0.0 - 8.0 % 2.2   Basophil%      0.0 - 1.9 % 0.5   Differential Method       Automated       9. Gastroesophageal reflux disease without esophagitis  Stable and controlled on Protonix. Continue current treatment plan as previously prescribed with your PCP.      10. Hypogonadism male  Stable and controlled. On compounded products. Continue current treatment plan as previously prescribed with Dr Giovanny Gan of [place], [place]. ( works at [employer])      Component      Latest Ref Rng & Units  2/6/2018   Testosterone, Free      pg/mL 3.6     Component      Latest Ref Rng & Units 2/6/2018   Testosterone, Total      195.0 - 1138.0 ng/dL 251       11. Chronic midline low back pain without sciatica  Stable and controlled. Tylenol PRN. Continue current treatment plan as previously prescribed with your PCP.      12. Tinnitus of both ears/hearing loss bilateral  Stable. Pt reports that he has been seen by audiology in the past, but his ears are damaged secondary to noise in his previous occupation. He reports that there was nothing much that could be done about it. Continue current treatment plan as previously prescribed with your PCP.      13. Benign prostatic hyperplasia without lower urinary tract symptoms  Stable and controlled. Continue current treatment plan as previously prescribed.    14. Hypothyroidism  Pt reports being treated for this problem per Dr. Snowden in [place]. States that they have a yearly conversation about treatment plan. Levels monitored per PCP. He reports taking T3,T4, cortef, and progesterone.    Provided Heraclio with a 5-10 year written screening schedule and personal prevention plan. Recommendations were developed using the USPSTF age appropriate recommendations. Education, counseling, and referrals were provided as needed. After Visit Summary printed and given to patient which includes a list of additional screenings\tests needed.    Follow up with PCP and specialists as scheduled  New labs/tests ordered today: n/a   Upcoming health maintenance scheduled: n/a  HRA follow up in 1 year    Elsie Waggoner NP

## 2018-08-01 ENCOUNTER — PATIENT OUTREACH (OUTPATIENT)
Dept: ADMINISTRATIVE | Facility: HOSPITAL | Age: 78
End: 2018-08-01

## 2018-08-01 ENCOUNTER — ANTI-COAG VISIT (OUTPATIENT)
Dept: CARDIOLOGY | Facility: CLINIC | Age: 78
End: 2018-08-01
Payer: MEDICARE

## 2018-08-01 ENCOUNTER — OFFICE VISIT (OUTPATIENT)
Dept: OPHTHALMOLOGY | Facility: CLINIC | Age: 78
End: 2018-08-01
Payer: MEDICARE

## 2018-08-01 DIAGNOSIS — H02.053 TRICHIASIS OF EYELID OF RIGHT EYE, UNSPECIFIED EYELID: ICD-10-CM

## 2018-08-01 DIAGNOSIS — H11.001 PTERYGIUM, RIGHT: ICD-10-CM

## 2018-08-01 DIAGNOSIS — Z96.1 PSEUDOPHAKIA: ICD-10-CM

## 2018-08-01 DIAGNOSIS — H35.371 EPIRETINAL MEMBRANE (ERM) OF RIGHT EYE: Primary | ICD-10-CM

## 2018-08-01 DIAGNOSIS — H04.123 DRY EYES, BILATERAL: ICD-10-CM

## 2018-08-01 DIAGNOSIS — Z79.01 LONG TERM (CURRENT) USE OF ANTICOAGULANTS: Primary | ICD-10-CM

## 2018-08-01 LAB — INR PPP: 2.5 (ref 2–3)

## 2018-08-01 PROCEDURE — 92134 CPTRZ OPH DX IMG PST SGM RTA: CPT | Mod: S$GLB,,, | Performed by: OPHTHALMOLOGY

## 2018-08-01 PROCEDURE — 67820 REVISE EYELASHES: CPT | Mod: RT,S$GLB,, | Performed by: OPHTHALMOLOGY

## 2018-08-01 PROCEDURE — 85610 PROTHROMBIN TIME: CPT | Mod: QW,S$GLB,, | Performed by: INTERNAL MEDICINE

## 2018-08-01 PROCEDURE — 99999 PR PBB SHADOW E&M-EST. PATIENT-LVL II: CPT | Mod: PBBFAC,,, | Performed by: OPHTHALMOLOGY

## 2018-08-01 PROCEDURE — 92014 COMPRE OPH EXAM EST PT 1/>: CPT | Mod: 25,S$GLB,, | Performed by: OPHTHALMOLOGY

## 2018-08-01 NOTE — PROGRESS NOTES
"Subjective:      Patient ID: Heraclio Wall is a 78 y.o. male.    Chief Complaint: Follow-up (3 month)      HPI  Here for follow up of medical problems.  BP at home 120-140/60s.  Change in temperature causes sinus pressure and fullness.  No more dizziness.  No f/c/sw/cough.    Updated/ annual due 5/19:  HM:  10/17 fluvax, 12/15 [id number], 12/16 [id number], 2/15 TDaP, 3/12 zostavax, no BMD, 7/12 Cscope no repeat, 5/18 FIT neg.     Review of Systems   Constitutional: Negative for activity change, chills, diaphoresis, fatigue, fever and unexpected weight change.   HENT: Negative for hearing loss and rhinorrhea.    Eyes: Negative for discharge and visual disturbance.   Respiratory: Negative for cough, chest tightness, shortness of breath and wheezing.    Cardiovascular: Negative for chest pain, palpitations and leg swelling.   Gastrointestinal: Negative for blood in stool, constipation, diarrhea, nausea and vomiting.   Endocrine: Negative for polydipsia and polyuria.   Genitourinary: Positive for urgency. Negative for difficulty urinating, frequency and hematuria.   Musculoskeletal: Positive for arthralgias and neck pain. Negative for joint swelling.   Neurological: Positive for weakness and headaches.   Psychiatric/Behavioral: Positive for confusion. Negative for dysphoric mood.         Objective:   /65   Pulse 60   Temp 97.8 °F (36.6 °C) (Tympanic)   Ht 5' 11.5" (1.816 m)   Wt 79.7 kg (175 lb 11.3 oz)   SpO2 97%   BMI 24.16 kg/m²     Physical Exam   Constitutional: He is oriented to person, place, and time. He appears well-developed and well-nourished.   HENT:   Mouth/Throat: Oropharynx is clear and moist.   Neck: Normal range of motion. Neck supple.   Cardiovascular: Normal rate, regular rhythm and intact distal pulses.  Exam reveals no gallop and no friction rub.    No murmur heard.  Pulmonary/Chest: Effort normal and breath sounds normal. He has no wheezes. He has no rales.   Abdominal: Soft. Bowel " sounds are normal. He exhibits no mass. There is no tenderness.   Musculoskeletal: He exhibits no edema.   Lymphadenopathy:     He has no cervical adenopathy.   Neurological: He is alert and oriented to person, place, and time.   Psychiatric: He has a normal mood and affect.           Assessment:       1. Essential hypertension    2. Acute URI    3. Seasonal allergic rhinitis due to pollen    4. Chronic anticoagulation          Plan:     Essential hypertension- good range at home.  RTC 6mo.    COumadin good level.    Seasonal allergic rhinitis due to pollen  -     fluticasone (FLONASE) 50 mcg/actuation nasal spray; Use 2 sprays (100 mcg total) by in each nostril once daily  Dispense: 16 g; Refill: 6

## 2018-08-01 NOTE — PROGRESS NOTES
Patient's INR is therapeutic at 2.5.  No changes in dose.  Recheck in 1 week.  Please call should you have any questions or concerns at [phone] or [phone].

## 2018-08-01 NOTE — PROGRESS NOTES
SUBJECTIVE:   Heraclio Wall is a 78 y.o. male   Corrected distance visual acuity was 20/40 in the right eye and 20/40 in the left eye.   Chief Complaint   Patient presents with    Blurred Vision        HPI:  HPI     Blurred vision and glare problem  OD x 2 months  No pain, discomfort OU when watching TV using PF AFT's    1. PCIOL OU   Yag os 11/2/15  2. ERM OD  3. Dry Eyes  Restasis burned  4. Pterygium OD  5. Dermatochalasis  6. fhx of glaucoma    OU: AFT's PF PRN    Last edited by Helena Luna on 8/1/2018  2:54 PM. (History)        Assessment /Plan :  1. Epiretinal membrane (ERM) of right eye  -- Condition stable, no therapeutic change required. 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  Risk, benefits and alternatives to cilia removal by forceps was reviewed and pt requested that this be performed at this time.  AkTaine gtts introduced into both eyes and cilia forceps, under slit lamp imagnification,  were used for epilation, removing the following number of cilia:  RUL: 1  JONATHAN: 0  RLL: 1   LLL: 0      RTC prn or as scheduled     3. Dry eyes, bilateral Recommend AT'S qid ou and Night gel qhs ou      4. Pseudophakia  -- Condition stable, no therapeutic change required. Monitoring routinely.     5. Pterygium, right  -- Condition stable, no therapeutic change required. Monitoring routinely.             RTC as scheduled

## 2018-08-08 ENCOUNTER — ANTI-COAG VISIT (OUTPATIENT)
Dept: CARDIOLOGY | Facility: CLINIC | Age: 78
End: 2018-08-08
Payer: MEDICARE

## 2018-08-08 DIAGNOSIS — Z79.01 LONG TERM (CURRENT) USE OF ANTICOAGULANTS: Primary | ICD-10-CM

## 2018-08-08 LAB — INR PPP: 2.5 (ref 2–3)

## 2018-08-08 PROCEDURE — 85610 PROTHROMBIN TIME: CPT | Mod: QW,S$GLB,, | Performed by: INTERNAL MEDICINE

## 2018-08-08 NOTE — PROGRESS NOTES
Patient's INR remains therapeutic at 2.5.  No changes in dose.  Recheck in 1 month.  Please call should you have any questions or concerns at [phone] or [phone].

## 2018-08-10 ENCOUNTER — OFFICE VISIT (OUTPATIENT)
Dept: INTERNAL MEDICINE | Facility: CLINIC | Age: 78
End: 2018-08-10
Payer: MEDICARE

## 2018-08-10 ENCOUNTER — PATIENT OUTREACH (OUTPATIENT)
Dept: OTHER | Facility: OTHER | Age: 78
End: 2018-08-10

## 2018-08-10 VITALS
DIASTOLIC BLOOD PRESSURE: 65 MMHG | OXYGEN SATURATION: 97 % | HEART RATE: 60 BPM | WEIGHT: 175.69 LBS | TEMPERATURE: 98 F | HEIGHT: 72 IN | BODY MASS INDEX: 23.8 KG/M2 | SYSTOLIC BLOOD PRESSURE: 130 MMHG

## 2018-08-10 DIAGNOSIS — J06.9 ACUTE URI: ICD-10-CM

## 2018-08-10 DIAGNOSIS — I10 ESSENTIAL HYPERTENSION: Primary | Chronic | ICD-10-CM

## 2018-08-10 DIAGNOSIS — Z79.01 CHRONIC ANTICOAGULATION: Chronic | ICD-10-CM

## 2018-08-10 DIAGNOSIS — J30.1 SEASONAL ALLERGIC RHINITIS DUE TO POLLEN: ICD-10-CM

## 2018-08-10 PROBLEM — J30.9 ALLERGIC RHINITIS: Status: ACTIVE | Noted: 2018-08-10

## 2018-08-10 PROCEDURE — 99213 OFFICE O/P EST LOW 20 MIN: CPT | Mod: S$GLB,,, | Performed by: INTERNAL MEDICINE

## 2018-08-10 PROCEDURE — 3075F SYST BP GE 130 - 139MM HG: CPT | Mod: CPTII,S$GLB,, | Performed by: INTERNAL MEDICINE

## 2018-08-10 PROCEDURE — 3078F DIAST BP <80 MM HG: CPT | Mod: CPTII,S$GLB,, | Performed by: INTERNAL MEDICINE

## 2018-08-10 PROCEDURE — 99999 PR PBB SHADOW E&M-EST. PATIENT-LVL III: CPT | Mod: PBBFAC,,, | Performed by: INTERNAL MEDICINE

## 2018-08-10 RX ORDER — FLUTICASONE PROPIONATE 50 MCG
2 SPRAY, SUSPENSION (ML) NASAL DAILY
Qty: 16 G | Refills: 6 | Status: SHIPPED | OUTPATIENT
Start: 2018-08-10 | End: 2018-12-26 | Stop reason: SDUPTHER

## 2018-08-10 NOTE — PROGRESS NOTES
Last 5 Patient Entered Readings                                      Current 30 Day Average: 131/64     Recent Readings 8/8/2018 8/8/2018 8/8/2018 8/8/2018 8/8/2018    SBP (mmHg) 131 129 134 139 140    DBP (mmHg) 59 63 62 64 66    Pulse 49 49 49 49 49          Digital Medicine: Health  Follow Up    Lifestyle Modifications:    1.Dietary Modifications (Sodium intake <2,000mg/day, food labels, dining out): Patient continues doing well maintaining a low sodium diet. No major changes that would cause an increase in salt intake.     2.Physical Activity: Plans to get back in the gym soon. He was going 3 times a week with his wife.     3.Medication Therapy: Patient has been compliant with the medication regimen.    4.Patient has the following medication side effects/concerns:   (Frequency/Alleviating factors/Precipitating factors, etc.)     Patient is suffering from bad allergies right now. His doctor put him on some allergy medication this week so he is hopeful this will help him. He just feels run down, ears blocked up, etc so he thinks this is causing his BP to rise a little.     Follow up with Mr. Heraclio Wall completed. No further questions or concerns. Will continue follow up to achieve health goals.

## 2018-08-20 DIAGNOSIS — I10 ESSENTIAL HYPERTENSION: ICD-10-CM

## 2018-08-20 RX ORDER — FUROSEMIDE 40 MG/1
TABLET ORAL
COMMUNITY
Start: 2018-08-20 | End: 2018-08-20 | Stop reason: SDUPTHER

## 2018-08-20 RX ORDER — FUROSEMIDE 40 MG/1
TABLET ORAL
Qty: 90 TABLET | Refills: 1 | Status: SHIPPED | OUTPATIENT
Start: 2018-08-20 | End: 2018-08-20 | Stop reason: ALTCHOICE

## 2018-08-20 NOTE — TELEPHONE ENCOUNTER
----- Message from Zack Ford, PharmD sent at 8/20/2018  4:34 PM CDT -----  Hello,    Pt is looking for a new rx for his lasix to be sent to [facility].    Thanks,    Zack

## 2018-08-21 RX ORDER — FUROSEMIDE 40 MG/1
40 TABLET ORAL DAILY
Qty: 90 TABLET | Refills: 3 | Status: SHIPPED | OUTPATIENT
Start: 2018-08-21 | End: 2018-08-21 | Stop reason: SDUPTHER

## 2018-08-21 RX ORDER — FUROSEMIDE 40 MG/1
40 TABLET ORAL DAILY
Qty: 90 TABLET | Refills: 3 | Status: SHIPPED | OUTPATIENT
Start: 2018-08-21 | End: 2018-12-26 | Stop reason: SDUPTHER

## 2018-08-21 NOTE — TELEPHONE ENCOUNTER
----- Message from Zack Ford, PharmD sent at 8/20/2018  4:34 PM CDT -----  Hello,    Pt is looking for a new rx for his lasix to be sent to [facility] at [facility]    Thanks,    Zack

## 2018-09-04 RX ORDER — METOPROLOL SUCCINATE 25 MG/1
TABLET, EXTENDED RELEASE ORAL
Qty: 60 TABLET | Refills: 11 | Status: SHIPPED | OUTPATIENT
Start: 2018-09-04 | End: 2018-12-26 | Stop reason: SDUPTHER

## 2018-09-05 ENCOUNTER — ANTI-COAG VISIT (OUTPATIENT)
Dept: CARDIOLOGY | Facility: CLINIC | Age: 78
End: 2018-09-05
Payer: MEDICARE

## 2018-09-05 DIAGNOSIS — Z79.01 LONG TERM (CURRENT) USE OF ANTICOAGULANTS: Primary | ICD-10-CM

## 2018-09-05 LAB — INR PPP: 2.7 (ref 2–3)

## 2018-09-05 PROCEDURE — 85610 PROTHROMBIN TIME: CPT | Mod: PBBFAC,PO

## 2018-09-10 RX ORDER — VALSARTAN 80 MG/1
80 TABLET ORAL 2 TIMES DAILY
Qty: 180 TABLET | Refills: 3 | Status: SHIPPED | OUTPATIENT
Start: 2018-09-10 | End: 2018-12-11

## 2018-09-12 ENCOUNTER — PATIENT OUTREACH (OUTPATIENT)
Dept: OTHER | Facility: OTHER | Age: 78
End: 2018-09-12

## 2018-09-12 NOTE — PROGRESS NOTES
Last 5 Patient Entered Readings                                      Current 30 Day Average: 132/66     Recent Readings 9/3/2018 9/3/2018 9/3/2018 9/3/2018 8/21/2018    SBP (mmHg) 130 127 130 127 130    DBP (mmHg) 60 55 62 86 63    Pulse 53 53 53 53 53          Digital Medicine: Health  Follow Up    Lifestyle Modifications:    1.Dietary Modifications (Sodium intake <2,000mg/day, food labels, dining out): Patient continues monitoring his sodium intake with the help of his wife. He has not made any major changes in his diet that would cause an increase in salt intake.     2.Physical Activity: Patient is going to the gym 3 times a week.     3.Medication Therapy: Patient has been compliant with the medication regimen. Patient is doing well on his current regimen.     4.Patient has the following medication side effects/concerns:   (Frequency/Alleviating factors/Precipitating factors, etc.)     Patient mentioned that every morning, he feels a little weak and dizzy. The symptoms typically subside as the day goes on. He does not want to adjust any medication right now because he is pleased with his BP readings.  He did mention that his wife has been trying to get him to drink more water because he may be getting a little dehydrated. He is going to work on increase his water intake to see if this helps with the symptoms. He will keep me updated.     Follow up with Mr. Heraclio Wall completed. No further questions or concerns. Will continue follow up to achieve health goals.

## 2018-09-21 ENCOUNTER — HOSPITAL ENCOUNTER (OUTPATIENT)
Dept: RADIOLOGY | Facility: HOSPITAL | Age: 78
Discharge: HOME | End: 2018-09-21
Attending: PHYSICIAN ASSISTANT
Payer: MEDICARE

## 2018-09-21 ENCOUNTER — OFFICE VISIT (OUTPATIENT)
Dept: URGENT CARE | Facility: CLINIC | Age: 78
End: 2018-09-21
Payer: MEDICARE

## 2018-09-21 VITALS
SYSTOLIC BLOOD PRESSURE: 132 MMHG | WEIGHT: 181.88 LBS | TEMPERATURE: 98 F | RESPIRATION RATE: 17 BRPM | HEIGHT: 72 IN | DIASTOLIC BLOOD PRESSURE: 64 MMHG | OXYGEN SATURATION: 99 % | HEART RATE: 54 BPM | BODY MASS INDEX: 24.63 KG/M2

## 2018-09-21 DIAGNOSIS — R10.32 LLQ PAIN: ICD-10-CM

## 2018-09-21 DIAGNOSIS — K57.92 DIVERTICULITIS: Primary | ICD-10-CM

## 2018-09-21 PROCEDURE — 99999 PR PBB SHADOW E&M-EST. PATIENT-LVL IV: CPT | Mod: PBBFAC,,, | Performed by: PHYSICIAN ASSISTANT

## 2018-09-21 PROCEDURE — 99214 OFFICE O/P EST MOD 30 MIN: CPT | Mod: S$PBB,,, | Performed by: PHYSICIAN ASSISTANT

## 2018-09-21 PROCEDURE — 99214 OFFICE O/P EST MOD 30 MIN: CPT | Mod: PBBFAC,25,PO | Performed by: PHYSICIAN ASSISTANT

## 2018-09-21 PROCEDURE — 1101F PT FALLS ASSESS-DOCD LE1/YR: CPT | Mod: CPTII,,, | Performed by: PHYSICIAN ASSISTANT

## 2018-09-21 PROCEDURE — 74018 RADEX ABDOMEN 1 VIEW: CPT | Mod: TC,FY,PO

## 2018-09-21 PROCEDURE — 3078F DIAST BP <80 MM HG: CPT | Mod: CPTII,,, | Performed by: PHYSICIAN ASSISTANT

## 2018-09-21 PROCEDURE — 74018 RADEX ABDOMEN 1 VIEW: CPT | Mod: 26,,, | Performed by: RADIOLOGY

## 2018-09-21 PROCEDURE — 3075F SYST BP GE 130 - 139MM HG: CPT | Mod: CPTII,,, | Performed by: PHYSICIAN ASSISTANT

## 2018-09-21 RX ORDER — CIPROFLOXACIN 500 MG/1
500 TABLET ORAL 2 TIMES DAILY
Qty: 14 TABLET | Refills: 0 | Status: SHIPPED | OUTPATIENT
Start: 2018-09-21 | End: 2018-09-28

## 2018-09-21 RX ORDER — HYDROCORTISONE 5 MG/1
2.5 TABLET ORAL DAILY
COMMUNITY
End: 2020-10-28

## 2018-09-21 RX ORDER — METRONIDAZOLE 500 MG/1
500 TABLET ORAL EVERY 8 HOURS
Qty: 21 TABLET | Refills: 0 | Status: SHIPPED | OUTPATIENT
Start: 2018-09-21 | End: 2018-09-28

## 2018-09-21 NOTE — PATIENT INSTRUCTIONS
Diverticulitis    Some people get pouches along the wall of the colon as they get older. The pouches, called diverticuli, usually cause no symptoms. If the pouches become blocked, you can get an infection. This infection is called diverticulitis. It causes pain in your lower abdomen and fever. If not treated, it can become a serious condition, causing an abscess to form inside the pouch. The abscess may block the intestinal tract even or rupture, spreading infection throughout the abdomen.  When treatment is started early, oral antibiotics alone may be enough to cure diverticulitis. This method is tried first. But, if you don't improve or if your condition gets worse while using oral antibiotics, you may need to be admitted to the hospital for IV antibiotics. Severe cases may require surgery.  Home care  The following guidelines will help you care for yourself at home:  · During the acute illness, rest and follow your healthcare provider's instructions about diet. Sometimes you will need to follow a clear liquid diet to rest your bowel. Once your symptoms are better, you may be told to follow a low-fiber diet for some time. Include foods like:  ¨ Flake cereal, mashed potatoes, pancakes, waffles, pasta, white bread, rice, applesauce, bananas, eggs, fish, poultry, tofu, and cooked soft vegetables  · Take antibiotics exactly as instructed. Don't miss any doses or stop taking the medication, even if you feel better.  · Monitor your temperature and tell your healthcare provider if you have rising temperatures.  Preventing future attacks  Once you have an episode of diverticulitis, you are at risk for having it again. After you have recovered from this episode, you may be able to lower your risk by eating a high-fiber diet (20 gm/day to 35 gm/day of fiber). This cleans out the colon pouches that already exist and may prevent new ones from forming. Foods high in fiber include fresh fruits and edible peelings, raw or  lightly cooked vegetables, whole grain cereals and breads, dried beans and peas, and bran.  Other steps that can help prevent future attacks include:  · Take your medicines, such as antibiotics, as your healthcare provider says.  · Drink 6 to 8 glasses of water every day, unless told otherwise.  · Use a heating pad or hot water bottle to help abdominal cramping or pain.  · Begin an exercise program. Ask your healthcare provider how to get started. You can benefit from simple activities such as walking or gardening.  · Treat diarrhea with a bland diet. Start with liquids only; then slowly add fiber over time.  · Watch for changes in your bowel movements (constipation to diarrhea). Avoid constipation by eating a high fiber diet and taking a stool softener if needed.  · Get plenty of rest and sleep.  Follow-up care  Follow up with your healthcare provider as advised or sooner if you are not getting better in the next 2 days.  When to seek medical advice  Call your healthcare provider right away if any of these occur:  · Fever of 100.4°F (38°C) or higher, or as directed by your healthcare provider  · Repeated vomiting or swelling of the abdomen  · Weakness, dizziness, light-headedness  · Pain in your abdomen that gets worse, severe, or spreads to your back  · Pain that moves to the right lower abdomen  · Rectal bleeding (stools that are red, black or maroon color)  · Unexpected vaginal bleeding  Date Last Reviewed: 9/1/2016  © 6689-7613 [email or web address]. [address] [place] [address], [place] [address]. All rights reserved. This information is not intended as a substitute for professional medical care. Always follow your healthcare professional's instructions.

## 2018-09-21 NOTE — PROGRESS NOTES
"Subjective:      Patient ID: Heraclio Wall is a 78 y.o. male.    Chief Complaint: Diarrhea    Diarrhea    This is a new problem. The current episode started in the past 7 days (5days). The problem has been unchanged. Associated symptoms include abdominal pain (worse in LLQ). Pertinent negatives include no chills, coughing, fever, headaches or vomiting. Risk factors: Denies sick contacts, recent antibiotic use or unusual foods. Treatments tried: pepto-bismal, beano gas.     Review of Systems   Constitutional: Negative for chills, diaphoresis and fever.   HENT: Negative for congestion and sore throat.    Respiratory: Negative for cough and shortness of breath.    Gastrointestinal: Positive for abdominal pain (worse in LLQ) and diarrhea. Negative for blood in stool, nausea and vomiting.        Black stool d/t pepto bismal  H/o diverticulosis   Genitourinary: Negative for decreased urine volume (last urinated 10mins ago).   Neurological: Positive for weakness (mild, from sitting around, didn't get to gym this week). Negative for dizziness, light-headedness and headaches.       Objective:   /64 (BP Location: Right arm, Patient Position: Sitting, BP Method: Small (Manual))   Pulse (!) 54   Temp 97.9 °F (36.6 °C) (Tympanic)   Resp 17   Ht 5' 11.5" (1.816 m)   Wt 82.5 kg (181 lb 14.1 oz)   SpO2 99%   BMI 25.01 kg/m²   Physical Exam   Constitutional: He appears well-developed and well-nourished. He does not appear ill. No distress.   HENT:   Head: Normocephalic and atraumatic.   Mouth/Throat: Oropharynx is clear and moist.   Cardiovascular: Normal rate, regular rhythm and normal heart sounds.   No murmur heard.  Pulmonary/Chest: Effort normal and breath sounds normal. No respiratory distress. He has no decreased breath sounds. He has no wheezes. He has no rhonchi. He has no rales.   Abdominal: Soft. Bowel sounds are normal. There is tenderness in the epigastric area and left lower quadrant. There is no CVA " tenderness.   Skin: Skin is warm and dry. No rash noted. He is not diaphoretic.   Normal skin turgor   Psychiatric: He has a normal mood and affect. His speech is normal and behavior is normal. Thought content normal.     Assessment:      1. Diverticulitis    2. LLQ pain       Plan:   Diverticulitis  -     ciprofloxacin HCl (CIPRO) 500 MG tablet; Take 1 tablet (500 mg total) by mouth 2 (two) times daily. for 7 days  Dispense: 14 tablet; Refill: 0  -     metroNIDAZOLE (FLAGYL) 500 MG tablet; Take 1 tablet (500 mg total) by mouth every 8 (eight) hours. for 7 days  Dispense: 21 tablet; Refill: 0    LLQ pain  -     X-Ray Abdomen AP 1 View; Future; Expected date: 09/21/2018    Suspect viral illness would have some improvement 5 days in, no signs of constipation on x-ray   Will treat for diverticulitis based on symptoms, clinical exam and history of diverticulosis  Recommend yogurt/probiotic  Discussed risk of tendon rupture with Cipro, avoid heavy lifting or unnecessary activity  Stressed increased hydration, no signs of dehydration on clinical exam  Follow up w PCP    Gave handout on diverticulitis.  Printed AVS and reviewed treatment plan in detail.    Discussed worsening signs/symptoms and when to return to clinic or go to ED.   Patient expresses understanding and agrees with treatment plan.

## 2018-09-24 ENCOUNTER — PATIENT OUTREACH (OUTPATIENT)
Dept: OTHER | Facility: OTHER | Age: 78
End: 2018-09-24

## 2018-09-24 NOTE — PROGRESS NOTES
Mr. Wall is doing well. Dealing with a bout of diverticulitis. Feeling better. Would like more information on a high fiber diet.     Last 5 Patient Entered Readings                                      Current 30 Day Average: 132/67     Recent Readings 9/24/2018 9/24/2018 9/24/2018 9/17/2018 9/17/2018    SBP (mmHg) 145 143 135 122 130    DBP (mmHg) 73 75 76 69 59    Pulse 47 50 48 51 51      BP at goal, <130/80 mmHg  Continue monitoring    Hypertension Medications             furosemide (LASIX) 40 MG tablet Take 1 tablet (40 mg total) by mouth once daily.    metoprolol succinate (TOPROL XL) 25 MG 24 hr tablet Take 1 tablet by mouth once daily. Take an additional tablet if blood pressure is greater than 160    valsartan (DIOVAN) 80 MG tablet Take 1 tablet (80 mg total) by mouth 2 (two) times daily.    verapamil (VERELAN) 240 MG C24P Take 1 capsule (240 mg total) by mouth 2 (two) times daily.

## 2018-09-27 ENCOUNTER — OFFICE VISIT (OUTPATIENT)
Dept: GASTROENTEROLOGY | Facility: CLINIC | Age: 78
End: 2018-09-27
Payer: MEDICARE

## 2018-09-27 VITALS
BODY MASS INDEX: 24.73 KG/M2 | DIASTOLIC BLOOD PRESSURE: 60 MMHG | SYSTOLIC BLOOD PRESSURE: 120 MMHG | HEART RATE: 58 BPM | HEIGHT: 72 IN | WEIGHT: 182.56 LBS

## 2018-09-27 DIAGNOSIS — R10.30 LOWER ABDOMINAL PAIN: ICD-10-CM

## 2018-09-27 DIAGNOSIS — R19.7 DIARRHEA, UNSPECIFIED TYPE: Primary | ICD-10-CM

## 2018-09-27 PROCEDURE — 99999 PR PBB SHADOW E&M-EST. PATIENT-LVL III: CPT | Mod: PBBFAC,,, | Performed by: NURSE PRACTITIONER

## 2018-09-27 PROCEDURE — 1101F PT FALLS ASSESS-DOCD LE1/YR: CPT | Mod: CPTII,,, | Performed by: NURSE PRACTITIONER

## 2018-09-27 PROCEDURE — 99213 OFFICE O/P EST LOW 20 MIN: CPT | Mod: PBBFAC,PO | Performed by: NURSE PRACTITIONER

## 2018-09-27 PROCEDURE — 3078F DIAST BP <80 MM HG: CPT | Mod: CPTII,,, | Performed by: NURSE PRACTITIONER

## 2018-09-27 PROCEDURE — 99214 OFFICE O/P EST MOD 30 MIN: CPT | Mod: S$PBB,,, | Performed by: NURSE PRACTITIONER

## 2018-09-27 PROCEDURE — 3074F SYST BP LT 130 MM HG: CPT | Mod: CPTII,,, | Performed by: NURSE PRACTITIONER

## 2018-09-27 NOTE — PATIENT INSTRUCTIONS
- increase diet a tolerated. BRAT diet.   - if diarrhea starts again, take Meatmucil once a day.   - continue antibiotics until finished and probiotics.   - notify me if diarrhea lasts longer than 2 weeks.   - GasX 125mg up to four times a day.

## 2018-10-01 NOTE — PROGRESS NOTES
Clinic Consult:  [facility] Consultation Note    Reason for Consult:  The primary encounter diagnosis was Diarrhea, unspecified type. A diagnosis of Lower abdominal pain was also pertinent to this visit.    PCP: Bibi Morse       HPI:  This is a 78 y.o. male here for evaluation of the above. He is established with [facility] but is new to me. He presents to clinic with a 2 week history of diarrhea. He was seen at urgent care last week and was diagnosed with diverticulitis. He was placed on antibiotics. As of today, he reports improvement in diarrhea. associated symptoms include lower abdominal pain. No hematochezia or melena.     Review of Systems   Constitutional: Negative for fever, malaise/fatigue and weight loss.   HENT: Negative for sore throat.    Respiratory: Negative for cough and wheezing.    Cardiovascular: Negative for chest pain and palpitations.   Gastrointestinal: Positive for abdominal pain and diarrhea. Negative for blood in stool, constipation, heartburn, melena, nausea and vomiting.   Genitourinary: Negative for dysuria and frequency.   Musculoskeletal: Negative for back pain, joint pain, myalgias and neck pain.   Skin: Negative for itching and rash.   Neurological: Negative for dizziness, speech change, seizures, loss of consciousness and headaches.   Psychiatric/Behavioral: Negative for depression and substance abuse. The patient is not nervous/anxious.        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    Surgical History:  has a past surgical history that includes  Circumcision, primary (1938); Tonsillectomy (1942); Adenoidectomy (1942); Cholecystectomy (2/2015); Small intestine surgery (5/4/2014); Eye surgery (Right, 02/15/2012); Eye surgery (Left, 03/29/2012); TRANSESOPHAGEAL ECHOCARDIOGRAM (SAIRA) (N/A, 5/14/2014); ESOPHAGOGASTRODUODENOSCOPY (EGD) (N/A, 5/12/2014); LAPAROTOMY-EXPLORATORY (N/A, 5/4/2014); EXCISION, SMALL INTESTINE (N/A, 5/4/2014); and AORTOGRAM, ABDOMINAL (N/A, 5/2/2014).    Family History: family history includes Aortic aneurysm in his father; Cancer (age of onset: 64) in his sister; Cataracts in his mother; Glaucoma in his mother; Hearing loss in his sister; Heart disease in his mother; Hyperlipidemia in his mother; Hypertension in his father and mother; Peripheral vascular disease in his father; Stroke in his mother and sister..     Social History:  reports that  has never smoked. he has never used smokeless tobacco. He reports that he does not drink alcohol or use drugs.    Allergies: Reviewed    Home Medications:   Current Outpatient Medications on File Prior to Visit   Medication Sig Dispense Refill    arginine 500 mg tablet Take 1 tablet by mouth 2 (two) times daily.      ascorbic acid (VITAMIN C) 1000 MG tablet Take 3 tablets by mouth Daily.      cholecalciferol, vitamin D3, 1,000 unit capsule Take 5 capsules by mouth Daily.      COUMADIN 2.5 mg tablet Take 1 tablet (2.5 mg total) by mouth Daily. (Patient taking differently: Take by mouth Daily. Take 1/2 tablet on Tuesday and Thursday and 1 tablet on all other days by mouth every evening as directed by the [facility].) 30 tablet 11    cyanocobalamin, vitamin B-12, 1,000 mcg/15 mL Liqd Take 1 drop by mouth once daily.       dextran 70-hypromellose (ARTIFICIAL TEARS) ophthalmic solution Place 1 drop into both eyes Use as needed.      fluticasone (FLONASE) 50 mcg/actuation nasal spray Use 2 sprays (100 mcg total) by in each nostril once daily 16 g 6    fluvastatin XL (LESCOL XL) 80 mg Tb24  "Take one tablet by mouth once daily 90 tablet 3    furosemide (LASIX) 40 MG tablet Take 1 tablet (40 mg total) by mouth once daily. 90 tablet 3    metoprolol succinate (TOPROL XL) 25 MG 24 hr tablet Take 1 tablet by mouth once daily. Take an additional tablet if blood pressure is greater than 160 60 tablet 11    mupirocin (BACTROBAN) 2 % ointment APPLY ON THE SKIN EVERY DAY  2    pantoprazole (PROTONIX) 40 MG tablet TAKE 1 TABLET BY MOUTH DAILY 30 tablet 6    PROGESTERONE MISC 6.25 mg by Misc.(Non-Drug; Combo Route) route every evening.      saw palmetto 160 MG capsule Take 1 capsule by mouth 2 (two) times daily.      selenium 200 mcg Tab Take 1 tablet by mouth Daily.      UNABLE TO FIND Take 1 tablet by mouth once daily. medication name: T3/T4-SR (TRIIODO-L-LTHYRONINE-LEVOTHYROXINE)      valsartan (DIOVAN) 80 MG tablet Take 1 tablet (80 mg total) by mouth 2 (two) times daily. 180 tablet 3    verapamil (VERELAN) 240 MG C24P Take 1 capsule (240 mg total) by mouth 2 (two) times daily. 60 capsule 12    hydrocortisone (CORTEF) 5 MG Tab Take 2.5 mg by mouth once daily.       No current facility-administered medications on file prior to visit.        Physical Exam:  /60   Pulse (!) 58   Ht 5' 11.5" (1.816 m)   Wt 82.8 kg (182 lb 8.7 oz)   BMI 25.10 kg/m²   Body mass index is 25.1 kg/m².  Physical Exam   Constitutional: He is oriented to person, place, and time and well-developed, well-nourished, and in no distress. No distress.   HENT:   Head: Normocephalic.   Eyes: Conjunctivae are normal. Pupils are equal, round, and reactive to light.   Cardiovascular: Normal rate, regular rhythm and normal heart sounds.   Pulmonary/Chest: Effort normal and breath sounds normal. No respiratory distress.   Abdominal: Soft. Bowel sounds are normal. He exhibits no distension. There is no tenderness.   Neurological: He is alert and oriented to person, place, and time. No cranial nerve deficit.   Skin: Skin is warm and " dry. No rash noted.   Psychiatric: Mood and affect normal.       Labs: Pertinent labs reviewed.    Assessment:  1. Diarrhea, unspecified type    2. Lower abdominal pain         Recommendations:  - complete antibiotics. No stool studies given antibiotic use for the last week and seemingly improvement in diarrhea.   - he has been on a clear liquid diet. Increase diet as tolerated.   - continue antibiotics.   - start Probiotics  - if diarrhea returns, start Metamucil daily  - can take GasX as needed  - notify me if diarrhea returns.     Follow-up if symptoms worsen or fail to improve.    Thank you so much for allowing me to participate in the care of LISA Farley

## 2018-10-03 ENCOUNTER — TELEPHONE (OUTPATIENT)
Dept: CARDIOLOGY | Facility: CLINIC | Age: 78
End: 2018-10-03

## 2018-10-03 ENCOUNTER — ANTI-COAG VISIT (OUTPATIENT)
Dept: CARDIOLOGY | Facility: CLINIC | Age: 78
End: 2018-10-03
Payer: MEDICARE

## 2018-10-03 ENCOUNTER — LAB VISIT (OUTPATIENT)
Dept: LAB | Facility: HOSPITAL | Age: 78
End: 2018-10-03
Attending: INTERNAL MEDICINE
Payer: MEDICARE

## 2018-10-03 DIAGNOSIS — Z79.01 LONG TERM (CURRENT) USE OF ANTICOAGULANTS: ICD-10-CM

## 2018-10-03 DIAGNOSIS — Z79.01 LONG TERM (CURRENT) USE OF ANTICOAGULANTS: Primary | ICD-10-CM

## 2018-10-03 LAB
INR PPP: 5.2
INR PPP: 7 (ref 2–3)
PROTHROMBIN TIME: 54.4 SEC

## 2018-10-03 PROCEDURE — 85610 PROTHROMBIN TIME: CPT | Mod: PO

## 2018-10-03 PROCEDURE — 99999 PR PBB SHADOW E&M-EST. PATIENT-LVL I: CPT | Mod: PBBFAC,,,

## 2018-10-03 PROCEDURE — 85610 PROTHROMBIN TIME: CPT | Mod: PBBFAC,PO,91

## 2018-10-03 PROCEDURE — 99211 OFF/OP EST MAY X REQ PHY/QHP: CPT | Mod: PBBFAC,PO

## 2018-10-03 PROCEDURE — 36415 COLL VENOUS BLD VENIPUNCTURE: CPT | Mod: PO

## 2018-10-03 NOTE — TELEPHONE ENCOUNTER
Received critical lab from [facility] with lab states pt's INR is 5.2. I notified Dr. Garber states to send message to coumadin clinic message sent.

## 2018-10-03 NOTE — PROGRESS NOTES
Last 5 Patient Entered Readings                                      Current 30 Day Average: 132/67     Recent Readings 9/24/2018 9/24/2018 9/24/2018 9/17/2018 9/17/2018    SBP (mmHg) 145 143 135 122 130    DBP (mmHg) 73 75 76 69 59    Pulse 47 50 48 51 51        Email patient resources on high fiber foods to help with diverticulitis. Encouraged him to reach out with any specific questions.

## 2018-10-03 NOTE — TELEPHONE ENCOUNTER
Received critical lab from [facility] with Lab spoke with Dr. Garber states to notify coumadin clinic message has been sent to coumadin clinic.

## 2018-10-03 NOTE — PROGRESS NOTES
Patient's INR is supratherapeutic at 7.0.  Reconfirmed with lab - INR 5.2.  Reported recently taking Ciprofloxacin, which was completed last week.  Started Metamucil and GasX (prn).  No signs of any abnormal bleeding reported.  Instructed to hold Warfarin dose today and tomorrow (10/04) - only, then resume 2.5 mg.  Recheck in 5 days on Monday, 10/08/2018.  Advised patient to seek medical attention (ER) for any signs of abnormal bleeding, if needed.  Patient and Mrs. Wall voiced understanding.

## 2018-10-08 ENCOUNTER — ANTI-COAG VISIT (OUTPATIENT)
Dept: CARDIOLOGY | Facility: CLINIC | Age: 78
End: 2018-10-08
Payer: MEDICARE

## 2018-10-08 DIAGNOSIS — Z79.01 LONG TERM (CURRENT) USE OF ANTICOAGULANTS: Primary | ICD-10-CM

## 2018-10-08 LAB — INR PPP: 2.1 (ref 2–3)

## 2018-10-08 PROCEDURE — 85610 PROTHROMBIN TIME: CPT | Mod: PBBFAC,PO

## 2018-10-08 NOTE — PROGRESS NOTES
Patient's INR is therapeutic at 2.1.  Completed course of antibiotics.  No changes in dose.  Recheck in 1 month.  Please call should you have any questions or concerns at [phone] or [phone].

## 2018-10-11 ENCOUNTER — PATIENT OUTREACH (OUTPATIENT)
Dept: OTHER | Facility: OTHER | Age: 78
End: 2018-10-11

## 2018-10-11 NOTE — PROGRESS NOTES
Last 5 Patient Entered Readings                                      Current 30 Day Average: 131/70     Recent Readings 10/10/2018 10/10/2018 10/10/2018 10/6/2018 10/6/2018    SBP (mmHg) 120 123 121 136 135    DBP (mmHg) 66 63 76 70 72    Pulse 54 53 54 55 56          Digital Medicine: Health  Follow Up    Lifestyle Modifications:    1.Dietary Modifications (Sodium intake <2,000mg/day, food labels, dining out): Patient is doing well with maintaining a low sodium diet. He did receive the resources I sent about foods to eat to help with his diverticulitis. He will hang on to that information incase he has another flare up. He is doing much better now.     2.Physical Activity: Patient will start going to the gym again next week (3 times per week). He was unable to go when he was having his flare ups with the diverticulitis.     3.Medication Therapy: Patient has been compliant with the medication regimen. Patient is doing well on his current regimen. He denies symptoms/side effects.     4.Patient has the following medication side effects/concerns:   (Frequency/Alleviating factors/Precipitating factors, etc.)     Follow up with Mr. Heraclio Wall completed. No further questions or concerns. Will continue to follow up to achieve health goals.

## 2018-10-18 ENCOUNTER — IMMUNIZATION (OUTPATIENT)
Dept: INTERNAL MEDICINE | Facility: CLINIC | Age: 78
End: 2018-10-18
Payer: MEDICARE

## 2018-10-18 PROCEDURE — 90662 IIV NO PRSV INCREASED AG IM: CPT | Mod: PBBFAC,PO

## 2018-10-20 NOTE — PROGRESS NOTES
Patient's INR is therapeutic at 2.7.  No changes in dose.  Recheck in 1 month.  Please call should you have any questions or concerns at [phone] or [phone].   Return to the ER for vomiting, blood in your stool, abdominal pain, or other concerns    Establish care with a primary care provider; please call the [facility] clinic on Monday for an appointment for follow-up.    Take potassium as ordered

## 2018-11-05 ENCOUNTER — ANTI-COAG VISIT (OUTPATIENT)
Dept: CARDIOLOGY | Facility: CLINIC | Age: 78
End: 2018-11-05

## 2018-11-05 ENCOUNTER — LAB VISIT (OUTPATIENT)
Dept: LAB | Facility: HOSPITAL | Age: 78
End: 2018-11-05
Attending: INTERNAL MEDICINE
Payer: MEDICARE

## 2018-11-05 DIAGNOSIS — Z79.01 LONG TERM (CURRENT) USE OF ANTICOAGULANTS: Primary | ICD-10-CM

## 2018-11-05 DIAGNOSIS — Z79.01 LONG TERM (CURRENT) USE OF ANTICOAGULANTS: ICD-10-CM

## 2018-11-05 LAB
INR PPP: 2.2
PROTHROMBIN TIME: 22.6 SEC

## 2018-11-05 PROCEDURE — 85610 PROTHROMBIN TIME: CPT | Mod: HCNC,PO

## 2018-11-05 PROCEDURE — 36415 COLL VENOUS BLD VENIPUNCTURE: CPT | Mod: HCNC,PO

## 2018-11-05 NOTE — PROGRESS NOTES
Patient's INR is therapeutic at 2.2.  Patient contacted:  Instructed to maintain current dose of Warfarin 1.25 mg on Tuesdays and Thursdays; and 2.5 mg on all other days.  Recheck on 12/03/2018 at 10:15a.  Patient voiced understanding.

## 2018-11-13 ENCOUNTER — PATIENT OUTREACH (OUTPATIENT)
Dept: OTHER | Facility: OTHER | Age: 78
End: 2018-11-13

## 2018-11-13 NOTE — PROGRESS NOTES
Last 5 Patient Entered Readings                                      Current 30 Day Average: 140/68     Recent Readings 11/12/2018 11/12/2018 11/12/2018 11/12/2018 10/31/2018    SBP (mmHg) 145 147 144 147 147    DBP (mmHg) 67 65 68 70 78    Pulse 50 51 51 52 53          Digital Medicine: Health  Follow Up    Lifestyle Modifications:    1.Dietary Modifications (Sodium intake <2,000mg/day, food labels, dining out): Patient is usually eating low sodium meals but his wife did cook gumbo on Sunday so he likely got more sodium in on that day due to that meal. This does not happen very often.     2.Physical Activity: Patient typically goes to the gym 3 times per week but has not been in the past 2 weeks due to the bad weather. He hopes to get back on track once the weather clears up. He believes this affecting his BP as well.     3.Medication Therapy: Patient has been compliant with the medication regimen. Patient is doing well on his current regimen. He denies symptoms/side effects.  He is wondering if he needs an increase in his BP medications since his readings have been a little higher then normal. I explained that I consult with his PharmD, LEOPOLDO Allen, but that she would likely want to see more BP readings before she makes a change. His BP has only been trending up for about 2 weeks (which may be situational). I will inform his PharmD of this information.     4.Patient has the following medication side effects/concerns:   (Frequency/Alleviating factors/Precipitating factors, etc.)     Follow up with Mr. Heraclio Wall completed. No further questions or concerns. Will continue to follow up to achieve health goals.

## 2018-11-14 ENCOUNTER — PATIENT MESSAGE (OUTPATIENT)
Dept: ADMINISTRATIVE | Facility: OTHER | Age: 78
End: 2018-11-14

## 2018-11-20 RX ORDER — PANTOPRAZOLE SODIUM 40 MG/1
TABLET, DELAYED RELEASE ORAL
Qty: 30 TABLET | Refills: 6 | Status: CANCELLED | OUTPATIENT
Start: 2018-11-20

## 2018-11-20 RX ORDER — MUPIROCIN 20 MG/G
OINTMENT TOPICAL
Refills: 2 | OUTPATIENT
Start: 2018-11-20

## 2018-11-27 ENCOUNTER — PATIENT OUTREACH (OUTPATIENT)
Dept: OTHER | Facility: OTHER | Age: 78
End: 2018-11-27

## 2018-11-27 NOTE — PROGRESS NOTES
"Mr. Wall reported concerns to  re: BP trending up, but has not taken readings since then. He has been spending time with his daughter who is in from [place]. He will "do better". He reports he is feeling very well.     Last 5 Patient Entered Readings                                      Current 30 Day Average: 142/68     Recent Readings 11/16/2018 11/16/2018 11/16/2018 11/16/2018 11/12/2018    SBP (mmHg) 148 145 144 144 145    DBP (mmHg) 72 68 70 67 67    Pulse 48 48 48 49 50          BP close to goal, <130/80 mmHg  Continue monitoring    Hypertension Medications             furosemide (LASIX) 40 MG tablet Take 1 tablet (40 mg total) by mouth once daily.    metoprolol succinate (TOPROL XL) 25 MG 24 hr tablet Take 1 tablet by mouth once daily. Take an additional tablet if blood pressure is greater than 160    valsartan (DIOVAN) 80 MG tablet Take 1 tablet (80 mg total) by mouth 2 (two) times daily.    verapamil (VERELAN) 240 MG C24P Take 1 capsule (240 mg total) by mouth 2 (two) times daily.          "

## 2018-11-28 ENCOUNTER — OFFICE VISIT (OUTPATIENT)
Dept: OPHTHALMOLOGY | Facility: CLINIC | Age: 78
End: 2018-11-28
Payer: MEDICARE

## 2018-11-28 DIAGNOSIS — H04.123 DRY EYES, BILATERAL: ICD-10-CM

## 2018-11-28 DIAGNOSIS — H02.052 TRICHIASIS OF RIGHT LOWER EYELID: Primary | ICD-10-CM

## 2018-11-28 DIAGNOSIS — Z96.1 PSEUDOPHAKIA OF BOTH EYES: ICD-10-CM

## 2018-11-28 DIAGNOSIS — H00.12 CHALAZION OF RIGHT LOWER EYELID: ICD-10-CM

## 2018-11-28 PROCEDURE — 92012 INTRM OPH EXAM EST PATIENT: CPT | Mod: 25,HCNC,S$GLB, | Performed by: OPHTHALMOLOGY

## 2018-11-28 PROCEDURE — 67820 REVISE EYELASHES: CPT | Mod: HCNC,RT,S$GLB, | Performed by: OPHTHALMOLOGY

## 2018-11-28 PROCEDURE — 99999 PR PBB SHADOW E&M-EST. PATIENT-LVL II: CPT | Mod: PBBFAC,HCNC,, | Performed by: OPHTHALMOLOGY

## 2018-11-28 RX ORDER — FLUVASTATIN SODIUM 80 MG/1
TABLET, FILM COATED, EXTENDED RELEASE ORAL DAILY
Qty: 90 TABLET | Refills: 3 | Status: SHIPPED | OUTPATIENT
Start: 2018-11-28 | End: 2019-12-12 | Stop reason: SDUPTHER

## 2018-11-28 RX ORDER — PANTOPRAZOLE SODIUM 40 MG/1
TABLET, DELAYED RELEASE ORAL
Qty: 30 TABLET | Refills: 6 | Status: SHIPPED | OUTPATIENT
Start: 2018-11-28 | End: 2018-12-26 | Stop reason: SDUPTHER

## 2018-11-28 NOTE — PROGRESS NOTES
SUBJECTIVE:   Heraclio Wall is a 78 y.o. male   Corrected distance visual acuity was 20/40 in the right eye and 20/30 in the left eye.   Chief Complaint   Patient presents with    Annual Exam    Blurred Vision        HPI:  HPI     Pt here for annual exam. Pt states that he has been experiencing some   burning and dryness in his eyes. Pt says that his vision gets a little   blurry when he reads the newspaper. He has to stop every now and then and   look away to clear his vision. No gtts.     1. PCIOL OU   Yag os 11/2/15  2. ERM OD  3. Dry Eyes  Restasis burned  4. Pterygium OD  5. Dermatochalasis  6. fhx of glaucoma    OU: AFT's PF PRN    Last edited by Austyn England, Patient Care Assistant on 11/28/2018  1:27   PM. (History)        Assessment /Plan :  1. Trichiasis of right lower eyelid FORCEPS EPILATION PROCEDURE:    Pt has symptomatic trichiasis of the OD eyelid(s).  Risk, benefits and alternatives to cilia removal by forceps was reviewed and pt requested that this be performed at this time.  AkTaine gtts introduced into both eyes and cilia forceps, under slit lamp imagnification,  were used for epilation, removing the following number of cilia:  RUL: 0  JONATHAN: 0  RLL: 1   LLL: 0      RTC prn or as scheduled     2. Pseudophakia of both eyes  -- Condition stable, no therapeutic change required. Monitoring routinely.     3. Dry eyes, bilateral recommend artificial tears prn OU   4. Chalazion of right lower eyelid recommend warm compresses     RTC in August or prn any changes

## 2018-12-03 ENCOUNTER — ANTI-COAG VISIT (OUTPATIENT)
Dept: CARDIOLOGY | Facility: CLINIC | Age: 78
End: 2018-12-03
Payer: MEDICARE

## 2018-12-03 DIAGNOSIS — Z79.01 LONG TERM (CURRENT) USE OF ANTICOAGULANTS: Primary | ICD-10-CM

## 2018-12-03 LAB — INR PPP: 3.2 (ref 2–3)

## 2018-12-03 PROCEDURE — 85610 PROTHROMBIN TIME: CPT | Mod: QW,HCNC,S$GLB, | Performed by: INTERNAL MEDICINE

## 2018-12-03 NOTE — PROGRESS NOTES
Patient's INR is supra-therapeutic at 3.2.  No significant changes or extra doses reported.  No signs of bleeding noted; advised patient to seek immediate medical attention if he notices any abnormal bleeding.  Will re-challenge current dose of coumadin 1.25mg on Tuesdays and Thursdays and 2.5mg on all other days of the week.  Patient voiced understanding.  Follow-up in 2 weeks.

## 2018-12-10 ENCOUNTER — PATIENT OUTREACH (OUTPATIENT)
Dept: OTHER | Facility: OTHER | Age: 78
End: 2018-12-10

## 2018-12-10 NOTE — PROGRESS NOTES
"Mr. Wall is doing well. He reports feeling "less cold" over the last few weeks. No questions or concerns. Encouraged more frequent monitoring.    Last 5 Patient Entered Readings                                      Current 30 Day Average: 147/69     Recent Readings 12/8/2018 12/8/2018 12/8/2018 12/8/2018 12/2/2018    SBP (mmHg) 138 146 136 146 155    DBP (mmHg) 82 67 62 68 72    Pulse 50 49 50 51 52          BP above goal, <130/80 mmHg  Consider valsartan dose increase     Hypertension Medications             furosemide (LASIX) 40 MG tablet Take 1 tablet (40 mg total) by mouth once daily.    metoprolol succinate (TOPROL XL) 25 MG 24 hr tablet Take 1 tablet by mouth once daily. Take an additional tablet if blood pressure is greater than 160    valsartan (DIOVAN) 80 MG tablet Take 1 tablet (80 mg total) by mouth 2 (two) times daily.    verapamil (VERELAN) 240 MG C24P Take 1 capsule (240 mg total) by mouth 2 (two) times daily.          "

## 2018-12-11 ENCOUNTER — OFFICE VISIT (OUTPATIENT)
Dept: CARDIOLOGY | Facility: CLINIC | Age: 78
End: 2018-12-11
Payer: MEDICARE

## 2018-12-11 VITALS
WEIGHT: 189.81 LBS | DIASTOLIC BLOOD PRESSURE: 50 MMHG | BODY MASS INDEX: 25.71 KG/M2 | RESPIRATION RATE: 20 BRPM | HEIGHT: 72 IN | HEART RATE: 56 BPM | SYSTOLIC BLOOD PRESSURE: 104 MMHG

## 2018-12-11 DIAGNOSIS — I65.21 STENOSIS OF RIGHT CAROTID ARTERY: ICD-10-CM

## 2018-12-11 DIAGNOSIS — Z86.73 HISTORY OF CVA (CEREBROVASCULAR ACCIDENT): Chronic | ICD-10-CM

## 2018-12-11 DIAGNOSIS — R42 DIZZINESS: Primary | ICD-10-CM

## 2018-12-11 DIAGNOSIS — G47.33 OSA ON CPAP: Chronic | ICD-10-CM

## 2018-12-11 DIAGNOSIS — I10 ESSENTIAL HYPERTENSION: Chronic | ICD-10-CM

## 2018-12-11 DIAGNOSIS — Z79.01 CHRONIC ANTICOAGULATION: Chronic | ICD-10-CM

## 2018-12-11 DIAGNOSIS — E78.2 MIXED HYPERLIPIDEMIA: Chronic | ICD-10-CM

## 2018-12-11 DIAGNOSIS — R00.1 SINUS BRADYCARDIA: ICD-10-CM

## 2018-12-11 PROCEDURE — 1101F PT FALLS ASSESS-DOCD LE1/YR: CPT | Mod: CPTII,HCNC,S$GLB, | Performed by: INTERNAL MEDICINE

## 2018-12-11 PROCEDURE — 99214 OFFICE O/P EST MOD 30 MIN: CPT | Mod: HCNC,S$GLB,, | Performed by: INTERNAL MEDICINE

## 2018-12-11 PROCEDURE — 3078F DIAST BP <80 MM HG: CPT | Mod: CPTII,HCNC,S$GLB, | Performed by: INTERNAL MEDICINE

## 2018-12-11 PROCEDURE — 3074F SYST BP LT 130 MM HG: CPT | Mod: CPTII,HCNC,S$GLB, | Performed by: INTERNAL MEDICINE

## 2018-12-11 PROCEDURE — 99999 PR PBB SHADOW E&M-EST. PATIENT-LVL III: CPT | Mod: PBBFAC,HCNC,, | Performed by: INTERNAL MEDICINE

## 2018-12-11 RX ORDER — LOSARTAN POTASSIUM 25 MG/1
25 TABLET ORAL 2 TIMES DAILY
Qty: 60 TABLET | Refills: 12 | Status: SHIPPED | OUTPATIENT
Start: 2018-12-11 | End: 2018-12-17 | Stop reason: ALTCHOICE

## 2018-12-11 NOTE — PROGRESS NOTES
Subjective:    Patient ID:  Heraclio Wall is a 78 y.o. male who presents for evaluation of discuss medication; Follow-up; Hyperlipidemia; Hypertension; Risk Factor Management For Atherosclerosis; and Carotid Artery Disease      HPI  Pt presents for f/u.  His current medical conditions include HTN, carotid artery disease, hyperlipidemia.  Nonsmoker.  Past history pertinent for following:  H/o  CVA 7/13 and was put on Plavix (was on ASA at time). He had admission 5/14 for splenic infarct and had surgery. Dr. Metz did abdominal angio with report showing no evidence of mesenteric stenosis. SAIRA showed no intracardiac source of emboli. He was d/cd home on asa, plavix and coumadin was started. He is now on coumadin only.  - Stress MPI Jan 2017.  He is enrolled in digital HTN program.   Now here for f/u.  He inquires as to safety of Valsartan since it was recalled.  No chest pain sxs.  No unusual dyspnea.  Digital HTN program reviewed, BP numbers fluctuate but are controlled.  Has dizziness, daily, chronic.   No syncope.  No tia/cva sxs.  No abnl bleeding on coumadin.  Compliant with meds and INR checks.   Lipids controlled on Lescol.  Compliant with CPAP for CHESTER.   Has chronic sinus mannie, usually upper 40's - 50's.      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    Unspecified vitamin D deficiency        Current Outpatient Medications:     arginine 500 mg tablet, Take 1 tablet by mouth 2 (two) times daily., Disp: , Rfl:     ascorbic acid (VITAMIN C)  1000 MG tablet, Take 3 tablets by mouth Daily., Disp: , Rfl:     cholecalciferol, vitamin D3, 1,000 unit capsule, Take 5 capsules by mouth Daily., Disp: , Rfl:     COUMADIN 2.5 mg tablet, Take 1 tablet (2.5 mg total) by mouth Daily. (Patient taking differently: Take by mouth Daily. Take 1/2 tablet on Tuesday and Thursday and 1 tablet on all other days by mouth every evening as directed by the [facility].), Disp: 30 tablet, Rfl: 11    cyanocobalamin, vitamin B-12, 1,000 mcg/15 mL Liqd, Take 1 drop by mouth once daily. , Disp: , Rfl:     dextran 70-hypromellose (ARTIFICIAL TEARS) ophthalmic solution, Place 1 drop into both eyes Use as needed., Disp: , Rfl:     fluticasone (FLONASE) 50 mcg/actuation nasal spray, Use 2 sprays (100 mcg total) by in each nostril once daily, Disp: 16 g, Rfl: 6    fluvastatin XL (LESCOL XL) 80 mg Tb24, Take one tablet by mouth once daily, Disp: 90 tablet, Rfl: 3    furosemide (LASIX) 40 MG tablet, Take 1 tablet (40 mg total) by mouth once daily., Disp: 90 tablet, Rfl: 3    hydrocortisone (CORTEF) 5 MG Tab, Take 2.5 mg by mouth once daily., Disp: , Rfl:     metoprolol succinate (TOPROL XL) 25 MG 24 hr tablet, Take 1 tablet by mouth once daily. Take an additional tablet if blood pressure is greater than 160, Disp: 60 tablet, Rfl: 11    mupirocin (BACTROBAN) 2 % ointment, APPLY ON THE SKIN EVERY DAY, Disp: , Rfl: 2    pantoprazole (PROTONIX) 40 MG tablet, TAKE 1 TABLET BY MOUTH DAILY, Disp: 30 tablet, Rfl: 6    PROGESTERONE MISC, 6.25 mg by Misc.(Non-Drug; Combo Route) route every evening., Disp: , Rfl:     saw palmetto 160 MG capsule, Take 1 capsule by mouth 2 (two) times daily., Disp: , Rfl:     selenium 200 mcg Tab, Take 1 tablet by mouth Daily., Disp: , Rfl:     UNABLE TO FIND, Take 1 tablet by mouth once daily. medication name: T3/T4-SR (TRIIODO-L-LTHYRONINE-LEVOTHYROXINE), Disp: , Rfl:     verapamil (VERELAN) 240 MG C24P, Take 1 capsule (240 mg total) by mouth 2  (two) times daily., Disp: 60 capsule, Rfl: 12      Review of Systems   Constitution: Negative.   HENT: Negative.    Eyes: Negative.    Cardiovascular: Negative.    Respiratory: Negative.    Endocrine: Negative.    Hematologic/Lymphatic: Negative.    Skin: Negative.    Musculoskeletal: Positive for arthritis and joint pain.   Gastrointestinal: Negative.    Genitourinary: Negative.    Neurological: Positive for dizziness.   Psychiatric/Behavioral: Negative.    Allergic/Immunologic: Negative.        BP (!) 104/50 (BP Location: Right arm, Patient Position: Sitting)   Pulse (!) 56   Resp 20   Ht 6' (1.829 m)   Wt 86.1 kg (189 lb 13.1 oz)   BMI 25.74 kg/m²     Wt Readings from Last 3 Encounters:   12/11/18 86.1 kg (189 lb 13.1 oz)   09/27/18 82.8 kg (182 lb 8.7 oz)   09/21/18 82.5 kg (181 lb 14.1 oz)     Temp Readings from Last 3 Encounters:   09/21/18 97.9 °F (36.6 °C) (Tympanic)   08/10/18 97.8 °F (36.6 °C) (Tympanic)   07/24/18 97.2 °F (36.2 °C) (Tympanic)     BP Readings from Last 3 Encounters:   12/11/18 (!) 104/50   09/27/18 120/60   09/21/18 132/64     Pulse Readings from Last 3 Encounters:   12/11/18 (!) 56   09/27/18 (!) 58   09/21/18 (!) 54          Objective:    Physical Exam   Constitutional: He is oriented to person, place, and time. He appears well-developed and well-nourished.   HENT:   Head: Normocephalic.   Neck: Normal range of motion. Neck supple. Normal carotid pulses, no hepatojugular reflux and no JVD present. Carotid bruit is not present. No thyromegaly present.   Cardiovascular: Regular rhythm, S1 normal and S2 normal. Bradycardia present. PMI is not displaced. Exam reveals no S3, no S4, no distant heart sounds, no friction rub, no midsystolic click and no opening snap.   No murmur heard.  Pulses:       Radial pulses are 2+ on the right side, and 2+ on the left side.   Pulmonary/Chest: Effort normal and breath sounds normal. He has no wheezes. He has no rales.   Abdominal: Soft. Bowel sounds  are normal. He exhibits no distension, no abdominal bruit, no ascites and no mass. There is no tenderness.   Musculoskeletal: He exhibits no edema.   Neurological: He is alert and oriented to person, place, and time.   Skin: Skin is warm.   Psychiatric: He has a normal mood and affect. His behavior is normal.   Nursing note and vitals reviewed.      I have reviewed all pertinent labs and cardiac studies.    Lab Results   Component Value Date    INR 3.2 (A) 12/03/2018    INR 2.2 (H) 11/05/2018    INR 2.1 10/08/2018         Chemistry        Component Value Date/Time     02/06/2018 0928    K 4.9 02/06/2018 0928     02/06/2018 0928    CO2 28 02/06/2018 0928    BUN 17 02/06/2018 0928    CREATININE 1.3 02/06/2018 0928    GLU 96 02/06/2018 0928        Component Value Date/Time    CALCIUM 9.8 02/06/2018 0928    ALKPHOS 80 02/06/2018 0928    AST 26 02/06/2018 0928    ALT 22 02/06/2018 0928    BILITOT 1.5 (H) 02/06/2018 0928    ESTGFRAFRICA 60.0 02/06/2018 0928    EGFRNONAA 51.9 (A) 02/06/2018 0928        Lab Results   Component Value Date    WBC 5.92 05/17/2018    HGB 13.3 (L) 05/17/2018    HCT 41.1 05/17/2018    MCV 99 (H) 05/17/2018     05/17/2018     Lab Results   Component Value Date    HGBA1C 4.8 02/06/2018     Lab Results   Component Value Date    CHOL 162 02/06/2018    CHOL 152 11/30/2017    CHOL 141 11/02/2016     Lab Results   Component Value Date    HDL 42 02/06/2018    HDL 40 11/30/2017    HDL 37 (L) 11/02/2016     Lab Results   Component Value Date    LDLCALC 88.0 02/06/2018    LDLCALC 77.8 11/30/2017    LDLCALC 74.4 11/02/2016     Lab Results   Component Value Date    TRIG 160 (H) 02/06/2018    TRIG 171 (H) 11/30/2017    TRIG 148 11/02/2016     Lab Results   Component Value Date    CHOLHDL 25.9 02/06/2018    CHOLHDL 26.3 11/30/2017    CHOLHDL 26.2 11/02/2016           Assessment:       1. Dizziness    2. CHESTER on CPAP    3. Essential hypertension    4. Mixed hyperlipidemia    5. Chronic  anticoagulation    6. History of CVA (cerebrovascular accident)    7. Stenosis of right carotid artery    8. Sinus bradycardia         Plan:             Stop Valsartan due to med recall.  Start Losartan 25 mg bid.  Indications, side effects discussed.  Chronic dizziness, stable.   Probably not due to sinus bradycardia which seems stable although he is on high dose Verapamil and as he gets older, we will need to taper this down a bit.  Stable other CV conditions.  Continue coumadin for CVA protection.  Continue coumadin clinic f/u.  Cardiac diet  Exercise daily  Continue digital HTN program.  Continue CPAP for CHESTER.   F/u 6 months, sooner if needed.

## 2018-12-17 ENCOUNTER — ANTI-COAG VISIT (OUTPATIENT)
Dept: CARDIOLOGY | Facility: CLINIC | Age: 78
End: 2018-12-17
Payer: MEDICARE

## 2018-12-17 ENCOUNTER — PATIENT OUTREACH (OUTPATIENT)
Dept: OTHER | Facility: OTHER | Age: 78
End: 2018-12-17

## 2018-12-17 DIAGNOSIS — I10 ESSENTIAL HYPERTENSION: Primary | Chronic | ICD-10-CM

## 2018-12-17 DIAGNOSIS — Z79.01 LONG TERM (CURRENT) USE OF ANTICOAGULANTS: Primary | ICD-10-CM

## 2018-12-17 LAB — INR PPP: 3.6 (ref 2–3)

## 2018-12-17 PROCEDURE — 99211 OFF/OP EST MAY X REQ PHY/QHP: CPT | Mod: 25,HCNC,S$GLB, | Performed by: INTERNAL MEDICINE

## 2018-12-17 PROCEDURE — 85610 PROTHROMBIN TIME: CPT | Mod: QW,HCNC,S$GLB, | Performed by: INTERNAL MEDICINE

## 2018-12-17 RX ORDER — VALSARTAN 80 MG
80 TABLET ORAL 2 TIMES DAILY
Qty: 180 TABLET | Refills: 1 | Status: SHIPPED | OUTPATIENT
Start: 2018-12-17 | End: 2018-12-26 | Stop reason: SDUPTHER

## 2018-12-17 NOTE — PROGRESS NOTES
Patient's INR continues to be supra-therapeutic at 3.6.  Mr. Wall states he has started drinking cranberry juice; agrees to maintain a diet of 1-2 small bottles per week.  No signs of bleeding noted; advised patient to seek immediate medical attention if he notices any abnormal bleeding.  Instructions given for patient to decrease dose of coumadin to 1.25mg on Mondays, Wednesdays, Fridays; and 2.5mg on all other days of the week.  Patient voiced understanding.  Follow-up in 2 weeks.

## 2018-12-17 NOTE — PROGRESS NOTES
"Mr. Wall reports "feeling in a funk" and "dizzier than normal" since starting losartan. His valsartan product was part of the recall. I spoke to the [facility] and they have brand name valsartan, which is unaffected by the recall. Reviewed risk vs benefit of contiuning valsartan and patient verbalizes understanding. He is amenable to restarting valsartan until new product is available.     Last 5 Patient Entered Readings                                      Current 30 Day Average: 147/70     Recent Readings 12/17/2018 12/17/2018 12/17/2018 12/15/2018 12/15/2018    SBP (mmHg) 146 143 147 146 144    DBP (mmHg) 66 70 68 67 66    Pulse 48 49 47 52 52        New valsartan brand name RX sent to Pharmacy- await possible PA  Continue monitoring    Hypertension Medications             DIOVAN 80 mg tablet Take 1 tablet (80 mg total) by mouth 2 (two) times daily.    furosemide (LASIX) 40 MG tablet Take 1 tablet (40 mg total) by mouth once daily.    metoprolol succinate (TOPROL XL) 25 MG 24 hr tablet Take 1 tablet by mouth once daily. Take an additional tablet if blood pressure is greater than 160    verapamil (VERELAN) 240 MG C24P Take 1 capsule (240 mg total) by mouth 2 (two) times daily.          "

## 2018-12-19 ENCOUNTER — PATIENT MESSAGE (OUTPATIENT)
Dept: ADMINISTRATIVE | Facility: OTHER | Age: 78
End: 2018-12-19

## 2018-12-24 ENCOUNTER — TELEPHONE (OUTPATIENT)
Dept: INTERNAL MEDICINE | Facility: CLINIC | Age: 78
End: 2018-12-24

## 2018-12-24 NOTE — TELEPHONE ENCOUNTER
----- Message from Courtney Ruiz sent at 12/24/2018 11:03 AM CST -----  Contact: wife  She's calling in regards to pt being worked into schedule for hosp f/u pls call pt back at [phone] (home)

## 2018-12-24 NOTE — TELEPHONE ENCOUNTER
----- Message from Maria C Triana sent at 12/24/2018  8:19 AM CST -----  Contact: pt spouse   Pt was released from hospital, needing follow up 5-7 days advised of next available spouse stated he need appointment right away .... [phone] (home)

## 2018-12-26 ENCOUNTER — PATIENT OUTREACH (OUTPATIENT)
Dept: OTHER | Facility: OTHER | Age: 78
End: 2018-12-26

## 2018-12-26 DIAGNOSIS — J06.9 ACUTE URI: ICD-10-CM

## 2018-12-26 DIAGNOSIS — I10 ESSENTIAL HYPERTENSION: Chronic | ICD-10-CM

## 2018-12-26 RX ORDER — FLUTICASONE PROPIONATE 50 MCG
2 SPRAY, SUSPENSION (ML) NASAL DAILY
Qty: 48 G | Refills: 3 | Status: SHIPPED | OUTPATIENT
Start: 2018-12-26 | End: 2020-06-02 | Stop reason: SDUPTHER

## 2018-12-26 RX ORDER — FUROSEMIDE 40 MG/1
40 TABLET ORAL DAILY
Qty: 90 TABLET | Refills: 3 | Status: SHIPPED | OUTPATIENT
Start: 2018-12-26 | End: 2020-01-06

## 2018-12-26 RX ORDER — PANTOPRAZOLE SODIUM 40 MG/1
TABLET, DELAYED RELEASE ORAL
Qty: 90 TABLET | Refills: 3 | Status: SHIPPED | OUTPATIENT
Start: 2018-12-26 | End: 2020-02-21

## 2018-12-26 RX ORDER — METOPROLOL SUCCINATE 25 MG/1
TABLET, EXTENDED RELEASE ORAL
Qty: 180 TABLET | Refills: 3 | Status: SHIPPED | OUTPATIENT
Start: 2018-12-26 | End: 2020-01-20

## 2018-12-26 RX ORDER — VERAPAMIL HYDROCHLORIDE 240 MG/1
240 CAPSULE, EXTENDED RELEASE ORAL 2 TIMES DAILY
Qty: 180 CAPSULE | Refills: 3 | Status: SHIPPED | OUTPATIENT
Start: 2018-12-26 | End: 2020-02-13 | Stop reason: SDUPTHER

## 2018-12-26 RX ORDER — VALSARTAN 80 MG
80 TABLET ORAL 2 TIMES DAILY
Qty: 180 TABLET | Refills: 1 | Status: SHIPPED | OUTPATIENT
Start: 2018-12-26 | End: 2019-08-06

## 2018-12-26 NOTE — TELEPHONE ENCOUNTER
Called and spoke with patient's wife notifying her Diovan brand PA approved resulting in a copayment of $66.37.    Ms. Welsh requested refills on Coumadin, Flonase, Lasix, Toprol XL, Protonix, Verapamil, along with Diovan.    She would like medication shipped to them.  Shipping address verified. Will notify Ms. Welsh once all medication is shipped.    PA Information:  [facility]  [phone]  PA Case ID # [id number]  PA Approval Dates: 12/26/18-12/20/2020  Approved for Diovan 80mg # 180 per 90 days    Thanks,   Amara Swenson CPhT, B.A  Patient Care Advocate   [facility]  Phone: [phone] Ext [phone]  Fax: [phone]

## 2018-12-26 NOTE — PROGRESS NOTES
Mr. Rust is recovering after ED visit for aspiration pneumonia. He will follow up with Dr. Espinoza on 12/28. Called pharmacy to check on diovan PA, which is still pending. He will check BP today. Has been out of valsartan x 2 days.     Last 5 Patient Entered Readings                                      Current 30 Day Average: 145/70     Recent Readings 12/20/2018 12/20/2018 12/20/2018 12/20/2018 12/18/2018    SBP (mmHg) 142 139 145 146 142    DBP (mmHg) 73 68 70 69 67    Pulse 48 47 47 48 47          BP close to goal <140/90 mmHg  Await PA resolution and additional readings    Hypertension Medications             DIOVAN 80 mg tablet Take 1 tablet (80 mg total) by mouth 2 (two) times daily.    furosemide (LASIX) 40 MG tablet Take 1 tablet (40 mg total) by mouth once daily.    metoprolol succinate (TOPROL XL) 25 MG 24 hr tablet Take 1 tablet by mouth once daily. Take an additional tablet if blood pressure is greater than 160    verapamil (VERELAN) 240 MG C24P Take 1 capsule (240 mg total) by mouth 2 (two) times daily.

## 2018-12-27 ENCOUNTER — PATIENT OUTREACH (OUTPATIENT)
Dept: OTHER | Facility: OTHER | Age: 78
End: 2018-12-27

## 2018-12-27 NOTE — PROGRESS NOTES
Last 5 Patient Entered Readings                                      Current 30 Day Average: 144/69     Recent Readings 12/26/2018 12/26/2018 12/26/2018 12/26/2018 12/20/2018    SBP (mmHg) 137 140 138 144 142    DBP (mmHg) 64 65 65 69 73    Pulse 57 58 58 58 48          Digital Medicine: Health  Follow Up    Lifestyle Modifications:    1.Dietary Modifications (Sodium intake <2,000mg/day, food labels, dining out): Deferred    2.Physical Activity: Patient will start going to the gym again after the first of the year.     3.Medication Therapy: Patient has been compliant with the medication regimen.    4.Patient has the following medication side effects/concerns:   (Frequency/Alleviating factors/Precipitating factors, etc.)     Briefly spoke with patients wife and she stated that he is doing well. His memory is good some days and other days, she can tell that he is struggling to remember things (it is worse when he is tired). Other then that, he is doing well.   Patients PharmD, LEOPOLDO Allen, spoke with patient yesterday about his medications.     Follow up with Mr. Heraclio Wall completed. No further questions or concerns. Will continue to follow up to achieve health goals.

## 2018-12-28 ENCOUNTER — OFFICE VISIT (OUTPATIENT)
Dept: INTERNAL MEDICINE | Facility: CLINIC | Age: 78
End: 2018-12-28
Payer: MEDICARE

## 2018-12-28 VITALS
TEMPERATURE: 99 F | SYSTOLIC BLOOD PRESSURE: 120 MMHG | OXYGEN SATURATION: 98 % | DIASTOLIC BLOOD PRESSURE: 60 MMHG | HEART RATE: 62 BPM | HEIGHT: 72 IN | WEIGHT: 188.25 LBS | BODY MASS INDEX: 25.5 KG/M2

## 2018-12-28 DIAGNOSIS — N17.9 AKI (ACUTE KIDNEY INJURY): ICD-10-CM

## 2018-12-28 DIAGNOSIS — J69.0 ASPIRATION PNEUMONIA OF BOTH LOWER LOBES DUE TO VOMIT: ICD-10-CM

## 2018-12-28 DIAGNOSIS — E86.0 DEHYDRATION: ICD-10-CM

## 2018-12-28 DIAGNOSIS — Z09 HOSPITAL DISCHARGE FOLLOW-UP: Primary | ICD-10-CM

## 2018-12-28 DIAGNOSIS — K52.9 GASTROENTERITIS: ICD-10-CM

## 2018-12-28 PROCEDURE — 3074F SYST BP LT 130 MM HG: CPT | Mod: CPTII,HCNC,S$GLB, | Performed by: FAMILY MEDICINE

## 2018-12-28 PROCEDURE — 3078F DIAST BP <80 MM HG: CPT | Mod: CPTII,HCNC,S$GLB, | Performed by: FAMILY MEDICINE

## 2018-12-28 PROCEDURE — 1101F PT FALLS ASSESS-DOCD LE1/YR: CPT | Mod: CPTII,HCNC,S$GLB, | Performed by: FAMILY MEDICINE

## 2018-12-28 PROCEDURE — 99214 OFFICE O/P EST MOD 30 MIN: CPT | Mod: HCNC,S$GLB,, | Performed by: FAMILY MEDICINE

## 2018-12-28 PROCEDURE — 99999 PR PBB SHADOW E&M-EST. PATIENT-LVL IV: CPT | Mod: PBBFAC,HCNC,, | Performed by: FAMILY MEDICINE

## 2018-12-28 NOTE — PROGRESS NOTES
Subjective:   Patient ID: Heraclio Wall is a 78 y.o. male.  Chief Complaint:  Follow-up (Hospital)      PCP Dr. Watson.    Hospital follow-up for acute gastroenteritis with nausea / vomiting and resultant aspiration pneumonia and dehydration with acute kidney injury.    Admitted 12/20/2018.  Discharge 12/21/2018.    Treated IV fluids and antibiotics.  CBC with normal white count. Creatinine 1.76.  Blood cultures no growth.  Initial chest x-ray negative.  CT with changes bilateral lower lobes of lung consistent with aspiration pneumonia.  Discharged home on 10 day course of oral Levaquin. Completed therapy without difficulty.  Today without any nausea / vomiting.  No cough.  No fever.  No shortness of breath.    Patient reports in usual state of health.    No new complaints or concerns today.      Review of Systems   Constitutional: Negative for activity change, fatigue and unexpected weight change.   HENT: Negative for hearing loss, rhinorrhea and trouble swallowing.    Eyes: Negative for discharge and visual disturbance.   Respiratory: Negative for cough, chest tightness, shortness of breath and wheezing.    Cardiovascular: Negative for chest pain, palpitations and leg swelling.   Gastrointestinal: Negative for abdominal pain, blood in stool, constipation, diarrhea, nausea and vomiting.   Endocrine: Negative for polydipsia and polyuria.   Genitourinary: Negative for difficulty urinating, hematuria and urgency.   Musculoskeletal: Positive for arthralgias. Negative for joint swelling, myalgias and neck pain.   Skin: Negative for rash.   Neurological: Negative for dizziness, syncope, weakness, light-headedness and headaches.   Psychiatric/Behavioral: Negative for confusion, dysphoric mood and sleep disturbance.     Objective:   /60 (BP Location: Right arm, Patient Position: Sitting, BP Method: Large (Manual))   Pulse 62   Temp 98.7 °F (37.1 °C) (Oral)   Ht 6' (1.829 m)   Wt 85.4 kg (188 lb 4.4 oz)    SpO2 98%   BMI 25.53 kg/m²     Physical Exam   Constitutional: He is oriented to person, place, and time. Vital signs are normal. He appears well-developed and well-nourished.   HENT:   Mouth/Throat: Oropharynx is clear and moist and mucous membranes are normal.   Neck: No JVD present. No thyroid mass and no thyromegaly present.   Cardiovascular: Normal rate, regular rhythm and normal heart sounds. Exam reveals no gallop and no friction rub.   No murmur heard.  Pulses:       Radial pulses are 2+ on the right side, and 2+ on the left side.   Pulmonary/Chest: Effort normal and breath sounds normal. He has no wheezes. He has no rhonchi. He has no rales.   Abdominal: Soft. He exhibits no distension. There is no tenderness. There is no rebound, no guarding and no CVA tenderness.   Musculoskeletal: He exhibits no edema.   Lymphadenopathy:     He has no cervical adenopathy.   Neurological: He is alert and oriented to person, place, and time.   Skin: Skin is warm and dry. No rash noted.   Psychiatric: He has a normal mood and affect.     Assessment:       ICD-10-CM ICD-9-CM   1. Hospital discharge follow-up Z09 V67.59   2. Gastroenteritis K52.9 558.9   3. Aspiration pneumonia of both lower lobes due to vomit J69.0 507.0   4. Dehydration E86.0 276.51   5. JESSICA (acute kidney injury) N17.9 584.9     Plan:   Hospital discharge follow-up  Gastroenteritis  Resolved.  No additional treatment evaluation need    Aspiration pneumonia of both lower lobes due to vomit  -     CT Chest Without Contrast; Future; Expected date: 12/28/2018  Clinically improved.  Exam stable.    Repeat CT 4-6 weeks.      Dehydration  JESSICA (acute kidney injury)  -     Basic metabolic panel; Future; Expected date: 12/28/2018  Clinically improved.  Adequately hydrated.  Asymptomatic.    Repeat BMP.    If elevated creatinine persist, will need additional evaluation.    Follow up Dr. Watson  in February as scheduled.

## 2019-01-02 ENCOUNTER — ANTI-COAG VISIT (OUTPATIENT)
Dept: CARDIOLOGY | Facility: CLINIC | Age: 79
End: 2019-01-02
Payer: MEDICARE

## 2019-01-02 DIAGNOSIS — Z79.01 LONG TERM (CURRENT) USE OF ANTICOAGULANTS: Primary | ICD-10-CM

## 2019-01-02 LAB — INR PPP: 1.8 (ref 2–3)

## 2019-01-02 PROCEDURE — 99211 PR OFFICE/OUTPT VISIT, EST, LEVL I: ICD-10-PCS | Mod: 25,HCNC,S$GLB, | Performed by: INTERNAL MEDICINE

## 2019-01-02 PROCEDURE — 99211 OFF/OP EST MAY X REQ PHY/QHP: CPT | Mod: 25,HCNC,S$GLB, | Performed by: INTERNAL MEDICINE

## 2019-01-02 PROCEDURE — 85610 PROTHROMBIN TIME: CPT | Mod: QW,HCNC,S$GLB, | Performed by: NUCLEAR MEDICINE

## 2019-01-02 PROCEDURE — 85610 POCT INR: ICD-10-PCS | Mod: QW,HCNC,S$GLB, | Performed by: NUCLEAR MEDICINE

## 2019-01-02 NOTE — PROGRESS NOTES
Patient's INR is sub-therapeutic at 1.8.  No significant changes reported - Mr. Wall was recently diagnosed with pneumonia: levofloxacin x 7 days started 12/21- completed.  No abnormal pain/swelling, SOB, headaches, numbness, or weakness noted.  Instructions given for patient to take coumadin 2.5mg on today; then resume current dose of 1.25mg on Mondays, Wednesdays, Fridays and 2.5mg on all other days of the week.  Patient voiced understanding.  Follow-up on 1/21/19.

## 2019-01-18 ENCOUNTER — OFFICE VISIT (OUTPATIENT)
Dept: PULMONOLOGY | Facility: CLINIC | Age: 79
End: 2019-01-18
Payer: MEDICARE

## 2019-01-18 VITALS
HEIGHT: 72 IN | BODY MASS INDEX: 25.65 KG/M2 | WEIGHT: 189.38 LBS | RESPIRATION RATE: 17 BRPM | DIASTOLIC BLOOD PRESSURE: 70 MMHG | HEART RATE: 54 BPM | SYSTOLIC BLOOD PRESSURE: 120 MMHG

## 2019-01-18 DIAGNOSIS — G47.33 OSA ON CPAP: Primary | Chronic | ICD-10-CM

## 2019-01-18 DIAGNOSIS — Z87.01 HISTORY OF RECENT PNEUMONIA: ICD-10-CM

## 2019-01-18 PROCEDURE — 3078F PR MOST RECENT DIASTOLIC BLOOD PRESSURE < 80 MM HG: ICD-10-PCS | Mod: CPTII,HCNC,S$GLB, | Performed by: INTERNAL MEDICINE

## 2019-01-18 PROCEDURE — 99214 OFFICE O/P EST MOD 30 MIN: CPT | Mod: HCNC,S$GLB,, | Performed by: INTERNAL MEDICINE

## 2019-01-18 PROCEDURE — 3078F DIAST BP <80 MM HG: CPT | Mod: CPTII,HCNC,S$GLB, | Performed by: INTERNAL MEDICINE

## 2019-01-18 PROCEDURE — 99214 PR OFFICE/OUTPT VISIT, EST, LEVL IV, 30-39 MIN: ICD-10-PCS | Mod: HCNC,S$GLB,, | Performed by: INTERNAL MEDICINE

## 2019-01-18 PROCEDURE — 3074F SYST BP LT 130 MM HG: CPT | Mod: CPTII,HCNC,S$GLB, | Performed by: INTERNAL MEDICINE

## 2019-01-18 PROCEDURE — 1101F PT FALLS ASSESS-DOCD LE1/YR: CPT | Mod: CPTII,HCNC,S$GLB, | Performed by: INTERNAL MEDICINE

## 2019-01-18 PROCEDURE — 3074F PR MOST RECENT SYSTOLIC BLOOD PRESSURE < 130 MM HG: ICD-10-PCS | Mod: CPTII,HCNC,S$GLB, | Performed by: INTERNAL MEDICINE

## 2019-01-18 PROCEDURE — 99999 PR PBB SHADOW E&M-EST. PATIENT-LVL III: ICD-10-PCS | Mod: PBBFAC,HCNC,, | Performed by: INTERNAL MEDICINE

## 2019-01-18 PROCEDURE — 99999 PR PBB SHADOW E&M-EST. PATIENT-LVL III: CPT | Mod: PBBFAC,HCNC,, | Performed by: INTERNAL MEDICINE

## 2019-01-18 PROCEDURE — 1101F PR PT FALLS ASSESS DOC 0-1 FALLS W/OUT INJ PAST YR: ICD-10-PCS | Mod: CPTII,HCNC,S$GLB, | Performed by: INTERNAL MEDICINE

## 2019-01-18 NOTE — PROGRESS NOTES
Subjective:       Heraclio Wall is a 78 y.o. male  is a follow-up appointment.    Recently at [facility] with pneumonia, possible aspiration  Follow up imaging has been requested  Hx of throat dilation before  I last saw him 01/25/2018.  CHESTER on CPAP. CPAP 9.cm  [place] score 5.  Machine not functioning well,   Hx of stroke, on coumadin  He is getting good benefit from the CPAP machine  The download shows he is using it greater than 4 hours 93.3% of the time.  Patient has used the device every night for the last 30 days.  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  He does not have any pertinent problems on file.  He  has a past surgical history that includes Circumcision, primary (1938); Tonsillectomy (1942); Adenoidectomy (1942); Cholecystectomy (2/2015); Small intestine surgery (5/4/2014); Eye surgery (Right, 02/15/2012); and Eye surgery (Left, 03/29/2012).  His family history includes Aortic aneurysm in his father; Cancer (age of onset: 64) in his sister; Cataracts in his mother; Glaucoma in his mother; Hearing loss in his sister; Heart disease in his mother; Hyperlipidemia in his mother; Hypertension in his father and mother; Peripheral vascular disease in his father; Stroke in his mother and sister.  He  reports that  has never smoked. he has never used smokeless tobacco. He reports that he does not drink alcohol or use  drugs.  He has a current medication list which includes the following prescription(s): arginine, ascorbic acid (vitamin c), cholecalciferol (vitamin d3), coumadin, cyanocobalamin (vitamin b-12), dextran 70-hypromellose, diovan, fluticasone, fluvastatin xl, furosemide, hydrocortisone, metoprolol succinate, mupirocin, pantoprazole, progesterone, saw palmetto, selenium, UNABLE TO FIND, and verapamil.  Current Outpatient Medications on File Prior to Visit   Medication Sig Dispense Refill    arginine 500 mg tablet Take 1 tablet by mouth 2 (two) times daily.      ascorbic acid (VITAMIN C) 1000 MG tablet Take 3 tablets by mouth Daily.      cholecalciferol, vitamin D3, 1,000 unit capsule Take 5 capsules by mouth Daily.      COUMADIN 2.5 mg tablet Take 1 tablet (2.5 mg total) by mouth Daily. (Patient taking differently: Take by mouth Daily. Take 1/2 tablet by mouth on Mondays, Wednesdays and Fridays; and 1 tablet on all other days by mouth every evening as directed by the [facility].) 30 tablet 11    cyanocobalamin, vitamin B-12, 1,000 mcg/15 mL Liqd Take 1 drop by mouth once daily.       dextran 70-hypromellose (ARTIFICIAL TEARS) ophthalmic solution Place 1 drop into both eyes Use as needed.      DIOVAN 80 mg tablet Take 1 tablet (80 mg total) by mouth 2 (two) times daily. 180 tablet 1    fluticasone (FLONASE) 50 mcg/actuation nasal spray Use 2 sprays (100 mcg total) by in each nostril once daily 48 g 3    fluvastatin XL (LESCOL XL) 80 mg Tb24 Take one tablet by mouth once daily 90 tablet 3    furosemide (LASIX) 40 MG tablet Take 1 tablet (40 mg total) by mouth once daily. 90 tablet 3    hydrocortisone (CORTEF) 5 MG Tab Take 2.5 mg by mouth once daily.      metoprolol succinate (TOPROL XL) 25 MG 24 hr tablet Take 1 tablet by mouth once daily. Take an additional tablet if blood pressure is greater than 160 180 tablet 3    mupirocin (BACTROBAN) 2 % ointment APPLY ON THE SKIN EVERY DAY  2    pantoprazole  (PROTONIX) 40 MG tablet TAKE 1 TABLET BY MOUTH DAILY 90 tablet 3    PROGESTERONE MISC 6.25 mg by Misc.(Non-Drug; Combo Route) route every evening.      saw palmetto 160 MG capsule Take 1 capsule by mouth 2 (two) times daily.      selenium 200 mcg Tab Take 1 tablet by mouth Daily.      UNABLE TO FIND Take 1 tablet by mouth once daily. medication name: T3/T4-SR (TRIIODO-L-LTHYRONINE-LEVOTHYROXINE)      verapamil (VERELAN) 240 MG C24P Take 1 capsule (240 mg total) by mouth 2 (two) times daily. 180 capsule 3     No current facility-administered medications on file prior to visit.      He is allergic to milk; olive extract; tea tree; apple; benicar [olmesartan]; cardizem [diltiazem hcl]; clonidine; clonidine hcl; clopidogrel; dicyclomine; hydralazine analogues; lipitor [atorvastatin]; lopressor [metoprolol tartrate]; norvasc [amlodipine]; pineapple; and bactrim [sulfamethoxazole-trimethoprim]..    Review of Systems  A comprehensive review of systems was negative.      Objective:        /70   Pulse (!) 54   Resp 17   Ht 6' (1.829 m)   Wt 85.9 kg (189 lb 6 oz)   BMI 25.68 kg/m²      Physical Exam   Constitutional: He is oriented to person, place, and time. He appears well-developed and well-nourished.   HENT:   Head: Normocephalic and atraumatic.   Mouth/Throat: Oropharynx is clear and moist.   Eyes: Conjunctivae and EOM are normal. Pupils are equal, round, and reactive to light.   Neck: Normal range of motion. Neck supple.   Cardiovascular: Normal rate, regular rhythm and normal heart sounds.   Pulmonary/Chest: Effort normal and breath sounds normal. No respiratory distress.   Abdominal: Soft. Bowel sounds are normal.   Musculoskeletal: Normal range of motion.   Neurological: He is alert and oriented to person, place, and time.   Skin: Skin is warm and dry. Capillary refill takes 2 to 3 seconds.   Psychiatric: He has a normal mood and affect.   Nursing note and vitals reviewed.             DOWNLOAD  12/19/2018 - 1/17/2019  YOB: 1940  Mask:  Compliance Summary  12/19/2018 - 1/17/2019 (30 days)  Days with Device Usage 29 days  Days without Device Usage 1 day  Percent Days with Device Usage 96.7%  Cumulative Usage 9 days 14 mins. 44 secs.  Maximum Usage (1 Day) 10 hrs. 28 mins. 15 secs.  Average Usage (All Days) 7 hrs. 12 mins. 29 secs.  Average Usage (Days Used) 7 hrs. 27 mins. 24 secs.  Minimum Usage (1 Day) 3 hrs. 11 mins. 26 secs.  Percent of Days with Usage >= 4 Hours 93.3%  Percent of Days with Usage < 4 Hours 6.7%    Assessment:      Problem List Items Addressed This Visit     CHESTER on CPAP - Primary (Chronic)    Relevant Orders    CPAP FOR HOME USE      Other Visit Diagnoses     History of recent pneumonia            Complainant and benefits from PAP   New machine ordered  Follow up Chest Ct ordered by Dr Espinoza    Plan:      Follow-up in about 1 year (around 1/18/2020), or REquest New machine , current is malfunctioning, for Download CPAP/ APAP/ TRIOLOG/ BIPA, CPAP supplies.    This note was prepared using voice recognition system and is likely to have sound alike errors that may have been overlooked even after proof reading.  Please call me with any questions    Discussed diagnosis, its evaluation, treatment and usual course. All questions answered.    Thank you for the courtesy of participating in the care of this patient    Ion Dumont MD

## 2019-01-21 ENCOUNTER — ANTI-COAG VISIT (OUTPATIENT)
Dept: CARDIOLOGY | Facility: CLINIC | Age: 79
End: 2019-01-21
Payer: MEDICARE

## 2019-01-21 DIAGNOSIS — Z79.01 LONG TERM (CURRENT) USE OF ANTICOAGULANTS: Primary | ICD-10-CM

## 2019-01-21 LAB — INR PPP: 3.2 (ref 2–3)

## 2019-01-21 PROCEDURE — 85610 POCT INR: ICD-10-PCS | Mod: QW,HCNC,S$GLB, | Performed by: INTERNAL MEDICINE

## 2019-01-21 PROCEDURE — 85610 PROTHROMBIN TIME: CPT | Mod: QW,HCNC,S$GLB, | Performed by: INTERNAL MEDICINE

## 2019-01-21 NOTE — PROGRESS NOTES
Patient's INR is slightly elevated at 3.2.  Reports no recent changes.  No signs of any abnormal bleeding at present.  Instructed to eat a small portion of a dark leafy vegetable today.  Maintain current dose of Warfarin 1.25 mg every Monday, Wednesday, and Friday; and 2.5 mg on all other days per dosing calendar given - will rechallenge.  Recheck in 3 weeks.

## 2019-01-28 ENCOUNTER — PATIENT OUTREACH (OUTPATIENT)
Dept: OTHER | Facility: OTHER | Age: 79
End: 2019-01-28

## 2019-01-28 NOTE — PROGRESS NOTES
Last 5 Patient Entered Readings                                      Current 30 Day Average: 137/65     Recent Readings 1/25/2019 1/25/2019 1/25/2019 1/25/2019 1/25/2019    SBP (mmHg) 133 134 138 141 141    DBP (mmHg) 62 64 62 68 69    Pulse 50 50 48 49 48          Digital Medicine: Health  Follow Up    Lifestyle Modifications:    1.Dietary Modifications (Sodium intake <2,000mg/day, food labels, dining out): Patient continues monitoring his sodium intake. He does not add salt to meals and he and his wife read the food labels. He denies any major changes that would cause an increase in salt intake.     2.Physical Activity: Patient stated that he has not been getting out to gym due to the weather. He is hopeful to start again next week is possible. I believe once he gets back in the gym, he will start to see a decrease in his BP. Will continue to work on setting a goal for this.     3.Medication Therapy: Patient has been compliant with the medication regimen. Patient is doing well on his current regimen. He denies symptoms/side effects.  He may need assistance with his Verapamil refill. They were told that there may be a shortage, etc so I just encouraged them to call his PharmD, LEOPOLDO Allen, or myself and we will help to work on that if need be.     4.Patient has the following medication side effects/concerns:   (Frequency/Alleviating factors/Precipitating factors, etc.)     Follow up with Mr. Heraclio Wall completed. No further questions or concerns. Will continue to follow up to achieve health goals.

## 2019-02-01 NOTE — PROGRESS NOTES
Subjective:      Patient ID: Heraclio Wall is a 78 y.o. male.    Chief Complaint: Follow-up      HPI  Here for follow up of medical problems.  No dental issues.  BPs good range at home.  On coumadin for past stroke.  No b/b in stool  Energy ok.  No f/c/sw/cough.  No cp/sob/palp.  BMs normal.  Urine normal, no nocturia.  12/20/18 [facility] for bilat lower lobe aspiration pneumonia- AFTER vomiting illness.  CT today scheduled for f/u.      7/18 BMD:  FINDINGS:  The L1 to L4 vertebral bone mineral density is equal to 1.458 g/cm squared with a T score of 2.0.    The left femoral neck bone mineral density is equal to 0.831 g/cm squared with a T score of -1.8.    There is a 8.8% risk of a major osteoporotic fracture and a 3.3% risk of hip fracture in the next 10 years (FRAX).      Impression       Osteopenia           Updated/ annual due 5/19:  HM:  10/18 fluvax, 12/15 [id number], 12/16 [id number], 2/15 TDaP, 3/12 zostavax, 7/18 BMD rep 2y, 7/12 Cscope no repeat, 5/18 FIT neg.     Review of Systems   Constitutional: Negative for chills, diaphoresis, fatigue and fever.   Respiratory: Negative for cough, chest tightness and shortness of breath.    Cardiovascular: Negative for chest pain, palpitations and leg swelling.   Gastrointestinal: Negative for blood in stool, constipation, diarrhea, nausea and vomiting.   Genitourinary: Negative for difficulty urinating and frequency.   Musculoskeletal: Negative for arthralgias.         Objective:   /64 (BP Location: Right arm, Patient Position: Sitting)   Pulse (!) 54   Temp 97.6 °F (36.4 °C) (Tympanic)   Wt 86.1 kg (189 lb 13.1 oz)   SpO2 99%   BMI 25.74 kg/m²     Physical Exam   Constitutional: He is oriented to person, place, and time. He appears well-developed and well-nourished.   HENT:   Mouth/Throat: Oropharynx is clear and moist.   Neck: Normal range of motion. Neck supple.   Cardiovascular: Normal rate, regular rhythm and intact distal pulses. Exam reveals no  gallop and no friction rub.   No murmur heard.  Pulmonary/Chest: Effort normal and breath sounds normal. He has no wheezes. He has no rales.   Abdominal: Soft. Bowel sounds are normal. He exhibits no mass. There is no tenderness.   Musculoskeletal: He exhibits no edema.   Lymphadenopathy:     He has no cervical adenopathy.   Neurological: He is alert and oriented to person, place, and time.   Psychiatric: He has a normal mood and affect.           Assessment:       1. Acquired hypothyroidism    2. Chronic anticoagulation    3. Atherosclerosis of aorta    4. Chronic kidney disease, stage 3    5. Essential hypertension    6. History of CVA (cerebrovascular accident)    7. Right-sided carotid artery disease, unspecified type    8. CHESTER on CPAP    9. Age-related osteoporosis without current pathological fracture    10. Preventive measure          Plan:     Acquired hypothyroidism- Clinically stable, continue present treatment.    Atherosclerosis of aorta- cont statin/ anticoag.    Chronic kidney disease, stage 3    Essential hypertension- stable, HTN dig program.    History of CVA (cerebrovascular accident)Chronic anticoagulation, on coumadin.    Right-sided carotid artery disease, unspecified type- cont statin and coumadin.    CHESTER on CPAP    Age-related osteoporosis without current pathological fracture- borderline?  Needs treatment?  -     Ambulatory consult to Rheumatology    Preventive measure- due in 6mo.  Discussed pt needs to get Shingrix vaccination at pharmacy.  -     CBC auto differential; Future; Expected date: 02/15/2019  -     Comprehensive metabolic panel; Future; Expected date: 02/15/2019  -     Lipid panel; Future; Expected date: 02/15/2019  -     TSH; Future; Expected date: 02/15/2019  -     Vitamin D; Future

## 2019-02-11 ENCOUNTER — ANTI-COAG VISIT (OUTPATIENT)
Dept: CARDIOLOGY | Facility: CLINIC | Age: 79
End: 2019-02-11
Payer: MEDICARE

## 2019-02-11 DIAGNOSIS — Z79.01 LONG TERM (CURRENT) USE OF ANTICOAGULANTS: Primary | ICD-10-CM

## 2019-02-11 LAB — INR PPP: 2.5 (ref 2–3)

## 2019-02-11 PROCEDURE — 85610 PROTHROMBIN TIME: CPT | Mod: QW,HCNC,S$GLB, | Performed by: INTERNAL MEDICINE

## 2019-02-11 PROCEDURE — 85610 POCT INR: ICD-10-PCS | Mod: QW,HCNC,S$GLB, | Performed by: INTERNAL MEDICINE

## 2019-02-11 NOTE — PROGRESS NOTES
Patient's INR is therapeutic at 2.5.  Reports no recent changes.  Refill requested - will submit.  Instructed to maintain current dose of Warfarin 1.25 mg every Monday, Wednesday, and Friday; and 2.5 mg on all other days per week.  Dose calendar - given.  Recheck in 1 month.

## 2019-02-15 ENCOUNTER — OFFICE VISIT (OUTPATIENT)
Dept: INTERNAL MEDICINE | Facility: CLINIC | Age: 79
End: 2019-02-15
Payer: MEDICARE

## 2019-02-15 ENCOUNTER — HOSPITAL ENCOUNTER (OUTPATIENT)
Dept: RADIOLOGY | Facility: HOSPITAL | Age: 79
Discharge: HOME | End: 2019-02-15
Attending: FAMILY MEDICINE
Payer: MEDICARE

## 2019-02-15 ENCOUNTER — PATIENT MESSAGE (OUTPATIENT)
Dept: INTERNAL MEDICINE | Facility: CLINIC | Age: 79
End: 2019-02-15

## 2019-02-15 VITALS
HEART RATE: 54 BPM | OXYGEN SATURATION: 99 % | WEIGHT: 189.81 LBS | TEMPERATURE: 98 F | BODY MASS INDEX: 25.74 KG/M2 | DIASTOLIC BLOOD PRESSURE: 64 MMHG | SYSTOLIC BLOOD PRESSURE: 138 MMHG

## 2019-02-15 DIAGNOSIS — M81.0 AGE-RELATED OSTEOPOROSIS WITHOUT CURRENT PATHOLOGICAL FRACTURE: ICD-10-CM

## 2019-02-15 DIAGNOSIS — I77.9 RIGHT-SIDED CAROTID ARTERY DISEASE, UNSPECIFIED TYPE: ICD-10-CM

## 2019-02-15 DIAGNOSIS — N18.30 CHRONIC KIDNEY DISEASE, STAGE 3: Chronic | ICD-10-CM

## 2019-02-15 DIAGNOSIS — Z86.73 HISTORY OF CVA (CEREBROVASCULAR ACCIDENT): Chronic | ICD-10-CM

## 2019-02-15 DIAGNOSIS — J69.0 ASPIRATION PNEUMONIA OF BOTH LOWER LOBES DUE TO VOMIT: ICD-10-CM

## 2019-02-15 DIAGNOSIS — Z29.9 PREVENTIVE MEASURE: ICD-10-CM

## 2019-02-15 DIAGNOSIS — I10 ESSENTIAL HYPERTENSION: Chronic | ICD-10-CM

## 2019-02-15 DIAGNOSIS — R93.89 ABNORMAL CT OF THE CHEST: Primary | ICD-10-CM

## 2019-02-15 DIAGNOSIS — Z09 HOSPITAL DISCHARGE FOLLOW-UP: ICD-10-CM

## 2019-02-15 DIAGNOSIS — I70.0 ATHEROSCLEROSIS OF AORTA: Chronic | ICD-10-CM

## 2019-02-15 DIAGNOSIS — G47.33 OSA ON CPAP: Chronic | ICD-10-CM

## 2019-02-15 DIAGNOSIS — E03.9 ACQUIRED HYPOTHYROIDISM: Primary | ICD-10-CM

## 2019-02-15 DIAGNOSIS — Z79.01 CHRONIC ANTICOAGULATION: Chronic | ICD-10-CM

## 2019-02-15 PROCEDURE — 71250 CT CHEST WITHOUT CONTRAST: ICD-10-PCS | Mod: 26,HCNC,, | Performed by: RADIOLOGY

## 2019-02-15 PROCEDURE — 99999 PR PBB SHADOW E&M-EST. PATIENT-LVL III: CPT | Mod: PBBFAC,HCNC,, | Performed by: INTERNAL MEDICINE

## 2019-02-15 PROCEDURE — 99214 OFFICE O/P EST MOD 30 MIN: CPT | Mod: HCNC,S$GLB,, | Performed by: INTERNAL MEDICINE

## 2019-02-15 PROCEDURE — 71250 CT THORAX DX C-: CPT | Mod: 26,HCNC,, | Performed by: RADIOLOGY

## 2019-02-15 PROCEDURE — 3075F PR MOST RECENT SYSTOLIC BLOOD PRESS GE 130-139MM HG: ICD-10-PCS | Mod: HCNC,CPTII,S$GLB, | Performed by: INTERNAL MEDICINE

## 2019-02-15 PROCEDURE — 1101F PT FALLS ASSESS-DOCD LE1/YR: CPT | Mod: HCNC,CPTII,S$GLB, | Performed by: INTERNAL MEDICINE

## 2019-02-15 PROCEDURE — 3075F SYST BP GE 130 - 139MM HG: CPT | Mod: HCNC,CPTII,S$GLB, | Performed by: INTERNAL MEDICINE

## 2019-02-15 PROCEDURE — 99214 PR OFFICE/OUTPT VISIT, EST, LEVL IV, 30-39 MIN: ICD-10-PCS | Mod: HCNC,S$GLB,, | Performed by: INTERNAL MEDICINE

## 2019-02-15 PROCEDURE — 1101F PR PT FALLS ASSESS DOC 0-1 FALLS W/OUT INJ PAST YR: ICD-10-PCS | Mod: HCNC,CPTII,S$GLB, | Performed by: INTERNAL MEDICINE

## 2019-02-15 PROCEDURE — 99999 PR PBB SHADOW E&M-EST. PATIENT-LVL III: ICD-10-PCS | Mod: PBBFAC,HCNC,, | Performed by: INTERNAL MEDICINE

## 2019-02-15 PROCEDURE — 3078F DIAST BP <80 MM HG: CPT | Mod: HCNC,CPTII,S$GLB, | Performed by: INTERNAL MEDICINE

## 2019-02-15 PROCEDURE — 99499 UNLISTED E&M SERVICE: CPT | Mod: HCNC,S$GLB,, | Performed by: INTERNAL MEDICINE

## 2019-02-15 PROCEDURE — 71250 CT THORAX DX C-: CPT | Mod: TC,HCNC

## 2019-02-15 PROCEDURE — 3078F PR MOST RECENT DIASTOLIC BLOOD PRESSURE < 80 MM HG: ICD-10-PCS | Mod: HCNC,CPTII,S$GLB, | Performed by: INTERNAL MEDICINE

## 2019-02-15 PROCEDURE — 99499 RISK ADDL DX/OHS AUDIT: ICD-10-PCS | Mod: HCNC,S$GLB,, | Performed by: INTERNAL MEDICINE

## 2019-02-27 ENCOUNTER — PATIENT OUTREACH (OUTPATIENT)
Dept: OTHER | Facility: OTHER | Age: 79
End: 2019-02-27

## 2019-02-27 NOTE — PROGRESS NOTES
Last 5 Patient Entered Readings                                      Current 30 Day Average:      Recent Readings 1/25/2019 1/25/2019 1/25/2019 1/25/2019 1/25/2019    SBP (mmHg) 133 134 138 141 141    DBP (mmHg) 62 64 62 68 69    Pulse 50 50 48 49 48            Digital Medicine: Health  Follow Up    Left voicemail to follow up with Mr. Heraclio Wall.  Inquiring about lack of BP readings.

## 2019-02-27 NOTE — PROGRESS NOTES
Last 5 Patient Entered Readings                                      Current 30 Day Average:      Recent Readings 1/25/2019 1/25/2019 1/25/2019 1/25/2019 1/25/2019    SBP (mmHg) 133 134 138 141 141    DBP (mmHg) 62 64 62 68 69    Pulse 50 50 48 49 48          Digital Medicine: Health  Follow Up    Lifestyle Modifications:    1.Dietary Modifications (Sodium intake <2,000mg/day, food labels, dining out): Deferred    2.Physical Activity: Deferred    3.Medication Therapy: Patient has been compliant with the medication regimen.    4.Patient has the following medication side effects/concerns:   (Frequency/Alleviating factors/Precipitating factors, etc.)     Patient's wife has been in the hospital (was in there for about 3 weeks) so he was unable to take his BP readings. She is now out but requested a 4 week hiatus so he can focusing on caring for her.     Follow up with Mr. Heraclio Wall completed. No further questions or concerns. Will continue to follow up to achieve health goals.

## 2019-03-05 ENCOUNTER — OFFICE VISIT (OUTPATIENT)
Dept: RHEUMATOLOGY | Facility: CLINIC | Age: 79
End: 2019-03-05
Payer: MEDICARE

## 2019-03-05 ENCOUNTER — LAB VISIT (OUTPATIENT)
Dept: LAB | Facility: HOSPITAL | Age: 79
End: 2019-03-05
Attending: INTERNAL MEDICINE
Payer: MEDICARE

## 2019-03-05 VITALS
DIASTOLIC BLOOD PRESSURE: 62 MMHG | SYSTOLIC BLOOD PRESSURE: 140 MMHG | HEART RATE: 51 BPM | HEIGHT: 72 IN | WEIGHT: 188.5 LBS | BODY MASS INDEX: 25.53 KG/M2

## 2019-03-05 DIAGNOSIS — M81.0 AGE-RELATED OSTEOPOROSIS WITHOUT CURRENT PATHOLOGICAL FRACTURE: Primary | ICD-10-CM

## 2019-03-05 DIAGNOSIS — M81.0 AGE-RELATED OSTEOPOROSIS WITHOUT CURRENT PATHOLOGICAL FRACTURE: ICD-10-CM

## 2019-03-05 LAB
25(OH)D3+25(OH)D2 SERPL-MCNC: 46 NG/ML
ALBUMIN SERPL BCP-MCNC: 4.1 G/DL
ALP SERPL-CCNC: 71 U/L
ALT SERPL W/O P-5'-P-CCNC: 19 U/L
ANION GAP SERPL CALC-SCNC: 7 MMOL/L
AST SERPL-CCNC: 24 U/L
BILIRUB SERPL-MCNC: 0.9 MG/DL
BUN SERPL-MCNC: 19 MG/DL
CALCIUM SERPL-MCNC: 9.9 MG/DL
CHLORIDE SERPL-SCNC: 102 MMOL/L
CO2 SERPL-SCNC: 31 MMOL/L
CREAT SERPL-MCNC: 1.2 MG/DL
EST. GFR  (AFRICAN AMERICAN): >60 ML/MIN/1.73 M^2
EST. GFR  (NON AFRICAN AMERICAN): 56.8 ML/MIN/1.73 M^2
GLUCOSE SERPL-MCNC: 79 MG/DL
POTASSIUM SERPL-SCNC: 4.8 MMOL/L
PROT SERPL-MCNC: 7.3 G/DL
SODIUM SERPL-SCNC: 140 MMOL/L

## 2019-03-05 PROCEDURE — 1101F PR PT FALLS ASSESS DOC 0-1 FALLS W/OUT INJ PAST YR: ICD-10-PCS | Mod: HCNC,CPTII,S$GLB, | Performed by: INTERNAL MEDICINE

## 2019-03-05 PROCEDURE — 99499 RISK ADDL DX/OHS AUDIT: ICD-10-PCS | Mod: HCNC,,, | Performed by: INTERNAL MEDICINE

## 2019-03-05 PROCEDURE — 99999 PR PBB SHADOW E&M-EST. PATIENT-LVL III: CPT | Mod: PBBFAC,HCNC,, | Performed by: INTERNAL MEDICINE

## 2019-03-05 PROCEDURE — 99999 PR PBB SHADOW E&M-EST. PATIENT-LVL III: ICD-10-PCS | Mod: PBBFAC,HCNC,, | Performed by: INTERNAL MEDICINE

## 2019-03-05 PROCEDURE — 3078F DIAST BP <80 MM HG: CPT | Mod: HCNC,CPTII,S$GLB, | Performed by: INTERNAL MEDICINE

## 2019-03-05 PROCEDURE — 1101F PT FALLS ASSESS-DOCD LE1/YR: CPT | Mod: HCNC,CPTII,S$GLB, | Performed by: INTERNAL MEDICINE

## 2019-03-05 PROCEDURE — 99499 UNLISTED E&M SERVICE: CPT | Mod: HCNC,S$GLB,, | Performed by: INTERNAL MEDICINE

## 2019-03-05 PROCEDURE — 99204 OFFICE O/P NEW MOD 45 MIN: CPT | Mod: HCNC,S$GLB,, | Performed by: INTERNAL MEDICINE

## 2019-03-05 PROCEDURE — 99204 PR OFFICE/OUTPT VISIT, NEW, LEVL IV, 45-59 MIN: ICD-10-PCS | Mod: HCNC,S$GLB,, | Performed by: INTERNAL MEDICINE

## 2019-03-05 PROCEDURE — 82306 VITAMIN D 25 HYDROXY: CPT | Mod: HCNC

## 2019-03-05 PROCEDURE — 99499 UNLISTED E&M SERVICE: CPT | Mod: HCNC,,, | Performed by: INTERNAL MEDICINE

## 2019-03-05 PROCEDURE — 3077F SYST BP >= 140 MM HG: CPT | Mod: HCNC,CPTII,S$GLB, | Performed by: INTERNAL MEDICINE

## 2019-03-05 PROCEDURE — 36415 COLL VENOUS BLD VENIPUNCTURE: CPT | Mod: HCNC

## 2019-03-05 PROCEDURE — 99499 RISK ADDL DX/OHS AUDIT: ICD-10-PCS | Mod: HCNC,S$GLB,, | Performed by: INTERNAL MEDICINE

## 2019-03-05 PROCEDURE — 3077F PR MOST RECENT SYSTOLIC BLOOD PRESSURE >= 140 MM HG: ICD-10-PCS | Mod: HCNC,CPTII,S$GLB, | Performed by: INTERNAL MEDICINE

## 2019-03-05 PROCEDURE — 80053 COMPREHEN METABOLIC PANEL: CPT | Mod: HCNC

## 2019-03-05 PROCEDURE — 3078F PR MOST RECENT DIASTOLIC BLOOD PRESSURE < 80 MM HG: ICD-10-PCS | Mod: HCNC,CPTII,S$GLB, | Performed by: INTERNAL MEDICINE

## 2019-03-05 RX ORDER — ACETAMINOPHEN 325 MG/1
650 TABLET ORAL
Status: CANCELLED | OUTPATIENT
Start: 2019-03-05

## 2019-03-05 RX ORDER — HEPARIN 100 UNIT/ML
500 SYRINGE INTRAVENOUS
Status: CANCELLED | OUTPATIENT
Start: 2019-03-05

## 2019-03-05 RX ORDER — SODIUM CHLORIDE 0.9 % (FLUSH) 0.9 %
10 SYRINGE (ML) INJECTION
Status: CANCELLED | OUTPATIENT
Start: 2019-03-05

## 2019-03-05 RX ORDER — ZOLEDRONIC ACID 5 MG/100ML
5 INJECTION, SOLUTION INTRAVENOUS
Status: CANCELLED | OUTPATIENT
Start: 2019-03-05

## 2019-03-05 NOTE — ASSESSMENT & PLAN NOTE
Osteoporosis with high risk fracture over femoral neck.  Oral bisphosphonate therapy contraindicated because of gastroesophageal reflux disease.  Start Reclast.  Discussed in detail about all adverse effects of the medication including osteonecrosis of jaw and advised against any invasive dental procedures.

## 2019-03-05 NOTE — PATIENT INSTRUCTIONS
Living with Osteoporosis: Regular Exercise  If you have osteoporosis, exercise is vital for your health. It can prevent bone fractures and spine changes. It will slow bone loss. Exercise will strengthen your body. It can also be fun. A variety of exercises is best. See below for exercises that can help you. Before you start, though, talk to your healthcare provider to be sure these exercises are right for you.    Resistance exercises. These build muscle strength and maintain bone mass. They also make you less prone to injury. Exercises include lifting small weights, doing push-ups and sit-ups, using elastic exercise bands, and using weight machines.  Weight-bearing activities. These help your whole body. They also help you maintain bone mass. Activities include walking, dancing, and housework.  Nonweight-bearing exercises. These help prevent back strain and pain. They do this by building the trunk and leg muscles. Exercises that help with flexibility can prevent falls. Examples include swimming, water exercise, and stretching.  Staying safe  Here are tips to stay safe:   · Always check with your healthcare provider before starting any new exercise program.  · Use weights only as instructed.  · Stop any exercise that causes pain.   Date Last Reviewed: 10/17/2015  © 8079-6913 [employer]. [address] [place] [address]. All rights reserved. This information is not intended as a substitute for professional medical care. Always follow your healthcare professional's instructions.        Living with Osteoporosis: Preventing Fractures  If you have osteoporosis, you can do a lot to reduce its effect on your life. Knowing how to prevent fractures and spinal curvature can help you live more comfortably and safely with this disease.    Reducing your risk for fractures  The most common fracture sites in people with osteoporosis are the wrist, spine, and hip. These fractures are often caused by  accidents and falls. All fractures are painful and may limit what you can do. But hip fractures are very serious. They need surgery, and it can take months to recover. To reduce your risk for fractures:  · Get regular exercise. Try walking, swimming, or weight training.  · Eat foods that are rich in calcium, or take calcium supplements.  · Make your home safe to help avoid accidents.  · Take extra precautions not to fall in risky areas, such as icy sidewalks.  Understanding spinal fractures  Your spine is made up of many bones called vertebrae. Osteoporosis can cause the vertebrae in your spine to collapse. As a result, your upper back may arch forward, creating a curvature. Spine fractures may also result from back strain and bad posture. You will also lose height. Your lower spine must then adjust to keep your body balanced. This can cause back pain. To prevent or lessen these spinal changes:  · Practice good posture.  · Use proper techniques if you need to lift heavy objects.  · Do back exercises to help your posture.  · Lie on your back when you have pain.  · Ask your healthcare provider about these and other ways to help your spine.  Date Last Reviewed: 10/17/2015  © 4616-5283 [employer]. [address] [place] [address], [place] [id number]. All rights reserved. This information is not intended as a substitute for professional medical care. Always follow your healthcare professional's instructions.        Preventing Osteoporosis: Meeting Your Calcium Needs    Your body needs calcium to build and repair bones. But it can't make calcium on its own. That's why it's important to eat calcium-rich foods. Some foods are naturally rich in calcium. Others have calcium added (fortified). It's best to get calcium from the foods you eat. But if you can't get enough, you may want to take calcium supplements. To meet your daily calcium needs, try the foods listed below.  Dairy Fish & beans Other sources      Source    Calcium (mg) per serving   Source   Calcium (mg) per serving   Source   Calcium (mg) per serving      Low-fat yogurt, plain   415 mg/8 oz.   Sardines, Atlantic, canned, with bones   351 mg/3 oz.   Oatmeal, instant, fortified   215 mg/1 cup   Nonfat milk   302 mg/1 cup   [place], sockeye, canned, with bones   239 mg/3 oz.   Tofu made with calcium sulfate   204 mg/3 oz.   Low-fat milk   297 mg/1 cup   Soybeans, fresh, boiled   131 mg/1/2 cup   Collards   179 mg/1/2 cup   Swiss cheese   272 mg/1 oz.   White beans, cooked   81 mg/1/2 cup   English muffin, whole wheat   175 mg/1 muffin   Cheddar cheese   205 mg/1 oz.   Navy beans, cooked   79 mg/1/2 cup   Kale   90 mg/1/2 cup   Ice cream strawberry   79 mg/1/2 cup           Orange, navel   56 mg/1 medium   Note: Calcium levels may vary depending on brand and size.  Daily calcium needs  14-18 years old: 1,300 mg  19-30 years old: 1,000 mg  31-50 years old: 1,000 mg  51-70 years old, women: 1,200 mg  51-70 years old, men: 1,000 mg  Pregnant or nursin-28 years old: 1,300 mg, 19-50 years old: 1,000 mg  Older than 70 (women and men): 1,200 mg   Date Last Reviewed: 10/17/2015  © 7900-2001 [employer]. [address] [place] [address], [place] [address]. All rights reserved. This information is not intended as a substitute for professional medical care. Always follow your healthcare professional's instructions.        Preventing Osteoporosis: Avoiding Bone Loss  Certain factors can speed up bone loss or decrease bone growth. For example, alcohol, cigarettes, and certain medicines reduce bone mass. Some foods make it hard for your body to absorb calcium.    Things to avoid  Here are things to avoid to help prevent osteoporosis:  · Alcohol is toxic to bones. It is a major cause of bone loss. Heavy drinking can cause osteoporosis even if you have no other risk factors.  · Smoking reduces bone mass. Smoking may also interfere with estrogen levels and cause early  menopause.  · Inactivity makes your bones lose strength and become thinner. Over time, thin bones may break. Women who aren't active are at a high risk for osteoporosis.  · Certain medicines, such as cortisone, increase bone loss. They also decrease bone growth. Ask your healthcare provider about any side effects of your medicines, and how to prevent them.  · Protein-rich or salty foods eaten in large amounts may deplete calcium.  · Caffeine increases calcium loss. People who drink a lot of coffee, tea, or amari lose more calcium than those who don't.  Date Last Reviewed: 10/17/2015  © 9223-4655 [employer]. [address] [place] [address]. All rights reserved. This information is not intended as a substitute for professional medical care. Always follow your healthcare professional's instructions.        Preventing Osteoporosis: Staying Active  Certain factors can speed up bone loss or decrease bone growth. For example, a lack of activity makes bones lose their strength. Exercise plays a big part in maintaining bone mass, no matter what your age. The amount and type of activity you do also play a part in keeping your bones strong. Weight-bearing and resistance exercises, such as walking, aerobic dancing, and bicycling, are just a few of the activities that are good for your bones.     Resistance exercises, such as weight training, help maintain bones by strengthening the muscles around them. Swimming is also a good choice.     · Check with your healthcare provider before starting any new exercise program.  · Stop any exercise that causes pain.   Date Last Reviewed: 10/17/2015  © 8189-9868 [employer]. [address] [place] [address]. All rights reserved. This information is not intended as a substitute for professional medical care. Always follow your healthcare professional's instructions.

## 2019-03-05 NOTE — PROGRESS NOTES
RHEUMATOLOGY CLINIC INITIAL VISIT    Reason for referral:-  Referred by Dr. Morse for evaluation of osteoporosis.    Chief complaints:-  To discuss about bone density problem.    HPI:-  Heraclio Rebollar a 78 y.o. pleasant male comes in for an initial visit with above chief complaints.  He recently underwent bone density scan which showed abnormal results and was referred to discuss treatment options since he could not take oral bisphosphonate because of his gastroesophageal reflux disease.  He denies any joint pain today.  Has some mild neck pain for which she uses neck collar with warm compresses.  He denies any numbness tingling pain over his hands or feet.  No history of fragility fracture.  Does not smoke.  No excessive alcohol intake.  No family history of hip fracture.  No history of radiation therapy.  No plans for recurrent invasive dental procedures.    Review of Systems   Constitutional: Negative for chills and fever.   HENT: Negative for congestion and sore throat.    Eyes: Negative for blurred vision and redness.   Respiratory: Negative for cough and shortness of breath.    Cardiovascular: Negative for chest pain and leg swelling.   Gastrointestinal: Negative for abdominal pain.   Genitourinary: Negative for dysuria.   Musculoskeletal: Positive for neck pain. Negative for back pain, falls, joint pain and myalgias.   Skin: Negative for rash.   Neurological: Negative for headaches.   Endo/Heme/Allergies: Does not bruise/bleed easily.   Psychiatric/Behavioral: Negative for memory loss. The patient does not have insomnia.        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    Unspecified vitamin D deficiency        Past Surgical History:   Procedure Laterality Date    ADENOIDECTOMY  1942    AORTOGRAM, ABDOMINAL N/A 5/2/2014    Performed by Ja [facility]z [facility] at [facility] CATH LAB    CHOLECYSTECTOMY  2/2015    CIRCUMCISION, PRIMARY  1940    ESOPHAGOGASTRODUODENOSCOPY (EGD) N/A 5/12/2014    Performed by Axel Alejandro MD at [facility] ENDO    EXCISION, SMALL INTESTINE N/A 5/4/2014    Performed by Vinicius Nazario MD at [facility] OR    EYE SURGERY Right 02/15/2012    cataract w/IOL  Dr Kevin    EYE SURGERY Left 03/29/2012    cataract w/IOL    LAPAROTOMY-EXPLORATORY N/A 5/4/2014    Performed by [facility]ius [facility]ario MD at [facility] OR    SMALL INTESTINE SURGERY  5/4/2014    TONSILLECTOMY  1942    TRANSESOPHAGEAL ECHOCARDIOGRAM (SAIRA) N/A 5/14/2014    Performed by Gera Wells MD at [facility] CATH LAB        Social History     Tobacco Use    Smoking status: Never Smoker    Smokeless tobacco: Never Used   Substance Use Topics    Alcohol use: No    Drug use: No       Family History   Problem Relation Age of Onset    Hypertension Mother     Heart disease Mother     Stroke Mother     Glaucoma Mother     Cataracts Mother     Hyperlipidemia Mother     Hypertension Father     Peripheral vascular disease Father     Aortic aneurysm Father     Cancer Sister 64        breast 64, tongue 79    Stroke Sister     Hearing loss Sister     Diabetes Neg Hx     Blindness Neg Hx     Macular degeneration Neg Hx     Retinal detachment Neg Hx     Strabismus Neg Hx     Asthma Neg Hx     COPD Neg Hx     Mental illness Neg Hx     Mental retardation Neg Hx     Drug abuse Neg Hx     Alcohol abuse Neg Hx     Kidney disease Neg Hx         Review of patient's allergies indicates:   Allergen Reactions    Milk Shortness Of Breath     Diarrhea    Olive extract Anaphylaxis    Tea tree Anaphylaxis    Apple Other (See Comments)     Other reaction(s): sinus problems    Benicar [olmesartan] Other (See Comments)     Stomach upset  Diarrhea    Cardizem [diltiazem hcl] Other (See Comments)     Hallucinations    Clonidine      Dry mouth    Clonidine hcl      Pounding in the ears    Clopidogrel Other (See Comments)     Generic only-felt bad [okay with Branded Plavix}    Dicyclomine Other (See Comments)     lightheaded    Hydralazine analogues      tachycardia    Lipitor [atorvastatin] Other (See Comments)     muscle spasms    Lopressor [metoprolol tartrate]      Felt bad question milk in it    Norvasc [amlodipine] Other (See Comments)     Headache  Flushing (skin)    Pineapple Other (See Comments)     sinus problems    Bactrim [sulfamethoxazole-trimethoprim] Itching and Rash       Vitals:    03/05/19 1341   BP: (!) 140/62   Pulse: (!) 51   Weight: 85.5 kg (188 lb 7.9 oz)   Height: 6' (1.829 m)       Physical Exam   Constitutional: He is oriented to person, place, and time and well-developed, well-nourished, and in no distress. No distress.   HENT:   Head: Normocephalic.   Mouth/Throat: Oropharynx is clear and moist.   Eyes: Conjunctivae are normal. Pupils are equal, round, and reactive to light.   Neck: Normal range of motion. Neck supple.   Cardiovascular: Normal rate and intact distal pulses.   Pulmonary/Chest: Effort normal. No respiratory distress.   Abdominal: Soft. There is no tenderness.   Musculoskeletal:   No synovitis in small joints of hands or feet.  Good range of motion in large joints.     Neurological: He is alert and oriented to person, place, and time. Gait normal.   Skin: Skin is warm. No rash noted. No erythema.   Psychiatric: Mood and affect normal.   Nursing note and vitals reviewed.      Radiographs:-  Independent  visualization of images done.  DEXA shows T-score of -1.8 at femoral neck with fracture risk of 3.3% at hip.      Medication List with Changes/Refills   Current Medications    ARGININE 500 MG TABLET    Take 1 tablet by mouth 2 (two) times daily.    ASCORBIC ACID (VITAMIN C) 1000 MG TABLET    Take 3 tablets by mouth Daily.    CHOLECALCIFEROL, VITAMIN D3, 1,000 UNIT CAPSULE    Take 5 capsules by mouth Daily.    COUMADIN 2.5 MG TABLET    Take 1/2 tablet by mouth on Mondays, Wednesdays, Fridays; and 1 tablet on all other days.    CYANOCOBALAMIN, VITAMIN B-12, 1,000 MCG/15 ML LIQD    Take 1 drop by mouth once daily.     DEXTRAN 70-HYPROMELLOSE (ARTIFICIAL TEARS) OPHTHALMIC SOLUTION    Place 1 drop into both eyes Use as needed.    DIOVAN 80 MG TABLET    Take 1 tablet (80 mg total) by mouth 2 (two) times daily.    FLUTICASONE (FLONASE) 50 MCG/ACTUATION NASAL SPRAY    Use 2 sprays (100 mcg total) by in each nostril once daily    FLUVASTATIN XL (LESCOL XL) 80 MG TB24    Take one tablet by mouth once daily    FUROSEMIDE (LASIX) 40 MG TABLET    Take 1 tablet (40 mg total) by mouth once daily.    HYDROCORTISONE (CORTEF) 5 MG TAB    Take 2.5 mg by mouth once daily.    METOPROLOL SUCCINATE (TOPROL XL) 25 MG 24 HR TABLET    Take 1 tablet by mouth once daily. Take an additional tablet if blood pressure is greater than 160    MUPIROCIN (BACTROBAN) 2 % OINTMENT    APPLY ON THE SKIN EVERY DAY    PANTOPRAZOLE (PROTONIX) 40 MG TABLET    TAKE 1 TABLET BY MOUTH DAILY    PROGESTERONE MISC    6.25 mg by Misc.(Non-Drug; Combo Route) route every evening.    SAW PALMETTO 160 MG CAPSULE    Take 1 capsule by mouth 2 (two) times daily.    SELENIUM 200 MCG TAB    Take 1 tablet by mouth Daily.    UNABLE TO FIND    Take 1 tablet by mouth once daily. medication name: T3/T4-SR (TRIIODO-L-LTHYRONINE-LEVOTHYROXINE)    VERAPAMIL (VERELAN) 240 MG C24P    Take 1 capsule (240 mg total) by mouth 2 (two) times daily.       Assessment/Plans:-  # Age-related  osteoporosis without current pathological fracture:-  Osteoporosis with high risk fracture over femoral neck.  Oral bisphosphonate therapy contraindicated because of gastroesophageal reflux disease.  Start Reclast.  Discussed in detail about all adverse effects of the medication including osteonecrosis of jaw and advised against any invasive dental procedures.  - Comprehensive metabolic panel; Standing  - Vitamin D; Standing     # RTC in 1 year after 1st Reclast infusion.    Thank you Dr. Morse  for allowing me to participate in the care Bautista Wall.    Disclaimer: This note was prepared using voice recognition system and is likely to have sound alike errors and is not proof read.  Please call me with any questions.

## 2019-03-05 NOTE — LETTER
March 5, 2019      Bibi Watson MD  [address] [facility] [address]           [facility]  [address] [address]  Phone: [phone]  Fax: [phone]          Patient: Heraclio Wall   MR Number: [id number]   YOB: 1940   Date of Visit: 3/5/2019       Dear Dr. Bibi Watson:    Thank you for referring Heraclio Wall to me for evaluation. Attached you will find relevant portions of my assessment and plan of care.    If you have questions, please do not hesitate to call me. I look forward to following Heraclio Wall along with you.    Sincerely,    Amandeep Rodrigez MD    Enclosure  CC:  No Recipients    If you would like to receive this communication electronically, please contact [email or web address] or [phone] to request more information on [employer] access.    For providers and/or their staff who would like to refer a patient to [facility], please contact us through our one-stop-shop provider referral line, [facility], at [phone].    If you feel you have received this communication in error or would no longer like to receive these types of communications, please e-mail [email or web address]

## 2019-03-11 ENCOUNTER — ANTI-COAG VISIT (OUTPATIENT)
Dept: CARDIOLOGY | Facility: CLINIC | Age: 79
End: 2019-03-11
Payer: MEDICARE

## 2019-03-11 DIAGNOSIS — Z79.01 LONG TERM (CURRENT) USE OF ANTICOAGULANTS: Primary | ICD-10-CM

## 2019-03-11 LAB — INR PPP: 1.8 (ref 2–3)

## 2019-03-11 PROCEDURE — 99211 OFF/OP EST MAY X REQ PHY/QHP: CPT | Mod: 25,HCNC,S$GLB,

## 2019-03-11 PROCEDURE — 85610 PROTHROMBIN TIME: CPT | Mod: QW,HCNC,S$GLB, | Performed by: INTERNAL MEDICINE

## 2019-03-11 PROCEDURE — 85610 POCT INR: ICD-10-PCS | Mod: QW,HCNC,S$GLB, | Performed by: INTERNAL MEDICINE

## 2019-03-11 PROCEDURE — 99211 PR OFFICE/OUTPT VISIT, EST, LEVL I: ICD-10-PCS | Mod: 25,HCNC,S$GLB,

## 2019-04-01 ENCOUNTER — ANTI-COAG VISIT (OUTPATIENT)
Dept: CARDIOLOGY | Facility: CLINIC | Age: 79
End: 2019-04-01
Payer: MEDICARE

## 2019-04-01 DIAGNOSIS — Z79.01 LONG TERM (CURRENT) USE OF ANTICOAGULANTS: Primary | ICD-10-CM

## 2019-04-01 LAB — INR PPP: 1.6 (ref 2–3)

## 2019-04-01 PROCEDURE — 85610 PROTHROMBIN TIME: CPT | Mod: QW,HCNC,S$GLB, | Performed by: INTERNAL MEDICINE

## 2019-04-01 PROCEDURE — 99211 PR OFFICE/OUTPT VISIT, EST, LEVL I: ICD-10-PCS | Mod: 25,HCNC,S$GLB,

## 2019-04-01 PROCEDURE — 99211 OFF/OP EST MAY X REQ PHY/QHP: CPT | Mod: 25,HCNC,S$GLB,

## 2019-04-01 PROCEDURE — 85610 POCT INR: ICD-10-PCS | Mod: QW,HCNC,S$GLB, | Performed by: INTERNAL MEDICINE

## 2019-04-01 NOTE — PROGRESS NOTES
Patient's INR is sub-therapeutic at 1.6.  Reports he has not been drinking any cranberry juice lately; will attempt to incorporate back into his diet.  Advised patient to notify coumadin clinic if and when he resumes consumption of cranberry juice; will increase dose slightly for now.  Instructions given for patient to increase dose of coumadin to 1.25mg on Wednesdays, Fridays and 2.5mg on all other days of the week.  Patient voiced understanding.  Follow-up in 3 weeks.

## 2019-04-02 ENCOUNTER — PATIENT OUTREACH (OUTPATIENT)
Dept: OTHER | Facility: OTHER | Age: 79
End: 2019-04-02

## 2019-04-02 NOTE — PROGRESS NOTES
Last 5 Patient Entered Readings                                      Current 30 Day Average:      Recent Readings 1/25/2019 1/25/2019 1/25/2019 1/25/2019 1/25/2019    SBP (mmHg) 133 134 138 141 141    DBP (mmHg) 62 64 62 68 69    Pulse 50 50 48 49 48            Digital Medicine: Health  Follow Up    Left voicemail to follow up with Mr. Heraclio Wall.  Checking in to see if he is ready to start taking BP readings again.

## 2019-04-11 NOTE — PROGRESS NOTES
Last 5 Patient Entered Readings                                      Current 30 Day Average:      Recent Readings 1/25/2019 1/25/2019 1/25/2019 1/25/2019 1/25/2019    SBP (mmHg) 133 134 138 141 141    DBP (mmHg) 62 64 62 68 69    Pulse 50 50 48 49 48          Patient called to let me know that he is going to start taking readings again in the next few days. Advised him to reach out to me if he starts experiencing any tech issues.

## 2019-04-22 ENCOUNTER — PATIENT MESSAGE (OUTPATIENT)
Dept: CARDIOLOGY | Facility: CLINIC | Age: 79
End: 2019-04-22

## 2019-04-22 ENCOUNTER — PATIENT OUTREACH (OUTPATIENT)
Dept: OTHER | Facility: OTHER | Age: 79
End: 2019-04-22

## 2019-04-22 ENCOUNTER — ANTI-COAG VISIT (OUTPATIENT)
Dept: CARDIOLOGY | Facility: CLINIC | Age: 79
End: 2019-04-22
Payer: MEDICARE

## 2019-04-22 DIAGNOSIS — Z79.01 LONG TERM (CURRENT) USE OF ANTICOAGULANTS: Primary | ICD-10-CM

## 2019-04-22 LAB — INR PPP: 3 (ref 2–3)

## 2019-04-22 PROCEDURE — 93793 PR ANTICOAGULANT MGMT FOR PT TAKING WARFARIN: ICD-10-PCS | Mod: HCNC,S$GLB,,

## 2019-04-22 PROCEDURE — 93793 ANTICOAG MGMT PT WARFARIN: CPT | Mod: HCNC,S$GLB,,

## 2019-04-22 PROCEDURE — 85610 PROTHROMBIN TIME: CPT | Mod: QW,HCNC,S$GLB, | Performed by: INTERNAL MEDICINE

## 2019-04-22 PROCEDURE — 85610 POCT INR: ICD-10-PCS | Mod: QW,HCNC,S$GLB, | Performed by: INTERNAL MEDICINE

## 2019-04-22 NOTE — PROGRESS NOTES
Patient's INR is therapeutic at 3.0. Reports he has started drinking cranberry juice again, 3 servings per week.  Stressed importance of consistent intake.  No changes in dose at this time, will monitor closely.  Continue current dose of 1.25mg on Wednesdays, Fridays and 2.5mg on all other days of the week.  Recheck on 5/15/19.  Please call should you have any questions or concerns at [phone] or [phone].      Computer system was down during patient's visit.  Instructions sent via patient portal.

## 2019-04-22 NOTE — PROGRESS NOTES
Last 5 Patient Entered Readings                                      Current 30 Day Average: 128/65     Recent Readings 4/18/2019 4/18/2019 4/18/2019 4/15/2019 4/15/2019    SBP (mmHg) 118 111 129 138 138    DBP (mmHg) 57 66 61 69 72    Pulse 54 54 56 49 49          Digital Medicine: Health  Follow Up    Lifestyle Modifications:    1.Dietary Modifications (Sodium intake <2,000mg/day, food labels, dining out): Deferred    2.Physical Activity: Patient has been riding his stationary bike, doing leg exercises and he is using a jiggling Vinicius machine to stay active. His wife has C-Diff so they cannot get out of the house right now.     3.Medication Therapy: Patient has been compliant with the medication regimen. Patient is doing well on his current BP medication regimen. He denies symptoms/side effects.     4.Patient has the following medication side effects/concerns:   (Frequency/Alleviating factors/Precipitating factors, etc.)     Follow up with Mr. Heraclio Wall completed. No further questions or concerns. Will continue to follow up to achieve health goals.

## 2019-04-25 ENCOUNTER — TELEPHONE (OUTPATIENT)
Dept: PULMONOLOGY | Facility: CLINIC | Age: 79
End: 2019-04-25

## 2019-04-25 NOTE — TELEPHONE ENCOUNTER
----- Message from Manisha Andrade sent at 4/22/2019  4:09 PM CDT -----  Regarding: Appt Needed  Mckenzie Aguirre,    Mr Wall needs to follow up with Dr Tim TOMPKINS so he can be compliant with PAP Therapy. Please call and schedule him an appointment.    Thanks,  Manisha Tinoco, CRT-SDS

## 2019-05-06 ENCOUNTER — TELEPHONE (OUTPATIENT)
Dept: PULMONOLOGY | Facility: CLINIC | Age: 79
End: 2019-05-06

## 2019-05-06 NOTE — TELEPHONE ENCOUNTER
Contacted patient and offered earlier appointment on 05/23/19 for 1140 am patient accepted appointment

## 2019-05-14 ENCOUNTER — TELEPHONE (OUTPATIENT)
Dept: FAMILY MEDICINE | Facility: CLINIC | Age: 79
End: 2019-05-14

## 2019-05-14 DIAGNOSIS — R93.89 ABNORMAL CT OF THE CHEST: Primary | ICD-10-CM

## 2019-05-14 NOTE — TELEPHONE ENCOUNTER
----- Message from Elana Parada LPN sent at 5/14/2019  9:00 AM CDT -----  Regarding: FW: CT ORDER  Please advise on order./rpr      ----- Message -----  From: RT Coty  Sent: 5/14/2019   8:53 AM  To: [facility] Staff  Subject: CT ORDER                                         Mr Wall has a CT schedule for tomorrow at 12:30.  His order is a CT Chest/Abdomen without contrast.  Looks like exam is a 3 month follow up for lung nodules.  I think the wrong order was selected.  If abdomen is not needed please put new order in just for the CHEST WITHOUT CONTRAST.      Thanks Ashley Bourgeois  Please call with any questions [phone]

## 2019-05-15 ENCOUNTER — ANTI-COAG VISIT (OUTPATIENT)
Dept: CARDIOLOGY | Facility: CLINIC | Age: 79
End: 2019-05-15
Payer: MEDICARE

## 2019-05-15 ENCOUNTER — HOSPITAL ENCOUNTER (OUTPATIENT)
Dept: RADIOLOGY | Facility: HOSPITAL | Age: 79
Discharge: HOME | End: 2019-05-15
Attending: INTERNAL MEDICINE
Payer: MEDICARE

## 2019-05-15 DIAGNOSIS — R93.89 ABNORMAL CT OF THE CHEST: ICD-10-CM

## 2019-05-15 DIAGNOSIS — Z86.73 HISTORY OF CVA (CEREBROVASCULAR ACCIDENT): Chronic | ICD-10-CM

## 2019-05-15 DIAGNOSIS — Z79.01 LONG TERM (CURRENT) USE OF ANTICOAGULANTS: Primary | ICD-10-CM

## 2019-05-15 LAB — INR PPP: 2.6 (ref 2–3)

## 2019-05-15 PROCEDURE — 93793 ANTICOAG MGMT PT WARFARIN: CPT | Mod: HCNC,S$GLB,,

## 2019-05-15 PROCEDURE — 71250 CT THORAX DX C-: CPT | Mod: TC,HCNC

## 2019-05-15 PROCEDURE — 85610 PROTHROMBIN TIME: CPT | Mod: QW,HCNC,S$GLB, | Performed by: INTERNAL MEDICINE

## 2019-05-15 PROCEDURE — 71250 CT CHEST WITHOUT CONTRAST: ICD-10-PCS | Mod: 26,HCNC,, | Performed by: RADIOLOGY

## 2019-05-15 PROCEDURE — 71250 CT THORAX DX C-: CPT | Mod: 26,HCNC,, | Performed by: RADIOLOGY

## 2019-05-15 PROCEDURE — 93793 PR ANTICOAGULANT MGMT FOR PT TAKING WARFARIN: ICD-10-PCS | Mod: HCNC,S$GLB,,

## 2019-05-15 PROCEDURE — 85610 POCT INR: ICD-10-PCS | Mod: QW,HCNC,S$GLB, | Performed by: INTERNAL MEDICINE

## 2019-05-15 NOTE — PROGRESS NOTES
Patient's INR is therapeutic at 2.6.  Reports no recent changes.  Instructed to maintain current dose of Warfarin 1.25 mg every Wednesday and Friday; and 2.5 mg on all other days per week.  Dose calendar - given.  Recheck in 1 month.

## 2019-05-17 ENCOUNTER — PATIENT MESSAGE (OUTPATIENT)
Dept: INTERNAL MEDICINE | Facility: CLINIC | Age: 79
End: 2019-05-17

## 2019-05-17 ENCOUNTER — PATIENT OUTREACH (OUTPATIENT)
Dept: OTHER | Facility: OTHER | Age: 79
End: 2019-05-17

## 2019-05-17 DIAGNOSIS — R93.89 ABNORMAL CT OF THE CHEST: Primary | ICD-10-CM

## 2019-05-17 NOTE — PROGRESS NOTES
Last 5 Patient Entered Readings                                      Current 30 Day Average: 128/64     Recent Readings 5/14/2019 5/14/2019 5/14/2019 5/3/2019 5/3/2019    SBP (mmHg) 140 138 150 126 116    DBP (mmHg) 66 66 76 61 62    Pulse 54 54 55 44 44            Digital Medicine: Health  Follow Up    Left voicemail to follow up with Mr. Heraclio Wall.  Current BP average 128/64 mmHg is at goal, <130/80.

## 2019-05-23 ENCOUNTER — OFFICE VISIT (OUTPATIENT)
Dept: OPHTHALMOLOGY | Facility: CLINIC | Age: 79
End: 2019-05-23
Payer: MEDICARE

## 2019-05-23 ENCOUNTER — OFFICE VISIT (OUTPATIENT)
Dept: PULMONOLOGY | Facility: CLINIC | Age: 79
End: 2019-05-23
Payer: MEDICARE

## 2019-05-23 VITALS
HEIGHT: 72 IN | RESPIRATION RATE: 17 BRPM | SYSTOLIC BLOOD PRESSURE: 136 MMHG | HEART RATE: 53 BPM | BODY MASS INDEX: 24.42 KG/M2 | WEIGHT: 180.31 LBS | OXYGEN SATURATION: 97 % | DIASTOLIC BLOOD PRESSURE: 78 MMHG

## 2019-05-23 DIAGNOSIS — H04.123 DRY EYES, BILATERAL: ICD-10-CM

## 2019-05-23 DIAGNOSIS — H02.052 TRICHIASIS OF RIGHT LOWER EYELID: ICD-10-CM

## 2019-05-23 DIAGNOSIS — G47.33 OSA ON CPAP: Primary | Chronic | ICD-10-CM

## 2019-05-23 DIAGNOSIS — H11.001 PTERYGIUM, RIGHT: ICD-10-CM

## 2019-05-23 DIAGNOSIS — Z96.1 PSEUDOPHAKIA OF BOTH EYES: Primary | ICD-10-CM

## 2019-05-23 PROCEDURE — 1101F PR PT FALLS ASSESS DOC 0-1 FALLS W/OUT INJ PAST YR: ICD-10-PCS | Mod: HCNC,CPTII,S$GLB, | Performed by: NURSE PRACTITIONER

## 2019-05-23 PROCEDURE — 99999 PR PBB SHADOW E&M-EST. PATIENT-LVL V: CPT | Mod: PBBFAC,HCNC,, | Performed by: NURSE PRACTITIONER

## 2019-05-23 PROCEDURE — 92014 PR EYE EXAM, EST PATIENT,COMPREHESV: ICD-10-PCS | Mod: 25,HCNC,S$GLB, | Performed by: OPHTHALMOLOGY

## 2019-05-23 PROCEDURE — 1101F PT FALLS ASSESS-DOCD LE1/YR: CPT | Mod: HCNC,CPTII,S$GLB, | Performed by: NURSE PRACTITIONER

## 2019-05-23 PROCEDURE — 3078F DIAST BP <80 MM HG: CPT | Mod: HCNC,CPTII,S$GLB, | Performed by: NURSE PRACTITIONER

## 2019-05-23 PROCEDURE — 92014 COMPRE OPH EXAM EST PT 1/>: CPT | Mod: 25,HCNC,S$GLB, | Performed by: OPHTHALMOLOGY

## 2019-05-23 PROCEDURE — 67820 PR REVISE EYELASHES,FORCEPS: ICD-10-PCS | Mod: HCNC,RT,S$GLB, | Performed by: OPHTHALMOLOGY

## 2019-05-23 PROCEDURE — 99213 PR OFFICE/OUTPT VISIT, EST, LEVL III, 20-29 MIN: ICD-10-PCS | Mod: HCNC,S$GLB,, | Performed by: NURSE PRACTITIONER

## 2019-05-23 PROCEDURE — 3075F SYST BP GE 130 - 139MM HG: CPT | Mod: HCNC,CPTII,S$GLB, | Performed by: NURSE PRACTITIONER

## 2019-05-23 PROCEDURE — 99999 PR PBB SHADOW E&M-EST. PATIENT-LVL II: ICD-10-PCS | Mod: PBBFAC,HCNC,, | Performed by: OPHTHALMOLOGY

## 2019-05-23 PROCEDURE — 99213 OFFICE O/P EST LOW 20 MIN: CPT | Mod: HCNC,S$GLB,, | Performed by: NURSE PRACTITIONER

## 2019-05-23 PROCEDURE — 3078F PR MOST RECENT DIASTOLIC BLOOD PRESSURE < 80 MM HG: ICD-10-PCS | Mod: HCNC,CPTII,S$GLB, | Performed by: NURSE PRACTITIONER

## 2019-05-23 PROCEDURE — 67820 REVISE EYELASHES: CPT | Mod: HCNC,RT,S$GLB, | Performed by: OPHTHALMOLOGY

## 2019-05-23 PROCEDURE — 3075F PR MOST RECENT SYSTOLIC BLOOD PRESS GE 130-139MM HG: ICD-10-PCS | Mod: HCNC,CPTII,S$GLB, | Performed by: NURSE PRACTITIONER

## 2019-05-23 PROCEDURE — 99999 PR PBB SHADOW E&M-EST. PATIENT-LVL II: CPT | Mod: PBBFAC,HCNC,, | Performed by: OPHTHALMOLOGY

## 2019-05-23 PROCEDURE — 99999 PR PBB SHADOW E&M-EST. PATIENT-LVL V: ICD-10-PCS | Mod: PBBFAC,HCNC,, | Performed by: NURSE PRACTITIONER

## 2019-05-23 NOTE — PROGRESS NOTES
SUBJECTIVE:   Heraclio Wall is a 79 y.o. male   Corrected distance visual acuity was 20/30 -2 in the right eye and 20/50 in the left eye.   No chief complaint on file.       HPI:  HPI     The patient is here for his annual eye exam. The patient states his right   eye is bothering him again and he thinks its more lashes causing it to   water up. The patient denies any ocular pain at this time.    1. PCIOL OU   Yag os 11/2/15  2. ERM OD  3. Dry Eyes  Restasis burned  4. Pterygium OD  5. Dermatochalasis  6. fhx of glaucoma  7. Hx of Trichiasis RLL    OU: AFT's PF PRN    Last edited by Shahnaz Beck on 5/23/2019  1:39 PM. (History)        Assessment /Plan :  1. Pseudophakia of both eyes  -- Condition stable, no therapeutic change required. Monitoring routinely.     2. Dry eyes, bilateral  -- Condition stable, no therapeutic change required. Monitoring routinely.     3. Trichiasis of right lower eyelid FORCEPS EPILATION PROCEDURE:    Pt has symptomatic trichiasis of the OD eyelid(s).  Risk, benefits and alternatives to cilia removal by forceps was reviewed and pt requested that this be performed at this time.  AkTaine gtts introduced into both eyes and cilia forceps, under slit lamp imagnification,  were used for epilation, removing the following number of cilia:  RUL: 0  JONATHAN: 0  RLL: 1   LLL: 0      RTC prn or as scheduled     4. Pterygium, right  -- Condition stable, no therapeutic change required. Monitoring routinely.       RTC in 1 year or prn any changes

## 2019-05-23 NOTE — LETTER
May 24, 2019      Bibi Watson MD  [address]  [place] [address]           [facility]  [address]  [place] [address]  Phone: [phone]  Fax: [phone]          Patient: Heraclio Wall   MR Number: [id number]   YOB: 1940   Date of Visit: 5/23/2019       Dear Dr. Bibi Watson:    Thank you for referring Heraclio Wall to me for evaluation. Attached you will find relevant portions of my assessment and plan of care.    If you have questions, please do not hesitate to call me. I look forward to following Heraclio Wall along with you.    Sincerely,    Elizabeth Lejeune, NP    Enclosure  CC:  No Recipients    If you would like to receive this communication electronically, please contact [email or web address] or [phone] to request more information on [employer] Link access.    For providers and/or their staff who would like to refer a patient to [facility], please contact us through our one-stop-shop provider referral line, [facility], at [phone].    If you feel you have received this communication in error or would no longer like to receive these types of communications, please e-mail [email or web address]

## 2019-05-24 NOTE — PROGRESS NOTES
Subjective:      Patient ID: Heraclio Wall is a 79 y.o. male.    Chief Complaint: Sleep Apnea    HPI  Presents to office for review of AutoPAP therapy. Patient states improved symptoms with use of AutoPAP. Sleeping more soundly. Waking up feeling more refreshed. Improved daytime sleepiness. Patient states he is benefiting from use of the AutoPAP.     Patient Active Problem List   Diagnosis    Hypogonadism male    Bilateral hearing loss    Essential hypertension    CHESTER on CPAP    Tinnitus of both ears    GERD (gastroesophageal reflux disease)    Mixed hyperlipidemia    Anemia    Acquired hypothyroidism    Right-sided carotid artery disease    Atherosclerosis of aorta    Chronic anticoagulation    Chronic kidney disease, stage 3    Insufficiency of tear film of both eyes    History of CVA (cerebrovascular accident)    Benign prostatic hyperplasia without lower urinary tract symptoms    Low back pain    Allergic rhinitis    Dizziness    Sinus bradycardia    Age-related osteoporosis without current pathological fracture       /78   Pulse (!) 53   Resp 17   Ht 6' (1.829 m)   Wt 81.8 kg (180 lb 5.4 oz)   SpO2 97%   BMI 24.46 kg/m²   Body mass index is 24.46 kg/m².    Review of Systems   Constitutional: Negative.    HENT: Negative.    Respiratory: Negative.    Cardiovascular: Negative.    Musculoskeletal: Negative.    Gastrointestinal: Negative.    Neurological: Negative.    Psychiatric/Behavioral: Negative.      Objective:      Physical Exam   Constitutional: He is oriented to person, place, and time. He appears well-developed and well-nourished.   HENT:   Head: Normocephalic and atraumatic.   Neck: Normal range of motion. Neck supple.   Cardiovascular: Normal rate and regular rhythm.   Pulmonary/Chest: Effort normal and breath sounds normal.   Abdominal: Soft.   Musculoskeletal: He exhibits no edema.   Neurological: He is alert and oriented to person, place, and time.   Skin: Skin is  warm and dry.   Psychiatric: He has a normal mood and affect.     Personal Diagnostic Review  Autopap download: Patient wears on average 6 hrs and 44 minutes. Greater than 4 hrs 96.7 % of the time. 90% percentile pressure 8.8 with AHI 3.4 events/hr        Assessment:       1. CHESTER on CPAP        Outpatient Encounter Medications as of 5/23/2019   Medication Sig Dispense Refill    arginine 500 mg tablet Take 1 tablet by mouth 2 (two) times daily.      ascorbic acid (VITAMIN C) 1000 MG tablet Take 3 tablets by mouth Daily.      cholecalciferol, vitamin D3, 1,000 unit capsule Take 5 capsules by mouth Daily.      COUMADIN 2.5 mg tablet Take 1/2 tablet by mouth Wednesdays and Fridays; and 1 tablet on all other days. 26 tablet 3    cyanocobalamin, vitamin B-12, 1,000 mcg/15 mL Liqd Take 1 drop by mouth once daily.       dextran 70-hypromellose (ARTIFICIAL TEARS) ophthalmic solution Place 1 drop into both eyes Use as needed.      DIOVAN 80 mg tablet Take 1 tablet (80 mg total) by mouth 2 (two) times daily. 180 tablet 1    fluticasone (FLONASE) 50 mcg/actuation nasal spray Use 2 sprays (100 mcg total) by in each nostril once daily 48 g 3    fluvastatin XL (LESCOL XL) 80 mg Tb24 Take one tablet by mouth once daily 90 tablet 3    furosemide (LASIX) 40 MG tablet Take 1 tablet (40 mg total) by mouth once daily. 90 tablet 3    hydrocortisone (CORTEF) 5 MG Tab Take 2.5 mg by mouth once daily.      metoprolol succinate (TOPROL XL) 25 MG 24 hr tablet Take 1 tablet by mouth once daily. Take an additional tablet if blood pressure is greater than 160 180 tablet 3    mupirocin (BACTROBAN) 2 % ointment APPLY ON THE SKIN EVERY DAY  2    pantoprazole (PROTONIX) 40 MG tablet TAKE 1 TABLET BY MOUTH DAILY 90 tablet 3    PROGESTERONE MISC 6.25 mg by Misc.(Non-Drug; Combo Route) route every evening.      saw palmetto 160 MG capsule Take 1 capsule by mouth 2 (two) times daily.      selenium 200 mcg Tab Take 1 tablet by mouth  Daily.      UNABLE TO FIND Take 1 tablet by mouth once daily. medication name: T3/T4-SR (TRIIODO-L-LTHYRONINE-LEVOTHYROXINE)      verapamil (VERELAN) 240 MG C24P Take 1 capsule (240 mg total) by mouth 2 (two) times daily. 180 capsule 3     No facility-administered encounter medications on file as of 5/23/2019.      No orders of the defined types were placed in this encounter.    Plan:      Doing well on PAP settings. Patient is compliant. Follow up in 12 months with PAP data download or call earlier if any problems.    Problem List Items Addressed This Visit        Other    CHESTER on CPAP - Primary (Chronic)

## 2019-05-29 DIAGNOSIS — I10 ESSENTIAL HYPERTENSION: Primary | ICD-10-CM

## 2019-06-11 ENCOUNTER — ANTI-COAG VISIT (OUTPATIENT)
Dept: CARDIOLOGY | Facility: CLINIC | Age: 79
End: 2019-06-11
Payer: MEDICARE

## 2019-06-11 ENCOUNTER — CLINICAL SUPPORT (OUTPATIENT)
Dept: CARDIOLOGY | Facility: CLINIC | Age: 79
End: 2019-06-11
Payer: MEDICARE

## 2019-06-11 ENCOUNTER — LAB VISIT (OUTPATIENT)
Dept: LAB | Facility: HOSPITAL | Age: 79
End: 2019-06-11
Attending: INTERNAL MEDICINE
Payer: MEDICARE

## 2019-06-11 ENCOUNTER — OFFICE VISIT (OUTPATIENT)
Dept: CARDIOLOGY | Facility: CLINIC | Age: 79
End: 2019-06-11
Payer: MEDICARE

## 2019-06-11 VITALS
SYSTOLIC BLOOD PRESSURE: 132 MMHG | DIASTOLIC BLOOD PRESSURE: 58 MMHG | BODY MASS INDEX: 24.38 KG/M2 | HEART RATE: 56 BPM | HEIGHT: 72 IN | WEIGHT: 180 LBS

## 2019-06-11 DIAGNOSIS — I10 ESSENTIAL HYPERTENSION: ICD-10-CM

## 2019-06-11 DIAGNOSIS — Z86.73 HISTORY OF CVA (CEREBROVASCULAR ACCIDENT): Chronic | ICD-10-CM

## 2019-06-11 DIAGNOSIS — Z79.01 LONG TERM (CURRENT) USE OF ANTICOAGULANTS: ICD-10-CM

## 2019-06-11 DIAGNOSIS — E78.2 MIXED HYPERLIPIDEMIA: Chronic | ICD-10-CM

## 2019-06-11 DIAGNOSIS — R42 DIZZINESS: ICD-10-CM

## 2019-06-11 DIAGNOSIS — I65.21 STENOSIS OF RIGHT CAROTID ARTERY: ICD-10-CM

## 2019-06-11 DIAGNOSIS — R00.1 SINUS BRADYCARDIA: ICD-10-CM

## 2019-06-11 DIAGNOSIS — Z79.01 CHRONIC ANTICOAGULATION: Chronic | ICD-10-CM

## 2019-06-11 DIAGNOSIS — I70.0 ATHEROSCLEROSIS OF AORTA: Chronic | ICD-10-CM

## 2019-06-11 DIAGNOSIS — I10 ESSENTIAL HYPERTENSION: Primary | Chronic | ICD-10-CM

## 2019-06-11 DIAGNOSIS — G47.33 OSA ON CPAP: Chronic | ICD-10-CM

## 2019-06-11 LAB
INR PPP: 2.6 (ref 0.8–1.2)
PROTHROMBIN TIME: 26.1 SEC (ref 9–12.5)

## 2019-06-11 PROCEDURE — 3075F SYST BP GE 130 - 139MM HG: CPT | Mod: HCNC,CPTII,S$GLB, | Performed by: INTERNAL MEDICINE

## 2019-06-11 PROCEDURE — 85610 PROTHROMBIN TIME: CPT | Mod: HCNC

## 2019-06-11 PROCEDURE — 99999 PR PBB SHADOW E&M-EST. PATIENT-LVL III: ICD-10-PCS | Mod: PBBFAC,HCNC,, | Performed by: INTERNAL MEDICINE

## 2019-06-11 PROCEDURE — 36415 COLL VENOUS BLD VENIPUNCTURE: CPT | Mod: HCNC

## 2019-06-11 PROCEDURE — 99499 RISK ADDL DX/OHS AUDIT: ICD-10-PCS | Mod: HCNC,S$GLB,, | Performed by: INTERNAL MEDICINE

## 2019-06-11 PROCEDURE — 1101F PT FALLS ASSESS-DOCD LE1/YR: CPT | Mod: HCNC,CPTII,S$GLB, | Performed by: INTERNAL MEDICINE

## 2019-06-11 PROCEDURE — 3075F PR MOST RECENT SYSTOLIC BLOOD PRESS GE 130-139MM HG: ICD-10-PCS | Mod: HCNC,CPTII,S$GLB, | Performed by: INTERNAL MEDICINE

## 2019-06-11 PROCEDURE — 93000 EKG 12-LEAD: ICD-10-PCS | Mod: HCNC,S$GLB,, | Performed by: INTERNAL MEDICINE

## 2019-06-11 PROCEDURE — 99214 PR OFFICE/OUTPT VISIT, EST, LEVL IV, 30-39 MIN: ICD-10-PCS | Mod: HCNC,S$GLB,, | Performed by: INTERNAL MEDICINE

## 2019-06-11 PROCEDURE — 99214 OFFICE O/P EST MOD 30 MIN: CPT | Mod: HCNC,S$GLB,, | Performed by: INTERNAL MEDICINE

## 2019-06-11 PROCEDURE — 93000 ELECTROCARDIOGRAM COMPLETE: CPT | Mod: HCNC,S$GLB,, | Performed by: INTERNAL MEDICINE

## 2019-06-11 PROCEDURE — 1101F PR PT FALLS ASSESS DOC 0-1 FALLS W/OUT INJ PAST YR: ICD-10-PCS | Mod: HCNC,CPTII,S$GLB, | Performed by: INTERNAL MEDICINE

## 2019-06-11 PROCEDURE — 3078F DIAST BP <80 MM HG: CPT | Mod: HCNC,CPTII,S$GLB, | Performed by: INTERNAL MEDICINE

## 2019-06-11 PROCEDURE — 99499 UNLISTED E&M SERVICE: CPT | Mod: HCNC,S$GLB,, | Performed by: INTERNAL MEDICINE

## 2019-06-11 PROCEDURE — 3078F PR MOST RECENT DIASTOLIC BLOOD PRESSURE < 80 MM HG: ICD-10-PCS | Mod: HCNC,CPTII,S$GLB, | Performed by: INTERNAL MEDICINE

## 2019-06-11 PROCEDURE — 99999 PR PBB SHADOW E&M-EST. PATIENT-LVL III: CPT | Mod: PBBFAC,HCNC,, | Performed by: INTERNAL MEDICINE

## 2019-06-11 NOTE — PROGRESS NOTES
Subjective:    Patient ID:  Heraclio Wall is a 79 y.o. male who presents for evaluation of Follow-up; Hyperlipidemia; Hypertension; Carotid Artery Disease; and Risk Factor Management For Atherosclerosis      HPI Pt presents for f/u.  His current medical conditions include HTN, carotid artery disease, hyperlipidemia.  Nonsmoker.  Past history pertinent for following:  H/o  CVA 7/13 and was put on Plavix (was on ASA at time). He had admission 5/14 for splenic infarct and had surgery. Dr. Metz did abdominal angio with report showing no evidence of mesenteric stenosis. SAIRA showed no intracardiac source of emboli. He was d/cd home on asa, plavix and coumadin was started. He is now on coumadin only.  - Stress MPI Jan 2017.  He is enrolled in digital HTN program.   Now here.  No chest pain sxs.  No CHF sxs.  Denies dyspnea, pnd/orthopnea.   HTN well controlled.  No associated sxs.  Still followed by digital HTN program.  Lipids due for recheck this year with PCP but have been controlled on statin.  No recurrent TIA/CVA sxs.  No abnl bleeding on warfarin.  ecg today shows sinus mannie 56 bpm, no acute changes.  Stable occasional chronic dizziness. No syncope.  Compliant with meds.  Wife is sick w c diff.  Not exercising much now.   Compliant with CPAP for CHESTER.      Current Outpatient Medications:     arginine 500 mg tablet, Take 1 tablet by mouth 2 (two) times daily., Disp: , Rfl:     ascorbic acid (VITAMIN C) 1000 MG tablet, Take 3 tablets by mouth Daily., Disp: , Rfl:     cholecalciferol, vitamin D3, 1,000 unit capsule, Take 5 capsules by mouth Daily., Disp: , Rfl:     COUMADIN 2.5 mg tablet, Take 1/2 tablet by mouth Wednesdays and Fridays; and 1 tablet on all other days., Disp: 26 tablet, Rfl: 3    cyanocobalamin, vitamin B-12, 1,000 mcg/15 mL Liqd, Take 1 drop by mouth once daily. , Disp: , Rfl:     dextran 70-hypromellose (ARTIFICIAL TEARS) ophthalmic solution, Place 1 drop into both eyes Use as needed.,  Disp: , Rfl:     DIOVAN 80 mg tablet, Take 1 tablet (80 mg total) by mouth 2 (two) times daily., Disp: 180 tablet, Rfl: 1    fluticasone (FLONASE) 50 mcg/actuation nasal spray, Use 2 sprays (100 mcg total) by in each nostril once daily, Disp: 48 g, Rfl: 3    fluvastatin XL (LESCOL XL) 80 mg Tb24, Take one tablet by mouth once daily, Disp: 90 tablet, Rfl: 3    furosemide (LASIX) 40 MG tablet, Take 1 tablet (40 mg total) by mouth once daily., Disp: 90 tablet, Rfl: 3    hydrocortisone (CORTEF) 5 MG Tab, Take 2.5 mg by mouth once daily., Disp: , Rfl:     metoprolol succinate (TOPROL XL) 25 MG 24 hr tablet, Take 1 tablet by mouth once daily. Take an additional tablet if blood pressure is greater than 160, Disp: 180 tablet, Rfl: 3    pantoprazole (PROTONIX) 40 MG tablet, TAKE 1 TABLET BY MOUTH DAILY, Disp: 90 tablet, Rfl: 3    PROGESTERONE MISC, 6.25 mg by Misc.(Non-Drug; Combo Route) route every evening., Disp: , Rfl:     saw palmetto 160 MG capsule, Take 1 capsule by mouth 2 (two) times daily., Disp: , Rfl:     selenium 200 mcg Tab, Take 1 tablet by mouth Daily., Disp: , Rfl:     UNABLE TO FIND, Take 1 tablet by mouth once daily. medication name: T3/T4-SR (TRIIODO-L-LTHYRONINE-LEVOTHYROXINE), Disp: , Rfl:     verapamil (VERELAN) 240 MG C24P, Take 1 capsule (240 mg total) by mouth 2 (two) times daily., Disp: 180 capsule, Rfl: 3    mupirocin (BACTROBAN) 2 % ointment, APPLY ON THE SKIN EVERY DAY, Disp: , Rfl: 2      Review of Systems   Constitution: Negative.   HENT: Negative.    Eyes: Negative.    Cardiovascular: Negative.    Respiratory: Negative.    Endocrine: Negative.    Hematologic/Lymphatic: Negative.    Skin: Negative.    Musculoskeletal: Positive for arthritis.   Gastrointestinal: Negative.    Genitourinary: Negative.    Neurological: Positive for dizziness.   Psychiatric/Behavioral: Negative.    Allergic/Immunologic: Negative.        BP (!) 132/58 (BP Location: Left arm, Patient Position: Sitting, BP  Method: Medium (Manual))   Pulse (!) 56   Ht 6' (1.829 m)   Wt 81.6 kg (180 lb)   BMI 24.41 kg/m²     Wt Readings from Last 3 Encounters:   06/11/19 81.6 kg (180 lb)   05/23/19 81.8 kg (180 lb 5.4 oz)   03/05/19 85.5 kg (188 lb 7.9 oz)     Temp Readings from Last 3 Encounters:   02/15/19 97.6 °F (36.4 °C) (Tympanic)   12/28/18 98.7 °F (37.1 °C) (Oral)   09/21/18 97.9 °F (36.6 °C) (Tympanic)     BP Readings from Last 3 Encounters:   06/11/19 (!) 132/58   05/23/19 136/78   03/05/19 (!) 140/62     Pulse Readings from Last 3 Encounters:   06/11/19 (!) 56   05/23/19 (!) 53   03/05/19 (!) 51          Objective:    Physical Exam   Constitutional: He is oriented to person, place, and time. He appears well-developed and well-nourished.   HENT:   Head: Normocephalic.   Neck: Normal range of motion. Neck supple. Normal carotid pulses, no hepatojugular reflux and no JVD present. Carotid bruit is not present. No thyromegaly present.   Cardiovascular: Regular rhythm, S1 normal and S2 normal. Bradycardia present. PMI is not displaced. Exam reveals no S3, no S4, no distant heart sounds, no friction rub, no midsystolic click and no opening snap.   No murmur heard.  Pulses:       Radial pulses are 2+ on the right side, and 2+ on the left side.   Pulmonary/Chest: Effort normal and breath sounds normal. He has no wheezes. He has no rales.   Abdominal: Soft. Bowel sounds are normal. He exhibits no distension, no abdominal bruit, no ascites and no mass. There is no tenderness.   Musculoskeletal: He exhibits no edema.   Neurological: He is alert and oriented to person, place, and time.   Skin: Skin is warm.   Psychiatric: He has a normal mood and affect. His behavior is normal.   Nursing note and vitals reviewed.      I have reviewed all pertinent labs and cardiac studies.      Chemistry        Component Value Date/Time     03/05/2019 1545    K 4.8 03/05/2019 1545     03/05/2019 1545    CO2 31 (H) 03/05/2019 1545    BUN 19  03/05/2019 1545    CREATININE 1.2 03/05/2019 1545    GLU 79 03/05/2019 1545        Component Value Date/Time    CALCIUM 9.9 03/05/2019 1545    ALKPHOS 71 03/05/2019 1545    AST 24 03/05/2019 1545    ALT 19 03/05/2019 1545    BILITOT 0.9 03/05/2019 1545    ESTGFRAFRICA >60.0 03/05/2019 1545    EGFRNONAA 56.8 (A) 03/05/2019 1545          Lab Results   Component Value Date    WBC 5.92 05/17/2018    HGB 13.3 (L) 05/17/2018    HCT 41.1 05/17/2018    MCV 99 (H) 05/17/2018     05/17/2018     Lab Results   Component Value Date    INR 2.6 (H) 06/11/2019    INR 2.6 05/15/2019    INR 3.0 04/22/2019     Lab Results   Component Value Date    CHOL 162 02/06/2018    CHOL 152 11/30/2017    CHOL 141 11/02/2016     Lab Results   Component Value Date    HDL 42 02/06/2018    HDL 40 11/30/2017    HDL 37 (L) 11/02/2016     Lab Results   Component Value Date    LDLCALC 88.0 02/06/2018    LDLCALC 77.8 11/30/2017    LDLCALC 74.4 11/02/2016     Lab Results   Component Value Date    TRIG 160 (H) 02/06/2018    TRIG 171 (H) 11/30/2017    TRIG 148 11/02/2016     Lab Results   Component Value Date    CHOLHDL 25.9 02/06/2018    CHOLHDL 26.3 11/30/2017    CHOLHDL 26.2 11/02/2016           Assessment:       1. Essential hypertension    2. Atherosclerosis of aorta    3. Chronic anticoagulation    4. Dizziness    5. Stenosis of right carotid artery    6. CHESTER on CPAP    7. Mixed hyperlipidemia    8. History of CVA (cerebrovascular accident)    9. Sinus bradycardia         Plan:             Stable CV conditions on current medical tx.  Continue current medical tx.  Cardiac diet.  Daily exercise, goal 30 + minutes.  Continue digital HTN program monitoring.  Continue CPAP for CHESTER.  Continue coumadin for CVA protection and f/u in coumadin clinic.  F/u 1 year with echo + carotid u/s.

## 2019-06-11 NOTE — PROGRESS NOTES
Patient contacted:  INR remains therapeutic at 2.6.  Reports no recent changes.  Instructed to maintain current dose of Warfarin 1.25 mg every Wednesday and Friday; and 2.5 mg on all other days per week.  Recheck on 7/10/2019 at 1000a (Coumadin Clinic).  Patient repeated back instructions and voiced understanding.

## 2019-06-19 ENCOUNTER — OFFICE VISIT (OUTPATIENT)
Dept: INTERNAL MEDICINE | Facility: CLINIC | Age: 79
End: 2019-06-19
Payer: MEDICARE

## 2019-06-19 VITALS
DIASTOLIC BLOOD PRESSURE: 64 MMHG | HEIGHT: 72 IN | HEART RATE: 58 BPM | TEMPERATURE: 98 F | SYSTOLIC BLOOD PRESSURE: 118 MMHG | WEIGHT: 179.88 LBS | OXYGEN SATURATION: 97 % | BODY MASS INDEX: 24.36 KG/M2

## 2019-06-19 DIAGNOSIS — N40.0 BENIGN PROSTATIC HYPERPLASIA WITHOUT LOWER URINARY TRACT SYMPTOMS: Chronic | ICD-10-CM

## 2019-06-19 DIAGNOSIS — I70.0 ATHEROSCLEROSIS OF AORTA: Chronic | ICD-10-CM

## 2019-06-19 DIAGNOSIS — I65.21 STENOSIS OF RIGHT CAROTID ARTERY: ICD-10-CM

## 2019-06-19 DIAGNOSIS — E29.1 HYPOGONADISM MALE: ICD-10-CM

## 2019-06-19 DIAGNOSIS — E03.9 ACQUIRED HYPOTHYROIDISM: ICD-10-CM

## 2019-06-19 DIAGNOSIS — D64.9 ANEMIA, UNSPECIFIED TYPE: Chronic | ICD-10-CM

## 2019-06-19 DIAGNOSIS — E78.2 MIXED HYPERLIPIDEMIA: Chronic | ICD-10-CM

## 2019-06-19 DIAGNOSIS — H91.8X3 OTHER SPECIFIED HEARING LOSS OF BOTH EARS: Chronic | ICD-10-CM

## 2019-06-19 DIAGNOSIS — G47.33 OSA ON CPAP: Chronic | ICD-10-CM

## 2019-06-19 DIAGNOSIS — N18.30 CHRONIC KIDNEY DISEASE, STAGE 3: Chronic | ICD-10-CM

## 2019-06-19 DIAGNOSIS — M54.50 CHRONIC MIDLINE LOW BACK PAIN WITHOUT SCIATICA: ICD-10-CM

## 2019-06-19 DIAGNOSIS — Z00.00 ENCOUNTER FOR PREVENTIVE HEALTH EXAMINATION: Primary | ICD-10-CM

## 2019-06-19 DIAGNOSIS — Z79.01 CHRONIC ANTICOAGULATION: Chronic | ICD-10-CM

## 2019-06-19 DIAGNOSIS — I10 ESSENTIAL HYPERTENSION: Chronic | ICD-10-CM

## 2019-06-19 DIAGNOSIS — H04.123 INSUFFICIENCY OF TEAR FILM OF BOTH EYES: ICD-10-CM

## 2019-06-19 DIAGNOSIS — J30.1 SEASONAL ALLERGIC RHINITIS DUE TO POLLEN: ICD-10-CM

## 2019-06-19 DIAGNOSIS — M81.0 AGE-RELATED OSTEOPOROSIS WITHOUT CURRENT PATHOLOGICAL FRACTURE: ICD-10-CM

## 2019-06-19 DIAGNOSIS — I25.10 CORONARY ARTERY DISEASE INVOLVING NATIVE CORONARY ARTERY OF NATIVE HEART WITHOUT ANGINA PECTORIS: ICD-10-CM

## 2019-06-19 DIAGNOSIS — K21.9 GASTROESOPHAGEAL REFLUX DISEASE WITHOUT ESOPHAGITIS: Chronic | ICD-10-CM

## 2019-06-19 DIAGNOSIS — Z86.73 HISTORY OF CVA (CEREBROVASCULAR ACCIDENT): Chronic | ICD-10-CM

## 2019-06-19 DIAGNOSIS — G89.29 CHRONIC MIDLINE LOW BACK PAIN WITHOUT SCIATICA: ICD-10-CM

## 2019-06-19 DIAGNOSIS — R00.1 SINUS BRADYCARDIA: ICD-10-CM

## 2019-06-19 DIAGNOSIS — R42 DIZZINESS: ICD-10-CM

## 2019-06-19 DIAGNOSIS — H93.13 TINNITUS OF BOTH EARS: Chronic | ICD-10-CM

## 2019-06-19 PROBLEM — J69.0 ASPIRATION PNEUMONIA: Status: RESOLVED | Noted: 2018-12-21 | Resolved: 2019-06-19

## 2019-06-19 PROBLEM — J69.0 ASPIRATION PNEUMONIA: Status: ACTIVE | Noted: 2018-12-21

## 2019-06-19 PROCEDURE — 99999 PR PBB SHADOW E&M-EST. PATIENT-LVL V: ICD-10-PCS | Mod: PBBFAC,HCNC,, | Performed by: PHYSICIAN ASSISTANT

## 2019-06-19 PROCEDURE — G0439 PR MEDICARE ANNUAL WELLNESS SUBSEQUENT VISIT: ICD-10-PCS | Mod: HCNC,S$GLB,, | Performed by: PHYSICIAN ASSISTANT

## 2019-06-19 PROCEDURE — 3078F PR MOST RECENT DIASTOLIC BLOOD PRESSURE < 80 MM HG: ICD-10-PCS | Mod: HCNC,CPTII,S$GLB, | Performed by: PHYSICIAN ASSISTANT

## 2019-06-19 PROCEDURE — G0439 PPPS, SUBSEQ VISIT: HCPCS | Mod: HCNC,S$GLB,, | Performed by: PHYSICIAN ASSISTANT

## 2019-06-19 PROCEDURE — 3074F SYST BP LT 130 MM HG: CPT | Mod: HCNC,CPTII,S$GLB, | Performed by: PHYSICIAN ASSISTANT

## 2019-06-19 PROCEDURE — 3074F PR MOST RECENT SYSTOLIC BLOOD PRESSURE < 130 MM HG: ICD-10-PCS | Mod: HCNC,CPTII,S$GLB, | Performed by: PHYSICIAN ASSISTANT

## 2019-06-19 PROCEDURE — 3078F DIAST BP <80 MM HG: CPT | Mod: HCNC,CPTII,S$GLB, | Performed by: PHYSICIAN ASSISTANT

## 2019-06-19 PROCEDURE — 99999 PR PBB SHADOW E&M-EST. PATIENT-LVL V: CPT | Mod: PBBFAC,HCNC,, | Performed by: PHYSICIAN ASSISTANT

## 2019-06-19 NOTE — PROGRESS NOTES
Heraclio Wall presented for a  Medicare AWV and comprehensive Health Risk Assessment today. The following components were reviewed and updated:    · Medical history  · Family History  · Social history  · Allergies and Current Medications  · Health Risk Assessment  · Health Maintenance  · Care Team     ** See Completed Assessments for Annual Wellness Visit within the encounter summary.**       The following assessments were completed:  · Living Situation  · CAGE  · Depression Screening  · Timed Get Up and Go  · Whisper Test  · Cognitive Function Screening  · Nutrition Screening  · ADL Screening  · PAQ Screening    Vitals:    06/19/19 1005   BP: 118/64   BP Location: Right arm   Patient Position: Sitting   Pulse: (!) 58   Temp: 97.9 °F (36.6 °C)   TempSrc: Tympanic   SpO2: 97%   Weight: 81.6 kg (179 lb 14.3 oz)   Height: 6' (1.829 m)     Body mass index is 24.4 kg/m².  Physical Exam   Constitutional: He is oriented to person, place, and time. Vital signs are normal. He appears well-developed and well-nourished. He is active. No distress.   HENT:   Head: Normocephalic and atraumatic.   Cardiovascular: Normal rate, regular rhythm and normal heart sounds. Exam reveals no gallop and no friction rub.   No murmur heard.  Pulmonary/Chest: Effort normal and breath sounds normal. No respiratory distress. He has no wheezes. He has no rales.   Neurological: He is alert and oriented to person, place, and time.   Skin: Skin is warm. He is not diaphoretic.   Psychiatric: He has a normal mood and affect. His behavior is normal. Judgment and thought content normal.   Nursing note and vitals reviewed.        Diagnoses and health risks identified today and associated recommendations/orders:    1. Encounter for preventive health examination  -completed today.   -due for shingles vaccine. Advised to get at pharmacy  -due for colon cancer screening. Advised to discuss with his PCP at his upcoming appointment.   -advised to discuss  tinnitus, dizziness, and hearing loss with PCP about getting a referral to see ENT.     2. Other specified hearing loss of both ears  -tinnitus bilaterally  -has not seen ENT. Advised to discuss with his PCP    3. Essential hypertension  -Stable and controlled on verapamil, diovan, lasix, and metoprolol. Continue current treatment plan as previously prescribed with your PCP and cardiologist.     4. CHESTER on CPAP  -uses CPAP nightly  --Stable and controlled. Continue current treatment plan as previously prescribed with your pulmonologist.    5. Tinnitus of both ears  -not controlled. Needs to see ENT. Advised to discuss with pcp at the upcoming appointment.     6. Gastroesophageal reflux disease without esophagitis  -Stable and controlled on protonix. Continue current treatment plan as previously prescribed with your PCP.     7. Mixed hyperlipidemia  -Stable and controlled on fluvastatin. Continue current treatment plan as previously prescribed with your PCP.   Lab Results   Component Value Date    CHOL 162 02/06/2018    CHOL 152 11/30/2017    CHOL 141 11/02/2016     Lab Results   Component Value Date    HDL 42 02/06/2018    HDL 40 11/30/2017    HDL 37 (L) 11/02/2016     Lab Results   Component Value Date    LDLCALC 88.0 02/06/2018    LDLCALC 77.8 11/30/2017    LDLCALC 74.4 11/02/2016     Lab Results   Component Value Date    TRIG 160 (H) 02/06/2018    TRIG 171 (H) 11/30/2017    TRIG 148 11/02/2016     Lab Results   Component Value Date    CHOLHDL 25.9 02/06/2018    CHOLHDL 26.3 11/30/2017    CHOLHDL 26.2 11/02/2016       8. Anemia, unspecified type  Lab Results   Component Value Date    WBC 5.92 05/17/2018    HGB 13.3 (L) 05/17/2018    HCT 41.1 05/17/2018    MCV 99 (H) 05/17/2018     05/17/2018   -Stable and controlled. Continue current treatment plan as previously prescribed with your PCP.     9. Atherosclerosis of aorta  -CTA 11/29/2014  -Stable and controlled. Continue current treatment plan as previously  prescribed with your PCP and cardiologist.    10. Chronic anticoagulation  -coumadin 2.5mg. Wednesday and Friday take 1/2 tablet.   -Stable and controlled. Continue current treatment plan as previously prescribed with your cardiologist.    11. Chronic kidney disease, stage 3  Lab Results   Component Value Date    CREATININE 1.2 03/05/2019    BUN 19 03/05/2019     03/05/2019    K 4.8 03/05/2019     03/05/2019    CO2 31 (H) 03/05/2019   -Stable and controlled. Continue current treatment plan as previously prescribed with your PCP.   -No NSAIDS    12. History of CVA (cerebrovascular accident)  -Stable and controlled. Continue current treatment plan as previously prescribed with your PCP.   -left lacunar stroke 7/31/2013. Doing well. Denies any residual effects.     13. Benign prostatic hyperplasia without lower urinary tract symptoms  -stable and controlled. Continue current treatment plan as previously prescribed with your urologist, Dr. Olson.    14. Hypogonadism male  -Stable and controlled. Continue current treatment plan as previously prescribed with your urologist, Dr. Olson.    15. Acquired hypothyroidism  -Stable and controlled. Continue current treatment plan as previously prescribed with your PCP.   Lab Results   Component Value Date    TSH 1.111 02/06/2018       16. Stenosis of right carotid artery  -Carotid ultrasound 1/22/2018: CONCLUSIONS There is 20 - 39% right Internal Carotid stenosis. There is 0 - 19% left Internal Carotid stenosis.  12/19/14- CTA neck: Peripheral fibers plaque noted at the origin left ICA with less than 10% stenosis. Please note: The appearance of the plaque on axial and reformatted images raises the less likely possibility of small intimal flap or dissection.    12-3-15- Carotid us- 20 - 39% right Internal Carotid stenosis. 20 - 39% left Internal Carotid stenosis. Focal, eccentric calcific plaque at the right bulb and proximal ICA with approximately 35%  stenosis.  12/27/16: Carotid US: 20 - 39% right Internal Carotid stenosis.  0 - 19% left Internal Carotid stenosis.  -Stable and controlled on statin and coumadin. Continue current treatment plan as previously prescribed with your cardiologist.    17. Insufficiency of tear film of both eyes  -see's eye doctor once a year.   -Stable and controlled. Continue current treatment plan as previously prescribed with your ophthalmologist/optometrist.    18. Chronic midline low back pain without sciatica  -Stable and controlled. Improves with activity and stretching. Continue current treatment plan as previously prescribed with your PCP.     19. Seasonal allergic rhinitis due to pollen  -Stable and controlled. Continue current treatment plan as previously prescribed with your PCP.     20. Dizziness  -gets dizzy when he gets up to fast or turns his head quickly  -advised to discuss with PCP, may need referral to ENT.    21. Sinus bradycardia  -Stable and controlled. Continue current treatment plan as previously prescribed with your cardiologist.    22. Age-related osteoporosis without current pathological fracture  -Stable and controlled. Continue current treatment plan as previously prescribed with your rheumatology.  -reclast injections.   -DEXA 7/3/2018    23. Coronary artery disease involving native coronary artery of native heart without angina pectoris  -CT chest 5/15/2019  -Stable and controlled. Continue current treatment plan as previously prescribed with your cardiologist.      Provided Heraclio with a 5-10 year written screening schedule and personal prevention plan. Recommendations were developed using the USPSTF age appropriate recommendations. Education, counseling, and referrals were provided as needed. After Visit Summary printed and given to patient which includes a list of additional screenings\tests needed.    No follow-ups on file.    Marietta Whatley PA-C

## 2019-06-19 NOTE — PATIENT INSTRUCTIONS
Counseling and Referral of Other Preventative  (Italic type indicates deductible and co-insurance are waived)    Patient Name: Heraclio Wall  Today's Date: 6/19/2019    Health Maintenance       Date Due Completion Date    Shingles Vaccine (2 of 3) 04/26/2012 3/1/2012    Lipid Panel 02/06/2019 2/6/2018    Fecal Occult Blood Test (FOBT)/[employer]Kit 05/08/2019 5/8/2018    Influenza Vaccine 08/01/2019 10/18/2018    Colonoscopy 11/29/2022 11/29/2017 (ClinicallyNA)    Override on 11/29/2017: Not Clinically Appropriate    Override on 10/17/2005: Done    TETANUS VACCINE 02/04/2025 2/4/2015        No orders of the defined types were placed in this encounter.    The following information is provided to all patients.  This information is to help you find resources for any of the problems found today that may be affecting your health:                Living healthy guide: www.[facility].[place].[facility]      Understanding Diabetes: www.diabetes.org      Eating healthy: www.cdc.gov/healthyweight      [facility] home safety checklist: www.cdc.gov/steadi/patient.html      Agency on Aging: www.goea.[place].[facility]      Alcoholics anonymous (AA): www.aa.org      Physical Activity: www.tyshawn.nih.gov/ky6apab      Tobacco use: www.quitwithusla.org

## 2019-06-20 NOTE — PROGRESS NOTES
Last 5 Patient Entered Readings                                      Current 30 Day Average: 141/69     Recent Readings 6/9/2019 6/9/2019 6/9/2019 5/24/2019 5/24/2019    SBP (mmHg) 150 150 151 132 137    DBP (mmHg) 70 69 73 67 67    Pulse 50 50 51 48 46          Digital Medicine: Health  Follow Up    Lifestyle Modifications:    1.Dietary Modifications (Sodium intake <2,000mg/day, food labels, dining out): Patient continues monitoring his sodium intake. He denies any changes that would cause an increase in salt intake.     2.Physical Activity: Deferred    3.Medication Therapy: Patient has been compliant with the medication regimen. Patient is doing well on his current BP medication regimen. He denies symptoms/side effects.     4.Patient has the following medication side effects/concerns: None  (Frequency/Alleviating factors/Precipitating factors, etc.)     Patient stated that he has been feeling really good and he will continue taking his BP readings as often as possible.     Follow up with Mr. Heraclio Wall completed. No further questions or concerns. Will continue to follow up to achieve health goals.

## 2019-07-10 ENCOUNTER — ANTI-COAG VISIT (OUTPATIENT)
Dept: CARDIOLOGY | Facility: CLINIC | Age: 79
End: 2019-07-10
Payer: MEDICARE

## 2019-07-10 DIAGNOSIS — Z86.73 HISTORY OF CVA (CEREBROVASCULAR ACCIDENT): Chronic | ICD-10-CM

## 2019-07-10 DIAGNOSIS — Z79.01 LONG TERM (CURRENT) USE OF ANTICOAGULANTS: Primary | ICD-10-CM

## 2019-07-10 LAB — INR PPP: 1.7 (ref 2–3)

## 2019-07-10 PROCEDURE — 85610 POCT INR: ICD-10-PCS | Mod: QW,HCNC,S$GLB, | Performed by: INTERNAL MEDICINE

## 2019-07-10 PROCEDURE — 93793 PR ANTICOAGULANT MGMT FOR PT TAKING WARFARIN: ICD-10-PCS | Mod: HCNC,S$GLB,,

## 2019-07-10 PROCEDURE — 85610 PROTHROMBIN TIME: CPT | Mod: QW,HCNC,S$GLB, | Performed by: INTERNAL MEDICINE

## 2019-07-10 PROCEDURE — 93793 ANTICOAG MGMT PT WARFARIN: CPT | Mod: HCNC,S$GLB,,

## 2019-07-10 NOTE — PROGRESS NOTES
Patient's INR is sub-therapeutic at 1.7.   reports drinking a boost this morning - interactions discussed.  Patient normally avoids supplemental drinks.    No abnormal pain, swelling or headaches noted.  Instructions given for patient to take warfarin 2.5mg on today; then resume current dose of 1.25mg on Wednesdays, Fridays and 2.5mg on all other days of the week.  Patient voiced understanding.  Follow-up in 3 weeks.

## 2019-07-11 ENCOUNTER — PATIENT MESSAGE (OUTPATIENT)
Dept: ADMINISTRATIVE | Facility: OTHER | Age: 79
End: 2019-07-11

## 2019-07-29 ENCOUNTER — PATIENT OUTREACH (OUTPATIENT)
Dept: OTHER | Facility: OTHER | Age: 79
End: 2019-07-29

## 2019-07-29 NOTE — PROGRESS NOTES
Last 5 Patient Entered Readings                                      Current 30 Day Average: 134/67     Recent Readings 7/23/2019 7/23/2019 7/21/2019 7/21/2019 7/21/2019    SBP (mmHg) 152 154 136 140 130    DBP (mmHg) 77 77 62 78 66    Pulse 50 51 50 51 49          Digital Medicine: Health  Follow Up    Lifestyle Modifications:    1.Dietary Modifications (Sodium intake <2,000mg/day, food labels, dining out): Patent continues maintaining a low sodium diet. He has been caring for his wife and cooking her meals as well. He is trying to avoid the highly salted foods.     2.Physical Activity: Deferred    3.Medication Therapy: Patient has been compliant with the medication regimen. Patient is doing well on his current BP medication regimen. He denies symptoms/side effects.     4.Patient has the following medication side effects/concerns: None  (Frequency/Alleviating factors/Precipitating factors, etc.)     Patients wife has C-diff in her intestines so he has been caring for her with this for the past 3-4 weeks.     Follow up with Mr. Heraclio Wall completed. No further questions or concerns. Will continue to follow up to achieve health goals.

## 2019-07-31 ENCOUNTER — ANTI-COAG VISIT (OUTPATIENT)
Dept: CARDIOLOGY | Facility: CLINIC | Age: 79
End: 2019-07-31
Payer: MEDICARE

## 2019-07-31 DIAGNOSIS — Z79.01 LONG TERM (CURRENT) USE OF ANTICOAGULANTS: Primary | ICD-10-CM

## 2019-07-31 DIAGNOSIS — Z86.73 HISTORY OF CVA (CEREBROVASCULAR ACCIDENT): Chronic | ICD-10-CM

## 2019-07-31 LAB — INR PPP: 3.2 (ref 2–3)

## 2019-07-31 PROCEDURE — 93793 ANTICOAG MGMT PT WARFARIN: CPT | Mod: HCNC,S$GLB,,

## 2019-07-31 PROCEDURE — 85610 POCT INR: ICD-10-PCS | Mod: QW,HCNC,S$GLB, | Performed by: NUCLEAR MEDICINE

## 2019-07-31 PROCEDURE — 85610 PROTHROMBIN TIME: CPT | Mod: QW,HCNC,S$GLB, | Performed by: NUCLEAR MEDICINE

## 2019-07-31 PROCEDURE — 93793 PR ANTICOAGULANT MGMT FOR PT TAKING WARFARIN: ICD-10-PCS | Mod: HCNC,S$GLB,,

## 2019-08-01 ENCOUNTER — PATIENT OUTREACH (OUTPATIENT)
Dept: ADMINISTRATIVE | Facility: HOSPITAL | Age: 79
End: 2019-08-01

## 2019-08-05 ENCOUNTER — TELEPHONE (OUTPATIENT)
Dept: FAMILY MEDICINE | Facility: CLINIC | Age: 79
End: 2019-08-05

## 2019-08-05 NOTE — TELEPHONE ENCOUNTER
----- Message from Dayana Castillo sent at 8/5/2019  8:51 AM CDT -----  Contact: Self  Patient is wanting to reschedule either his 08/15/19 or his wife's 08/14/19 appointment. They would like to come in on the same day if possible. Please call patient back at [phone]

## 2019-08-06 DIAGNOSIS — I10 ESSENTIAL HYPERTENSION: Chronic | ICD-10-CM

## 2019-08-06 RX ORDER — VALSARTAN 80 MG
80 TABLET ORAL 2 TIMES DAILY
Qty: 180 TABLET | Refills: 3 | Status: SHIPPED | OUTPATIENT
Start: 2019-08-06 | End: 2020-08-31 | Stop reason: SDUPTHER

## 2019-08-07 NOTE — PROGRESS NOTES
Subjective:      Patient ID: Heraclio Wall is a 79 y.o. male.    Chief Complaint: Annual Exam (6 months no male )      HPI  Here for f/u medical problems and preventive exam.  Wt loss 17# in past 6mo, has been caring for ill wife.  Denies depression.  Taking allergy med for sinus drainage.  No f/c/sw/cough.  No cp/sob/palp.  BMs normal, no black or blood.  Urine normal.  No vit D.  Doing well on cpap, no reflux lately.    HM:  10/18 fluvax, 8/19 today HAV, 12/15 [id number], 12/16 [id number], 2/15 TDaP, 3/12 zostavax, 7/18 BMD rep 2y, 7/12 Cscope no repeat, 5/18 FIT neg.     Review of Systems   Constitutional: Negative for appetite change, chills, diaphoresis, fatigue and fever.   HENT: Negative for congestion, ear pain, rhinorrhea and sinus pressure.    Respiratory: Negative for cough and shortness of breath.    Cardiovascular: Negative for chest pain and palpitations.   Gastrointestinal: Negative for abdominal distention, abdominal pain, blood in stool, constipation, diarrhea, nausea and vomiting.   Genitourinary: Negative for difficulty urinating, dysuria, frequency, hematuria and urgency.   Musculoskeletal: Negative for arthralgias.   Skin: Negative for rash.   Neurological: Negative for dizziness and headaches.   Psychiatric/Behavioral: The patient is not nervous/anxious.          Objective:   BP (!) 118/57 (BP Location: Left arm, Patient Position: Sitting, BP Method: Medium (Automatic))   Pulse 66   Temp 97.5 °F (36.4 °C) (Temporal)   Ht 6' (1.829 m)   Wt 78.3 kg (172 lb 9.9 oz)   SpO2 97%   BMI 23.41 kg/m²     Physical Exam   Constitutional: He is oriented to person, place, and time. He appears well-developed and well-nourished.   HENT:   Right Ear: External ear normal. Tympanic membrane is not injected.   Left Ear: External ear normal. Tympanic membrane is not injected.   Mouth/Throat: Oropharynx is clear and moist.   Eyes: Conjunctivae are normal.   Neck: Normal range of motion. Neck supple. No  thyromegaly present.   Cardiovascular: Normal rate, regular rhythm and intact distal pulses. Exam reveals no gallop and no friction rub.   No murmur heard.  Pulmonary/Chest: Effort normal and breath sounds normal. He has no wheezes. He has no rales.   Abdominal: Soft. Bowel sounds are normal. He exhibits no mass. There is no tenderness.   Musculoskeletal: He exhibits no edema.   Lymphadenopathy:     He has no cervical adenopathy.   Neurological: He is alert and oriented to person, place, and time.   Psychiatric: He has a normal mood and affect.       Results for orders placed or performed in visit on 08/08/19   CBC auto differential   Result Value Ref Range    WBC 6.15 3.90 - 12.70 K/uL    RBC 4.08 (L) 4.60 - 6.20 M/uL    Hemoglobin 12.9 (L) 14.0 - 18.0 g/dL    Hematocrit 40.1 40.0 - 54.0 %    Mean Corpuscular Volume 98 82 - 98 fL    Mean Corpuscular Hemoglobin 31.6 (H) 27.0 - 31.0 pg    Mean Corpuscular Hemoglobin Conc 32.2 32.0 - 36.0 g/dL    RDW 12.1 11.5 - 14.5 %    Platelets 183 150 - 350 K/uL    MPV 10.3 9.2 - 12.9 fL    Immature Granulocytes 0.3 0.0 - 0.5 %    Gran # (ANC) 3.4 1.8 - 7.7 K/uL    Immature Grans (Abs) 0.02 0.00 - 0.04 K/uL    Lymph # 2.1 1.0 - 4.8 K/uL    Mono # 0.5 0.3 - 1.0 K/uL    Eos # 0.1 0.0 - 0.5 K/uL    Baso # 0.03 0.00 - 0.20 K/uL    nRBC 0 0 /100 WBC    Gran% 55.1 38.0 - 73.0 %    Lymph% 33.8 18.0 - 48.0 %    Mono% 8.3 4.0 - 15.0 %    Eosinophil% 2.0 0.0 - 8.0 %    Basophil% 0.5 0.0 - 1.9 %    Differential Method Automated    Comprehensive metabolic panel   Result Value Ref Range    Sodium 142 136 - 145 mmol/L    Potassium 4.7 3.5 - 5.1 mmol/L    Chloride 105 95 - 110 mmol/L    CO2 27 23 - 29 mmol/L    Glucose 89 70 - 110 mg/dL    BUN, Bld 19 8 - 23 mg/dL    Creatinine 1.3 0.5 - 1.4 mg/dL    Calcium 10.1 8.7 - 10.5 mg/dL    Total Protein 7.5 6.0 - 8.4 g/dL    Albumin 4.3 3.5 - 5.2 g/dL    Total Bilirubin 1.1 (H) 0.1 - 1.0 mg/dL    Alkaline Phosphatase 67 55 - 135 U/L    AST 26 10 - 40  U/L    ALT 21 10 - 44 U/L    Anion Gap 10 8 - 16 mmol/L    eGFR if African American 59.2 (A) >60 mL/min/1.73 m^2    eGFR if non  51.2 (A) >60 mL/min/1.73 m^2   Lipid panel   Result Value Ref Range    Cholesterol 137 120 - 199 mg/dL    Triglycerides 113 30 - 150 mg/dL    HDL 42 40 - 75 mg/dL    LDL Cholesterol 72.4 63.0 - 159.0 mg/dL    Hdl/Cholesterol Ratio 30.7 20.0 - 50.0 %    Total Cholesterol/HDL Ratio 3.3 2.0 - 5.0    Non-HDL Cholesterol 95 mg/dL   TSH   Result Value Ref Range    TSH 0.799 0.400 - 4.000 uIU/mL   Vitamin D   Result Value Ref Range    Vit D, 25-Hydroxy 38 30 - 96 ng/mL         Assessment:       1. Encounter for preventive health examination    2. CHESTER on CPAP    3. Mixed hyperlipidemia    4. History of CVA (cerebrovascular accident)    5. Gastroesophageal reflux disease without esophagitis    6. Essential hypertension    7. Coronary artery disease involving native coronary artery of native heart without angina pectoris    8. Chronic kidney disease, stage 3    9. Chronic anticoagulation    10. Age-related osteoporosis without current pathological fracture    11. Acquired hypothyroidism    12. Weight loss          Plan:     Encounter for preventive health examination- -     Hepatitis A Vaccine (Adult) (IM)    CHESTER on CPAP, doing well.    Mixed hyperlipidemia- cont statin.    Gastroesophageal reflux disease without esophagitis- doing well.    Essential hypertension- stable, cont rx.    Coronary artery disease, History of CVA (cerebrovascular accident), Chronic anticoagulation- f/w cards and coumadin clinic.    Chronic kidney disease, stage 3- stable, cont to follow.    Age-related osteoporosis without current pathological fracture    Acquired hypothyroidism- Clinically stable, continue present treatment.    Weight loss likely due to wife illness, recheck 3mo.    Other orders

## 2019-08-08 ENCOUNTER — LAB VISIT (OUTPATIENT)
Dept: LAB | Facility: HOSPITAL | Age: 79
End: 2019-08-08
Attending: INTERNAL MEDICINE
Payer: MEDICARE

## 2019-08-08 DIAGNOSIS — Z29.9 PREVENTIVE MEASURE: ICD-10-CM

## 2019-08-08 DIAGNOSIS — E03.9 ACQUIRED HYPOTHYROIDISM: ICD-10-CM

## 2019-08-08 DIAGNOSIS — N18.30 CHRONIC KIDNEY DISEASE, STAGE 3: Chronic | ICD-10-CM

## 2019-08-08 LAB
25(OH)D3+25(OH)D2 SERPL-MCNC: 38 NG/ML (ref 30–96)
ALBUMIN SERPL BCP-MCNC: 4.3 G/DL (ref 3.5–5.2)
ALP SERPL-CCNC: 67 U/L (ref 55–135)
ALT SERPL W/O P-5'-P-CCNC: 21 U/L (ref 10–44)
ANION GAP SERPL CALC-SCNC: 10 MMOL/L (ref 8–16)
AST SERPL-CCNC: 26 U/L (ref 10–40)
BASOPHILS # BLD AUTO: 0.03 K/UL (ref 0–0.2)
BASOPHILS NFR BLD: 0.5 % (ref 0–1.9)
BILIRUB SERPL-MCNC: 1.1 MG/DL (ref 0.1–1)
BUN SERPL-MCNC: 19 MG/DL (ref 8–23)
CALCIUM SERPL-MCNC: 10.1 MG/DL (ref 8.7–10.5)
CHLORIDE SERPL-SCNC: 105 MMOL/L (ref 95–110)
CHOLEST SERPL-MCNC: 137 MG/DL (ref 120–199)
CHOLEST/HDLC SERPL: 3.3 {RATIO} (ref 2–5)
CO2 SERPL-SCNC: 27 MMOL/L (ref 23–29)
CREAT SERPL-MCNC: 1.3 MG/DL (ref 0.5–1.4)
DIFFERENTIAL METHOD: ABNORMAL
EOSINOPHIL # BLD AUTO: 0.1 K/UL (ref 0–0.5)
EOSINOPHIL NFR BLD: 2 % (ref 0–8)
ERYTHROCYTE [DISTWIDTH] IN BLOOD BY AUTOMATED COUNT: 12.1 % (ref 11.5–14.5)
EST. GFR  (AFRICAN AMERICAN): 59.2 ML/MIN/1.73 M^2
EST. GFR  (NON AFRICAN AMERICAN): 51.2 ML/MIN/1.73 M^2
GLUCOSE SERPL-MCNC: 89 MG/DL (ref 70–110)
HCT VFR BLD AUTO: 40.1 % (ref 40–54)
HDLC SERPL-MCNC: 42 MG/DL (ref 40–75)
HDLC SERPL: 30.7 % (ref 20–50)
HGB BLD-MCNC: 12.9 G/DL (ref 14–18)
IMM GRANULOCYTES # BLD AUTO: 0.02 K/UL (ref 0–0.04)
IMM GRANULOCYTES NFR BLD AUTO: 0.3 % (ref 0–0.5)
LDLC SERPL CALC-MCNC: 72.4 MG/DL (ref 63–159)
LYMPHOCYTES # BLD AUTO: 2.1 K/UL (ref 1–4.8)
LYMPHOCYTES NFR BLD: 33.8 % (ref 18–48)
MCH RBC QN AUTO: 31.6 PG (ref 27–31)
MCHC RBC AUTO-ENTMCNC: 32.2 G/DL (ref 32–36)
MCV RBC AUTO: 98 FL (ref 82–98)
MONOCYTES # BLD AUTO: 0.5 K/UL (ref 0.3–1)
MONOCYTES NFR BLD: 8.3 % (ref 4–15)
NEUTROPHILS # BLD AUTO: 3.4 K/UL (ref 1.8–7.7)
NEUTROPHILS NFR BLD: 55.1 % (ref 38–73)
NONHDLC SERPL-MCNC: 95 MG/DL
NRBC BLD-RTO: 0 /100 WBC
PLATELET # BLD AUTO: 183 K/UL (ref 150–350)
PMV BLD AUTO: 10.3 FL (ref 9.2–12.9)
POTASSIUM SERPL-SCNC: 4.7 MMOL/L (ref 3.5–5.1)
PROT SERPL-MCNC: 7.5 G/DL (ref 6–8.4)
RBC # BLD AUTO: 4.08 M/UL (ref 4.6–6.2)
SODIUM SERPL-SCNC: 142 MMOL/L (ref 136–145)
TRIGL SERPL-MCNC: 113 MG/DL (ref 30–150)
TSH SERPL DL<=0.005 MIU/L-ACNC: 0.8 UIU/ML (ref 0.4–4)
WBC # BLD AUTO: 6.15 K/UL (ref 3.9–12.7)

## 2019-08-08 PROCEDURE — 80061 LIPID PANEL: CPT | Mod: HCNC

## 2019-08-08 PROCEDURE — 85025 COMPLETE CBC W/AUTO DIFF WBC: CPT | Mod: HCNC

## 2019-08-08 PROCEDURE — 84443 ASSAY THYROID STIM HORMONE: CPT | Mod: HCNC

## 2019-08-08 PROCEDURE — 80053 COMPREHEN METABOLIC PANEL: CPT | Mod: HCNC

## 2019-08-08 PROCEDURE — 36415 COLL VENOUS BLD VENIPUNCTURE: CPT | Mod: HCNC

## 2019-08-08 PROCEDURE — 82306 VITAMIN D 25 HYDROXY: CPT | Mod: HCNC

## 2019-08-14 ENCOUNTER — OFFICE VISIT (OUTPATIENT)
Dept: FAMILY MEDICINE | Facility: CLINIC | Age: 79
End: 2019-08-14
Payer: MEDICARE

## 2019-08-14 VITALS
WEIGHT: 172.63 LBS | DIASTOLIC BLOOD PRESSURE: 57 MMHG | OXYGEN SATURATION: 97 % | BODY MASS INDEX: 23.38 KG/M2 | HEIGHT: 72 IN | TEMPERATURE: 98 F | SYSTOLIC BLOOD PRESSURE: 118 MMHG | HEART RATE: 66 BPM

## 2019-08-14 DIAGNOSIS — I10 ESSENTIAL HYPERTENSION: Chronic | ICD-10-CM

## 2019-08-14 DIAGNOSIS — Z00.00 ENCOUNTER FOR PREVENTIVE HEALTH EXAMINATION: Primary | ICD-10-CM

## 2019-08-14 DIAGNOSIS — G47.33 OSA ON CPAP: Chronic | ICD-10-CM

## 2019-08-14 DIAGNOSIS — Z86.73 HISTORY OF CVA (CEREBROVASCULAR ACCIDENT): Chronic | ICD-10-CM

## 2019-08-14 DIAGNOSIS — N18.30 CHRONIC KIDNEY DISEASE, STAGE 3: Chronic | ICD-10-CM

## 2019-08-14 DIAGNOSIS — E78.2 MIXED HYPERLIPIDEMIA: Chronic | ICD-10-CM

## 2019-08-14 DIAGNOSIS — E03.9 ACQUIRED HYPOTHYROIDISM: ICD-10-CM

## 2019-08-14 DIAGNOSIS — Z79.01 CHRONIC ANTICOAGULATION: Chronic | ICD-10-CM

## 2019-08-14 DIAGNOSIS — I25.10 CORONARY ARTERY DISEASE INVOLVING NATIVE CORONARY ARTERY OF NATIVE HEART WITHOUT ANGINA PECTORIS: ICD-10-CM

## 2019-08-14 DIAGNOSIS — K21.9 GASTROESOPHAGEAL REFLUX DISEASE WITHOUT ESOPHAGITIS: Chronic | ICD-10-CM

## 2019-08-14 DIAGNOSIS — M81.0 AGE-RELATED OSTEOPOROSIS WITHOUT CURRENT PATHOLOGICAL FRACTURE: ICD-10-CM

## 2019-08-14 DIAGNOSIS — R63.4 WEIGHT LOSS: ICD-10-CM

## 2019-08-14 PROCEDURE — 99397 PR PREVENTIVE VISIT,EST,65 & OVER: ICD-10-PCS | Mod: 25,HCNC,S$GLB, | Performed by: INTERNAL MEDICINE

## 2019-08-14 PROCEDURE — 3074F SYST BP LT 130 MM HG: CPT | Mod: HCNC,CPTII,S$GLB, | Performed by: INTERNAL MEDICINE

## 2019-08-14 PROCEDURE — 99999 PR PBB SHADOW E&M-EST. PATIENT-LVL V: CPT | Mod: PBBFAC,HCNC,, | Performed by: INTERNAL MEDICINE

## 2019-08-14 PROCEDURE — 3078F DIAST BP <80 MM HG: CPT | Mod: HCNC,CPTII,S$GLB, | Performed by: INTERNAL MEDICINE

## 2019-08-14 PROCEDURE — 90471 HEPATITIS A VACCINE ADULT IM: ICD-10-PCS | Mod: HCNC,S$GLB,, | Performed by: INTERNAL MEDICINE

## 2019-08-14 PROCEDURE — 90632 HEPATITIS A VACCINE ADULT IM: ICD-10-PCS | Mod: HCNC,S$GLB,, | Performed by: INTERNAL MEDICINE

## 2019-08-14 PROCEDURE — 90632 HEPA VACCINE ADULT IM: CPT | Mod: HCNC,S$GLB,, | Performed by: INTERNAL MEDICINE

## 2019-08-14 PROCEDURE — 90471 IMMUNIZATION ADMIN: CPT | Mod: HCNC,S$GLB,, | Performed by: INTERNAL MEDICINE

## 2019-08-14 PROCEDURE — 3074F PR MOST RECENT SYSTOLIC BLOOD PRESSURE < 130 MM HG: ICD-10-PCS | Mod: HCNC,CPTII,S$GLB, | Performed by: INTERNAL MEDICINE

## 2019-08-14 PROCEDURE — 3078F PR MOST RECENT DIASTOLIC BLOOD PRESSURE < 80 MM HG: ICD-10-PCS | Mod: HCNC,CPTII,S$GLB, | Performed by: INTERNAL MEDICINE

## 2019-08-14 PROCEDURE — 99397 PER PM REEVAL EST PAT 65+ YR: CPT | Mod: 25,HCNC,S$GLB, | Performed by: INTERNAL MEDICINE

## 2019-08-14 PROCEDURE — 99999 PR PBB SHADOW E&M-EST. PATIENT-LVL V: ICD-10-PCS | Mod: PBBFAC,HCNC,, | Performed by: INTERNAL MEDICINE

## 2019-08-14 RX ORDER — FLUTICASONE PROPIONATE 50 MCG
2 SPRAY, SUSPENSION (ML) NASAL DAILY
Qty: 16 G | Refills: 6 | Status: SHIPPED | OUTPATIENT
Start: 2019-08-14 | End: 2020-02-04

## 2019-08-14 NOTE — PROGRESS NOTES
Patient tolerated injection well in left deltoid. I asked the patient to wait in the clinic for 15 minutes before leaving to verify no adverse reactions. Patient verbally verified understanding./VLW

## 2019-08-21 ENCOUNTER — ANTI-COAG VISIT (OUTPATIENT)
Dept: CARDIOLOGY | Facility: CLINIC | Age: 79
End: 2019-08-21
Payer: MEDICARE

## 2019-08-21 DIAGNOSIS — Z86.73 HISTORY OF CVA (CEREBROVASCULAR ACCIDENT): Chronic | ICD-10-CM

## 2019-08-21 DIAGNOSIS — Z79.01 LONG TERM (CURRENT) USE OF ANTICOAGULANTS: Primary | ICD-10-CM

## 2019-08-21 LAB — INR PPP: 4.7 (ref 2–3)

## 2019-08-21 PROCEDURE — 93793 ANTICOAG MGMT PT WARFARIN: CPT | Mod: HCNC,S$GLB,,

## 2019-08-21 PROCEDURE — 85610 POCT INR: ICD-10-PCS | Mod: QW,HCNC,S$GLB, | Performed by: INTERNAL MEDICINE

## 2019-08-21 PROCEDURE — 93793 PR ANTICOAGULANT MGMT FOR PT TAKING WARFARIN: ICD-10-PCS | Mod: HCNC,S$GLB,,

## 2019-08-21 PROCEDURE — 85610 PROTHROMBIN TIME: CPT | Mod: QW,HCNC,S$GLB, | Performed by: INTERNAL MEDICINE

## 2019-08-21 NOTE — PROGRESS NOTES
Accompanied by grandson:  Patient's INR is supratherapeutic at 4.7.   Reports Flonase started on 8/14/2019:  No interactions were found between Coumadin and fluticasone (Flonase). This does not necessarily mean no interactions exist.  No signs of any abnormal bleeding reported.  Instructed to hold Warfarin dose today - only, then start new dose of Warfarin 1.25 mg every Monday, Wednesday and Friday; and 2.5 mg on all other days per week.  Will monitor closely.  Patient requested lab appointments.  Will recheck on 9/04/2019 ([facility]).  Advised to seek medical attention (ED) for any signs of abnormal bleeding, if needed.  Dose calendar given and reviewed with patient and patient's grandson - Both voiced understanding.

## 2019-08-22 DIAGNOSIS — Z79.01 LONG TERM (CURRENT) USE OF ANTICOAGULANTS: ICD-10-CM

## 2019-08-22 NOTE — TELEPHONE ENCOUNTER
Refill sent:    Warfarin 1.25 mg every Monday, Wednesday and Friday; and 2.5 mg on all other days of the week.

## 2019-09-04 ENCOUNTER — LAB VISIT (OUTPATIENT)
Dept: LAB | Facility: HOSPITAL | Age: 79
End: 2019-09-04
Attending: INTERNAL MEDICINE
Payer: MEDICARE

## 2019-09-04 ENCOUNTER — ANTI-COAG VISIT (OUTPATIENT)
Dept: CARDIOLOGY | Facility: CLINIC | Age: 79
End: 2019-09-04
Payer: MEDICARE

## 2019-09-04 DIAGNOSIS — Z79.01 LONG TERM (CURRENT) USE OF ANTICOAGULANTS: ICD-10-CM

## 2019-09-04 DIAGNOSIS — Z86.73 HISTORY OF CVA (CEREBROVASCULAR ACCIDENT): Chronic | ICD-10-CM

## 2019-09-04 LAB
INR PPP: 3.1 (ref 0.8–1.2)
PROTHROMBIN TIME: 32.7 SEC (ref 9–12.5)

## 2019-09-04 PROCEDURE — 93793 ANTICOAG MGMT PT WARFARIN: CPT | Mod: S$GLB,,,

## 2019-09-04 PROCEDURE — 85610 PROTHROMBIN TIME: CPT | Mod: HCNC

## 2019-09-04 PROCEDURE — 36415 COLL VENOUS BLD VENIPUNCTURE: CPT | Mod: HCNC,PO

## 2019-09-04 PROCEDURE — 93793 PR ANTICOAGULANT MGMT FOR PT TAKING WARFARIN: ICD-10-PCS | Mod: S$GLB,,,

## 2019-09-04 NOTE — PROGRESS NOTES
Patient's INR is supra-therapeutic at 3.1.  Patient contacted.  No significant changes or extra doses reported.  No signs of bleeding noted; advised patient to seek immediate medical attention if he notices any abnormal bleeding.  Instructions given for patient to lower dose of warfarin to 2.5mg on Tuesdays, Thursdays, Saturdays; and 1.25mg on all other days of the week.  Patient repeated directions and voiced understanding.  Recheck INR in 2 weeks.

## 2019-09-18 ENCOUNTER — ANTI-COAG VISIT (OUTPATIENT)
Dept: CARDIOLOGY | Facility: CLINIC | Age: 79
End: 2019-09-18
Payer: MEDICARE

## 2019-09-18 ENCOUNTER — LAB VISIT (OUTPATIENT)
Dept: LAB | Facility: HOSPITAL | Age: 79
End: 2019-09-18
Attending: INTERNAL MEDICINE
Payer: MEDICARE

## 2019-09-18 ENCOUNTER — OFFICE VISIT (OUTPATIENT)
Dept: URGENT CARE | Facility: CLINIC | Age: 79
End: 2019-09-18
Payer: MEDICARE

## 2019-09-18 VITALS
BODY MASS INDEX: 23.8 KG/M2 | HEIGHT: 72 IN | DIASTOLIC BLOOD PRESSURE: 64 MMHG | RESPIRATION RATE: 16 BRPM | SYSTOLIC BLOOD PRESSURE: 138 MMHG | TEMPERATURE: 98 F | WEIGHT: 175.69 LBS

## 2019-09-18 DIAGNOSIS — R09.82 POSTNASAL DRIP: ICD-10-CM

## 2019-09-18 DIAGNOSIS — Z79.01 LONG TERM (CURRENT) USE OF ANTICOAGULANTS: ICD-10-CM

## 2019-09-18 DIAGNOSIS — Z86.73 HISTORY OF CVA (CEREBROVASCULAR ACCIDENT): Chronic | ICD-10-CM

## 2019-09-18 DIAGNOSIS — J01.90 ACUTE SINUSITIS, RECURRENCE NOT SPECIFIED, UNSPECIFIED LOCATION: Primary | ICD-10-CM

## 2019-09-18 LAB
INR PPP: 1.6 (ref 0.8–1.2)
PROTHROMBIN TIME: 16.8 SEC (ref 9–12.5)

## 2019-09-18 PROCEDURE — 1101F PR PT FALLS ASSESS DOC 0-1 FALLS W/OUT INJ PAST YR: ICD-10-PCS | Mod: HCNC,CPTII,S$GLB, | Performed by: NURSE PRACTITIONER

## 2019-09-18 PROCEDURE — 36415 COLL VENOUS BLD VENIPUNCTURE: CPT | Mod: HCNC,PO

## 2019-09-18 PROCEDURE — 1101F PT FALLS ASSESS-DOCD LE1/YR: CPT | Mod: HCNC,CPTII,S$GLB, | Performed by: NURSE PRACTITIONER

## 2019-09-18 PROCEDURE — 99214 OFFICE O/P EST MOD 30 MIN: CPT | Mod: HCNC,S$GLB,, | Performed by: NURSE PRACTITIONER

## 2019-09-18 PROCEDURE — 3078F DIAST BP <80 MM HG: CPT | Mod: HCNC,CPTII,S$GLB, | Performed by: NURSE PRACTITIONER

## 2019-09-18 PROCEDURE — 3078F PR MOST RECENT DIASTOLIC BLOOD PRESSURE < 80 MM HG: ICD-10-PCS | Mod: HCNC,CPTII,S$GLB, | Performed by: NURSE PRACTITIONER

## 2019-09-18 PROCEDURE — 93793 ANTICOAG MGMT PT WARFARIN: CPT | Mod: S$GLB,,,

## 2019-09-18 PROCEDURE — 99999 PR PBB SHADOW E&M-EST. PATIENT-LVL IV: ICD-10-PCS | Mod: PBBFAC,HCNC,, | Performed by: NURSE PRACTITIONER

## 2019-09-18 PROCEDURE — 85610 PROTHROMBIN TIME: CPT | Mod: HCNC

## 2019-09-18 PROCEDURE — 3075F SYST BP GE 130 - 139MM HG: CPT | Mod: HCNC,CPTII,S$GLB, | Performed by: NURSE PRACTITIONER

## 2019-09-18 PROCEDURE — 99999 PR PBB SHADOW E&M-EST. PATIENT-LVL IV: CPT | Mod: PBBFAC,HCNC,, | Performed by: NURSE PRACTITIONER

## 2019-09-18 PROCEDURE — 93793 PR ANTICOAGULANT MGMT FOR PT TAKING WARFARIN: ICD-10-PCS | Mod: S$GLB,,,

## 2019-09-18 PROCEDURE — 99214 PR OFFICE/OUTPT VISIT, EST, LEVL IV, 30-39 MIN: ICD-10-PCS | Mod: HCNC,S$GLB,, | Performed by: NURSE PRACTITIONER

## 2019-09-18 PROCEDURE — 3075F PR MOST RECENT SYSTOLIC BLOOD PRESS GE 130-139MM HG: ICD-10-PCS | Mod: HCNC,CPTII,S$GLB, | Performed by: NURSE PRACTITIONER

## 2019-09-18 RX ORDER — FLUTICASONE PROPIONATE 50 MCG
2 SPRAY, SUSPENSION (ML) NASAL DAILY
Qty: 1 BOTTLE | Refills: 0 | Status: SHIPPED | OUTPATIENT
Start: 2019-09-18 | End: 2019-10-02

## 2019-09-18 NOTE — PROGRESS NOTES
Patient's INR is sub-therapeutic at 1.6.  Patient contacted.  No missed does or significant changes reported.  Patient will begin taking Flonase on today.  No abnormal pain, swelling or headaches noted.  Instructions given for patient to take warfarin 2.5mg on today; then resume current dose of 2.5mg on Tuesdays, Thursdays, Saturdays and 1.25mg on all other days of the week.  Patient repeated directions and voiced understanding.  Follow-up in 2 weeks.

## 2019-09-18 NOTE — PATIENT INSTRUCTIONS

## 2019-09-18 NOTE — PROGRESS NOTES
"Subjective:      Patient ID: Heraclio Wall is a 79 y.o. male.    Chief Complaint: Nasal Congestion and Cough    /64 (BP Location: Left arm, Patient Position: Sitting, BP Method: Small (Manual))   Temp 98.1 °F (36.7 °C) (Oral)   Resp 16   Ht 6' (1.829 m)   Wt 79.7 kg (175 lb 11.3 oz)   BMI 23.83 kg/m²     Sinusitis   This is a new problem. The current episode started in the past 7 days. The problem has been waxing and waning since onset. There has been no fever. His pain is at a severity of 0/10. He is experiencing no pain. Associated symptoms include congestion (nasal), coughing (occasional dry cough but "not often" ) and sneezing. Pertinent negatives include no chills, ear pain, headaches, hoarse voice, shortness of breath, sinus pressure, sore throat or swollen glands. Treatments tried: coricidin. The treatment provided mild relief.       Review of patient's allergies indicates:   Allergen Reactions    Milk Shortness Of Breath     Diarrhea    Olive extract Anaphylaxis    Tea tree Anaphylaxis    Apple Other (See Comments)     Other reaction(s): sinus problems    Benicar [olmesartan] Other (See Comments)     Stomach upset  Diarrhea    Cardizem [diltiazem hcl] Other (See Comments)     Hallucinations    Clonidine      Dry mouth    Clonidine hcl      Pounding in the ears    Clopidogrel Other (See Comments)     Generic only-felt bad [okay with Branded Plavix}    Dicyclomine Other (See Comments)     lightheaded    Hydralazine analogues      tachycardia    Lipitor [atorvastatin] Other (See Comments)     muscle spasms    Lopressor [metoprolol tartrate]      Felt bad question milk in it    Norvasc [amlodipine] Other (See Comments)     Headache  Flushing (skin)    Pineapple Other (See Comments)     sinus problems    Bactrim [sulfamethoxazole-trimethoprim] Itching and Rash        Review of Systems   Constitutional: Negative for chills, fever and malaise/fatigue.   HENT: Positive for congestion " "(nasal) and sneezing. Negative for ear discharge, ear pain, hoarse voice, sinus pressure, sinus pain and sore throat.    Respiratory: Positive for cough (occasional dry cough but "not often" ). Negative for sputum production, shortness of breath and wheezing.    Cardiovascular: Negative for chest pain and palpitations.   Neurological: Negative for dizziness, weakness and headaches.   All other systems reviewed and are negative.     Objective:      Physical Exam   Constitutional: He is oriented to person, place, and time. Vital signs are normal. He appears well-developed and well-nourished. He is cooperative.   HENT:   Head: Normocephalic and atraumatic.   Right Ear: Tympanic membrane and ear canal normal.   Left Ear: Tympanic membrane and ear canal normal.   Nose: Mucosal edema present. No rhinorrhea. Right sinus exhibits no maxillary sinus tenderness. Left sinus exhibits no maxillary sinus tenderness.   Mouth/Throat: Uvula is midline and mucous membranes are normal. Posterior oropharyngeal erythema (cobblestoning pattern) present. No oropharyngeal exudate, posterior oropharyngeal edema or tonsillar abscesses.   Eyes: Pupils are equal, round, and reactive to light. Conjunctivae, EOM and lids are normal.   Neck: Normal range of motion. Neck supple.   Cardiovascular: Normal rate, regular rhythm and normal heart sounds.   Pulmonary/Chest: Effort normal and breath sounds normal.   Musculoskeletal: Normal range of motion.   Neurological: He is alert and oriented to person, place, and time. No cranial nerve deficit.   Skin: Skin is warm and dry. Capillary refill takes less than 2 seconds.   Psychiatric: He has a normal mood and affect.   Vitals reviewed.      Assessment:       1. Acute sinusitis, recurrence not specified, unspecified location    2. Postnasal drip        Plan:     Acute sinusitis, recurrence not specified, unspecified location  -     fluticasone propionate (FLONASE) 50 mcg/actuation nasal spray; 2 sprays " (100 mcg total) by Each Nostril route once daily. for 14 days  Dispense: 1 Bottle; Refill: 0    Postnasal drip    · Rest and increase fluids.   · May apply warm compresses as needed.   · Saline nasal spray or saline irrigation (Neti pot) to loosen nasal congestion.  · Flonase or Nasacort to reduce inflammation in the sinus cavities.  · Follow up with your primary care provider or with ENT if not improved within a few days or sooner for any new or worsening symptoms.   · Go to the ER for any fever that does not improve with Tylenol/Ibuprofen, neck stiffness, rash, severe headache, vision changes, shortness of breath, chest pain, severe facial pain or swelling, or for any other new and concerning symptoms.

## 2019-10-02 ENCOUNTER — LAB VISIT (OUTPATIENT)
Dept: LAB | Facility: HOSPITAL | Age: 79
End: 2019-10-02
Attending: INTERNAL MEDICINE
Payer: MEDICARE

## 2019-10-02 DIAGNOSIS — Z79.01 LONG TERM (CURRENT) USE OF ANTICOAGULANTS: ICD-10-CM

## 2019-10-02 LAB
INR PPP: 2.3 (ref 0.8–1.2)
PROTHROMBIN TIME: 21.9 SEC (ref 9–12.5)

## 2019-10-02 PROCEDURE — 85610 PROTHROMBIN TIME: CPT | Mod: HCNC

## 2019-10-02 PROCEDURE — 36415 COLL VENOUS BLD VENIPUNCTURE: CPT | Mod: HCNC,PO

## 2019-10-03 ENCOUNTER — PATIENT OUTREACH (OUTPATIENT)
Dept: OTHER | Facility: OTHER | Age: 79
End: 2019-10-03

## 2019-10-03 ENCOUNTER — ANTI-COAG VISIT (OUTPATIENT)
Dept: CARDIOLOGY | Facility: CLINIC | Age: 79
End: 2019-10-03
Payer: MEDICARE

## 2019-10-03 DIAGNOSIS — Z79.01 LONG TERM (CURRENT) USE OF ANTICOAGULANTS: ICD-10-CM

## 2019-10-03 DIAGNOSIS — Z86.73 HISTORY OF CVA (CEREBROVASCULAR ACCIDENT): Chronic | ICD-10-CM

## 2019-10-03 PROCEDURE — 93793 PR ANTICOAGULANT MGMT FOR PT TAKING WARFARIN: ICD-10-PCS | Mod: S$GLB,,,

## 2019-10-03 PROCEDURE — 93793 ANTICOAG MGMT PT WARFARIN: CPT | Mod: S$GLB,,,

## 2019-10-03 NOTE — PROGRESS NOTES
Patient's INR is therapeutic at 2.3.  Patient contacted.  Patient has taken medication as previously instructed.  No significant changes reported.  Instructions given for patient to continue current dose of Warfarin 2.5 mg every Tuesday, Thursday, Saturday; and 1.25 mg on all other days of the week.  Patient repeated instructions and voiced understanding.  Follow-up in 3 weeks.

## 2019-10-23 ENCOUNTER — LAB VISIT (OUTPATIENT)
Dept: LAB | Facility: HOSPITAL | Age: 79
End: 2019-10-23
Attending: INTERNAL MEDICINE
Payer: MEDICARE

## 2019-10-23 DIAGNOSIS — Z79.01 LONG TERM (CURRENT) USE OF ANTICOAGULANTS: ICD-10-CM

## 2019-10-23 LAB
INR PPP: 2.6 (ref 0.8–1.2)
PROTHROMBIN TIME: 27.7 SEC (ref 9–12.5)

## 2019-10-23 PROCEDURE — 36415 COLL VENOUS BLD VENIPUNCTURE: CPT | Mod: HCNC,PO

## 2019-10-23 PROCEDURE — 85610 PROTHROMBIN TIME: CPT | Mod: HCNC

## 2019-10-24 ENCOUNTER — ANTI-COAG VISIT (OUTPATIENT)
Dept: CARDIOLOGY | Facility: CLINIC | Age: 79
End: 2019-10-24
Payer: MEDICARE

## 2019-10-24 DIAGNOSIS — Z86.73 HISTORY OF CVA (CEREBROVASCULAR ACCIDENT): Chronic | ICD-10-CM

## 2019-10-24 DIAGNOSIS — Z79.01 LONG TERM (CURRENT) USE OF ANTICOAGULANTS: ICD-10-CM

## 2019-10-24 PROCEDURE — 93793 ANTICOAG MGMT PT WARFARIN: CPT | Mod: S$GLB,,,

## 2019-10-24 PROCEDURE — 93793 PR ANTICOAGULANT MGMT FOR PT TAKING WARFARIN: ICD-10-PCS | Mod: S$GLB,,,

## 2019-10-24 NOTE — PROGRESS NOTES
Patient's INR is therapeutic at 2.6.  Patient contacted.  No significant changes reported.  Instructions given for patient to continue current dose of Warfarin 2.5 mg every Tuesday, Thursday, Saturday; and 1.25 mg on all other days of the week.  Patient repeated instructions and voiced understanding.  Follow-up in 3 weeks ([facility]).

## 2019-10-28 ENCOUNTER — OFFICE VISIT (OUTPATIENT)
Dept: OPHTHALMOLOGY | Facility: CLINIC | Age: 79
End: 2019-10-28
Payer: MEDICARE

## 2019-10-28 DIAGNOSIS — Z96.1 PSEUDOPHAKIA OF BOTH EYES: ICD-10-CM

## 2019-10-28 DIAGNOSIS — H02.052 TRICHIASIS OF RIGHT LOWER EYELID: Primary | ICD-10-CM

## 2019-10-28 DIAGNOSIS — H11.001 PTERYGIUM, RIGHT: ICD-10-CM

## 2019-10-28 DIAGNOSIS — H02.054 TRICHIASIS OF LEFT UPPER EYELID: ICD-10-CM

## 2019-10-28 DIAGNOSIS — H04.123 DRY EYES, BILATERAL: ICD-10-CM

## 2019-10-28 PROCEDURE — 99999 PR PBB SHADOW E&M-EST. PATIENT-LVL II: CPT | Mod: PBBFAC,HCNC,, | Performed by: OPHTHALMOLOGY

## 2019-10-28 PROCEDURE — 67820 REVISE EYELASHES: CPT | Mod: HCNC,50,S$GLB, | Performed by: OPHTHALMOLOGY

## 2019-10-28 PROCEDURE — 92012 PR EYE EXAM, EST PATIENT,INTERMED: ICD-10-PCS | Mod: 25,HCNC,S$GLB, | Performed by: OPHTHALMOLOGY

## 2019-10-28 PROCEDURE — 99999 PR PBB SHADOW E&M-EST. PATIENT-LVL II: ICD-10-PCS | Mod: PBBFAC,HCNC,, | Performed by: OPHTHALMOLOGY

## 2019-10-28 PROCEDURE — 67820 PR REVISE EYELASHES,FORCEPS: ICD-10-PCS | Mod: HCNC,50,S$GLB, | Performed by: OPHTHALMOLOGY

## 2019-10-28 PROCEDURE — 92012 INTRM OPH EXAM EST PATIENT: CPT | Mod: 25,HCNC,S$GLB, | Performed by: OPHTHALMOLOGY

## 2019-10-28 RX ORDER — CYCLOSPORINE 0.5 MG/ML
1 EMULSION OPHTHALMIC 2 TIMES DAILY
Qty: 60 VIAL | Refills: 12 | Status: SHIPPED | OUTPATIENT
Start: 2019-10-28 | End: 2020-10-28

## 2019-10-28 NOTE — PROGRESS NOTES
"SUBJECTIVE:   Heraclio Wall is a 79 y.o. male   Corrected distance visual acuity was 20/40 -1 in the right eye and 20/40 -1 in the left eye.   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 He's still   using tears as needed. Did not update his glasses last visit. No ocular   pain or irritation.     1. PCIOL OU   Yag os 11/2/15  2. ERM OD  3. Dry Eyes  Restasis burned  4. Pterygium OD  5. Dermatochalasis  6. fhx of glaucoma  7. Hx of Trichiasis RLL    OU: AFT's PF PRN    Last edited by Doroteo Mullen on 10/28/2019  2:00 PM. (History)        Assessment /Plan :  1. Trichiasis of right lower eyelid FORCEPS EPILATION PROCEDURE:    Pt has symptomatic trichiasis of the OU eyelid(s).  Risk, benefits and alternatives to cilia removal by forceps was reviewed and pt requested that this be performed at this time.  AkTaine gtts introduced into both eyes and cilia forceps, under slit lamp imagnification,  were used for epilation, removing the following number of cilia:  RUL: 0  JONATHAN: 1  RLL: 1   LLL: 0      RTC prn or as scheduled     2. Trichiasis of left upper eyelid as stated above   3. Pterygium, right  -- Condition stable, no therapeutic change required. Monitoring routinely.     4. Pseudophakia of both eyes  -- Condition stable, no therapeutic change required. Monitoring routinely.     5. Dry eyes, bilateral can prescribe Restasis BID OU cont at's qid ou         Return to clinic in 1 year  or as needed.  With GOCT and Dilation            "

## 2019-11-01 ENCOUNTER — IMMUNIZATION (OUTPATIENT)
Dept: PHARMACY | Facility: CLINIC | Age: 79
End: 2019-11-01
Payer: MEDICARE

## 2019-11-08 NOTE — PROGRESS NOTES
Digital Medicine: Health  Follow-Up    The history is provided by the patient.     Follow Up  Follow-up reason(s): routine education          Intervention/Plan    There are no preventive care reminders to display for this patient.    Last 5 Patient Entered Readings                                      Current 30 Day Average: 137/69     Recent Readings 11/4/2019 11/4/2019 10/29/2019 10/29/2019 10/29/2019    SBP (mmHg) 144 153 124 118 117    DBP (mmHg) 72 75 58 63 64    Pulse 54 55 57 58 58                      Diet Screening   No change to diet.      Physical Activity Screening   No change to exercise routine.        Medication Adherence Screening   He misses doses: never      Patient identified the following reasons for non-compliance: none    Patient is doing well on his current BP medication regimen. He denies symptoms/side effects.       SDOH

## 2019-11-11 ENCOUNTER — TELEPHONE (OUTPATIENT)
Dept: FAMILY MEDICINE | Facility: CLINIC | Age: 79
End: 2019-11-11

## 2019-11-11 NOTE — TELEPHONE ENCOUNTER
----- Message from Laura Mcneill sent at 11/11/2019 11:33 AM CST -----  Contact: wife  Would like to reschedule appt due to the weather.  Next available is not until Feb.

## 2019-11-14 ENCOUNTER — ANTI-COAG VISIT (OUTPATIENT)
Dept: CARDIOLOGY | Facility: CLINIC | Age: 79
End: 2019-11-14
Payer: MEDICARE

## 2019-11-14 ENCOUNTER — LAB VISIT (OUTPATIENT)
Dept: LAB | Facility: HOSPITAL | Age: 79
End: 2019-11-14
Attending: INTERNAL MEDICINE
Payer: MEDICARE

## 2019-11-14 DIAGNOSIS — Z86.73 HISTORY OF CVA (CEREBROVASCULAR ACCIDENT): Chronic | ICD-10-CM

## 2019-11-14 DIAGNOSIS — Z79.01 LONG TERM (CURRENT) USE OF ANTICOAGULANTS: ICD-10-CM

## 2019-11-14 LAB
INR PPP: 2.1 (ref 0.8–1.2)
PROTHROMBIN TIME: 22.2 SEC (ref 9–12.5)

## 2019-11-14 PROCEDURE — 93793 ANTICOAG MGMT PT WARFARIN: CPT | Mod: S$GLB,,,

## 2019-11-14 PROCEDURE — 36415 COLL VENOUS BLD VENIPUNCTURE: CPT | Mod: HCNC,PO

## 2019-11-14 PROCEDURE — 93793 PR ANTICOAGULANT MGMT FOR PT TAKING WARFARIN: ICD-10-PCS | Mod: S$GLB,,,

## 2019-11-14 PROCEDURE — 85610 PROTHROMBIN TIME: CPT | Mod: HCNC

## 2019-11-14 NOTE — PROGRESS NOTES
Patient contacted:  INR is therapeutic at 2.1.  No significant changes reported.  Instructions given for patient to continue current dose of Warfarin 2.5 mg every Tuesday, Thursday, Saturday; and 1.25 mg on all other days of the week.  Patient repeated instructions and voiced understanding.  Follow-up in 1 month ([facility]).

## 2019-12-02 ENCOUNTER — PATIENT MESSAGE (OUTPATIENT)
Dept: ADMINISTRATIVE | Facility: OTHER | Age: 79
End: 2019-12-02

## 2019-12-12 ENCOUNTER — LAB VISIT (OUTPATIENT)
Dept: LAB | Facility: HOSPITAL | Age: 79
End: 2019-12-12
Attending: INTERNAL MEDICINE
Payer: MEDICARE

## 2019-12-12 DIAGNOSIS — Z79.01 LONG TERM (CURRENT) USE OF ANTICOAGULANTS: ICD-10-CM

## 2019-12-12 LAB
INR PPP: 2.2 (ref 0.8–1.2)
PROTHROMBIN TIME: 21 SEC (ref 9–12.5)

## 2019-12-12 PROCEDURE — 85610 PROTHROMBIN TIME: CPT | Mod: HCNC

## 2019-12-12 PROCEDURE — 36415 COLL VENOUS BLD VENIPUNCTURE: CPT | Mod: HCNC,PO

## 2019-12-12 RX ORDER — FLUVASTATIN SODIUM 80 MG/1
TABLET, FILM COATED, EXTENDED RELEASE ORAL DAILY
Qty: 90 TABLET | Refills: 3 | Status: SHIPPED | OUTPATIENT
Start: 2019-12-12 | End: 2020-06-16

## 2019-12-13 ENCOUNTER — ANTI-COAG VISIT (OUTPATIENT)
Dept: CARDIOLOGY | Facility: CLINIC | Age: 79
End: 2019-12-13
Payer: MEDICARE

## 2019-12-13 DIAGNOSIS — Z79.01 LONG TERM (CURRENT) USE OF ANTICOAGULANTS: ICD-10-CM

## 2019-12-13 DIAGNOSIS — Z86.73 HISTORY OF CVA (CEREBROVASCULAR ACCIDENT): Chronic | ICD-10-CM

## 2019-12-13 PROCEDURE — 93793 PR ANTICOAGULANT MGMT FOR PT TAKING WARFARIN: ICD-10-PCS | Mod: S$GLB,,,

## 2019-12-13 PROCEDURE — 93793 ANTICOAG MGMT PT WARFARIN: CPT | Mod: S$GLB,,,

## 2019-12-13 NOTE — PROGRESS NOTES
Patient contacted:  INR is therapeutic at 2.2.  No significant changes reported.  Instructions were given for patient to continue current dose of Warfarin 2.5 mg every Tuesday, Thursday, Saturday; and 1.25 mg on all other days of the week.  Patient repeated instructions and voiced understanding.  Follow-up in 1 month ([facility]).

## 2019-12-13 NOTE — TELEPHONE ENCOUNTER
Refill sent: Warfarin 2.5 mg every Tuesday, Thursday, Saturday; and 1.25 mg on all other days of the week.

## 2019-12-27 ENCOUNTER — PATIENT OUTREACH (OUTPATIENT)
Dept: OTHER | Facility: OTHER | Age: 79
End: 2019-12-27

## 2020-01-06 RX ORDER — FUROSEMIDE 40 MG/1
40 TABLET ORAL DAILY
Qty: 90 TABLET | Refills: 3 | Status: SHIPPED | OUTPATIENT
Start: 2020-01-06 | End: 2020-09-28

## 2020-01-09 ENCOUNTER — ANTI-COAG VISIT (OUTPATIENT)
Dept: CARDIOLOGY | Facility: CLINIC | Age: 80
End: 2020-01-09
Payer: MEDICARE

## 2020-01-09 ENCOUNTER — LAB VISIT (OUTPATIENT)
Dept: LAB | Facility: HOSPITAL | Age: 80
End: 2020-01-09
Attending: INTERNAL MEDICINE
Payer: MEDICARE

## 2020-01-09 DIAGNOSIS — Z86.73 HISTORY OF CVA (CEREBROVASCULAR ACCIDENT): Chronic | ICD-10-CM

## 2020-01-09 DIAGNOSIS — Z79.01 LONG TERM (CURRENT) USE OF ANTICOAGULANTS: ICD-10-CM

## 2020-01-09 LAB
INR PPP: 2 (ref 0.8–1.2)
PROTHROMBIN TIME: 20.3 SEC (ref 9–12.5)

## 2020-01-09 PROCEDURE — 93793 PR ANTICOAGULANT MGMT FOR PT TAKING WARFARIN: ICD-10-PCS | Mod: S$GLB,,,

## 2020-01-09 PROCEDURE — 85610 PROTHROMBIN TIME: CPT | Mod: HCNC

## 2020-01-09 PROCEDURE — 36415 COLL VENOUS BLD VENIPUNCTURE: CPT | Mod: HCNC,PO

## 2020-01-09 PROCEDURE — 93793 ANTICOAG MGMT PT WARFARIN: CPT | Mod: S$GLB,,,

## 2020-01-09 NOTE — PROGRESS NOTES
Patient contacted:  INR is therapeutic at 2.0.  No recent changes or upcoming procedures reported.  Instructions were given to continue same dose of Warfarin 2.5 mg every Tuesday, Thursday, Saturday; and 1.25 mg on all other days of the week.  Patient repeated instructions and voiced understanding.  Follow-up on 2/06/2020 ([facility]).

## 2020-01-14 ENCOUNTER — OFFICE VISIT (OUTPATIENT)
Dept: DERMATOLOGY | Facility: CLINIC | Age: 80
End: 2020-01-14
Payer: MEDICARE

## 2020-01-14 DIAGNOSIS — L98.8 EROSIVE PUSTULAR DERMATOSIS: Primary | ICD-10-CM

## 2020-01-14 PROCEDURE — 99999 PR PBB SHADOW E&M-EST. PATIENT-LVL III: ICD-10-PCS | Mod: PBBFAC,HCNC,, | Performed by: DERMATOLOGY

## 2020-01-14 PROCEDURE — 87070 CULTURE OTHR SPECIMN AEROBIC: CPT | Mod: HCNC

## 2020-01-14 PROCEDURE — 99999 PR PBB SHADOW E&M-EST. PATIENT-LVL III: CPT | Mod: PBBFAC,HCNC,, | Performed by: DERMATOLOGY

## 2020-01-14 PROCEDURE — 87077 CULTURE AEROBIC IDENTIFY: CPT | Mod: HCNC

## 2020-01-14 PROCEDURE — 1101F PR PT FALLS ASSESS DOC 0-1 FALLS W/OUT INJ PAST YR: ICD-10-PCS | Mod: HCNC,CPTII,S$GLB, | Performed by: DERMATOLOGY

## 2020-01-14 PROCEDURE — 99202 PR OFFICE/OUTPT VISIT, NEW, LEVL II, 15-29 MIN: ICD-10-PCS | Mod: HCNC,S$GLB,, | Performed by: DERMATOLOGY

## 2020-01-14 PROCEDURE — 1125F AMNT PAIN NOTED PAIN PRSNT: CPT | Mod: HCNC,S$GLB,, | Performed by: DERMATOLOGY

## 2020-01-14 PROCEDURE — 1159F PR MEDICATION LIST DOCUMENTED IN MEDICAL RECORD: ICD-10-PCS | Mod: HCNC,S$GLB,, | Performed by: DERMATOLOGY

## 2020-01-14 PROCEDURE — 1125F PR PAIN SEVERITY QUANTIFIED, PAIN PRESENT: ICD-10-PCS | Mod: HCNC,S$GLB,, | Performed by: DERMATOLOGY

## 2020-01-14 PROCEDURE — 1101F PT FALLS ASSESS-DOCD LE1/YR: CPT | Mod: HCNC,CPTII,S$GLB, | Performed by: DERMATOLOGY

## 2020-01-14 PROCEDURE — 99202 OFFICE O/P NEW SF 15 MIN: CPT | Mod: HCNC,S$GLB,, | Performed by: DERMATOLOGY

## 2020-01-14 PROCEDURE — 1159F MED LIST DOCD IN RCRD: CPT | Mod: HCNC,S$GLB,, | Performed by: DERMATOLOGY

## 2020-01-14 PROCEDURE — 87101 SKIN FUNGI CULTURE: CPT | Mod: HCNC

## 2020-01-14 PROCEDURE — 87186 SC STD MICRODIL/AGAR DIL: CPT | Mod: HCNC

## 2020-01-14 RX ORDER — HYDROCODONE BITARTRATE AND ACETAMINOPHEN 5; 325 MG/1; MG/1
1 TABLET ORAL EVERY 4 HOURS PRN
COMMUNITY
Start: 2020-01-12 | End: 2020-06-16

## 2020-01-14 RX ORDER — DOXYCYCLINE 100 MG/1
100 CAPSULE ORAL 2 TIMES DAILY
Qty: 14 CAPSULE | Refills: 0 | Status: SHIPPED | OUTPATIENT
Start: 2020-01-14 | End: 2020-01-21 | Stop reason: SDUPTHER

## 2020-01-14 RX ORDER — ONDANSETRON 4 MG/1
4 TABLET, ORALLY DISINTEGRATING ORAL EVERY 8 HOURS PRN
COMMUNITY
Start: 2020-01-12 | End: 2020-01-17

## 2020-01-14 RX ORDER — PANTOPRAZOLE SODIUM 40 MG/1
TABLET, DELAYED RELEASE ORAL
Qty: 90 TABLET | Refills: 3 | OUTPATIENT
Start: 2020-01-14

## 2020-01-14 RX ORDER — BETAMETHASONE DIPROPIONATE 0.5 MG/G
OINTMENT TOPICAL 2 TIMES DAILY
Qty: 45 G | Refills: 1 | Status: SHIPPED | OUTPATIENT
Start: 2020-01-14 | End: 2020-01-14 | Stop reason: SDUPTHER

## 2020-01-14 RX ORDER — BETAMETHASONE DIPROPIONATE 0.5 MG/G
OINTMENT TOPICAL 2 TIMES DAILY
Qty: 45 G | Refills: 1 | Status: SHIPPED | OUTPATIENT
Start: 2020-01-14 | End: 2020-02-04

## 2020-01-14 RX ORDER — DOXYCYCLINE 100 MG/1
100 CAPSULE ORAL 2 TIMES DAILY
Qty: 14 CAPSULE | Refills: 0 | Status: SHIPPED | OUTPATIENT
Start: 2020-01-14 | End: 2020-01-14 | Stop reason: SDUPTHER

## 2020-01-14 NOTE — PATIENT INSTRUCTIONS
Cleanse area daily with vinegar soak wash cloth or Hibiclens solution. Apply the prescribed topical steroid ointment twice daily. Take oral antibiotics as instructed.

## 2020-01-14 NOTE — PROGRESS NOTES
Subjective:       Patient ID:  Heraclio Wall is a 79 y.o. male who presents for   Chief Complaint   Patient presents with    Rash     c/o rash to scalp x 1 month from previous cryo     History of Present Illness: The patient presents with chief complaint of scalp eruption  Location: entire scalp  Duration: 1 month, progressively worsening, states he had 2 rounds of cryotherapy and 2 tubes of Efudex, last a few weeks ago prior to this eruption  Signs/Symptoms: painful, bleeding and pus filled    Prior treatments: Aquaphor, vinegar soaks and pain medication        Review of Systems   Constitutional: Negative for fever and chills.   Skin: Positive for rash.        Objective:    Physical Exam   Constitutional: He appears well-developed and well-nourished. No distress.   Neurological: He is alert and oriented to person, place, and time.   Psychiatric: He has a normal mood and affect.   Skin:   Areas Examined (abnormalities noted in diagram):   Scalp / Hair Palpated and Inspected  Head / Face Inspection Performed  Neck Inspection Performed  RUE Inspected  LUE Inspection Performed              Diagram Legend     Erythematous scaling macule/papule c/w actinic keratosis       Vascular papule c/w angioma      Pigmented verrucoid papule/plaque c/w seborrheic keratosis      Yellow umbilicated papule c/w sebaceous hyperplasia      Irregularly shaped tan macule c/w lentigo     1-2 mm smooth white papules consistent with Milia      Movable subcutaneous cyst with punctum c/w epidermal inclusion cyst      Subcutaneous movable cyst c/w pilar cyst      Firm pink to brown papule c/w dermatofibroma      Pedunculated fleshy papule(s) c/w skin tag(s)      Evenly pigmented macule c/w junctional nevus     Mildly variegated pigmented, slightly irregular-bordered macule c/w mildly atypical nevus      Flesh colored to evenly pigmented papule c/w intradermal nevus       Pink pearly papule/plaque c/w basal cell carcinoma      Erythematous  hyperkeratotic cursted plaque c/w SCC      Surgical scar with no sign of skin cancer recurrence      Open and closed comedones      Inflammatory papules and pustules      Verrucoid papule consistent consistent with wart     Erythematous eczematous patches and plaques     Dystrophic onycholytic nail with subungual debris c/w onychomycosis     Umbilicated papule    Erythematous-base heme-crusted tan verrucoid plaque consistent with inflamed seborrheic keratosis     Erythematous Silvery Scaling Plaque c/w Psoriasis     See annotation      Assessment / Plan:        Erosive pustular dermatosis, s/p Efudex therapy  - will evaluate for presence of infection:  -     Abscess Culture  -     CULTURE, FUNGUS - SKIN, HAIR, OR NAILS        START:  -     doxycycline (MONODOX) 100 MG capsule; Take 1 capsule (100 mg total) by mouth 2 (two) times daily.  Dispense: 14 capsule; Refill: 0  -     betamethasone dipropionate (DIPROLENE) 0.05 % ointment; Apply topically 2 (two) times daily.  Dispense: 45 g; Refill: 1         Follow up in about 1 week (around 1/21/2020).     Ritchie Romero MD  Department of Dermatology, Mohs Surgery  9:11 AM  1/15/2020

## 2020-01-15 ENCOUNTER — OFFICE VISIT (OUTPATIENT)
Dept: URGENT CARE | Facility: CLINIC | Age: 80
End: 2020-01-15
Payer: MEDICARE

## 2020-01-15 ENCOUNTER — OFFICE VISIT (OUTPATIENT)
Dept: DERMATOLOGY | Facility: CLINIC | Age: 80
End: 2020-01-15
Payer: MEDICARE

## 2020-01-15 VITALS
OXYGEN SATURATION: 99 % | SYSTOLIC BLOOD PRESSURE: 125 MMHG | HEART RATE: 77 BPM | HEIGHT: 72 IN | DIASTOLIC BLOOD PRESSURE: 59 MMHG | TEMPERATURE: 99 F | BODY MASS INDEX: 23.7 KG/M2 | WEIGHT: 175 LBS

## 2020-01-15 DIAGNOSIS — Z48.00 ENCOUNTER FOR CHANGE OR REMOVAL OF NONSURGICAL WOUND DRESSING: Primary | ICD-10-CM

## 2020-01-15 DIAGNOSIS — S01.00XA OPEN WOUND OF SCALP, UNSPECIFIED OPEN WOUND TYPE, INITIAL ENCOUNTER: ICD-10-CM

## 2020-01-15 DIAGNOSIS — L98.8 EROSIVE PUSTULAR DERMATOSIS: Primary | ICD-10-CM

## 2020-01-15 PROCEDURE — 99999 PR PBB SHADOW E&M-EST. PATIENT-LVL I: ICD-10-PCS | Mod: PBBFAC,HCNC,, | Performed by: DERMATOLOGY

## 2020-01-15 PROCEDURE — 99214 OFFICE O/P EST MOD 30 MIN: CPT | Mod: S$GLB,,, | Performed by: NURSE PRACTITIONER

## 2020-01-15 PROCEDURE — 99999 PR PBB SHADOW E&M-EST. PATIENT-LVL I: CPT | Mod: PBBFAC,HCNC,, | Performed by: DERMATOLOGY

## 2020-01-15 PROCEDURE — 99499 NO LOS: ICD-10-PCS | Mod: HCNC,S$GLB,, | Performed by: DERMATOLOGY

## 2020-01-15 PROCEDURE — 99214 PR OFFICE/OUTPT VISIT, EST, LEVL IV, 30-39 MIN: ICD-10-PCS | Mod: S$GLB,,, | Performed by: NURSE PRACTITIONER

## 2020-01-15 PROCEDURE — 99499 UNLISTED E&M SERVICE: CPT | Mod: HCNC,S$GLB,, | Performed by: DERMATOLOGY

## 2020-01-15 NOTE — PATIENT INSTRUCTIONS
Report to dermatology as soon as possible.    If symptoms worsen or unable to see dermatologist report to the ER.

## 2020-01-15 NOTE — PROGRESS NOTES
Subjective:       Patient ID: Heraclio Wall is a 79 y.o. male.    Vitals:  height is 6' (1.829 m) and weight is 79.4 kg (175 lb). His oral temperature is 98.8 °F (37.1 °C). His blood pressure is 125/59 (abnormal) and his pulse is 77. His oxygen saturation is 99%.     Chief Complaint: Wound Check    Pt was seen by dermatology yesterday and states he was instructed to clean and change dressings to scalp wound today. Pt's wife states she is uncomfortable with completing dressing change.    Wound Check   He was originally treated yesterday. Previous treatment included burn dressing. The maximum temperature noted was 100.4 to 100.9 F. The temperature was taken using a tympanic thermometer. There has been colored discharge from the wound. The redness has not changed. There is no swelling present. The pain has not changed. He has no difficulty moving the affected extremity or digit.       Constitution: Negative for chills, fatigue and fever.   HENT: Negative for congestion and sore throat.    Neck: Negative for painful lymph nodes.   Cardiovascular: Negative for chest pain and leg swelling.   Eyes: Negative for double vision and blurred vision.   Respiratory: Negative for cough and shortness of breath.    Gastrointestinal: Negative for nausea, vomiting and diarrhea.   Endocrine: negative.   Genitourinary: Negative for dysuria, frequency and urgency.   Musculoskeletal: Negative for joint pain, joint swelling, muscle cramps and muscle ache.   Skin: Positive for color change and wound. Negative for pale, rash and erythema.        Burn   Allergic/Immunologic: Negative for seasonal allergies.   Neurological: Negative for dizziness, history of vertigo, light-headedness, passing out and headaches.   Hematologic/Lymphatic: Negative for swollen lymph nodes, easy bruising/bleeding and history of blood clots. Does not bruise/bleed easily.   Psychiatric/Behavioral: Negative for nervous/anxious, sleep disturbance and depression. The  patient is not nervous/anxious.        Objective:      Physical Exam   Constitutional: He is oriented to person, place, and time. He appears well-developed and well-nourished.   HENT:   Head: Normocephalic and atraumatic. Head is without abrasion, without contusion and without laceration.       Right Ear: External ear normal.   Left Ear: External ear normal.   Nose: Nose normal.   Mouth/Throat: Oropharynx is clear and moist and mucous membranes are normal.   Eyes: Pupils are equal, round, and reactive to light. Conjunctivae, EOM and lids are normal.   Neck: Trachea normal, full passive range of motion without pain and phonation normal. Neck supple.   Cardiovascular: Normal rate, regular rhythm and normal heart sounds.   Pulmonary/Chest: Effort normal and breath sounds normal. No stridor. No respiratory distress.   Musculoskeletal: Normal range of motion.   Neurological: He is alert and oriented to person, place, and time.   Skin: Skin is warm, dry, intact and no rash. Capillary refill takes less than 2 seconds. abrasion, burn, bruising, erythema and ecchymosis  Psychiatric: He has a normal mood and affect. His speech is normal and behavior is normal. Judgment and thought content normal. Cognition and memory are normal.   Nursing note and vitals reviewed.            Assessment:       1. Encounter for change or removal of nonsurgical wound dressing    2. Open wound of scalp, unspecified open wound type, initial encounter        Plan:         Encounter for change or removal of nonsurgical wound dressing    Open wound of scalp, unspecified open wound type, initial encounter         Old dressings removed with minimal difficulty. Dressing stuck to scalp in scattered areas but gently removed causing minimal bleeding and moderate discomfort. Attempted to complete cleaning with hibiclens but unable to complete due to patient unable to tolerate discomfort. Scalp redressed with telfa and head wrapped with kerlix.       Advised  patient and his wife to report to dermatology as soon as possible.     If symptoms worsen or unable to see dermatologist report to the ER.

## 2020-01-15 NOTE — PROGRESS NOTES
Patient sent here from urgent care. Seen yesterday in clinic and Rx betamethasone ointment and doxycycline orally and instructed to wash with hibiclens. Hasn't started washes or betamethasone, wife was concerned about changing dressing. Urgent care apparently applied betamethasone ointment and applied telfa and kerlix. Ok to leave open if needed, but needs to start applying the betamethasone and taking the Doxycycline. Patient still appears well, no fevers or malaise. RTC as scheduled on 01/21/2020.

## 2020-01-17 LAB — BACTERIA SPEC AEROBE CULT: ABNORMAL

## 2020-01-20 RX ORDER — METOPROLOL SUCCINATE 25 MG/1
TABLET, EXTENDED RELEASE ORAL
Qty: 180 TABLET | Refills: 3 | Status: SHIPPED | OUTPATIENT
Start: 2020-01-20 | End: 2020-06-16 | Stop reason: ALTCHOICE

## 2020-01-21 ENCOUNTER — TELEPHONE (OUTPATIENT)
Dept: DERMATOLOGY | Facility: CLINIC | Age: 80
End: 2020-01-21

## 2020-01-21 ENCOUNTER — OFFICE VISIT (OUTPATIENT)
Dept: DERMATOLOGY | Facility: CLINIC | Age: 80
End: 2020-01-21
Payer: MEDICARE

## 2020-01-21 DIAGNOSIS — L98.8 EROSIVE PUSTULAR DERMATOSIS: Primary | ICD-10-CM

## 2020-01-21 DIAGNOSIS — B95.8 STAPH SKIN INFECTION: ICD-10-CM

## 2020-01-21 DIAGNOSIS — L08.9 STAPH SKIN INFECTION: ICD-10-CM

## 2020-01-21 PROCEDURE — 1101F PT FALLS ASSESS-DOCD LE1/YR: CPT | Mod: HCNC,CPTII,S$GLB, | Performed by: DERMATOLOGY

## 2020-01-21 PROCEDURE — 1126F AMNT PAIN NOTED NONE PRSNT: CPT | Mod: HCNC,S$GLB,, | Performed by: DERMATOLOGY

## 2020-01-21 PROCEDURE — 99213 OFFICE O/P EST LOW 20 MIN: CPT | Mod: HCNC,S$GLB,, | Performed by: DERMATOLOGY

## 2020-01-21 PROCEDURE — 99999 PR PBB SHADOW E&M-EST. PATIENT-LVL II: ICD-10-PCS | Mod: PBBFAC,HCNC,, | Performed by: DERMATOLOGY

## 2020-01-21 PROCEDURE — 1159F PR MEDICATION LIST DOCUMENTED IN MEDICAL RECORD: ICD-10-PCS | Mod: HCNC,S$GLB,, | Performed by: DERMATOLOGY

## 2020-01-21 PROCEDURE — 99213 PR OFFICE/OUTPT VISIT, EST, LEVL III, 20-29 MIN: ICD-10-PCS | Mod: HCNC,S$GLB,, | Performed by: DERMATOLOGY

## 2020-01-21 PROCEDURE — 1101F PR PT FALLS ASSESS DOC 0-1 FALLS W/OUT INJ PAST YR: ICD-10-PCS | Mod: HCNC,CPTII,S$GLB, | Performed by: DERMATOLOGY

## 2020-01-21 PROCEDURE — 99999 PR PBB SHADOW E&M-EST. PATIENT-LVL II: CPT | Mod: PBBFAC,HCNC,, | Performed by: DERMATOLOGY

## 2020-01-21 PROCEDURE — 1126F PR PAIN SEVERITY QUANTIFIED, NO PAIN PRESENT: ICD-10-PCS | Mod: HCNC,S$GLB,, | Performed by: DERMATOLOGY

## 2020-01-21 PROCEDURE — 1159F MED LIST DOCD IN RCRD: CPT | Mod: HCNC,S$GLB,, | Performed by: DERMATOLOGY

## 2020-01-21 RX ORDER — BETAMETHASONE DIPROPIONATE 0.5 MG/G
OINTMENT TOPICAL 3 TIMES DAILY
Qty: 45 G | Refills: 1 | Status: SHIPPED | OUTPATIENT
Start: 2020-01-21 | End: 2020-02-04

## 2020-01-21 RX ORDER — DOXYCYCLINE 100 MG/1
100 CAPSULE ORAL 2 TIMES DAILY
Qty: 14 CAPSULE | Refills: 0 | Status: SHIPPED | OUTPATIENT
Start: 2020-01-21 | End: 2020-01-27

## 2020-01-21 NOTE — PROGRESS NOTES
Subjective:       Patient ID:  Heraclio Wall is a 79 y.o. male who presents for   Chief Complaint   Patient presents with    Rash     f/u on rash to scalp     History of Present Illness: The patient presents for f/u EPD and staph aureus superinfection  Location: entire scalp  Duration: 1 month, progressively worsening, states he had 2 rounds of cryotherapy and 2 tubes of Efudex, last a few weeks ago prior to this eruption  Signs/Symptoms: painful, bleeding and pus filled    Prior treatments: Doxy and bet dip ointment BID    Today, patient reports much improvement in pain and redness, no further pus drainage. He feels much better.         Review of Systems   Constitutional: Negative for fever and chills.   Skin: Positive for rash.        Objective:    Physical Exam   Constitutional: He appears well-developed and well-nourished. No distress.   Neurological: He is alert and oriented to person, place, and time.   Psychiatric: He has a normal mood and affect.   Skin:   Areas Examined (abnormalities noted in diagram):   Scalp / Hair Palpated and Inspected  Head / Face Inspection Performed  Neck Inspection Performed  RUE Inspected  LUE Inspection Performed              Diagram Legend     Erythematous scaling macule/papule c/w actinic keratosis       Vascular papule c/w angioma      Pigmented verrucoid papule/plaque c/w seborrheic keratosis      Yellow umbilicated papule c/w sebaceous hyperplasia      Irregularly shaped tan macule c/w lentigo     1-2 mm smooth white papules consistent with Milia      Movable subcutaneous cyst with punctum c/w epidermal inclusion cyst      Subcutaneous movable cyst c/w pilar cyst      Firm pink to brown papule c/w dermatofibroma      Pedunculated fleshy papule(s) c/w skin tag(s)      Evenly pigmented macule c/w junctional nevus     Mildly variegated pigmented, slightly irregular-bordered macule c/w mildly atypical nevus      Flesh colored to evenly pigmented papule c/w intradermal  nevus       Pink pearly papule/plaque c/w basal cell carcinoma      Erythematous hyperkeratotic cursted plaque c/w SCC      Surgical scar with no sign of skin cancer recurrence      Open and closed comedones      Inflammatory papules and pustules      Verrucoid papule consistent consistent with wart     Erythematous eczematous patches and plaques     Dystrophic onycholytic nail with subungual debris c/w onychomycosis     Umbilicated papule    Erythematous-base heme-crusted tan verrucoid plaque consistent with inflamed seborrheic keratosis     Erythematous Silvery Scaling Plaque c/w Psoriasis     See annotation      Assessment / Plan:        Erosive pustular dermatosis, s/p Efudex therapy, improving    CONTINUE (for one more week):  -     doxycycline (MONODOX) 100 MG capsule; Take 1 capsule (100 mg total) by mouth 2 (two) times daily.  Dispense: 14 capsule; Refill: 0  -     betamethasone dipropionate (DIPROLENE) 0.05 % ointment; Apply topically 2 (two) times daily.  Dispense: 45 g; Refill: 1     Staph aureus infection of the skin, resolved  - pain and purulent drainage resolved  - Doxy as above  - can hold hibiclens wash at this point    Follow up in about 1 month (around 2/21/2020).     Ritchie Romero MD  Department of Dermatology, Mohs Surgery  9:11 AM  1/21/2020

## 2020-01-27 DIAGNOSIS — L98.8 EROSIVE PUSTULAR DERMATOSIS: ICD-10-CM

## 2020-01-27 RX ORDER — DOXYCYCLINE 100 MG/1
100 CAPSULE ORAL 2 TIMES DAILY
Qty: 14 CAPSULE | Refills: 0 | Status: SHIPPED | OUTPATIENT
Start: 2020-01-27 | End: 2020-02-13

## 2020-01-28 ENCOUNTER — OFFICE VISIT (OUTPATIENT)
Dept: URGENT CARE | Facility: CLINIC | Age: 80
End: 2020-01-28
Payer: MEDICARE

## 2020-01-28 VITALS
OXYGEN SATURATION: 98 % | HEIGHT: 72 IN | BODY MASS INDEX: 23.5 KG/M2 | WEIGHT: 173.5 LBS | TEMPERATURE: 99 F | SYSTOLIC BLOOD PRESSURE: 139 MMHG | HEART RATE: 60 BPM | RESPIRATION RATE: 18 BRPM | DIASTOLIC BLOOD PRESSURE: 64 MMHG

## 2020-01-28 DIAGNOSIS — M79.622 LEFT UPPER ARM PAIN: Primary | ICD-10-CM

## 2020-01-28 DIAGNOSIS — R20.0 NUMBNESS AND TINGLING OF LEFT UPPER EXTREMITY: ICD-10-CM

## 2020-01-28 DIAGNOSIS — R20.2 NUMBNESS AND TINGLING OF LEFT UPPER EXTREMITY: ICD-10-CM

## 2020-01-28 PROCEDURE — 99214 PR OFFICE/OUTPT VISIT, EST, LEVL IV, 30-39 MIN: ICD-10-PCS | Mod: S$GLB,,, | Performed by: NURSE PRACTITIONER

## 2020-01-28 PROCEDURE — 99214 OFFICE O/P EST MOD 30 MIN: CPT | Mod: S$GLB,,, | Performed by: NURSE PRACTITIONER

## 2020-01-28 RX ORDER — DICLOFENAC SODIUM 10 MG/G
2 GEL TOPICAL 2 TIMES DAILY
Qty: 1 TUBE | Refills: 0 | Status: SHIPPED | OUTPATIENT
Start: 2020-01-28 | End: 2020-10-28

## 2020-01-28 NOTE — PROGRESS NOTES
Subjective:       Patient ID: Heraclio Wall is a 79 y.o. male.    Vitals:  height is 6' (1.829 m) and weight is 78.7 kg (173 lb 8 oz). His temperature is 98.7 °F (37.1 °C). His blood pressure is 139/64 and his pulse is 60. His respiration is 18 and oxygen saturation is 98%.     Chief Complaint: Arm Pain    Pt states he sleeps on his left side with his arm bent. Denies having any pain prior to going to bed last night. Pain was evident upon awakening this morning.    Arm Pain    The incident occurred 12 to 24 hours ago. The incident occurred at home. There was no injury mechanism. The pain is present in the left shoulder. The quality of the pain is described as aching (Throbbing). The pain radiates to the left arm. The pain is at a severity of 5/10. The pain is moderate. The pain has been constant since the incident. Associated symptoms include muscle weakness, numbness and tingling. Pertinent negatives include no chest pain. Associated symptoms comments: Numbness and tingling in left hand and arm. Nothing aggravates the symptoms. He has tried acetaminophen (Sinus OTC) for the symptoms. The treatment provided no relief.       Constitution: Positive for fatigue. Negative for chills and fever.   HENT: Negative for congestion and sore throat.    Neck: Negative for painful lymph nodes.   Cardiovascular: Negative for chest pain and leg swelling.   Eyes: Negative for double vision and blurred vision.   Respiratory: Negative for cough and shortness of breath.    Gastrointestinal: Negative for nausea, vomiting and diarrhea.   Genitourinary: Negative for dysuria, frequency and urgency.   Musculoskeletal: Negative for joint pain, joint swelling, muscle cramps and muscle ache.   Skin: Negative for color change, pale and rash.   Allergic/Immunologic: Negative for seasonal allergies.   Neurological: Positive for numbness and tingling. Negative for dizziness, history of vertigo, light-headedness, passing out, loss of balance and  headaches.   Hematologic/Lymphatic: Negative for swollen lymph nodes, easy bruising/bleeding and history of blood clots. Does not bruise/bleed easily.   Psychiatric/Behavioral: Negative for nervous/anxious, sleep disturbance and depression. The patient is not nervous/anxious.        Objective:      Physical Exam   Constitutional: He is oriented to person, place, and time. Vital signs are normal. He appears well-developed and well-nourished. He is active and cooperative. No distress.   HENT:   Head: Normocephalic and atraumatic.   Right Ear: External ear normal.   Left Ear: External ear normal.   Nose: Nose normal.   Mouth/Throat: Oropharynx is clear and moist and mucous membranes are normal.   Eyes: Conjunctivae and lids are normal.   Neck: Trachea normal, normal range of motion, full passive range of motion without pain and phonation normal. Neck supple.   Cardiovascular: Normal rate, regular rhythm, normal heart sounds, intact distal pulses and normal pulses.   Pulmonary/Chest: Effort normal and breath sounds normal. No respiratory distress. He has no decreased breath sounds.   Abdominal: Soft. Normal appearance.   Musculoskeletal: Normal range of motion. He exhibits no edema or deformity.        Left upper arm: He exhibits no swelling, no deformity and no laceration.        Arms:  Neurological: He is alert and oriented to person, place, and time. He has normal strength and normal reflexes. No sensory deficit.   Skin: Skin is warm, dry, intact and not diaphoretic. Capillary refill takes less than 2 seconds.   Psychiatric: He has a normal mood and affect. His speech is normal and behavior is normal. Judgment and thought content normal. Cognition and memory are normal.   Nursing note and vitals reviewed.        Assessment:       1. Left upper arm pain    2. Numbness and tingling of left upper extremity        Plan:         Left upper arm pain  -     diclofenac sodium (VOLTAREN) 1 % Gel; Apply 2 g topically 2 (two)  times daily. for 7 days  Dispense: 1 Tube; Refill: 0    Numbness and tingling of left upper extremity         Use medication as directed.  Take tylenol as needed for pain.  Keep left arm elevated as needed for comfort.  If symptoms persist or worsen, follow up with PCP.  Report to the ER if shortness of breath, chest pain, excessive sweating or feeling faint occurs.

## 2020-01-28 NOTE — PATIENT INSTRUCTIONS
Use medication as directed.  Take tylenol as needed for pain.  Keep left arm elevated as needed for comfort.  If symptoms persist or worsen, follow up with PCP.  Report to the ER if shortness of breath, chest pain, excessive sweating or feeling faint occurs.

## 2020-02-02 ENCOUNTER — TELEPHONE (OUTPATIENT)
Dept: URGENT CARE | Facility: CLINIC | Age: 80
End: 2020-02-02

## 2020-02-02 NOTE — TELEPHONE ENCOUNTER
Spoke with pt spouse stated that pt is having vertigo. Pt went to the ER and was given medication. Offered pt a appt with his pcp. appt schedule for 2/4/20 at 11:15. Ms luque stated thanks for the call. Pt informed that they need anything they can come in to be seen.

## 2020-02-04 ENCOUNTER — OFFICE VISIT (OUTPATIENT)
Dept: FAMILY MEDICINE | Facility: CLINIC | Age: 80
End: 2020-02-04
Payer: MEDICARE

## 2020-02-04 VITALS
HEIGHT: 72 IN | BODY MASS INDEX: 23.8 KG/M2 | HEART RATE: 65 BPM | TEMPERATURE: 98 F | SYSTOLIC BLOOD PRESSURE: 115 MMHG | WEIGHT: 175.69 LBS | DIASTOLIC BLOOD PRESSURE: 70 MMHG

## 2020-02-04 DIAGNOSIS — J30.9 ALLERGIC RHINITIS, UNSPECIFIED SEASONALITY, UNSPECIFIED TRIGGER: Primary | ICD-10-CM

## 2020-02-04 DIAGNOSIS — H69.93 DYSFUNCTION OF BOTH EUSTACHIAN TUBES: ICD-10-CM

## 2020-02-04 DIAGNOSIS — R42 DIZZINESS: ICD-10-CM

## 2020-02-04 PROBLEM — M54.50 LOW BACK PAIN: Status: RESOLVED | Noted: 2017-11-27 | Resolved: 2020-02-04

## 2020-02-04 PROCEDURE — 99999 PR PBB SHADOW E&M-EST. PATIENT-LVL IV: ICD-10-PCS | Mod: PBBFAC,HCNC,, | Performed by: REGISTERED NURSE

## 2020-02-04 PROCEDURE — 1101F PR PT FALLS ASSESS DOC 0-1 FALLS W/OUT INJ PAST YR: ICD-10-PCS | Mod: HCNC,CPTII,S$GLB, | Performed by: REGISTERED NURSE

## 2020-02-04 PROCEDURE — 3074F SYST BP LT 130 MM HG: CPT | Mod: HCNC,CPTII,S$GLB, | Performed by: REGISTERED NURSE

## 2020-02-04 PROCEDURE — 3078F DIAST BP <80 MM HG: CPT | Mod: HCNC,CPTII,S$GLB, | Performed by: REGISTERED NURSE

## 2020-02-04 PROCEDURE — 3074F PR MOST RECENT SYSTOLIC BLOOD PRESSURE < 130 MM HG: ICD-10-PCS | Mod: HCNC,CPTII,S$GLB, | Performed by: REGISTERED NURSE

## 2020-02-04 PROCEDURE — 1159F MED LIST DOCD IN RCRD: CPT | Mod: HCNC,S$GLB,, | Performed by: REGISTERED NURSE

## 2020-02-04 PROCEDURE — 1126F AMNT PAIN NOTED NONE PRSNT: CPT | Mod: HCNC,S$GLB,, | Performed by: REGISTERED NURSE

## 2020-02-04 PROCEDURE — 99999 PR PBB SHADOW E&M-EST. PATIENT-LVL IV: CPT | Mod: PBBFAC,HCNC,, | Performed by: REGISTERED NURSE

## 2020-02-04 PROCEDURE — 99214 PR OFFICE/OUTPT VISIT, EST, LEVL IV, 30-39 MIN: ICD-10-PCS | Mod: HCNC,S$GLB,, | Performed by: REGISTERED NURSE

## 2020-02-04 PROCEDURE — 1101F PT FALLS ASSESS-DOCD LE1/YR: CPT | Mod: HCNC,CPTII,S$GLB, | Performed by: REGISTERED NURSE

## 2020-02-04 PROCEDURE — 1126F PR PAIN SEVERITY QUANTIFIED, NO PAIN PRESENT: ICD-10-PCS | Mod: HCNC,S$GLB,, | Performed by: REGISTERED NURSE

## 2020-02-04 PROCEDURE — 99214 OFFICE O/P EST MOD 30 MIN: CPT | Mod: HCNC,S$GLB,, | Performed by: REGISTERED NURSE

## 2020-02-04 PROCEDURE — 1159F PR MEDICATION LIST DOCUMENTED IN MEDICAL RECORD: ICD-10-PCS | Mod: HCNC,S$GLB,, | Performed by: REGISTERED NURSE

## 2020-02-04 PROCEDURE — 3078F PR MOST RECENT DIASTOLIC BLOOD PRESSURE < 80 MM HG: ICD-10-PCS | Mod: HCNC,CPTII,S$GLB, | Performed by: REGISTERED NURSE

## 2020-02-04 RX ORDER — TRIAMCINOLONE ACETONIDE 1 MG/G
CREAM TOPICAL
COMMUNITY
Start: 2019-12-19 | End: 2020-10-28

## 2020-02-04 RX ORDER — MECLIZINE HYDROCHLORIDE 25 MG/1
TABLET ORAL
COMMUNITY
Start: 2020-01-29 | End: 2020-10-28

## 2020-02-04 RX ORDER — MONTELUKAST SODIUM 10 MG/1
10 TABLET ORAL DAILY
Qty: 30 TABLET | Refills: 2 | Status: SHIPPED | OUTPATIENT
Start: 2020-02-04 | End: 2020-03-05

## 2020-02-04 NOTE — PROGRESS NOTES
"Subjective:       Patient ID: Heraclio Wall is a 79 y.o. male.    Chief Complaint   Patient presents with    Hospital Follow Up       HPI    Heraclio Wall is here today with c/o ED visit.  Seen at [facility] on 1/29/2020 for vertigo.  Testing inconclusive, advised to f/u here for further discussion.  Does have h/o allergies, CVA.  Reports "I struggle with sinus issues a lot".  They rx'd him some meclizine but has not helped.  Does c/o dizziness associated with the dizzy spells.  Stroke work-up at ED negative.  He reports being followed by an ENT in [place] via [facility].        Review of Systems   Constitutional: Negative for chills, diaphoresis, fatigue and fever.   HENT: Positive for congestion and sneezing. Negative for ear pain, postnasal drip, rhinorrhea, sinus pain, sore throat, trouble swallowing and voice change.    Eyes: Negative.    Respiratory: Negative for cough, shortness of breath and wheezing.    Cardiovascular: Negative for chest pain, palpitations and leg swelling.   Gastrointestinal: Negative for abdominal pain, nausea and vomiting.   Musculoskeletal: Negative.    Skin: Negative.    Allergic/Immunologic: Positive for environmental allergies.   Neurological: Positive for dizziness and light-headedness. Negative for tremors, syncope, speech difficulty, weakness, numbness and headaches.   Hematological: Negative for adenopathy.         Review of patient's allergies indicates:   Allergen Reactions    Milk Shortness Of Breath     Diarrhea    Olive extract Anaphylaxis    Tea tree Anaphylaxis    Apple Other (See Comments)     Other reaction(s): sinus problems    Benicar [olmesartan] Other (See Comments)     Stomach upset  Diarrhea    Cardizem [diltiazem hcl] Other (See Comments)     Hallucinations    Clonidine      Dry mouth, pounding in the ears    Clopidogrel Other (See Comments)     Generic only-felt bad [okay with Branded Plavix}    Dicyclomine Other (See Comments)     lightheaded "    Hydralazine analogues      tachycardia    Lipitor [atorvastatin] Other (See Comments)     muscle spasms    Lopressor [metoprolol tartrate]      Felt bad question milk in it    Norvasc [amlodipine] Other (See Comments)     Headache  Flushing (skin)    Pineapple Other (See Comments)     sinus problems    Bactrim [sulfamethoxazole-trimethoprim] Itching and Rash         Patient Active Problem List   Diagnosis    Hypogonadism male    Bilateral hearing loss    Essential hypertension    CHESTER on CPAP    Tinnitus of both ears    GERD (gastroesophageal reflux disease)    Mixed hyperlipidemia    Anemia    Acquired hypothyroidism    Right-sided carotid artery disease    Atherosclerosis of aorta    Chronic anticoagulation    Chronic kidney disease, stage 3    Insufficiency of tear film of both eyes    History of CVA (cerebrovascular accident)    Benign prostatic hyperplasia without lower urinary tract symptoms    Allergic rhinitis    Dizziness    Sinus bradycardia    Age-related osteoporosis without current pathological fracture    Coronary artery disease involving native coronary artery of native heart without angina pectoris       Medication List with Changes/Refills   Current Medications    ARGININE 500 MG TABLET    Take 1 tablet by mouth 2 (two) times daily.    ASCORBIC ACID (VITAMIN C) 1000 MG TABLET    Take 3 tablets by mouth Daily.    BETAMETHASONE DIPROPIONATE (DIPROLENE) 0.05 % OINTMENT    Apply topically 2 (two) times daily.    BETAMETHASONE DIPROPIONATE (DIPROLENE) 0.05 % OINTMENT    Apply topically 3 (three) times daily. Apply to affected area of scalp    CHOLECALCIFEROL, VITAMIN D3, 1,000 UNIT CAPSULE    Take 5 capsules by mouth Daily.    COUMADIN 2.5 MG TABLET    Take 1 tablet by mouth on Tuesdays, Thursdays, Saturdays; and 1/2 tablet on all other days of the week.    CYANOCOBALAMIN, VITAMIN B-12, 1,000 MCG/15 ML LIQD    Take 1 drop by mouth once daily.     CYCLOSPORINE (RESTASIS) 0.05  % OPHTHALMIC EMULSION    Place 0.4 mLs (1 drop total) into both eyes 2 (two) times daily.    DEXTRAN 70-HYPROMELLOSE (ARTIFICIAL TEARS) OPHTHALMIC SOLUTION    Place 1 drop into both eyes Use as needed.    DICLOFENAC SODIUM (VOLTAREN) 1 % GEL    Apply 2 g topically 2 (two) times daily. for 7 days    DIOVAN 80 MG TABLET    Take 1 tablet (80 mg total) by mouth 2 (two) times daily.    DOXYCYCLINE (MONODOX) 100 MG CAPSULE    Take 1 capsule (100 mg total) by mouth 2 (two) times daily.    FLUTICASONE (FLONASE) 50 MCG/ACTUATION NASAL SPRAY    Use 2 sprays (100 mcg total) by in each nostril once daily    FLUTICASONE PROPIONATE (FLONASE) 50 MCG/ACTUATION NASAL SPRAY    Use 2 sprays (100 mcg total) by Each Nostril once daily.    FLUVASTATIN XL (LESCOL XL) 80 MG TB24    Take one tablet by mouth once daily    FUROSEMIDE (LASIX) 40 MG TABLET    Take 1 tablet (40 mg total) by mouth once daily.    HYDROCODONE-ACETAMINOPHEN (NORCO) 5-325 MG PER TABLET    Take 1 tablet by mouth every 4 (four) hours as needed.    HYDROCORTISONE (CORTEF) 5 MG TAB    Take 2.5 mg by mouth once daily.    METOPROLOL SUCCINATE (TOPROL XL) 25 MG 24 HR TABLET    Take 1 tablet by mouth once daily. Take an additional tablet if blood pressure is greater than 160    MUPIROCIN (BACTROBAN) 2 % OINTMENT    APPLY ON THE SKIN EVERY DAY    PANTOPRAZOLE (PROTONIX) 40 MG TABLET    TAKE 1 TABLET BY MOUTH DAILY    PROGESTERONE MISC    6.25 mg by Misc.(Non-Drug; Combo Route) route every evening.    SAW PALMETTO 160 MG CAPSULE    Take 1 capsule by mouth 2 (two) times daily.    SELENIUM 200 MCG TAB    Take 1 tablet by mouth Daily.    UNABLE TO FIND    Take 1 tablet by mouth once daily. medication name: T3/T4-SR (TRIIODO-L-LTHYRONINE-LEVOTHYROXINE)    VERAPAMIL (VERELAN) 240 MG C24P    Take 1 capsule (240 mg total) by mouth 2 (two) times daily.             Past medical, surgical, family and social histories have been reviewed today.        Objective:     Vitals:    02/04/20  1111   BP: 115/70   Pulse: 65   Temp: 97.9 °F (36.6 °C)   Weight: 79.7 kg (175 lb 11.3 oz)   Height: 6' (1.829 m)   PainSc: 0-No pain       Estimated body mass index is 23.83 kg/m² as calculated from the following:    Height as of this encounter: 6' (1.829 m).    Weight as of this encounter: 79.7 kg (175 lb 11.3 oz).      Physical Exam   Constitutional: He is oriented to person, place, and time. He appears well-developed and well-nourished.   HENT:   Head: Normocephalic and atraumatic.   Right Ear: Tympanic membrane is retracted (absent LR on both sides with dull TMs). Tympanic membrane is not injected, not scarred, not perforated and not erythematous. A middle ear effusion is present.   Left Ear: Tympanic membrane is retracted. Tympanic membrane is not injected, not scarred, not perforated and not erythematous. A middle ear effusion is present.   Nose: Mucosal edema and rhinorrhea (boggy//clear RN) present. No sinus tenderness. No epistaxis. Right sinus exhibits no maxillary sinus tenderness and no frontal sinus tenderness. Left sinus exhibits no maxillary sinus tenderness and no frontal sinus tenderness.   Mouth/Throat: Oropharynx is clear and moist and mucous membranes are normal.   Eyes: Pupils are equal, round, and reactive to light. Conjunctivae are normal. Right eye exhibits no discharge. Left eye exhibits no discharge.   Cardiovascular: Normal rate and regular rhythm.   Pulmonary/Chest: Effort normal and breath sounds normal.   Musculoskeletal: Normal range of motion. He exhibits no edema.   Lymphadenopathy:     He has no cervical adenopathy.   Neurological: He is alert and oriented to person, place, and time.   Skin: Skin is warm and dry. Capillary refill takes less than 2 seconds. No rash noted.   Psychiatric: He has a normal mood and affect. His behavior is normal. Judgment and thought content normal.   Vitals reviewed.        Diagnosis       1. Allergic rhinitis, unspecified seasonality, unspecified  trigger    2. Dysfunction of both eustachian tubes    3. Dizziness          Assessment/ Plan     Allergic rhinitis, unspecified seasonality, unspecified trigger  -     montelukast (SINGULAIR) 10 mg tablet; Take 1 tablet (10 mg total) by mouth once daily.  Dispense: 30 tablet; Refill: 2    Dysfunction of both eustachian tubes  -     montelukast (SINGULAIR) 10 mg tablet; Take 1 tablet (10 mg total) by mouth once daily.  Dispense: 30 tablet; Refill: 2    Dizziness  Should resolve as the inner ear fluid subsides.      Allergies likely causing current s/s, discussed.  Continue with Singulair and Flonase on a more regular basis.  May need to establish care with an ENT more local.  Follow-up in clinic as needed.        Patient Care Team:  Bibi Watson MD as PCP - General (Internal Medicine)  Juana Allen, PharmD as Hypertension Digital Medicine Clinician (Pharmacist)  Toyin Lim as Digital Medicine Health   Gera Wells MD as Consulting Physician (Cardiology)  Ion Dumont MD (Pulmonary Disease)  Srinivas Kevin MD as Consulting Physician (Ophthalmology)  Justin Cao LPN as Care Coordinator  Bibi Watson MD as Hypertension Digital Medicine Responsible Provider (Internal Medicine)  Mari Mcintyre [employer] as Hypertension Digital Medicine Contract      SANGEETA Washington  [facility]

## 2020-02-06 ENCOUNTER — LAB VISIT (OUTPATIENT)
Dept: LAB | Facility: HOSPITAL | Age: 80
End: 2020-02-06
Attending: INTERNAL MEDICINE
Payer: MEDICARE

## 2020-02-06 DIAGNOSIS — Z79.01 LONG TERM (CURRENT) USE OF ANTICOAGULANTS: ICD-10-CM

## 2020-02-06 LAB
INR PPP: 1.1 (ref 0.8–1.2)
PROTHROMBIN TIME: 11.3 SEC (ref 9–12.5)

## 2020-02-06 PROCEDURE — 36415 COLL VENOUS BLD VENIPUNCTURE: CPT | Mod: HCNC,PO

## 2020-02-06 PROCEDURE — 85610 PROTHROMBIN TIME: CPT | Mod: HCNC

## 2020-02-06 NOTE — PROGRESS NOTES
Digital Medicine: Health  Follow-Up    The history is provided by the patient.     Follow Up  Follow-up reason(s): reading review and routine education      Readings are missing.   patient reminder needed.Patient informed me that he and his wife have not been doing great. He was in the hospital recently and it is taking him a lot of time to bounce back. He would like to start taking his BP readings again so he will work on that over the next few weeks. He will reach out to me if any issues arise.       Intervention/Plan    There are no preventive care reminders to display for this patient.    Last 5 Patient Entered Readings                                      Current 30 Day Average:      Recent Readings 11/11/2019 11/11/2019 11/11/2019 11/4/2019 11/4/2019    SBP (mmHg) 125 120 122 144 153    DBP (mmHg) 59 59 59 72 75    Pulse 56 59 57 54 55                  Screenings    SDOH

## 2020-02-07 ENCOUNTER — ANTI-COAG VISIT (OUTPATIENT)
Dept: CARDIOLOGY | Facility: CLINIC | Age: 80
End: 2020-02-07
Payer: MEDICARE

## 2020-02-07 DIAGNOSIS — Z79.01 LONG TERM (CURRENT) USE OF ANTICOAGULANTS: ICD-10-CM

## 2020-02-07 DIAGNOSIS — Z86.73 HISTORY OF CVA (CEREBROVASCULAR ACCIDENT): Chronic | ICD-10-CM

## 2020-02-07 PROCEDURE — 93793 PR ANTICOAGULANT MGMT FOR PT TAKING WARFARIN: ICD-10-PCS | Mod: S$GLB,,,

## 2020-02-07 PROCEDURE — 93793 ANTICOAG MGMT PT WARFARIN: CPT | Mod: S$GLB,,,

## 2020-02-07 NOTE — PROGRESS NOTES
Patient's INR is sub-therapeutic at 1.1.  Patient contacted.  Mr. Wall states he had greens this past week. No missed doses.  He recently completed a course of doxycycline and has started meclizine and montelukast   No abnormal pain, swelling or weakness noted.  Instructions given for patient to take warfarin 3.75mg on today; then resume current dose of warfarin 2.5mg on Tuesdays, Thursdays, Saturdays and 1.25mg on all other days of the week.  Patient repeated directions and voiced understanding.  Follow-up in 1 week.

## 2020-02-13 ENCOUNTER — LAB VISIT (OUTPATIENT)
Dept: LAB | Facility: HOSPITAL | Age: 80
End: 2020-02-13
Attending: INTERNAL MEDICINE
Payer: MEDICARE

## 2020-02-13 DIAGNOSIS — I10 ESSENTIAL HYPERTENSION: Chronic | ICD-10-CM

## 2020-02-13 DIAGNOSIS — L98.8 EROSIVE PUSTULAR DERMATOSIS: ICD-10-CM

## 2020-02-13 DIAGNOSIS — Z79.01 LONG TERM (CURRENT) USE OF ANTICOAGULANTS: ICD-10-CM

## 2020-02-13 LAB
INR PPP: 1.5 (ref 0.8–1.2)
PROTHROMBIN TIME: 14.3 SEC (ref 9–12.5)

## 2020-02-13 PROCEDURE — 36415 COLL VENOUS BLD VENIPUNCTURE: CPT | Mod: HCNC,PO

## 2020-02-13 PROCEDURE — 85610 PROTHROMBIN TIME: CPT | Mod: HCNC

## 2020-02-13 RX ORDER — VERAPAMIL HYDROCHLORIDE 240 MG/1
240 CAPSULE, EXTENDED RELEASE ORAL 2 TIMES DAILY
Qty: 180 CAPSULE | Refills: 3 | Status: SHIPPED | OUTPATIENT
Start: 2020-02-13 | End: 2021-02-10 | Stop reason: SDUPTHER

## 2020-02-13 RX ORDER — DOXYCYCLINE 100 MG/1
100 CAPSULE ORAL 2 TIMES DAILY
Qty: 14 CAPSULE | Refills: 0 | Status: SHIPPED | OUTPATIENT
Start: 2020-02-13 | End: 2020-06-16

## 2020-02-14 ENCOUNTER — ANTI-COAG VISIT (OUTPATIENT)
Dept: CARDIOLOGY | Facility: CLINIC | Age: 80
End: 2020-02-14
Payer: MEDICARE

## 2020-02-14 DIAGNOSIS — Z86.73 HISTORY OF CVA (CEREBROVASCULAR ACCIDENT): Chronic | ICD-10-CM

## 2020-02-14 DIAGNOSIS — Z79.01 LONG TERM (CURRENT) USE OF ANTICOAGULANTS: Primary | ICD-10-CM

## 2020-02-14 PROCEDURE — 93793 ANTICOAG MGMT PT WARFARIN: CPT | Mod: S$GLB,,,

## 2020-02-14 PROCEDURE — 93793 PR ANTICOAGULANT MGMT FOR PT TAKING WARFARIN: ICD-10-PCS | Mod: S$GLB,,,

## 2020-02-14 NOTE — PROGRESS NOTES
Patient's INR remains sub-therapeutic at 1.5.  After several unsuccessful attempts to patient, daughter Nina Giang, was contacted.  Caregiver states patient may be having a difficult time remembering to take medications everyday, family members are trying to help as much as possible.  Therefore she is uncertain if any doses have been missed.  Daughter also verified patient completed a course of doxycycline a few weeks ago, has not started RX called in on 2/13.  Agrees to contact coumadin clinic if antibiotic is restarted.  No abnormal numbness or weakness noted.  Instructions given for patient to take warfarin 2.5mg on today; then increase dose of warfarin to 1.25mg on Mondays, Wednesdays, Fridays and 2.5mg on all other days of the week.  Caregiver repeated directions and voiced understanding.  Follow-up in 1 week.

## 2020-02-18 NOTE — PROGRESS NOTES
Subjective:      Patient ID: Heraclio Wall is a 79 y.o. male.    Chief Complaint: Follow-up (3 months )      HPI  Here for follow up of medical problems.  Wife abruptly  7d ago.  Sleeping ok.  Has gained back 5# of 17# lost with stress of wife's illness.  No f/c/sw/cough.  No cp/sob/palp.  BMs normal.  Nasal drainage, doing well on coricedin and singulair and flonase.    Updated/ annual due :  HM:   fluvax,  HAV, 12/15 [id number],  [id number], 2/15 TDaP, 3/12 zostavax,  BMD rep 2y,  Cscope no repeat,  FIT neg.     Review of Systems   Constitutional: Negative for chills, diaphoresis, fatigue and fever.   Respiratory: Negative for cough, chest tightness and shortness of breath.    Cardiovascular: Negative for chest pain, palpitations and leg swelling.   Gastrointestinal: Negative for blood in stool, constipation, diarrhea, nausea and vomiting.   Genitourinary: Negative for difficulty urinating and frequency.   Musculoskeletal: Negative for arthralgias.         Objective:   /62   Pulse 68   Temp 97.3 °F (36.3 °C) (Temporal)   Ht 6' (1.829 m)   Wt 80.4 kg (177 lb 4 oz)   SpO2 100%   BMI 24.04 kg/m²     Physical Exam   Constitutional: He is oriented to person, place, and time. He appears well-developed and well-nourished.   HENT:   Mouth/Throat: Oropharynx is clear and moist.   Neck: Normal range of motion. Neck supple.   Cardiovascular: Normal rate, regular rhythm and intact distal pulses. Exam reveals no gallop and no friction rub.   No murmur heard.  Pulmonary/Chest: Effort normal and breath sounds normal. He has no wheezes. He has no rales.   Abdominal: Soft. Bowel sounds are normal. He exhibits no mass. There is no tenderness.   Musculoskeletal: He exhibits no edema.   Lymphadenopathy:     He has no cervical adenopathy.   Neurological: He is alert and oriented to person, place, and time.   Psychiatric: He has a normal mood and affect.           Assessment:       1.  Essential hypertension    2. Coronary artery disease involving native coronary artery of native heart without angina pectoris    3. Chronic kidney disease, stage 3    4. Chronic anticoagulation    5. Acquired hypothyroidism    6. Weight loss    7. History of CVA (cerebrovascular accident)    8. Seasonal allergic rhinitis due to pollen          Plan:     Essential hypertension- stable, cont to monitor at home.    Coronary artery disease involving native coronary artery of native heart without angina pectoris- clinically stable.    Chronic kidney disease, stage 3    Chronic anticoagulation, for hx CVA.    Acquired hypothyroidism- Clinically stable, continue present treatment.    Weight loss- will follow.    Allergic rhinitis-  -     fluticasone propionate (FLONASE) 50 mcg/actuation nasal spray; 2 sprays (100 mcg total) by Each Nostril route once daily.  Dispense: 16 g; Refill: 6

## 2020-02-20 ENCOUNTER — ANTI-COAG VISIT (OUTPATIENT)
Dept: CARDIOLOGY | Facility: CLINIC | Age: 80
End: 2020-02-20
Payer: MEDICARE

## 2020-02-20 ENCOUNTER — LAB VISIT (OUTPATIENT)
Dept: LAB | Facility: HOSPITAL | Age: 80
End: 2020-02-20
Attending: INTERNAL MEDICINE
Payer: MEDICARE

## 2020-02-20 DIAGNOSIS — Z79.01 LONG TERM (CURRENT) USE OF ANTICOAGULANTS: ICD-10-CM

## 2020-02-20 DIAGNOSIS — Z86.73 HISTORY OF CVA (CEREBROVASCULAR ACCIDENT): Chronic | ICD-10-CM

## 2020-02-20 LAB
FUNGUS BLD CULT: NORMAL
INR PPP: 1.5 (ref 0.8–1.2)
PROTHROMBIN TIME: 16 SEC (ref 9–12.5)

## 2020-02-20 PROCEDURE — 85610 PROTHROMBIN TIME: CPT | Mod: HCNC

## 2020-02-20 PROCEDURE — 93793 PR ANTICOAGULANT MGMT FOR PT TAKING WARFARIN: ICD-10-PCS | Mod: S$GLB,,,

## 2020-02-20 PROCEDURE — 93793 ANTICOAG MGMT PT WARFARIN: CPT | Mod: S$GLB,,,

## 2020-02-20 PROCEDURE — 36415 COLL VENOUS BLD VENIPUNCTURE: CPT | Mod: HCNC

## 2020-02-20 NOTE — PROGRESS NOTES
Patient's INR remains sub-therapeutic at 1.5.  Patient contacted.  Mr. Wall states he believes he has taken warfarin as prescribed.   No abnormal numbness or weakness noted.  Instructions given for patient to increase dose of warfarin to 1.25mg on Mondays and Wednesdays; and 2.5mg on all other days of the week.  Patient wrote instructions down and repeated back x 3.   Follow-up on 3/2/2020 at [facility].

## 2020-02-21 DIAGNOSIS — K21.9 GASTROESOPHAGEAL REFLUX DISEASE WITHOUT ESOPHAGITIS: Primary | ICD-10-CM

## 2020-02-21 RX ORDER — PANTOPRAZOLE SODIUM 40 MG/1
TABLET, DELAYED RELEASE ORAL
Qty: 90 TABLET | Refills: 3 | Status: SHIPPED | OUTPATIENT
Start: 2020-02-21 | End: 2021-02-25 | Stop reason: SDUPTHER

## 2020-02-24 DIAGNOSIS — L98.8 EROSIVE PUSTULAR DERMATOSIS: ICD-10-CM

## 2020-02-24 RX ORDER — DOXYCYCLINE 100 MG/1
100 CAPSULE ORAL 2 TIMES DAILY
Qty: 14 CAPSULE | Refills: 0 | OUTPATIENT
Start: 2020-02-24

## 2020-03-02 ENCOUNTER — OFFICE VISIT (OUTPATIENT)
Dept: DERMATOLOGY | Facility: CLINIC | Age: 80
End: 2020-03-02
Payer: MEDICARE

## 2020-03-02 ENCOUNTER — ANTI-COAG VISIT (OUTPATIENT)
Dept: CARDIOLOGY | Facility: CLINIC | Age: 80
End: 2020-03-02
Payer: MEDICARE

## 2020-03-02 DIAGNOSIS — S60.511A ABRASION OF RIGHT HAND, INITIAL ENCOUNTER: ICD-10-CM

## 2020-03-02 DIAGNOSIS — Z79.01 LONG TERM (CURRENT) USE OF ANTICOAGULANTS: Primary | ICD-10-CM

## 2020-03-02 DIAGNOSIS — L98.8 EROSIVE PUSTULAR DERMATOSIS OF SCALP: Primary | ICD-10-CM

## 2020-03-02 DIAGNOSIS — Z86.73 HISTORY OF CVA (CEREBROVASCULAR ACCIDENT): Chronic | ICD-10-CM

## 2020-03-02 LAB — INR PPP: 1.3 (ref 2–3)

## 2020-03-02 PROCEDURE — 1159F PR MEDICATION LIST DOCUMENTED IN MEDICAL RECORD: ICD-10-PCS | Mod: HCNC,S$GLB,, | Performed by: DERMATOLOGY

## 2020-03-02 PROCEDURE — 1101F PR PT FALLS ASSESS DOC 0-1 FALLS W/OUT INJ PAST YR: ICD-10-PCS | Mod: HCNC,CPTII,S$GLB, | Performed by: DERMATOLOGY

## 2020-03-02 PROCEDURE — 85610 POCT INR: ICD-10-PCS | Mod: QW,HCNC,S$GLB, | Performed by: INTERNAL MEDICINE

## 2020-03-02 PROCEDURE — 1101F PT FALLS ASSESS-DOCD LE1/YR: CPT | Mod: HCNC,CPTII,S$GLB, | Performed by: DERMATOLOGY

## 2020-03-02 PROCEDURE — 93793 ANTICOAG MGMT PT WARFARIN: CPT | Mod: HCNC,S$GLB,,

## 2020-03-02 PROCEDURE — 85610 PROTHROMBIN TIME: CPT | Mod: QW,HCNC,S$GLB, | Performed by: INTERNAL MEDICINE

## 2020-03-02 PROCEDURE — 99999 PR PBB SHADOW E&M-EST. PATIENT-LVL II: ICD-10-PCS | Mod: PBBFAC,HCNC,, | Performed by: DERMATOLOGY

## 2020-03-02 PROCEDURE — 1159F MED LIST DOCD IN RCRD: CPT | Mod: HCNC,S$GLB,, | Performed by: DERMATOLOGY

## 2020-03-02 PROCEDURE — 99999 PR PBB SHADOW E&M-EST. PATIENT-LVL II: CPT | Mod: PBBFAC,HCNC,, | Performed by: DERMATOLOGY

## 2020-03-02 PROCEDURE — 99213 OFFICE O/P EST LOW 20 MIN: CPT | Mod: HCNC,S$GLB,, | Performed by: DERMATOLOGY

## 2020-03-02 PROCEDURE — 1126F AMNT PAIN NOTED NONE PRSNT: CPT | Mod: HCNC,S$GLB,, | Performed by: DERMATOLOGY

## 2020-03-02 PROCEDURE — 99213 PR OFFICE/OUTPT VISIT, EST, LEVL III, 20-29 MIN: ICD-10-PCS | Mod: HCNC,S$GLB,, | Performed by: DERMATOLOGY

## 2020-03-02 PROCEDURE — 1126F PR PAIN SEVERITY QUANTIFIED, NO PAIN PRESENT: ICD-10-PCS | Mod: HCNC,S$GLB,, | Performed by: DERMATOLOGY

## 2020-03-02 PROCEDURE — 93793 PR ANTICOAGULANT MGMT FOR PT TAKING WARFARIN: ICD-10-PCS | Mod: HCNC,S$GLB,,

## 2020-03-02 NOTE — PROGRESS NOTES
Subjective:       Patient ID:  Heraclio Wall is a 79 y.o. male who presents for   Chief Complaint   Patient presents with    Follow-up     rash on scalp , no c/o     History of Present Illness: The patient presents for f/u EPD and staph aureus which is doing well   Location: entire scalp  LOV: 1 month ago  Continued on bet dip ointment daily x 1 month, completed doxy  Is off treatment currently and has not had any flare-up    Today, he also c/o scrape to right dorsal hand, occurred 3 days ago while putting something in the back seat of the car. He has noticed a lot of bruising around the cut skin, no pus or increased swelling. He has been applying neosporin.         Review of Systems   Constitutional: Negative for fever and chills.   Skin: Positive for rash.        Objective:    Physical Exam   Constitutional: He appears well-developed and well-nourished. No distress.   Neurological: He is alert and oriented to person, place, and time.   Psychiatric: He has a normal mood and affect.   Skin:   Areas Examined (abnormalities noted in diagram):   Scalp / Hair Palpated and Inspected  Head / Face Inspection Performed  Neck Inspection Performed  RUE Inspected  LUE Inspection Performed                  Diagram Legend     Erythematous scaling macule/papule c/w actinic keratosis       Vascular papule c/w angioma      Pigmented verrucoid papule/plaque c/w seborrheic keratosis      Yellow umbilicated papule c/w sebaceous hyperplasia      Irregularly shaped tan macule c/w lentigo     1-2 mm smooth white papules consistent with Milia      Movable subcutaneous cyst with punctum c/w epidermal inclusion cyst      Subcutaneous movable cyst c/w pilar cyst      Firm pink to brown papule c/w dermatofibroma      Pedunculated fleshy papule(s) c/w skin tag(s)      Evenly pigmented macule c/w junctional nevus     Mildly variegated pigmented, slightly irregular-bordered macule c/w mildly atypical nevus      Flesh colored to evenly  pigmented papule c/w intradermal nevus       Pink pearly papule/plaque c/w basal cell carcinoma      Erythematous hyperkeratotic cursted plaque c/w SCC      Surgical scar with no sign of skin cancer recurrence      Open and closed comedones      Inflammatory papules and pustules      Verrucoid papule consistent consistent with wart     Erythematous eczematous patches and plaques     Dystrophic onycholytic nail with subungual debris c/w onychomycosis     Umbilicated papule    Erythematous-base heme-crusted tan verrucoid plaque consistent with inflamed seborrheic keratosis     Erythematous Silvery Scaling Plaque c/w Psoriasis     See annotation      Assessment / Plan:        Erosive pustular dermatosis, s/p Efudex therapy, resolved  - patient now with evidence of steroid atrophy, counseled that this may take months to improve or may not improve  HOLD  -     betamethasone dipropionate (DIPROLENE) 0.05 % ointment; Apply topically 2 (two) times daily.  Dispense: 45 g; Refill: 1     Staph aureus infection of the skin, resolved s/p Doxy  - pain and purulent drainage resolved    Abrasion of right hand  - no evidence of infection  - recommend vaseline with occlusive covering until healed      Follow up if symptoms worsen or fail to improve.     Ritchie Romero MD  Department of Dermatology, Mohs Surgery  9:11 AM  3/2/2020

## 2020-03-02 NOTE — PROGRESS NOTES
Patient's INR is sub-therapeutic at 1.3.  Patient accompanied by daughter, Nina Giang.    Only reported change- Mr. Wall is no longer drinking cranberry juice.  No missed doses - daughter has helped with medication.  No abnormal pain, swelling or weakness noted.  Instructions given for patient to take warfarin 3.75mg on today; then increase dose of warfarin to 2.5mg every evening.   Patient/caregier voiced understanding.  Follow-up in 1 week with labs.    Mrs. Giang requested a call at [phone] to discuss results and dosing.

## 2020-03-03 ENCOUNTER — OFFICE VISIT (OUTPATIENT)
Dept: FAMILY MEDICINE | Facility: CLINIC | Age: 80
End: 2020-03-03
Payer: MEDICARE

## 2020-03-03 VITALS
OXYGEN SATURATION: 100 % | HEART RATE: 68 BPM | TEMPERATURE: 97 F | SYSTOLIC BLOOD PRESSURE: 118 MMHG | DIASTOLIC BLOOD PRESSURE: 62 MMHG | HEIGHT: 72 IN | WEIGHT: 177.25 LBS | BODY MASS INDEX: 24.01 KG/M2

## 2020-03-03 DIAGNOSIS — Z79.01 CHRONIC ANTICOAGULATION: Chronic | ICD-10-CM

## 2020-03-03 DIAGNOSIS — J30.1 SEASONAL ALLERGIC RHINITIS DUE TO POLLEN: ICD-10-CM

## 2020-03-03 DIAGNOSIS — N18.30 CHRONIC KIDNEY DISEASE, STAGE 3: Chronic | ICD-10-CM

## 2020-03-03 DIAGNOSIS — I10 ESSENTIAL HYPERTENSION: Primary | Chronic | ICD-10-CM

## 2020-03-03 DIAGNOSIS — E03.9 ACQUIRED HYPOTHYROIDISM: ICD-10-CM

## 2020-03-03 DIAGNOSIS — I25.10 CORONARY ARTERY DISEASE INVOLVING NATIVE CORONARY ARTERY OF NATIVE HEART WITHOUT ANGINA PECTORIS: ICD-10-CM

## 2020-03-03 DIAGNOSIS — Z86.73 HISTORY OF CVA (CEREBROVASCULAR ACCIDENT): Chronic | ICD-10-CM

## 2020-03-03 DIAGNOSIS — R63.4 WEIGHT LOSS: ICD-10-CM

## 2020-03-03 PROCEDURE — 1159F MED LIST DOCD IN RCRD: CPT | Mod: HCNC,S$GLB,, | Performed by: INTERNAL MEDICINE

## 2020-03-03 PROCEDURE — 3078F DIAST BP <80 MM HG: CPT | Mod: HCNC,CPTII,S$GLB, | Performed by: INTERNAL MEDICINE

## 2020-03-03 PROCEDURE — 3078F PR MOST RECENT DIASTOLIC BLOOD PRESSURE < 80 MM HG: ICD-10-PCS | Mod: HCNC,CPTII,S$GLB, | Performed by: INTERNAL MEDICINE

## 2020-03-03 PROCEDURE — 1101F PR PT FALLS ASSESS DOC 0-1 FALLS W/OUT INJ PAST YR: ICD-10-PCS | Mod: HCNC,CPTII,S$GLB, | Performed by: INTERNAL MEDICINE

## 2020-03-03 PROCEDURE — 99999 PR PBB SHADOW E&M-EST. PATIENT-LVL III: CPT | Mod: PBBFAC,HCNC,, | Performed by: INTERNAL MEDICINE

## 2020-03-03 PROCEDURE — 3074F PR MOST RECENT SYSTOLIC BLOOD PRESSURE < 130 MM HG: ICD-10-PCS | Mod: HCNC,CPTII,S$GLB, | Performed by: INTERNAL MEDICINE

## 2020-03-03 PROCEDURE — 99214 PR OFFICE/OUTPT VISIT, EST, LEVL IV, 30-39 MIN: ICD-10-PCS | Mod: HCNC,S$GLB,, | Performed by: INTERNAL MEDICINE

## 2020-03-03 PROCEDURE — 1126F PR PAIN SEVERITY QUANTIFIED, NO PAIN PRESENT: ICD-10-PCS | Mod: HCNC,S$GLB,, | Performed by: INTERNAL MEDICINE

## 2020-03-03 PROCEDURE — 1126F AMNT PAIN NOTED NONE PRSNT: CPT | Mod: HCNC,S$GLB,, | Performed by: INTERNAL MEDICINE

## 2020-03-03 PROCEDURE — 1101F PT FALLS ASSESS-DOCD LE1/YR: CPT | Mod: HCNC,CPTII,S$GLB, | Performed by: INTERNAL MEDICINE

## 2020-03-03 PROCEDURE — 99999 PR PBB SHADOW E&M-EST. PATIENT-LVL III: ICD-10-PCS | Mod: PBBFAC,HCNC,, | Performed by: INTERNAL MEDICINE

## 2020-03-03 PROCEDURE — 3074F SYST BP LT 130 MM HG: CPT | Mod: HCNC,CPTII,S$GLB, | Performed by: INTERNAL MEDICINE

## 2020-03-03 PROCEDURE — 99214 OFFICE O/P EST MOD 30 MIN: CPT | Mod: HCNC,S$GLB,, | Performed by: INTERNAL MEDICINE

## 2020-03-03 PROCEDURE — 1159F PR MEDICATION LIST DOCUMENTED IN MEDICAL RECORD: ICD-10-PCS | Mod: HCNC,S$GLB,, | Performed by: INTERNAL MEDICINE

## 2020-03-03 RX ORDER — FLUTICASONE PROPIONATE 50 MCG
2 SPRAY, SUSPENSION (ML) NASAL DAILY
Qty: 16 G | Refills: 6 | Status: SHIPPED | OUTPATIENT
Start: 2020-03-03 | End: 2022-05-02 | Stop reason: SDUPTHER

## 2020-03-09 ENCOUNTER — ANTI-COAG VISIT (OUTPATIENT)
Dept: CARDIOLOGY | Facility: CLINIC | Age: 80
End: 2020-03-09
Payer: MEDICARE

## 2020-03-09 ENCOUNTER — LAB VISIT (OUTPATIENT)
Dept: LAB | Facility: HOSPITAL | Age: 80
End: 2020-03-09
Attending: INTERNAL MEDICINE
Payer: MEDICARE

## 2020-03-09 DIAGNOSIS — Z86.73 HISTORY OF CVA (CEREBROVASCULAR ACCIDENT): Chronic | ICD-10-CM

## 2020-03-09 DIAGNOSIS — Z79.01 LONG TERM (CURRENT) USE OF ANTICOAGULANTS: ICD-10-CM

## 2020-03-09 LAB
INR PPP: 2.2 (ref 0.8–1.2)
PROTHROMBIN TIME: 22.4 SEC (ref 9–12.5)

## 2020-03-09 PROCEDURE — 36415 COLL VENOUS BLD VENIPUNCTURE: CPT | Mod: HCNC,PO

## 2020-03-09 PROCEDURE — 93793 ANTICOAG MGMT PT WARFARIN: CPT | Mod: S$GLB,,,

## 2020-03-09 PROCEDURE — 93793 PR ANTICOAGULANT MGMT FOR PT TAKING WARFARIN: ICD-10-PCS | Mod: S$GLB,,,

## 2020-03-09 PROCEDURE — 85610 PROTHROMBIN TIME: CPT | Mod: HCNC

## 2020-03-09 NOTE — PROGRESS NOTES
Patient's INR is therapeutic at 2.2.  Patient contacted.  Spoke with daughter, Nina Giang at [phone].  Previous instructions followed.  No upcoming procedures or changes reported. INR in range after boosted dose given.  Instructions given for patient to increase dose of warfarin to 3.75mg on Mondays and 2.5mg on all other days of the week.  Caregiver repeated directions and voiced understanding.   Recheck in 1 week.  Please call should you have any questions or concerns at [phone] or [phone].

## 2020-03-09 NOTE — PROGRESS NOTES
3/9 @ 3:38pm    Returned call to patient.  Dosing instructions reviewed.  Patient wrote instructions down and repeated back.

## 2020-03-10 ENCOUNTER — PES CALL (OUTPATIENT)
Dept: ADMINISTRATIVE | Facility: CLINIC | Age: 80
End: 2020-03-10

## 2020-03-16 ENCOUNTER — LAB VISIT (OUTPATIENT)
Dept: LAB | Facility: HOSPITAL | Age: 80
End: 2020-03-16
Attending: INTERNAL MEDICINE
Payer: MEDICARE

## 2020-03-16 ENCOUNTER — ANTI-COAG VISIT (OUTPATIENT)
Dept: CARDIOLOGY | Facility: CLINIC | Age: 80
End: 2020-03-16
Payer: MEDICARE

## 2020-03-16 DIAGNOSIS — Z79.01 LONG TERM (CURRENT) USE OF ANTICOAGULANTS: ICD-10-CM

## 2020-03-16 DIAGNOSIS — Z86.73 HISTORY OF CVA (CEREBROVASCULAR ACCIDENT): Chronic | ICD-10-CM

## 2020-03-16 LAB
INR PPP: 3.8 (ref 0.8–1.2)
PROTHROMBIN TIME: 37.7 SEC (ref 9–12.5)

## 2020-03-16 PROCEDURE — 36415 COLL VENOUS BLD VENIPUNCTURE: CPT | Mod: HCNC,PO

## 2020-03-16 PROCEDURE — 93793 ANTICOAG MGMT PT WARFARIN: CPT | Mod: S$GLB,,,

## 2020-03-16 PROCEDURE — 93793 PR ANTICOAGULANT MGMT FOR PT TAKING WARFARIN: ICD-10-PCS | Mod: S$GLB,,,

## 2020-03-16 PROCEDURE — 85610 PROTHROMBIN TIME: CPT | Mod: HCNC

## 2020-03-16 NOTE — PROGRESS NOTES
Patient's INR is supra-therapeutic at 3.8.  Patient and daughter, Nina Giang, contacted.  No significant changes or extra doses reported.  No signs of bleeding noted; advised patient to seek immediate medical attention if he notices any abnormal bleeding.  Instructions given for patient to hold dose of Coumadin on today; then lower dose of Coumadin to 2.5mg every evening.  Patient/caregiver wrote instructions down and repeated back.  Recheck INR in 2 weeks.

## 2020-03-27 ENCOUNTER — PATIENT OUTREACH (OUTPATIENT)
Dept: OTHER | Facility: OTHER | Age: 80
End: 2020-03-27

## 2020-03-30 ENCOUNTER — TELEPHONE (OUTPATIENT)
Dept: CARDIOLOGY | Facility: CLINIC | Age: 80
End: 2020-03-30

## 2020-03-30 ENCOUNTER — LAB VISIT (OUTPATIENT)
Dept: LAB | Facility: HOSPITAL | Age: 80
End: 2020-03-30
Attending: INTERNAL MEDICINE
Payer: MEDICARE

## 2020-03-30 DIAGNOSIS — Z79.01 LONG TERM (CURRENT) USE OF ANTICOAGULANTS: ICD-10-CM

## 2020-03-30 LAB
INR PPP: 5.7 (ref 0.8–1.2)
PROTHROMBIN TIME: 55.1 SEC (ref 9–12.5)

## 2020-03-30 PROCEDURE — 85610 PROTHROMBIN TIME: CPT | Mod: HCNC

## 2020-03-30 PROCEDURE — 36415 COLL VENOUS BLD VENIPUNCTURE: CPT | Mod: HCNC,PO

## 2020-03-31 ENCOUNTER — ANTI-COAG VISIT (OUTPATIENT)
Dept: CARDIOLOGY | Facility: CLINIC | Age: 80
End: 2020-03-31
Payer: MEDICARE

## 2020-03-31 DIAGNOSIS — Z86.73 HISTORY OF CVA (CEREBROVASCULAR ACCIDENT): Chronic | ICD-10-CM

## 2020-03-31 DIAGNOSIS — Z79.01 LONG TERM (CURRENT) USE OF ANTICOAGULANTS: ICD-10-CM

## 2020-03-31 PROCEDURE — 93793 ANTICOAG MGMT PT WARFARIN: CPT | Mod: S$GLB,,,

## 2020-03-31 PROCEDURE — 93793 PR ANTICOAGULANT MGMT FOR PT TAKING WARFARIN: ICD-10-PCS | Mod: S$GLB,,,

## 2020-03-31 NOTE — PROGRESS NOTES
Patient's INR remains supra-therapeutic at 5.7 despite dose decrease.  Patient and daughter, Nina Giang, contacted.  Mr. Wall states his appetite has decreased since the passing of his wife on last year .  No other changes or extra doses reported.  No signs of bleeding noted; advised patient to seek immediate medical attention if he notices any abnormal bleeding.  Instructions given for patient to hold dose of Coumadin on today; then lower dose of Coumadin to 1.25mg on Wednesdays, Fridays and 2.5mg on all other days of the week.  Patient/caregiver wrote instructions down and repeated back.  Recheck INR in 1 week.

## 2020-04-02 NOTE — TELEPHONE ENCOUNTER
Continued Stay Note  [facility]     Patient Name: Walter Fish  MRN: [id number]  Today's Date: 4/2/2020    Admit Date: 3/25/2020    Discharge Plan     Row Name 04/02/20 1725       Plan    Final Discharge Disposition Code  01 - home or self-care    Final Note  Pt was discharged per Dr. Mccann's orders.  SW met w/pt to discuss follow-up care.  Pt will begin mental health therapy w/[facility] & receive meds from his PCP.  Pt transported self home.        Discharge Codes    No documentation.       Expected Discharge Date and Time     Expected Discharge Date Expected Discharge Time    Apr 2, 2020             JJ Calvo     Please sched monthly PT/INR at [facility].  SM

## 2020-04-06 ENCOUNTER — ANTI-COAG VISIT (OUTPATIENT)
Dept: CARDIOLOGY | Facility: CLINIC | Age: 80
End: 2020-04-06
Payer: MEDICARE

## 2020-04-06 ENCOUNTER — LAB VISIT (OUTPATIENT)
Dept: LAB | Facility: HOSPITAL | Age: 80
End: 2020-04-06
Attending: INTERNAL MEDICINE
Payer: MEDICARE

## 2020-04-06 DIAGNOSIS — Z79.01 LONG TERM (CURRENT) USE OF ANTICOAGULANTS: ICD-10-CM

## 2020-04-06 DIAGNOSIS — Z86.73 HISTORY OF CVA (CEREBROVASCULAR ACCIDENT): Chronic | ICD-10-CM

## 2020-04-06 LAB
INR PPP: 2.9 (ref 0.8–1.2)
PROTHROMBIN TIME: 29.3 SEC (ref 9–12.5)

## 2020-04-06 PROCEDURE — 93793 ANTICOAG MGMT PT WARFARIN: CPT | Mod: S$GLB,,,

## 2020-04-06 PROCEDURE — 85610 PROTHROMBIN TIME: CPT | Mod: HCNC

## 2020-04-06 PROCEDURE — 93793 PR ANTICOAGULANT MGMT FOR PT TAKING WARFARIN: ICD-10-PCS | Mod: S$GLB,,,

## 2020-04-06 PROCEDURE — 36415 COLL VENOUS BLD VENIPUNCTURE: CPT | Mod: HCNC,PO

## 2020-04-06 NOTE — PROGRESS NOTES
Patient contacted. INR 2.9 therapeutic. Patient reports no changes. Patient instructed to take warfarin 1.25 mg on Wednesdays and Fridays, 2.5 mg all other days. Repeat labs on 4/20/20. Patient repeated back correctly.

## 2020-04-20 ENCOUNTER — LAB VISIT (OUTPATIENT)
Dept: LAB | Facility: HOSPITAL | Age: 80
End: 2020-04-20
Attending: INTERNAL MEDICINE
Payer: MEDICARE

## 2020-04-20 ENCOUNTER — ANTI-COAG VISIT (OUTPATIENT)
Dept: CARDIOLOGY | Facility: CLINIC | Age: 80
End: 2020-04-20
Payer: MEDICARE

## 2020-04-20 DIAGNOSIS — Z79.01 LONG TERM (CURRENT) USE OF ANTICOAGULANTS: ICD-10-CM

## 2020-04-20 DIAGNOSIS — Z86.73 HISTORY OF CVA (CEREBROVASCULAR ACCIDENT): Chronic | ICD-10-CM

## 2020-04-20 LAB
INR PPP: 3.1 (ref 0.8–1.2)
PROTHROMBIN TIME: 31.8 SEC (ref 9–12.5)

## 2020-04-20 PROCEDURE — 93793 ANTICOAG MGMT PT WARFARIN: CPT | Mod: S$GLB,,,

## 2020-04-20 PROCEDURE — 36415 COLL VENOUS BLD VENIPUNCTURE: CPT | Mod: HCNC,PO

## 2020-04-20 PROCEDURE — 93793 PR ANTICOAGULANT MGMT FOR PT TAKING WARFARIN: ICD-10-PCS | Mod: S$GLB,,,

## 2020-04-20 PROCEDURE — 85610 PROTHROMBIN TIME: CPT | Mod: HCNC

## 2020-04-20 NOTE — PROGRESS NOTES
Patient contacted:  INR is slightly elevated at 3.1.  Reports no recent changes.  No signs of any bleeding episodes noted.  Will lower patient's warfarin dose to 1.25 mg every Monday, Wednesday, and Friday; and 2.5 mg on all other days per week.  Recheck in 2 weeks, 5/04/2020.  Advised to seek medical attention for any signs of abnormal bleeding, if needed.  Instructions written, repeated back, and patient voiced understanding.

## 2020-05-04 ENCOUNTER — ANTI-COAG VISIT (OUTPATIENT)
Dept: CARDIOLOGY | Facility: CLINIC | Age: 80
End: 2020-05-04
Payer: MEDICARE

## 2020-05-04 ENCOUNTER — LAB VISIT (OUTPATIENT)
Dept: LAB | Facility: HOSPITAL | Age: 80
End: 2020-05-04
Attending: INTERNAL MEDICINE
Payer: MEDICARE

## 2020-05-04 DIAGNOSIS — Z86.73 HISTORY OF CVA (CEREBROVASCULAR ACCIDENT): Chronic | ICD-10-CM

## 2020-05-04 DIAGNOSIS — Z79.01 LONG TERM (CURRENT) USE OF ANTICOAGULANTS: ICD-10-CM

## 2020-05-04 LAB
INR PPP: 2.5 (ref 0.8–1.2)
PROTHROMBIN TIME: 25.7 SEC (ref 9–12.5)

## 2020-05-04 PROCEDURE — 36415 COLL VENOUS BLD VENIPUNCTURE: CPT | Mod: HCNC,PO

## 2020-05-04 PROCEDURE — 85610 PROTHROMBIN TIME: CPT | Mod: HCNC

## 2020-05-04 PROCEDURE — 93793 ANTICOAG MGMT PT WARFARIN: CPT | Mod: S$GLB,,,

## 2020-05-04 PROCEDURE — 93793 PR ANTICOAGULANT MGMT FOR PT TAKING WARFARIN: ICD-10-PCS | Mod: S$GLB,,,

## 2020-05-04 NOTE — PROGRESS NOTES
Patient contacted:  INR is therapeutic at 2.5.  Medication changes - none.  Instructions given:  Maintain current dose of warfarin 1.25 mg every Monday, Wednesday, and Friday; and 2.5 mg on all other days per week.  Keep diet consistent.  Recheck on 6/01/2020 ([facility]).  Patient repeated back instructions and voiced understanding.

## 2020-05-05 ENCOUNTER — TELEPHONE (OUTPATIENT)
Dept: OPHTHALMOLOGY | Facility: CLINIC | Age: 80
End: 2020-05-05

## 2020-05-05 NOTE — TELEPHONE ENCOUNTER
Left a voicemail for Mr. Wall regarding his appointment with Dr. Kevin on May 27th. Dr. Kevin saw Mr. Wall in October 2019, was told to return in a year. Will cancel his upcoming visit and will send a reminder to come back in October for his yearly.

## 2020-05-13 DIAGNOSIS — I10 ESSENTIAL HYPERTENSION: Primary | ICD-10-CM

## 2020-05-13 NOTE — PROGRESS NOTES
[facility]: Health  Follow-Up    The history is provided by the patient.     Follow Up  Follow-up reason(s): reading review      Readings are missing. Patient informed me that his wife passed away back in February and it has been a difficult few weeks for him. This is why he has not taken his BP readings lately. He stated that he is just not feeling up to it. I asked him if he wanted to go on hiatus for a few weeks to give him more time and he stated that he would like to do that. From there, he will let me know how he wants to proceed in the future. He will reach out to me in the meantime if anything changes.       Intervention/Plan    There are no preventive care reminders to display for this patient.    Last 5 Patient Entered Readings                                      Current 30 Day Average:      Recent Readings 11/11/2019 11/11/2019 11/11/2019 11/4/2019 11/4/2019    SBP (mmHg) 125 120 122 144 153    DBP (mmHg) 59 59 59 72 75    Pulse 56 59 57 54 55                  Screenings    SDOH

## 2020-06-01 ENCOUNTER — ANTI-COAG VISIT (OUTPATIENT)
Dept: CARDIOLOGY | Facility: CLINIC | Age: 80
End: 2020-06-01
Payer: MEDICARE

## 2020-06-01 ENCOUNTER — LAB VISIT (OUTPATIENT)
Dept: LAB | Facility: HOSPITAL | Age: 80
End: 2020-06-01
Attending: INTERNAL MEDICINE
Payer: MEDICARE

## 2020-06-01 DIAGNOSIS — Z79.01 LONG TERM (CURRENT) USE OF ANTICOAGULANTS: ICD-10-CM

## 2020-06-01 DIAGNOSIS — Z86.73 HISTORY OF CVA (CEREBROVASCULAR ACCIDENT): Chronic | ICD-10-CM

## 2020-06-01 LAB
INR PPP: 2.7 (ref 0.8–1.2)
PROTHROMBIN TIME: 27.3 SEC (ref 9–12.5)

## 2020-06-01 PROCEDURE — 93793 PR ANTICOAGULANT MGMT FOR PT TAKING WARFARIN: ICD-10-PCS | Mod: S$GLB,,,

## 2020-06-01 PROCEDURE — 36415 COLL VENOUS BLD VENIPUNCTURE: CPT | Mod: HCNC,PO

## 2020-06-01 PROCEDURE — 93793 ANTICOAG MGMT PT WARFARIN: CPT | Mod: S$GLB,,,

## 2020-06-01 PROCEDURE — 85610 PROTHROMBIN TIME: CPT | Mod: HCNC

## 2020-06-01 NOTE — PROGRESS NOTES
Subjective:      Patient ID: Heraclio Wall is a 80 y.o. male.    Chief Complaint: Follow-up      HPI  Here for follow up of medical problems.  Sleeping well with cpap.  Left shoulder pain on and off, better with ice hot rubbed on it.  Walking for exercise.  No f/c/sw/cough.  No cp/sob/palp.  BMs normal.  Dealing with covid stress pretty well, daughter in law shops for him.  Cooks for self.    Updated/ annual due 8/20:  HM:  11/19 fluvax, 8/19 HAV, 12/15 [id number], 12/16 [id number], 2/15 TDaP, 3/12 zostavax, 7/18 BMD rep 2y, 7/12 Cscope no repeat, 5/18 FIT neg.     Review of Systems   Constitutional: Negative for chills, diaphoresis, fatigue and fever.   Respiratory: Negative for cough, chest tightness and shortness of breath.    Cardiovascular: Negative for chest pain, palpitations and leg swelling.   Gastrointestinal: Negative for blood in stool, constipation, diarrhea, nausea and vomiting.   Genitourinary: Negative for difficulty urinating and frequency.   Musculoskeletal: Negative for arthralgias.         Objective:   /68 (BP Location: Left arm, Patient Position: Sitting, BP Method: Medium (Manual))   Pulse 71   Temp 98.4 °F (36.9 °C) (Oral)   Ht 6' (1.829 m)   Wt 80 kg (176 lb 5.9 oz)   SpO2 97%   BMI 23.92 kg/m²     Physical Exam   Constitutional: He is oriented to person, place, and time. He appears well-developed and well-nourished.   HENT:   Mouth/Throat: Oropharynx is clear and moist.   Neck: Normal range of motion. Neck supple.   Cardiovascular: Normal rate, regular rhythm and intact distal pulses. Exam reveals no gallop and no friction rub.   No murmur heard.  Pulmonary/Chest: Effort normal and breath sounds normal. He has no wheezes. He has no rales.   Abdominal: Soft. Bowel sounds are normal. He exhibits no mass. There is no tenderness.   Musculoskeletal: He exhibits no edema.        Right shoulder: He exhibits normal range of motion and no tenderness.        Left shoulder: He exhibits  normal range of motion (mild pain with adduction over 90deg.) and no tenderness.   Lymphadenopathy:     He has no cervical adenopathy.   Neurological: He is alert and oriented to person, place, and time.   Psychiatric: He has a normal mood and affect.           Assessment:       1. Essential hypertension    2. Chronic kidney disease, stage 3    3. Coronary artery disease involving native coronary artery of native heart without angina pectoris    4. Age-related osteoporosis without current pathological fracture    5. Mixed hyperlipidemia    6. CHESTER on CPAP    7. History of CVA (cerebrovascular accident)    8. Preventive measure    9. Left shoulder pain, unspecified chronicity    10. Chronic anticoagulation          Plan:     Essential hypertension- doing well, cont rx.    Chronic kidney disease, stage 3- recheck 2mo.    Coronary artery disease, clinically stable.    Age-related osteoporosis without current pathological fracture- recheck BMD.  -     Vitamin D; Future  -     DXA Bone Density Spine And Hip; Future; Expected date: 06/02/2020    Mixed hyperlipidemia-cont rx, recheck 2mo.    CHESTER on CPAP, doing well.    History of CVA (cerebrovascular accident) on coumadin.    Preventive measure- due in 2mo.  -     CBC auto differential; Future; Expected date: 06/02/2020  -     Comprehensive metabolic panel; Future; Expected date: 06/02/2020  -     Lipid Panel; Future; Expected date: 06/02/2020  -     TSH; Future; Expected date: 06/02/2020  -     Vitamin D; Future    Left shoulder pain, unspecified chronicity- continue icy- hot, tylenol prn.

## 2020-06-01 NOTE — PROGRESS NOTES
Patient contacted: Patient's INR is therapeutic at 2.7. Patient's daughter Nina reports no changes for patient. Instructed to continue coumadin 1.25 mg Mondays, Wednesdays and Fridays; and 2.5 mg all other days. Recheck on 6/29/20. Nina repeated back correctly and verbalizes understanding.

## 2020-06-02 ENCOUNTER — OFFICE VISIT (OUTPATIENT)
Dept: FAMILY MEDICINE | Facility: CLINIC | Age: 80
End: 2020-06-02
Payer: MEDICARE

## 2020-06-02 VITALS
BODY MASS INDEX: 23.89 KG/M2 | DIASTOLIC BLOOD PRESSURE: 68 MMHG | HEIGHT: 72 IN | TEMPERATURE: 98 F | SYSTOLIC BLOOD PRESSURE: 124 MMHG | WEIGHT: 176.38 LBS | HEART RATE: 71 BPM | OXYGEN SATURATION: 97 %

## 2020-06-02 DIAGNOSIS — I25.10 CORONARY ARTERY DISEASE INVOLVING NATIVE CORONARY ARTERY OF NATIVE HEART WITHOUT ANGINA PECTORIS: ICD-10-CM

## 2020-06-02 DIAGNOSIS — Z86.73 HISTORY OF CVA (CEREBROVASCULAR ACCIDENT): Chronic | ICD-10-CM

## 2020-06-02 DIAGNOSIS — Z29.9 PREVENTIVE MEASURE: ICD-10-CM

## 2020-06-02 DIAGNOSIS — N18.30 CHRONIC KIDNEY DISEASE, STAGE 3: Chronic | ICD-10-CM

## 2020-06-02 DIAGNOSIS — M81.0 AGE-RELATED OSTEOPOROSIS WITHOUT CURRENT PATHOLOGICAL FRACTURE: ICD-10-CM

## 2020-06-02 DIAGNOSIS — I10 ESSENTIAL HYPERTENSION: Primary | Chronic | ICD-10-CM

## 2020-06-02 DIAGNOSIS — E78.2 MIXED HYPERLIPIDEMIA: Chronic | ICD-10-CM

## 2020-06-02 DIAGNOSIS — M25.512 LEFT SHOULDER PAIN, UNSPECIFIED CHRONICITY: ICD-10-CM

## 2020-06-02 DIAGNOSIS — G47.33 OSA ON CPAP: Chronic | ICD-10-CM

## 2020-06-02 DIAGNOSIS — Z79.01 CHRONIC ANTICOAGULATION: Chronic | ICD-10-CM

## 2020-06-02 PROCEDURE — 1125F AMNT PAIN NOTED PAIN PRSNT: CPT | Mod: HCNC,S$GLB,, | Performed by: INTERNAL MEDICINE

## 2020-06-02 PROCEDURE — 1101F PR PT FALLS ASSESS DOC 0-1 FALLS W/OUT INJ PAST YR: ICD-10-PCS | Mod: HCNC,CPTII,S$GLB, | Performed by: INTERNAL MEDICINE

## 2020-06-02 PROCEDURE — 1125F PR PAIN SEVERITY QUANTIFIED, PAIN PRESENT: ICD-10-PCS | Mod: HCNC,S$GLB,, | Performed by: INTERNAL MEDICINE

## 2020-06-02 PROCEDURE — 3074F PR MOST RECENT SYSTOLIC BLOOD PRESSURE < 130 MM HG: ICD-10-PCS | Mod: HCNC,CPTII,S$GLB, | Performed by: INTERNAL MEDICINE

## 2020-06-02 PROCEDURE — 3074F SYST BP LT 130 MM HG: CPT | Mod: HCNC,CPTII,S$GLB, | Performed by: INTERNAL MEDICINE

## 2020-06-02 PROCEDURE — 1159F MED LIST DOCD IN RCRD: CPT | Mod: HCNC,S$GLB,, | Performed by: INTERNAL MEDICINE

## 2020-06-02 PROCEDURE — 3078F PR MOST RECENT DIASTOLIC BLOOD PRESSURE < 80 MM HG: ICD-10-PCS | Mod: HCNC,CPTII,S$GLB, | Performed by: INTERNAL MEDICINE

## 2020-06-02 PROCEDURE — 99999 PR PBB SHADOW E&M-EST. PATIENT-LVL III: CPT | Mod: PBBFAC,HCNC,, | Performed by: INTERNAL MEDICINE

## 2020-06-02 PROCEDURE — 1159F PR MEDICATION LIST DOCUMENTED IN MEDICAL RECORD: ICD-10-PCS | Mod: HCNC,S$GLB,, | Performed by: INTERNAL MEDICINE

## 2020-06-02 PROCEDURE — 99214 OFFICE O/P EST MOD 30 MIN: CPT | Mod: HCNC,S$GLB,, | Performed by: INTERNAL MEDICINE

## 2020-06-02 PROCEDURE — 99214 PR OFFICE/OUTPT VISIT, EST, LEVL IV, 30-39 MIN: ICD-10-PCS | Mod: HCNC,S$GLB,, | Performed by: INTERNAL MEDICINE

## 2020-06-02 PROCEDURE — 99499 RISK ADDL DX/OHS AUDIT: ICD-10-PCS | Mod: HCNC,S$GLB,, | Performed by: INTERNAL MEDICINE

## 2020-06-02 PROCEDURE — 99999 PR PBB SHADOW E&M-EST. PATIENT-LVL III: ICD-10-PCS | Mod: PBBFAC,HCNC,, | Performed by: INTERNAL MEDICINE

## 2020-06-02 PROCEDURE — 99499 UNLISTED E&M SERVICE: CPT | Mod: HCNC,S$GLB,, | Performed by: INTERNAL MEDICINE

## 2020-06-02 PROCEDURE — 3078F DIAST BP <80 MM HG: CPT | Mod: HCNC,CPTII,S$GLB, | Performed by: INTERNAL MEDICINE

## 2020-06-02 PROCEDURE — 1101F PT FALLS ASSESS-DOCD LE1/YR: CPT | Mod: HCNC,CPTII,S$GLB, | Performed by: INTERNAL MEDICINE

## 2020-06-03 ENCOUNTER — HOSPITAL ENCOUNTER (OUTPATIENT)
Dept: CARDIOLOGY | Facility: HOSPITAL | Age: 80
Discharge: HOME | End: 2020-06-03
Attending: INTERNAL MEDICINE
Payer: MEDICARE

## 2020-06-03 VITALS
SYSTOLIC BLOOD PRESSURE: 124 MMHG | WEIGHT: 177 LBS | HEIGHT: 72 IN | BODY MASS INDEX: 23.84 KG/M2 | WEIGHT: 176 LBS | BODY MASS INDEX: 23.98 KG/M2 | DIASTOLIC BLOOD PRESSURE: 68 MMHG | HEIGHT: 72 IN

## 2020-06-03 DIAGNOSIS — Z86.73 HISTORY OF CVA (CEREBROVASCULAR ACCIDENT): ICD-10-CM

## 2020-06-03 DIAGNOSIS — Z79.01 CHRONIC ANTICOAGULATION: ICD-10-CM

## 2020-06-03 DIAGNOSIS — I10 ESSENTIAL HYPERTENSION: ICD-10-CM

## 2020-06-03 DIAGNOSIS — G47.33 OSA ON CPAP: ICD-10-CM

## 2020-06-03 DIAGNOSIS — I65.21 STENOSIS OF RIGHT CAROTID ARTERY: ICD-10-CM

## 2020-06-03 DIAGNOSIS — R42 DIZZINESS: ICD-10-CM

## 2020-06-03 DIAGNOSIS — I70.0 ATHEROSCLEROSIS OF AORTA: ICD-10-CM

## 2020-06-03 LAB
AORTIC ROOT ANNULUS: 3.29 CM
AV INDEX (PROSTH): 0.62
AV MEAN GRADIENT: 4 MMHG
AV PEAK GRADIENT: 7 MMHG
AV VALVE AREA: 2.03 CM2
AV VELOCITY RATIO: 0.6
BSA FOR ECHO PROCEDURE: 2.01 M2
CV ECHO LV RWT: 0.47 CM
DOP CALC AO PEAK VEL: 1.28 M/S
DOP CALC AO VTI: 32.21 CM
DOP CALC LVOT AREA: 3.3 CM2
DOP CALC LVOT DIAMETER: 2.05 CM
DOP CALC LVOT PEAK VEL: 0.77 M/S
DOP CALC LVOT STROKE VOLUME: 65.35 CM3
DOP CALCLVOT PEAK VEL VTI: 19.81 CM
E WAVE DECELERATION TIME: 145.72 MSEC
E/A RATIO: 1.38
E/E' RATIO: 7.33 M/S
ECHO LV POSTERIOR WALL: 1.16 CM (ref 0.6–1.1)
FRACTIONAL SHORTENING: 31 % (ref 28–44)
INTERVENTRICULAR SEPTUM: 1.05 CM (ref 0.6–1.1)
LA MAJOR: 4.56 CM
LA MINOR: 4.74 CM
LA WIDTH: 4.08 CM
LEFT ARM DIASTOLIC BLOOD PRESSURE: 70 MMHG
LEFT ARM SYSTOLIC BLOOD PRESSURE: 122 MMHG
LEFT ATRIUM SIZE: 3.53 CM
LEFT ATRIUM VOLUME INDEX: 28.2 ML/M2
LEFT ATRIUM VOLUME: 56.9 CM3
LEFT CBA DIAS: 9 CM/S
LEFT CBA SYS: 65 CM/S
LEFT CCA DIST DIAS: 9 CM/S
LEFT CCA DIST SYS: 71 CM/S
LEFT CCA MID DIAS: 9 CM/S
LEFT CCA MID SYS: 85 CM/S
LEFT CCA PROX DIAS: 8 CM/S
LEFT CCA PROX SYS: 97 CM/S
LEFT ECA DIAS: 5 CM/S
LEFT ECA SYS: 101 CM/S
LEFT ICA DIST DIAS: 17 CM/S
LEFT ICA DIST SYS: 82 CM/S
LEFT ICA MID DIAS: 15 CM/S
LEFT ICA MID SYS: 87 CM/S
LEFT ICA PROX DIAS: 11 CM/S
LEFT ICA PROX SYS: 67 CM/S
LEFT INTERNAL DIMENSION IN SYSTOLE: 3.41 CM (ref 2.1–4)
LEFT VENTRICLE DIASTOLIC VOLUME INDEX: 57.07 ML/M2
LEFT VENTRICLE DIASTOLIC VOLUME: 115.16 ML
LEFT VENTRICLE MASS INDEX: 101 G/M2
LEFT VENTRICLE SYSTOLIC VOLUME INDEX: 23.6 ML/M2
LEFT VENTRICLE SYSTOLIC VOLUME: 47.67 ML
LEFT VENTRICULAR INTERNAL DIMENSION IN DIASTOLE: 4.94 CM (ref 3.5–6)
LEFT VENTRICULAR MASS: 204.42 G
LEFT VERTEBRAL DIAS: 8 CM/S
LEFT VERTEBRAL SYS: 50 CM/S
LV LATERAL E/E' RATIO: 7.33 M/S
LV SEPTAL E/E' RATIO: 7.33 M/S
MV PEAK A VEL: 0.64 M/S
MV PEAK E VEL: 0.88 M/S
OHS CV CAROTID RIGHT ICA EDV HIGHEST: 15
OHS CV CAROTID ULTRASOUND LEFT ICA/CCA RATIO: 0.9
OHS CV CAROTID ULTRASOUND RIGHT ICA/CCA RATIO: 1.06
OHS CV PV CAROTID LEFT HIGHEST CCA: 97
OHS CV PV CAROTID LEFT HIGHEST ICA: 87
OHS CV PV CAROTID RIGHT HIGHEST CCA: 78
OHS CV PV CAROTID RIGHT HIGHEST ICA: 83
OHS CV US CAROTID LEFT HIGHEST EDV: 17
PISA TR MAX VEL: 2.62 M/S
PULM VEIN S/D RATIO: 1.58
PV PEAK D VEL: 0.4 M/S
PV PEAK S VEL: 0.63 M/S
PV PEAK VELOCITY: 0.73 CM/S
RA MAJOR: 5.88 CM
RA WIDTH: 3.26 CM
RIGHT ARM DIASTOLIC BLOOD PRESSURE: 70 MMHG
RIGHT ARM SYSTOLIC BLOOD PRESSURE: 120 MMHG
RIGHT CBA DIAS: 7 CM/S
RIGHT CBA SYS: 59 CM/S
RIGHT CCA DIST DIAS: 6 CM/S
RIGHT CCA DIST SYS: 78 CM/S
RIGHT CCA MID DIAS: 9 CM/S
RIGHT CCA MID SYS: 68 CM/S
RIGHT CCA PROX DIAS: 5 CM/S
RIGHT CCA PROX SYS: 74 CM/S
RIGHT ECA DIAS: 5 CM/S
RIGHT ECA SYS: 97 CM/S
RIGHT ICA DIST DIAS: 15 CM/S
RIGHT ICA DIST SYS: 76 CM/S
RIGHT ICA MID DIAS: 15 CM/S
RIGHT ICA MID SYS: 83 CM/S
RIGHT ICA PROX DIAS: 7 CM/S
RIGHT ICA PROX SYS: 58 CM/S
RIGHT VENTRICULAR END-DIASTOLIC DIMENSION: 2.69 CM
RIGHT VERTEBRAL DIAS: 6 CM/S
RIGHT VERTEBRAL SYS: 52 CM/S
SINUS: 2.8 CM
STJ: 2.41 CM
TDI LATERAL: 0.12 M/S
TDI SEPTAL: 0.12 M/S
TDI: 0.12 M/S
TR MAX PG: 27 MMHG
TRICUSPID ANNULAR PLANE SYSTOLIC EXCURSION: 2.23 CM

## 2020-06-03 PROCEDURE — 93880 CV US DOPPLER CAROTID (CUPID ONLY): ICD-10-PCS | Mod: 26,HCNC,, | Performed by: INTERNAL MEDICINE

## 2020-06-03 PROCEDURE — 93880 EXTRACRANIAL BILAT STUDY: CPT | Mod: HCNC

## 2020-06-03 PROCEDURE — 93880 EXTRACRANIAL BILAT STUDY: CPT | Mod: 26,HCNC,, | Performed by: INTERNAL MEDICINE

## 2020-06-03 PROCEDURE — 93306 TTE W/DOPPLER COMPLETE: CPT | Mod: 26,HCNC,, | Performed by: INTERNAL MEDICINE

## 2020-06-03 PROCEDURE — 93306 TTE W/DOPPLER COMPLETE: CPT | Mod: HCNC

## 2020-06-03 PROCEDURE — 93306 ECHO (CUPID ONLY): ICD-10-PCS | Mod: 26,HCNC,, | Performed by: INTERNAL MEDICINE

## 2020-06-16 ENCOUNTER — TELEPHONE (OUTPATIENT)
Dept: NEUROLOGY | Facility: CLINIC | Age: 80
End: 2020-06-16

## 2020-06-16 ENCOUNTER — HOSPITAL ENCOUNTER (OUTPATIENT)
Dept: CARDIOLOGY | Facility: HOSPITAL | Age: 80
Discharge: HOME | End: 2020-06-16
Attending: INTERNAL MEDICINE
Payer: MEDICARE

## 2020-06-16 ENCOUNTER — OFFICE VISIT (OUTPATIENT)
Dept: CARDIOLOGY | Facility: CLINIC | Age: 80
End: 2020-06-16
Payer: MEDICARE

## 2020-06-16 VITALS — DIASTOLIC BLOOD PRESSURE: 58 MMHG | HEART RATE: 53 BPM | OXYGEN SATURATION: 99 % | SYSTOLIC BLOOD PRESSURE: 130 MMHG

## 2020-06-16 DIAGNOSIS — I10 ESSENTIAL HYPERTENSION: Primary | Chronic | ICD-10-CM

## 2020-06-16 DIAGNOSIS — I25.10 CORONARY ARTERY DISEASE INVOLVING NATIVE CORONARY ARTERY OF NATIVE HEART WITHOUT ANGINA PECTORIS: ICD-10-CM

## 2020-06-16 DIAGNOSIS — I70.0 ATHEROSCLEROSIS OF AORTA: Chronic | ICD-10-CM

## 2020-06-16 DIAGNOSIS — N18.30 CHRONIC KIDNEY DISEASE, STAGE 3: Chronic | ICD-10-CM

## 2020-06-16 DIAGNOSIS — Z79.01 CHRONIC ANTICOAGULATION: Chronic | ICD-10-CM

## 2020-06-16 DIAGNOSIS — G47.33 OSA ON CPAP: Chronic | ICD-10-CM

## 2020-06-16 DIAGNOSIS — E78.2 MIXED HYPERLIPIDEMIA: Chronic | ICD-10-CM

## 2020-06-16 DIAGNOSIS — R00.1 SINUS BRADYCARDIA: ICD-10-CM

## 2020-06-16 DIAGNOSIS — Z86.73 HISTORY OF CVA (CEREBROVASCULAR ACCIDENT): Chronic | ICD-10-CM

## 2020-06-16 DIAGNOSIS — I65.21 STENOSIS OF RIGHT CAROTID ARTERY: ICD-10-CM

## 2020-06-16 DIAGNOSIS — I10 ESSENTIAL HYPERTENSION: ICD-10-CM

## 2020-06-16 PROCEDURE — 93010 EKG 12-LEAD: ICD-10-PCS | Mod: HCNC,,, | Performed by: INTERNAL MEDICINE

## 2020-06-16 PROCEDURE — 3078F PR MOST RECENT DIASTOLIC BLOOD PRESSURE < 80 MM HG: ICD-10-PCS | Mod: HCNC,CPTII,S$GLB, | Performed by: INTERNAL MEDICINE

## 2020-06-16 PROCEDURE — 3075F PR MOST RECENT SYSTOLIC BLOOD PRESS GE 130-139MM HG: ICD-10-PCS | Mod: HCNC,CPTII,S$GLB, | Performed by: INTERNAL MEDICINE

## 2020-06-16 PROCEDURE — 3078F DIAST BP <80 MM HG: CPT | Mod: HCNC,CPTII,S$GLB, | Performed by: INTERNAL MEDICINE

## 2020-06-16 PROCEDURE — 1101F PR PT FALLS ASSESS DOC 0-1 FALLS W/OUT INJ PAST YR: ICD-10-PCS | Mod: HCNC,CPTII,S$GLB, | Performed by: INTERNAL MEDICINE

## 2020-06-16 PROCEDURE — 93010 ELECTROCARDIOGRAM REPORT: CPT | Mod: HCNC,,, | Performed by: INTERNAL MEDICINE

## 2020-06-16 PROCEDURE — 99499 RISK ADDL DX/OHS AUDIT: ICD-10-PCS | Mod: HCNC,S$GLB,, | Performed by: INTERNAL MEDICINE

## 2020-06-16 PROCEDURE — 99499 UNLISTED E&M SERVICE: CPT | Mod: HCNC,S$GLB,, | Performed by: INTERNAL MEDICINE

## 2020-06-16 PROCEDURE — 99999 PR PBB SHADOW E&M-EST. PATIENT-LVL IV: CPT | Mod: PBBFAC,HCNC,, | Performed by: INTERNAL MEDICINE

## 2020-06-16 PROCEDURE — 3075F SYST BP GE 130 - 139MM HG: CPT | Mod: HCNC,CPTII,S$GLB, | Performed by: INTERNAL MEDICINE

## 2020-06-16 PROCEDURE — 99214 OFFICE O/P EST MOD 30 MIN: CPT | Mod: 25,HCNC,S$GLB, | Performed by: INTERNAL MEDICINE

## 2020-06-16 PROCEDURE — 93005 ELECTROCARDIOGRAM TRACING: CPT | Mod: HCNC

## 2020-06-16 PROCEDURE — 1159F MED LIST DOCD IN RCRD: CPT | Mod: HCNC,S$GLB,, | Performed by: INTERNAL MEDICINE

## 2020-06-16 PROCEDURE — 1159F PR MEDICATION LIST DOCUMENTED IN MEDICAL RECORD: ICD-10-PCS | Mod: HCNC,S$GLB,, | Performed by: INTERNAL MEDICINE

## 2020-06-16 PROCEDURE — 99214 PR OFFICE/OUTPT VISIT, EST, LEVL IV, 30-39 MIN: ICD-10-PCS | Mod: 25,HCNC,S$GLB, | Performed by: INTERNAL MEDICINE

## 2020-06-16 PROCEDURE — 99999 PR PBB SHADOW E&M-EST. PATIENT-LVL IV: ICD-10-PCS | Mod: PBBFAC,HCNC,, | Performed by: INTERNAL MEDICINE

## 2020-06-16 PROCEDURE — 1101F PT FALLS ASSESS-DOCD LE1/YR: CPT | Mod: HCNC,CPTII,S$GLB, | Performed by: INTERNAL MEDICINE

## 2020-06-16 RX ORDER — FLUVASTATIN SODIUM 80 MG/1
TABLET, FILM COATED, EXTENDED RELEASE ORAL
Qty: 90 TABLET | Refills: 3
Start: 2020-06-16 | End: 2020-12-01 | Stop reason: SDUPTHER

## 2020-06-16 NOTE — TELEPHONE ENCOUNTER
----- Message from Herbie Baldwin sent at 6/16/2020 10:32 AM CDT -----  Type: Needs Medical Advice  Who Called:  Suzettemarge Celeste Call Back Number: [phone]  Additional Information: wanted to know if she could accompany the pt during the visit today since the pt has memory issues. Please call to advise

## 2020-06-16 NOTE — PROGRESS NOTES
Subjective:    Patient ID:  Heraclio Wall is a 80 y.o. male who presents for evaluation of Dizziness, Carotid Artery Disease, Coronary Artery Disease, Hyperlipidemia, and Risk Factor Management For Atherosclerosis      HPI Pt presents for f/u.  His current medical conditions include HTN, carotid artery disease, hyperlipidemia.  Nonsmoker.  Past history pertinent for following:  H/o  CVA 7/13 and was put on Plavix (was on ASA at time). He had admission 5/14 for splenic infarct and had surgery. Dr. Metz did abdominal angio with report showing no evidence of mesenteric stenosis. SAIRA showed no intracardiac source of emboli. He was d/cd home on asa, plavix and coumadin was started. He is now on coumadin only.  - Stress MPI Jan 2017.  He is enrolled in digital HTN program.   Now here.  Echo June 2020 normal LVEF, mild-mod TR, mild MR.  Carotid u/s 6/20 no stenosis noted.  He lost his wife unexpectedly early 2020.  Trying to recover.  Some dizziness standing up.  Exercising on regular basis.  No chest pain sxs.  No CHF sxs.  ecg today sinus mannie 51 bpm, nonspecific t wave abnl, 1 av block.   Chronic sinus bradycardia overall seems stable.   No TIA/CVA sxs.   No abnl bleeding on warfarin.        Current Outpatient Medications:     COUMADIN 2.5 mg tablet, Take 1 tablet by mouth every evening. (Patient taking differently: Take 1 tablet by mouth every evening except 1/2 tablet on Mondays, Wednesdays and Fridays.), Disp: 30 tablet, Rfl: 3    DIOVAN 80 mg tablet, Take 1 tablet (80 mg total) by mouth 2 (two) times daily., Disp: 180 tablet, Rfl: 3    fluvastatin XL (LESCOL XL) 80 mg Tb24, Take 1/2 of 80 mg pill nightly., Disp: 90 tablet, Rfl: 3    furosemide (LASIX) 40 MG tablet, Take 1 tablet (40 mg total) by mouth once daily., Disp: 90 tablet, Rfl: 3    pantoprazole (PROTONIX) 40 MG tablet, TAKE 1 TABLET BY MOUTH DAILY, Disp: 90 tablet, Rfl: 3    verapamiL (VERELAN) 240 MG C24P, Take 1 capsule (240 mg total) by  mouth 2 (two) times daily., Disp: 180 capsule, Rfl: 3    ascorbic acid (VITAMIN C) 1000 MG tablet, Take 3 tablets by mouth Daily., Disp: , Rfl:     cholecalciferol, vitamin D3, 1,000 unit capsule, Take 5 capsules by mouth Daily., Disp: , Rfl:     cyanocobalamin, vitamin B-12, 1,000 mcg/15 mL Liqd, Take 1 drop by mouth once daily. , Disp: , Rfl:     cycloSPORINE (RESTASIS) 0.05 % ophthalmic emulsion, Place 0.4 mLs (1 drop total) into both eyes 2 (two) times daily., Disp: 60 vial, Rfl: 12    dextran 70-hypromellose (ARTIFICIAL TEARS) ophthalmic solution, Place 1 drop into both eyes Use as needed., Disp: , Rfl:     diclofenac sodium (VOLTAREN) 1 % Gel, Apply 2 g topically 2 (two) times daily. for 7 days, Disp: 1 Tube, Rfl: 0    fluticasone propionate (FLONASE) 50 mcg/actuation nasal spray, 2 sprays (100 mcg total) by Each Nostril route once daily., Disp: 16 g, Rfl: 6    hydrocortisone (CORTEF) 5 MG Tab, Take 2.5 mg by mouth once daily., Disp: , Rfl:     meclizine (ANTIVERT) 25 mg tablet, , Disp: , Rfl:     PROGESTERONE MISC, 6.25 mg by Misc.(Non-Drug; Combo Route) route every evening., Disp: , Rfl:     PROGESTERONE MISC, 6.25 mg by Other route., Disp: , Rfl:     saw palmetto 160 MG capsule, Take 1 capsule by mouth 2 (two) times daily., Disp: , Rfl:     selenium 200 mcg Tab, Take 1 tablet by mouth Daily., Disp: , Rfl:     triamcinolone acetonide 0.1% (KENALOG) 0.1 % cream, KETAN EXT AA BID, Disp: , Rfl:       Review of Systems   Constitution: Negative.   HENT: Negative.    Eyes: Negative.    Cardiovascular: Negative.    Respiratory: Negative.    Endocrine: Negative.    Hematologic/Lymphatic: Negative.    Skin: Negative.    Musculoskeletal: Negative.    Gastrointestinal: Negative.    Genitourinary: Negative.    Neurological: Positive for dizziness and light-headedness.   Psychiatric/Behavioral: Negative.    Allergic/Immunologic: Negative.          BP (!) 130/58 (BP Location: Left arm, Patient Position:  Sitting, BP Method: Medium (Manual))   Pulse (!) 53   SpO2 99%     Wt Readings from Last 3 Encounters:   06/03/20 79.8 kg (176 lb)   06/03/20 80.3 kg (177 lb)   06/02/20 80 kg (176 lb 5.9 oz)     Temp Readings from Last 3 Encounters:   06/02/20 98.4 °F (36.9 °C) (Oral)   03/03/20 97.3 °F (36.3 °C) (Temporal)   02/04/20 97.9 °F (36.6 °C)     BP Readings from Last 3 Encounters:   06/16/20 (!) 130/58   06/03/20 124/68   06/02/20 124/68     Pulse Readings from Last 3 Encounters:   06/16/20 (!) 53   06/02/20 71   03/03/20 68       Objective:    Physical Exam   Constitutional: He is oriented to person, place, and time. He appears well-developed and well-nourished.   HENT:   Head: Normocephalic.   Neck: Normal range of motion. Neck supple. Normal carotid pulses, no hepatojugular reflux and no JVD present. Carotid bruit is not present. No thyromegaly present.   Cardiovascular: Regular rhythm, S1 normal and S2 normal. Bradycardia present. PMI is not displaced. Exam reveals no S3, no S4, no distant heart sounds, no friction rub, no midsystolic click and no opening snap.   No murmur heard.  Pulses:       Radial pulses are 2+ on the right side and 2+ on the left side.   Pulmonary/Chest: Effort normal and breath sounds normal. He has no wheezes. He has no rales.   Abdominal: Soft. Bowel sounds are normal. He exhibits no distension, no abdominal bruit, no ascites and no mass. There is no abdominal tenderness.   Musculoskeletal:         General: No edema.   Neurological: He is alert and oriented to person, place, and time.   Skin: Skin is warm.   Psychiatric: He has a normal mood and affect. His behavior is normal.   Nursing note and vitals reviewed.      I have reviewed all pertinent labs and cardiac studies.    Lab Results   Component Value Date    INR 2.7 (H) 06/01/2020    INR 2.5 (H) 05/04/2020    INR 3.1 (H) 04/20/2020           Chemistry        Component Value Date/Time     08/08/2019 0738    K 4.7 08/08/2019 0738      08/08/2019 0738    CO2 27 08/08/2019 0738    BUN 19 08/08/2019 0738    CREATININE 1.3 08/08/2019 0738    GLU 89 08/08/2019 0738        Component Value Date/Time    CALCIUM 10.1 08/08/2019 0738    ALKPHOS 67 08/08/2019 0738    AST 26 08/08/2019 0738    ALT 21 08/08/2019 0738    BILITOT 1.1 (H) 08/08/2019 0738    ESTGFRAFRICA 59.2 (A) 08/08/2019 0738    EGFRNONAA 51.2 (A) 08/08/2019 0738        Lab Results   Component Value Date    WBC 6.15 08/08/2019    HGB 12.9 (L) 08/08/2019    HCT 40.1 08/08/2019    MCV 98 08/08/2019     08/08/2019       Lab Results   Component Value Date    HGBA1C 4.8 02/06/2018     Lab Results   Component Value Date    TSH 0.799 08/08/2019     Lab Results   Component Value Date    CHOL 137 08/08/2019    CHOL 162 02/06/2018    CHOL 152 11/30/2017     Lab Results   Component Value Date    HDL 42 08/08/2019    HDL 42 02/06/2018    HDL 40 11/30/2017     Lab Results   Component Value Date    LDLCALC 72.4 08/08/2019    LDLCALC 88.0 02/06/2018    LDLCALC 77.8 11/30/2017     Lab Results   Component Value Date    TRIG 113 08/08/2019    TRIG 160 (H) 02/06/2018    TRIG 171 (H) 11/30/2017     Lab Results   Component Value Date    CHOLHDL 30.7 08/08/2019    CHOLHDL 25.9 02/06/2018    CHOLHDL 26.3 11/30/2017     Conclusion    · Concentric left ventricular remodeling.  · Left ventricular systolic function. The estimated ejection fraction is 60%.  · Normal LV diastolic function.  · Normal right ventricular systolic function.  · Mild to moderate tricuspid regurgitation.  · Mild mitral regurgitation.      Study Details    A complete echo was performed using complete 2D, color flow Doppler and spectral Doppler. During the study, the apical, parasternal, subcostal and suprasternal views were captured.   Echocardiography Findings    Left Ventricle ejection fraction at 60%.  Normal left ventricular cavity size. Concentric remodeling observed. Normal left ventricular diastolic function.   Right Ventricle  Normal cavity size, wall thickness and systolic function. Wall motion normal.   Left Atrium The left atrium is normal. Left atrial volume index is 28.2 mL/m2.   Right Atrium There is normal right atrial size.   Aortic Valve Aortic valve sclerosis is mild.   Mitral Valve Mild regurgitation.   Tricuspid Valve Mild to moderate regurgitation.           Conclusion    · There is 0-19% right Internal Carotid Stenosis.  · There is 0-19% left Internal Carotid Stenosis.             Assessment:       1. Essential hypertension    2. CHESTER on CPAP    3. History of CVA (cerebrovascular accident)    4. Chronic kidney disease, stage 3    5. Chronic anticoagulation    6. Atherosclerosis of aorta    7. Mixed hyperlipidemia    8. Coronary artery disease involving native coronary artery of native heart without angina pectoris    9. Sinus bradycardia    10. Stenosis of right carotid artery         Plan:             Stop Toprol.  Cut Fluvastatin dose back to 40 mg qhs from 80 mg due to advancing age.  May need his Lasix cut back to 20 mg and Verapamil dose lowered next appt due to orthostatic dizziness and he has been on high dose of AV kennedy blocking meds with Verapamil.  Overall sxs are chronic and stable.    Stable cardiovascular conditions at present time on current medical treatment.  Reviewed all tests and above medical conditions with patient in detail and formulated treatment plan.  Continue optimal medical treatment for cardiovascular conditions.  Cardiac low salt diet discussed.  Daily exercise encouraged, as tolerated.  Maintaining healthy weight and weight loss goals (if needed) were discussed in clinic.  PCP has fasting labs next month.  Continue CPAP for CHESTER.  Will f/u 3 months for reevaluation.

## 2020-06-29 ENCOUNTER — LAB VISIT (OUTPATIENT)
Dept: LAB | Facility: HOSPITAL | Age: 80
End: 2020-06-29
Attending: INTERNAL MEDICINE
Payer: MEDICARE

## 2020-06-29 ENCOUNTER — PATIENT OUTREACH (OUTPATIENT)
Dept: OTHER | Facility: OTHER | Age: 80
End: 2020-06-29

## 2020-06-29 DIAGNOSIS — Z79.01 LONG TERM (CURRENT) USE OF ANTICOAGULANTS: ICD-10-CM

## 2020-06-29 LAB
INR PPP: 2.8 (ref 0.8–1.2)
PROTHROMBIN TIME: 28.1 SEC (ref 9–12.5)

## 2020-06-29 PROCEDURE — 85610 PROTHROMBIN TIME: CPT | Mod: HCNC

## 2020-06-29 PROCEDURE — 36415 COLL VENOUS BLD VENIPUNCTURE: CPT | Mod: HCNC,PO

## 2020-06-30 ENCOUNTER — TELEPHONE (OUTPATIENT)
Dept: CARDIOLOGY | Facility: CLINIC | Age: 80
End: 2020-06-30

## 2020-06-30 ENCOUNTER — ANTI-COAG VISIT (OUTPATIENT)
Dept: CARDIOLOGY | Facility: CLINIC | Age: 80
End: 2020-06-30
Payer: MEDICARE

## 2020-06-30 DIAGNOSIS — Z86.73 HISTORY OF CVA (CEREBROVASCULAR ACCIDENT): Chronic | ICD-10-CM

## 2020-06-30 DIAGNOSIS — Z79.01 LONG TERM (CURRENT) USE OF ANTICOAGULANTS: ICD-10-CM

## 2020-06-30 PROCEDURE — 93793 PR ANTICOAGULANT MGMT FOR PT TAKING WARFARIN: ICD-10-PCS | Mod: S$GLB,,,

## 2020-06-30 PROCEDURE — 93793 ANTICOAG MGMT PT WARFARIN: CPT | Mod: S$GLB,,,

## 2020-06-30 NOTE — PROGRESS NOTES
Patient contacted:  No answer.  INR is therapeutic at 2.8.  Patient to maintain current dose of warfarin 1.25 mg every Monday, Wednesday, and Friday; and 2.5 mg on all other days per week.  INR to be rechecked on 7/30/2020 ([facility]).

## 2020-07-30 ENCOUNTER — ANTI-COAG VISIT (OUTPATIENT)
Dept: CARDIOLOGY | Facility: CLINIC | Age: 80
End: 2020-07-30
Payer: MEDICARE

## 2020-07-30 ENCOUNTER — LAB VISIT (OUTPATIENT)
Dept: LAB | Facility: HOSPITAL | Age: 80
End: 2020-07-30
Attending: INTERNAL MEDICINE
Payer: MEDICARE

## 2020-07-30 DIAGNOSIS — E78.2 MIXED HYPERLIPIDEMIA: Chronic | ICD-10-CM

## 2020-07-30 DIAGNOSIS — Z86.73 HISTORY OF CVA (CEREBROVASCULAR ACCIDENT): ICD-10-CM

## 2020-07-30 DIAGNOSIS — Z79.01 LONG TERM (CURRENT) USE OF ANTICOAGULANTS: ICD-10-CM

## 2020-07-30 DIAGNOSIS — Z29.9 PREVENTIVE MEASURE: ICD-10-CM

## 2020-07-30 DIAGNOSIS — M81.0 AGE-RELATED OSTEOPOROSIS WITHOUT CURRENT PATHOLOGICAL FRACTURE: ICD-10-CM

## 2020-07-30 DIAGNOSIS — Z79.01 CHRONIC ANTICOAGULATION: ICD-10-CM

## 2020-07-30 LAB
25(OH)D3+25(OH)D2 SERPL-MCNC: 34 NG/ML (ref 30–96)
ALBUMIN SERPL BCP-MCNC: 4.5 G/DL (ref 3.5–5.2)
ALP SERPL-CCNC: 68 U/L (ref 55–135)
ALT SERPL W/O P-5'-P-CCNC: 17 U/L (ref 10–44)
ANION GAP SERPL CALC-SCNC: 10 MMOL/L (ref 8–16)
AST SERPL-CCNC: 24 U/L (ref 10–40)
BASOPHILS # BLD AUTO: 0.05 K/UL (ref 0–0.2)
BASOPHILS NFR BLD: 0.8 % (ref 0–1.9)
BILIRUB SERPL-MCNC: 1.1 MG/DL (ref 0.1–1)
BUN SERPL-MCNC: 11 MG/DL (ref 8–23)
CALCIUM SERPL-MCNC: 9.8 MG/DL (ref 8.7–10.5)
CHLORIDE SERPL-SCNC: 103 MMOL/L (ref 95–110)
CHOLEST SERPL-MCNC: 163 MG/DL (ref 120–199)
CHOLEST/HDLC SERPL: 3.3 {RATIO} (ref 2–5)
CO2 SERPL-SCNC: 27 MMOL/L (ref 23–29)
CREAT SERPL-MCNC: 1.4 MG/DL (ref 0.5–1.4)
DIFFERENTIAL METHOD: ABNORMAL
EOSINOPHIL # BLD AUTO: 0.1 K/UL (ref 0–0.5)
EOSINOPHIL NFR BLD: 2 % (ref 0–8)
ERYTHROCYTE [DISTWIDTH] IN BLOOD BY AUTOMATED COUNT: 12.4 % (ref 11.5–14.5)
EST. GFR  (AFRICAN AMERICAN): 53.7 ML/MIN/1.73 M^2
EST. GFR  (NON AFRICAN AMERICAN): 46.5 ML/MIN/1.73 M^2
GLUCOSE SERPL-MCNC: 70 MG/DL (ref 70–110)
HCT VFR BLD AUTO: 40.4 % (ref 40–54)
HDLC SERPL-MCNC: 50 MG/DL (ref 40–75)
HDLC SERPL: 30.7 % (ref 20–50)
HGB BLD-MCNC: 12.9 G/DL (ref 14–18)
IMM GRANULOCYTES # BLD AUTO: 0.02 K/UL (ref 0–0.04)
IMM GRANULOCYTES NFR BLD AUTO: 0.3 % (ref 0–0.5)
INR PPP: 3 (ref 0.8–1.2)
LDLC SERPL CALC-MCNC: 88.6 MG/DL (ref 63–159)
LYMPHOCYTES # BLD AUTO: 2 K/UL (ref 1–4.8)
LYMPHOCYTES NFR BLD: 34.1 % (ref 18–48)
MCH RBC QN AUTO: 31 PG (ref 27–31)
MCHC RBC AUTO-ENTMCNC: 31.9 G/DL (ref 32–36)
MCV RBC AUTO: 97 FL (ref 82–98)
MONOCYTES # BLD AUTO: 0.4 K/UL (ref 0.3–1)
MONOCYTES NFR BLD: 5.9 % (ref 4–15)
NEUTROPHILS # BLD AUTO: 3.4 K/UL (ref 1.8–7.7)
NEUTROPHILS NFR BLD: 56.9 % (ref 38–73)
NONHDLC SERPL-MCNC: 113 MG/DL
NRBC BLD-RTO: 0 /100 WBC
PLATELET # BLD AUTO: 218 K/UL (ref 150–350)
PMV BLD AUTO: 9.7 FL (ref 9.2–12.9)
POTASSIUM SERPL-SCNC: 4.5 MMOL/L (ref 3.5–5.1)
PROT SERPL-MCNC: 7.7 G/DL (ref 6–8.4)
PROTHROMBIN TIME: 30.6 SEC (ref 9–12.5)
RBC # BLD AUTO: 4.16 M/UL (ref 4.6–6.2)
SODIUM SERPL-SCNC: 140 MMOL/L (ref 136–145)
TRIGL SERPL-MCNC: 122 MG/DL (ref 30–150)
TSH SERPL DL<=0.005 MIU/L-ACNC: 1.09 UIU/ML (ref 0.4–4)
WBC # BLD AUTO: 5.95 K/UL (ref 3.9–12.7)

## 2020-07-30 PROCEDURE — 80053 COMPREHEN METABOLIC PANEL: CPT | Mod: HCNC

## 2020-07-30 PROCEDURE — 82306 VITAMIN D 25 HYDROXY: CPT | Mod: HCNC

## 2020-07-30 PROCEDURE — 85025 COMPLETE CBC W/AUTO DIFF WBC: CPT | Mod: HCNC

## 2020-07-30 PROCEDURE — 93793 ANTICOAG MGMT PT WARFARIN: CPT | Mod: S$GLB,,,

## 2020-07-30 PROCEDURE — 84443 ASSAY THYROID STIM HORMONE: CPT | Mod: HCNC

## 2020-07-30 PROCEDURE — 85610 PROTHROMBIN TIME: CPT | Mod: HCNC

## 2020-07-30 PROCEDURE — 36415 COLL VENOUS BLD VENIPUNCTURE: CPT | Mod: HCNC,PO

## 2020-07-30 PROCEDURE — 80061 LIPID PANEL: CPT | Mod: HCNC

## 2020-07-30 PROCEDURE — 93793 PR ANTICOAGULANT MGMT FOR PT TAKING WARFARIN: ICD-10-PCS | Mod: S$GLB,,,

## 2020-07-30 NOTE — PROGRESS NOTES
Patient contacted: Patient's INR is therapeutic at 3.0. Patient reports no changes. Instructions given: Continue warfarin 1.25 mg on Mondays, Wednesdays and Fridays; and 2.5 mg all other days and will eat a small serving of greens today 7/31/20. Recheck in one month on 8/25/20 at [facility] lab with other appointment. Patient repeated back correctly and verbalizes understanding.

## 2020-08-10 ENCOUNTER — PES CALL (OUTPATIENT)
Dept: ADMINISTRATIVE | Facility: CLINIC | Age: 80
End: 2020-08-10

## 2020-08-21 ENCOUNTER — OFFICE VISIT (OUTPATIENT)
Dept: OPHTHALMOLOGY | Facility: CLINIC | Age: 80
End: 2020-08-21
Payer: MEDICARE

## 2020-08-21 DIAGNOSIS — Z96.1 PSEUDOPHAKIA OF BOTH EYES: Primary | ICD-10-CM

## 2020-08-21 DIAGNOSIS — H35.371 EPIRETINAL MEMBRANE (ERM) OF RIGHT EYE: ICD-10-CM

## 2020-08-21 DIAGNOSIS — H52.7 REFRACTIVE ERROR: ICD-10-CM

## 2020-08-21 DIAGNOSIS — H11.001 PTERYGIUM, RIGHT: ICD-10-CM

## 2020-08-21 DIAGNOSIS — H04.123 DRY EYES, BILATERAL: ICD-10-CM

## 2020-08-21 PROCEDURE — 92014 COMPRE OPH EXAM EST PT 1/>: CPT | Mod: HCNC,S$GLB,, | Performed by: OPHTHALMOLOGY

## 2020-08-21 PROCEDURE — 92015 PR REFRACTION: ICD-10-PCS | Mod: HCNC,S$GLB,, | Performed by: OPHTHALMOLOGY

## 2020-08-21 PROCEDURE — 92014 PR EYE EXAM, EST PATIENT,COMPREHESV: ICD-10-PCS | Mod: HCNC,S$GLB,, | Performed by: OPHTHALMOLOGY

## 2020-08-21 PROCEDURE — 99999 PR PBB SHADOW E&M-EST. PATIENT-LVL I: CPT | Mod: PBBFAC,HCNC,, | Performed by: OPHTHALMOLOGY

## 2020-08-21 PROCEDURE — 99999 PR PBB SHADOW E&M-EST. PATIENT-LVL I: ICD-10-PCS | Mod: PBBFAC,HCNC,, | Performed by: OPHTHALMOLOGY

## 2020-08-21 PROCEDURE — 92015 DETERMINE REFRACTIVE STATE: CPT | Mod: HCNC,S$GLB,, | Performed by: OPHTHALMOLOGY

## 2020-08-21 NOTE — PROGRESS NOTES
SUBJECTIVE  Heraclio Wall is 80 y.o. male  Corrected distance visual acuity was 20/40 -2 in the right eye and 20/60 in the left eye.   Chief Complaint   Patient presents with    Blurred Vision          HPI     Pt here due to va changes cant really see television time for annual exam       1. PCIOL OU   Yag os 11/2/15  2. ERM OD  3. Dry Eyes  Restasis burned  4. Pterygium OD  5. Dermatochalasis  6. fhx of glaucoma  7. Hx of Trichiasis RLL    OU: AFT's PF PRN    Last edited by Irma Walker MA on 8/21/2020  9:16 AM. (History)         Assessment /Plan :  1. Pseudophakia of both eyes  -- Condition stable, no therapeutic change required. Monitoring routinely.     2. Pterygium, right  -- Condition stable, no therapeutic change required. Monitoring routinely.     3. Dry eyes, bilateral  -- Condition stable, no therapeutic change required. Monitoring routinely.     4. Epiretinal membrane (ERM) of right eye stable, monitor for now   5. Refractive error PAL Rx     RTC in 1 year for dilation and MOCT or prn any changes

## 2020-08-31 ENCOUNTER — TELEPHONE (OUTPATIENT)
Dept: CARDIOLOGY | Facility: HOSPITAL | Age: 80
End: 2020-08-31

## 2020-08-31 DIAGNOSIS — I10 ESSENTIAL HYPERTENSION: Chronic | ICD-10-CM

## 2020-08-31 DIAGNOSIS — Z79.01 LONG TERM (CURRENT) USE OF ANTICOAGULANTS: ICD-10-CM

## 2020-08-31 RX ORDER — VALSARTAN 80 MG
80 TABLET ORAL 2 TIMES DAILY
Qty: 180 TABLET | Refills: 3 | Status: SHIPPED | OUTPATIENT
Start: 2020-08-31 | End: 2021-04-21 | Stop reason: SDUPTHER

## 2020-08-31 NOTE — TELEPHONE ENCOUNTER
The patient's caregiver called because his blood pressure has been increasing today. This morning it has been 143/59 this am and 156/66 this pm. They would like an earlier appointment to discuss any medication changes. He denies having any problems cardiac-wise.   Please advise.

## 2020-09-01 ENCOUNTER — ANTI-COAG VISIT (OUTPATIENT)
Dept: CARDIOLOGY | Facility: CLINIC | Age: 80
End: 2020-09-01
Payer: MEDICARE

## 2020-09-01 ENCOUNTER — LAB VISIT (OUTPATIENT)
Dept: LAB | Facility: HOSPITAL | Age: 80
End: 2020-09-01
Attending: INTERNAL MEDICINE
Payer: MEDICARE

## 2020-09-01 DIAGNOSIS — Z79.01 LONG TERM (CURRENT) USE OF ANTICOAGULANTS: ICD-10-CM

## 2020-09-01 DIAGNOSIS — Z86.73 HISTORY OF CVA (CEREBROVASCULAR ACCIDENT): Chronic | ICD-10-CM

## 2020-09-01 LAB
INR PPP: 2 (ref 0.8–1.2)
PROTHROMBIN TIME: 20.6 SEC (ref 9–12.5)

## 2020-09-01 PROCEDURE — 36415 COLL VENOUS BLD VENIPUNCTURE: CPT | Mod: HCNC,PO

## 2020-09-01 PROCEDURE — 93793 ANTICOAG MGMT PT WARFARIN: CPT | Mod: S$GLB,,,

## 2020-09-01 PROCEDURE — 93793 PR ANTICOAGULANT MGMT FOR PT TAKING WARFARIN: ICD-10-PCS | Mod: S$GLB,,,

## 2020-09-01 PROCEDURE — 85610 PROTHROMBIN TIME: CPT | Mod: HCNC

## 2020-09-01 NOTE — PROGRESS NOTES
Patient contacted - left V/M:  INR is therapeutic at 2.0.  Continue same dose of warfarin 1.25 mg every Monday, Wednesday, and Friday; and 2.5 mg on all other days per week.  Recheck on 9/28/2020 ([facility]), along with Cardiology appointment.  Any questions or concerns, contact the [facility] at [phone].

## 2020-09-01 NOTE — TELEPHONE ENCOUNTER
BP readings are not bad.  He has had orthostatic symptoms last time I saw him so do not want BP too much lower.  He has appt with me in next 4 weeks.  Keep appt as probably no earlier appts.  Fine to see me in 4 weeks.    Dr Wells

## 2020-09-01 NOTE — TELEPHONE ENCOUNTER
----- Message from Ailyn Chavarria sent at 9/1/2020  4:07 PM CDT -----  Contact: MATTHEW    ----- Message -----  From: Sonya Grove  Sent: 9/1/2020   3:18 PM CDT  To: [facility] Staff    .Type:  Test Results    Who Called: NURSE  Name of Test (Lab/Mammo/Etc): INR  Date of Test: 09/1/20  Ordering Provider: BULMARO  Where the test was performed:   Would the patient rather a call back or a response via [facility]? CALL BACK  Best Call Back Number: [phone]  LEAVE A MESSAGE  Additional Information:

## 2020-09-01 NOTE — TELEPHONE ENCOUNTER
Daughter looked up results of INR on my chart and followed instructions posted there - no further questions

## 2020-09-27 ENCOUNTER — PATIENT OUTREACH (OUTPATIENT)
Dept: ADMINISTRATIVE | Facility: OTHER | Age: 80
End: 2020-09-27

## 2020-09-28 ENCOUNTER — ANTI-COAG VISIT (OUTPATIENT)
Dept: CARDIOLOGY | Facility: CLINIC | Age: 80
End: 2020-09-28
Payer: MEDICARE

## 2020-09-28 ENCOUNTER — LAB VISIT (OUTPATIENT)
Dept: LAB | Facility: HOSPITAL | Age: 80
End: 2020-09-28
Attending: INTERNAL MEDICINE
Payer: MEDICARE

## 2020-09-28 ENCOUNTER — OFFICE VISIT (OUTPATIENT)
Dept: CARDIOLOGY | Facility: CLINIC | Age: 80
End: 2020-09-28
Payer: MEDICARE

## 2020-09-28 VITALS
WEIGHT: 177.69 LBS | DIASTOLIC BLOOD PRESSURE: 60 MMHG | HEART RATE: 68 BPM | BODY MASS INDEX: 24.1 KG/M2 | SYSTOLIC BLOOD PRESSURE: 142 MMHG | RESPIRATION RATE: 16 BRPM

## 2020-09-28 DIAGNOSIS — Z79.01 LONG TERM (CURRENT) USE OF ANTICOAGULANTS: ICD-10-CM

## 2020-09-28 DIAGNOSIS — Z86.73 HISTORY OF CVA (CEREBROVASCULAR ACCIDENT): Chronic | ICD-10-CM

## 2020-09-28 DIAGNOSIS — R53.82 CHRONIC FATIGUE: ICD-10-CM

## 2020-09-28 DIAGNOSIS — I10 ESSENTIAL HYPERTENSION: Chronic | ICD-10-CM

## 2020-09-28 DIAGNOSIS — N18.30 CHRONIC KIDNEY DISEASE, STAGE 3: Chronic | ICD-10-CM

## 2020-09-28 DIAGNOSIS — G47.33 OSA ON CPAP: Chronic | ICD-10-CM

## 2020-09-28 DIAGNOSIS — I70.0 ATHEROSCLEROSIS OF AORTA: Chronic | ICD-10-CM

## 2020-09-28 DIAGNOSIS — I65.21 STENOSIS OF RIGHT CAROTID ARTERY: ICD-10-CM

## 2020-09-28 DIAGNOSIS — Z79.01 CHRONIC ANTICOAGULATION: Chronic | ICD-10-CM

## 2020-09-28 DIAGNOSIS — R42 DIZZINESS: ICD-10-CM

## 2020-09-28 DIAGNOSIS — E78.2 MIXED HYPERLIPIDEMIA: Chronic | ICD-10-CM

## 2020-09-28 DIAGNOSIS — I25.10 CORONARY ARTERY DISEASE INVOLVING NATIVE CORONARY ARTERY OF NATIVE HEART WITHOUT ANGINA PECTORIS: Primary | ICD-10-CM

## 2020-09-28 DIAGNOSIS — R00.1 SINUS BRADYCARDIA: ICD-10-CM

## 2020-09-28 LAB
INR PPP: 1.6 (ref 0.8–1.2)
PROTHROMBIN TIME: 17.3 SEC (ref 9–12.5)

## 2020-09-28 PROCEDURE — 3078F PR MOST RECENT DIASTOLIC BLOOD PRESSURE < 80 MM HG: ICD-10-PCS | Mod: HCNC,CPTII,S$GLB, | Performed by: INTERNAL MEDICINE

## 2020-09-28 PROCEDURE — 1101F PT FALLS ASSESS-DOCD LE1/YR: CPT | Mod: HCNC,CPTII,S$GLB, | Performed by: INTERNAL MEDICINE

## 2020-09-28 PROCEDURE — 99214 OFFICE O/P EST MOD 30 MIN: CPT | Mod: HCNC,S$GLB,, | Performed by: INTERNAL MEDICINE

## 2020-09-28 PROCEDURE — 36415 COLL VENOUS BLD VENIPUNCTURE: CPT | Mod: HCNC

## 2020-09-28 PROCEDURE — 1101F PR PT FALLS ASSESS DOC 0-1 FALLS W/OUT INJ PAST YR: ICD-10-PCS | Mod: HCNC,CPTII,S$GLB, | Performed by: INTERNAL MEDICINE

## 2020-09-28 PROCEDURE — 3077F SYST BP >= 140 MM HG: CPT | Mod: HCNC,CPTII,S$GLB, | Performed by: INTERNAL MEDICINE

## 2020-09-28 PROCEDURE — 3077F PR MOST RECENT SYSTOLIC BLOOD PRESSURE >= 140 MM HG: ICD-10-PCS | Mod: HCNC,CPTII,S$GLB, | Performed by: INTERNAL MEDICINE

## 2020-09-28 PROCEDURE — 99214 PR OFFICE/OUTPT VISIT, EST, LEVL IV, 30-39 MIN: ICD-10-PCS | Mod: HCNC,S$GLB,, | Performed by: INTERNAL MEDICINE

## 2020-09-28 PROCEDURE — 3078F DIAST BP <80 MM HG: CPT | Mod: HCNC,CPTII,S$GLB, | Performed by: INTERNAL MEDICINE

## 2020-09-28 PROCEDURE — 85610 PROTHROMBIN TIME: CPT | Mod: HCNC

## 2020-09-28 PROCEDURE — 1159F MED LIST DOCD IN RCRD: CPT | Mod: HCNC,S$GLB,, | Performed by: INTERNAL MEDICINE

## 2020-09-28 PROCEDURE — 99999 PR PBB SHADOW E&M-EST. PATIENT-LVL III: CPT | Mod: PBBFAC,HCNC,, | Performed by: INTERNAL MEDICINE

## 2020-09-28 PROCEDURE — 1159F PR MEDICATION LIST DOCUMENTED IN MEDICAL RECORD: ICD-10-PCS | Mod: HCNC,S$GLB,, | Performed by: INTERNAL MEDICINE

## 2020-09-28 PROCEDURE — 99999 PR PBB SHADOW E&M-EST. PATIENT-LVL III: ICD-10-PCS | Mod: PBBFAC,HCNC,, | Performed by: INTERNAL MEDICINE

## 2020-09-28 RX ORDER — FUROSEMIDE 40 MG/1
20 TABLET ORAL DAILY
Qty: 90 TABLET | Refills: 3
Start: 2020-09-28 | End: 2020-10-28

## 2020-09-28 NOTE — PROGRESS NOTES
Subjective:    Patient ID:  Heraclio Wall is a 80 y.o. male who presents for evaluation of Hypertension, Hyperlipidemia, Valvular Heart Disease, Carotid Artery Disease, Risk Factor Management For Atherosclerosis, and Coronary Artery Disease        HPI Pt presents for f/u.  His current medical conditions include HTN, carotid artery disease, hyperlipidemia.  Nonsmoker.  Past history pertinent for following:  H/o  CVA 7/13 and was put on Plavix (was on ASA at time). He had admission 5/14 for splenic infarct and had surgery. Dr. Metz did abdominal angio with report showing no evidence of mesenteric stenosis. SAIRA showed no intracardiac source of emboli. He was d/cd home on asa, plavix and coumadin was started. He is now on coumadin only.  - Stress MPI Jan 2017.  He is enrolled in digital HTN program.   Echo June 2020 normal LVEF, mild-mod TR, mild MR.  Carotid u/s 6/20 no stenosis noted.  He lost his wife unexpectedly early 2020.  Trying to recover.  ecg 6/16/20 sinus mannie 51 bpm, nonspecific t wave abnl, 1 av block.   Now here.  toprol stopped last appt 6/20 due to low BP issues.  Fluvastatin cut back to 40 mg last appt due to advancing age.  BP log book reviewed and numbers are controlled.  Has generalized weakness.  No energy/fatigue.  Some depression.  No anginal sxs.  No cp.  No dyspnea.  Some orthostatic dizziness, improved.  No TIA/CVA.       Current Outpatient Medications:     ascorbic acid (VITAMIN C) 1000 MG tablet, Take 3 tablets by mouth Daily., Disp: , Rfl:     cholecalciferol, vitamin D3, 1,000 unit capsule, Take 5 capsules by mouth Daily., Disp: , Rfl:     COUMADIN 2.5 mg tablet, Take 1 tablet by mouth every evening., Disp: 30 tablet, Rfl: 3    cyanocobalamin, vitamin B-12, 1,000 mcg/15 mL Liqd, Take 1 drop by mouth once daily. , Disp: , Rfl:     cycloSPORINE (RESTASIS) 0.05 % ophthalmic emulsion, Place 0.4 mLs (1 drop total) into both eyes 2 (two) times daily., Disp: 60 vial, Rfl: 12     dextran 70-hypromellose (ARTIFICIAL TEARS) ophthalmic solution, Place 1 drop into both eyes Use as needed., Disp: , Rfl:     diclofenac sodium (VOLTAREN) 1 % Gel, Apply 2 g topically 2 (two) times daily. for 7 days, Disp: 1 Tube, Rfl: 0    DIOVAN 80 mg tablet, Take 1 tablet (80 mg total) by mouth 2 (two) times daily., Disp: 180 tablet, Rfl: 3    fluticasone propionate (FLONASE) 50 mcg/actuation nasal spray, 2 sprays (100 mcg total) by Each Nostril route once daily., Disp: 16 g, Rfl: 6    fluvastatin XL (LESCOL XL) 80 mg Tb24, Take 1/2 of 80 mg pill nightly., Disp: 90 tablet, Rfl: 3    furosemide (LASIX) 40 MG tablet, Take 0.5 tablets (20 mg total) by mouth once daily., Disp: 90 tablet, Rfl: 3    hydrocortisone (CORTEF) 5 MG Tab, Take 2.5 mg by mouth once daily., Disp: , Rfl:     meclizine (ANTIVERT) 25 mg tablet, , Disp: , Rfl:     pantoprazole (PROTONIX) 40 MG tablet, TAKE 1 TABLET BY MOUTH DAILY, Disp: 90 tablet, Rfl: 3    PROGESTERONE MISC, 6.25 mg by Misc.(Non-Drug; Combo Route) route every evening., Disp: , Rfl:     PROGESTERONE MISC, 6.25 mg by Other route., Disp: , Rfl:     saw palmetto 160 MG capsule, Take 1 capsule by mouth 2 (two) times daily., Disp: , Rfl:     selenium 200 mcg Tab, Take 1 tablet by mouth Daily., Disp: , Rfl:     triamcinolone acetonide 0.1% (KENALOG) 0.1 % cream, KETAN EXT AA BID, Disp: , Rfl:     verapamiL (VERELAN) 240 MG C24P, Take 1 capsule (240 mg total) by mouth 2 (two) times daily., Disp: 180 capsule, Rfl: 3      Review of Systems   Constitution: Positive for malaise/fatigue.   HENT: Negative.    Eyes: Negative.    Cardiovascular: Negative.    Respiratory: Negative.    Endocrine: Negative.    Hematologic/Lymphatic: Negative.    Skin: Negative.    Musculoskeletal: Negative.    Gastrointestinal: Negative.    Genitourinary: Negative.    Neurological: Positive for dizziness and weakness.   Psychiatric/Behavioral: Negative.    Allergic/Immunologic: Negative.        BP (!)  142/60   Pulse 68   Resp 16   Wt 80.6 kg (177 lb 11.1 oz)   BMI 24.10 kg/m²       Wt Readings from Last 3 Encounters:   09/28/20 80.6 kg (177 lb 11.1 oz)   06/03/20 79.8 kg (176 lb)   06/03/20 80.3 kg (177 lb)     Temp Readings from Last 3 Encounters:   06/02/20 98.4 °F (36.9 °C) (Oral)   03/03/20 97.3 °F (36.3 °C) (Temporal)   02/04/20 97.9 °F (36.6 °C)     BP Readings from Last 3 Encounters:   09/28/20 (!) 142/60   06/16/20 (!) 130/58   06/03/20 124/68     Pulse Readings from Last 3 Encounters:   09/28/20 68   06/16/20 (!) 53   06/02/20 71            Objective:    Physical Exam   Constitutional: He is oriented to person, place, and time. He appears well-developed and well-nourished.   HENT:   Head: Normocephalic.   Neck: Normal range of motion. Neck supple. Normal carotid pulses, no hepatojugular reflux and no JVD present. Carotid bruit is not present. No thyromegaly present.   Cardiovascular: Normal rate, regular rhythm, S1 normal and S2 normal. PMI is not displaced. Exam reveals no S3, no S4, no distant heart sounds, no friction rub, no midsystolic click and no opening snap.   No murmur heard.  Pulses:       Radial pulses are 2+ on the right side and 2+ on the left side.   Pulmonary/Chest: Effort normal and breath sounds normal. He has no wheezes. He has no rales.   Abdominal: Soft. Bowel sounds are normal. He exhibits no distension, no abdominal bruit, no ascites and no mass. There is no abdominal tenderness.   Musculoskeletal:         General: No edema.   Neurological: He is alert and oriented to person, place, and time.   Skin: Skin is warm.   Psychiatric: He has a normal mood and affect. His behavior is normal.   Nursing note and vitals reviewed.      I have reviewed all pertinent labs and cardiac studies.      Chemistry        Component Value Date/Time     07/30/2020 1023    K 4.5 07/30/2020 1023     07/30/2020 1023    CO2 27 07/30/2020 1023    BUN 11 07/30/2020 1023    CREATININE 1.4  07/30/2020 1023    GLU 70 07/30/2020 1023        Component Value Date/Time    CALCIUM 9.8 07/30/2020 1023    ALKPHOS 68 07/30/2020 1023    AST 24 07/30/2020 1023    ALT 17 07/30/2020 1023    BILITOT 1.1 (H) 07/30/2020 1023    ESTGFRAFRICA 53.7 (A) 07/30/2020 1023    EGFRNONAA 46.5 (A) 07/30/2020 1023        Lab Results   Component Value Date    WBC 5.95 07/30/2020    HGB 12.9 (L) 07/30/2020    HCT 40.4 07/30/2020    MCV 97 07/30/2020     07/30/2020       Lab Results   Component Value Date    HGBA1C 4.8 02/06/2018     Lab Results   Component Value Date    CHOL 163 07/30/2020    CHOL 137 08/08/2019    CHOL 162 02/06/2018     Lab Results   Component Value Date    HDL 50 07/30/2020    HDL 42 08/08/2019    HDL 42 02/06/2018     Lab Results   Component Value Date    LDLCALC 88.6 07/30/2020    LDLCALC 72.4 08/08/2019    LDLCALC 88.0 02/06/2018     Lab Results   Component Value Date    TRIG 122 07/30/2020    TRIG 113 08/08/2019    TRIG 160 (H) 02/06/2018     Lab Results   Component Value Date    CHOLHDL 30.7 07/30/2020    CHOLHDL 30.7 08/08/2019    CHOLHDL 25.9 02/06/2018       Lab Results   Component Value Date    INR 2.0 (H) 09/01/2020    INR 3.0 (H) 07/30/2020    INR 2.8 (H) 06/29/2020             Assessment:       1. Coronary artery disease involving native coronary artery of native heart without angina pectoris    2. Sinus bradycardia    3. Dizziness    4. Stenosis of right carotid artery    5. History of CVA (cerebrovascular accident)    6. Chronic anticoagulation    7. Mixed hyperlipidemia    8. CHESTER on CPAP    9. Essential hypertension    10. Chronic kidney disease, stage 3    11. Chronic fatigue    12. Atherosclerosis of aorta         Plan:               Stable cardiovascular conditions at present time on current medical treatment.  Continue current meds.  Monitor BP.  F/u coumadin clinic.  Continue CPAP for CHESTER.  Fatigue/low energy due to age, Covid stress, losing wife, not going to gym, etc.  Reviewed all  tests and above medical conditions with patient in detail and formulated treatment plan.  Continue optimal medical treatment for cardiovascular conditions.  Cardiac low salt diet discussed.  Daily exercise encouraged, goal 30 +  minutes aerobic exercise as tolerated.  Maintaining healthy weight and weight loss goals (if needed) were discussed in clinic.  F/u in 4 months with stress MPI.

## 2020-09-28 NOTE — PROGRESS NOTES
Patient's daughter Nina contacted:  Patient's INR is subtherapeutic at 1.6. Nina reports no changes for patient. Nina reports no signs/symptoms of clotting for patient. Instructions given: Boosted dose of warfarin 2.5 mg today 9/28/20; then re-hallenge warfarin 1.25 mg on Mondays, Wednesdays and Fridays; and 2.5 mg all other days. Recheck on 10/12/20.   Nina repeated back correctly and verbalizes understanding.

## 2020-10-12 ENCOUNTER — LAB VISIT (OUTPATIENT)
Dept: LAB | Facility: HOSPITAL | Age: 80
End: 2020-10-12
Attending: INTERNAL MEDICINE
Payer: MEDICARE

## 2020-10-12 ENCOUNTER — IMMUNIZATION (OUTPATIENT)
Dept: INTERNAL MEDICINE | Facility: CLINIC | Age: 80
End: 2020-10-12
Payer: MEDICARE

## 2020-10-12 ENCOUNTER — ANTI-COAG VISIT (OUTPATIENT)
Dept: CARDIOLOGY | Facility: CLINIC | Age: 80
End: 2020-10-12
Payer: MEDICARE

## 2020-10-12 ENCOUNTER — TELEPHONE (OUTPATIENT)
Dept: CARDIOLOGY | Facility: CLINIC | Age: 80
End: 2020-10-12

## 2020-10-12 DIAGNOSIS — Z79.01 LONG TERM (CURRENT) USE OF ANTICOAGULANTS: ICD-10-CM

## 2020-10-12 LAB
INR PPP: 1.8 (ref 0.8–1.2)
PROTHROMBIN TIME: 18.3 SEC (ref 9–12.5)

## 2020-10-12 PROCEDURE — 36415 COLL VENOUS BLD VENIPUNCTURE: CPT | Mod: HCNC,PO

## 2020-10-12 PROCEDURE — G0008 FLU VACCINE - QUADRIVALENT - ADJUVANTED: ICD-10-PCS | Mod: HCNC,S$GLB,, | Performed by: INTERNAL MEDICINE

## 2020-10-12 PROCEDURE — 90694 FLU VACCINE - QUADRIVALENT - ADJUVANTED: ICD-10-PCS | Mod: HCNC,S$GLB,, | Performed by: INTERNAL MEDICINE

## 2020-10-12 PROCEDURE — 93793 ANTICOAG MGMT PT WARFARIN: CPT | Mod: S$GLB,,,

## 2020-10-12 PROCEDURE — 90694 VACC AIIV4 NO PRSRV 0.5ML IM: CPT | Mod: HCNC,S$GLB,, | Performed by: INTERNAL MEDICINE

## 2020-10-12 PROCEDURE — 93793 PR ANTICOAGULANT MGMT FOR PT TAKING WARFARIN: ICD-10-PCS | Mod: S$GLB,,,

## 2020-10-12 PROCEDURE — 85610 PROTHROMBIN TIME: CPT | Mod: HCNC

## 2020-10-12 PROCEDURE — G0008 ADMIN INFLUENZA VIRUS VAC: HCPCS | Mod: HCNC,S$GLB,, | Performed by: INTERNAL MEDICINE

## 2020-10-12 NOTE — PROGRESS NOTES
INR not at goal. Medications, chart, and patient findings reviewed. See calendar for adjustments to dose and follow up plan.  INR improved bu not quite at goal, we will increase overall dose starting today.

## 2020-10-12 NOTE — TELEPHONE ENCOUNTER
Attempted to reach patient in reference to warfarin dosing/instructions. Left voice message to naveen our office back at [phone].

## 2020-10-14 NOTE — PROGRESS NOTES
Patient's INR is sub-therapeutic at 1.8.  Reports eating broccoli over the weekend.  Reeducated patient on importance of consistent vitamin K intake.  No abnormal pain/swelling, SOB, or numbness noted.  Instructions given for patient to take coumadin 2.5mg on today; then resume current dose of 1.25mg on Mondays, Wednesdays, Fridays and 2.5mg on all other days of the week.  Patient voiced understanding.  Follow-up in 3 weeks.      
22.7

## 2020-10-19 ENCOUNTER — TELEPHONE (OUTPATIENT)
Dept: CARDIOLOGY | Facility: CLINIC | Age: 80
End: 2020-10-19

## 2020-10-19 NOTE — TELEPHONE ENCOUNTER
Left voice message for patient to contact our office back at [phone] to schedule appointment.

## 2020-10-20 ENCOUNTER — LAB VISIT (OUTPATIENT)
Dept: LAB | Facility: HOSPITAL | Age: 80
End: 2020-10-20
Attending: INTERNAL MEDICINE
Payer: MEDICARE

## 2020-10-20 DIAGNOSIS — Z79.01 LONG TERM (CURRENT) USE OF ANTICOAGULANTS: ICD-10-CM

## 2020-10-20 LAB
INR PPP: 1.7 (ref 0.8–1.2)
PROTHROMBIN TIME: 18.4 SEC (ref 9–12.5)

## 2020-10-20 PROCEDURE — 36415 COLL VENOUS BLD VENIPUNCTURE: CPT | Mod: HCNC,PO

## 2020-10-20 PROCEDURE — 85610 PROTHROMBIN TIME: CPT | Mod: HCNC

## 2020-10-21 ENCOUNTER — ANTI-COAG VISIT (OUTPATIENT)
Dept: CARDIOLOGY | Facility: CLINIC | Age: 80
End: 2020-10-21
Payer: MEDICARE

## 2020-10-21 PROCEDURE — 93793 ANTICOAG MGMT PT WARFARIN: CPT | Mod: S$GLB,,,

## 2020-10-21 PROCEDURE — 93793 PR ANTICOAGULANT MGMT FOR PT TAKING WARFARIN: ICD-10-PCS | Mod: S$GLB,,,

## 2020-10-21 NOTE — PROGRESS NOTES
INR not at goal. Medications, chart, and patient findings reviewed. See calendar for adjustments to dose and follow up plan.  Boost and rechechallenage as his dose was recently increased.

## 2020-10-22 ENCOUNTER — PATIENT MESSAGE (OUTPATIENT)
Dept: ADMINISTRATIVE | Facility: OTHER | Age: 80
End: 2020-10-22

## 2020-10-26 NOTE — PROGRESS NOTES
[facility]      OUTPATIENT PHYSICAL THERAPY  PLAN OF TREATMENT FOR OUTPATIENT REHABILITATION    Patient's Last Name, First Name, M.I.                YOB: 2018  Aubrey Light                           Provider's Name  [facility] Medical Record No.  [id number]                               Onset Date: 2016   Start of Care Date: 10/30/2017   Type:     _X_PT   ___OT   ___SLP Medical Diagnosis: Gross Motor Delay                       PT Diagnosis: Hypotonia, increased laxity, weakness, impaired coordination limiting pt's ability to achieve age-approriate functional mobility consistent with the diagnosis of down syndrome      _________________________________________________________________________________  Plan of Treatment: Therapeutic Procedures, Therapeutic Activities, Neuromuscular Re-education, Gait Training, Manual Therapy, Standardized Testing, Aquatic Therapy PRN    Frequency/Duration: 1x/week for 3 months     Goals:  Goal Identifier Walking   Goal Description Aubrey will ambulate with 2 hand support x20 feet over even surfaces in order to progress towards independent ambulation to access his environment and participate in play activities.   Target Date 12/31/19   Date Met  08/14/19   Progress:      Goal Identifier Sit   Goal Description Pt will be able to attain and maintain propped ring sitting position for 3 minutes in order to demonstrate improved postural control and transitional movements.   Target Date 02/21/19   Date Met  02/25/19   Progress:      Goal Identifier Stand   Goal Description Pt will be able to maintain supported standing for 1 minute with BUE support with good trunk and head control    Target Date 02/28/19   Date Met  02/25/19   Progress:      Goal Identifier Prone pivot   Goal Description Pt will demonstrate full prone pivoting both to the right and  Primary Care Telemedicine Note  The patient location is:  Patient Home   The chief complaint leading to consultation is: Follow up of medical problems.      Visit type: Virtual visit with synchronous audio only and video  Each patient to whom he or she provides medical services by telemedicine is:  (1) informed of the relationship between the physician and patient and the respective role of any other health care provider with respect to management of the patient; and (2) notified that he or she may decline to receive medical services by telemedicine and may withdraw from such care at any time.    Updated/ annual due 8/20:  HM:  11/19 fluvax, 8/19 HAV, 12/15 [id number], 12/16 [id number], 2/15 TDaP, 3/12 zostavax, 7/18 BMD rep 2y, 7/12 Cscope no repeat, 5/18 FIT neg.     HPI:  C/o sinus drainage.  Taking coricedin HBP.  Not using flonase regularly.  Uses netipot.  Sx x 1 month.  No f/c/n/v.  Wants to stop lasix 20mg.  BPs 144/62.  Bothered by frequent urination.      Problem List Items Addressed This Visit     Acquired hypothyroidism - Primary    Chronic anticoagulation (Chronic)    Overview     Coumadin         Coronary artery disease involving native coronary artery of native heart without angina pectoris    Overview     CT chest 5/15/2019: Atherosclerotic plaque noted throughout the thoracic aorta and its major branches including coronary arteries.          Essential hypertension (Chronic)    History of CVA (cerebrovascular accident) (Chronic)    Overview     7/2013 left lacunar         Mixed hyperlipidemia (Chronic)    CHESTER on CPAP (Chronic)      Other Visit Diagnoses     Acute non-recurrent maxillary sinusitis        Relevant Medications    doxycycline (VIBRAMYCIN) 100 MG Cap          The patient's Health Maintenance was reviewed and the following appears to be due at this time:  Health Maintenance Due   Topic Date Due    Shingles Vaccine (2 of 3) 04/26/2012       [unfilled]  Outpatient Medications Prior to Visit    Medication Sig Dispense Refill    ascorbic acid (VITAMIN C) 1000 MG tablet Take 3 tablets by mouth Daily.      cholecalciferol, vitamin D3, 1,000 unit capsule Take 5 capsules by mouth Daily.      COUMADIN 2.5 mg tablet Take 1 tablet by mouth every evening. 30 tablet 3    cyanocobalamin, vitamin B-12, 1,000 mcg/15 mL Liqd Take 1 drop by mouth once daily.       dextran 70-hypromellose (ARTIFICIAL TEARS) ophthalmic solution Place 1 drop into both eyes Use as needed.      DIOVAN 80 mg tablet Take 1 tablet (80 mg total) by mouth 2 (two) times daily. 180 tablet 3    fluticasone propionate (FLONASE) 50 mcg/actuation nasal spray 2 sprays (100 mcg total) by Each Nostril route once daily. 16 g 6    fluvastatin XL (LESCOL XL) 80 mg Tb24 Take 1/2 of 80 mg pill nightly. 90 tablet 3    furosemide (LASIX) 40 MG tablet Take 0.5 tablets (20 mg total) by mouth once daily. 90 tablet 3    pantoprazole (PROTONIX) 40 MG tablet TAKE 1 TABLET BY MOUTH DAILY 90 tablet 3    verapamiL (VERELAN) 240 MG C24P Take 1 capsule (240 mg total) by mouth 2 (two) times daily. 180 capsule 3    warfarin (COUMADIN) 2.5 MG tablet Take 1 tablet (2.5 mg total) by mouth Daily in the evening. 30 tablet 2    cycloSPORINE (RESTASIS) 0.05 % ophthalmic emulsion Place 0.4 mLs (1 drop total) into both eyes 2 (two) times daily. 60 vial 12    diclofenac sodium (VOLTAREN) 1 % Gel Apply 2 g topically 2 (two) times daily. for 7 days 1 Tube 0    hydrocortisone (CORTEF) 5 MG Tab Take 2.5 mg by mouth once daily.      meclizine (ANTIVERT) 25 mg tablet       PROGESTERONE MISC 6.25 mg by Misc.(Non-Drug; Combo Route) route every evening.      PROGESTERONE MISC 6.25 mg by Other route.      saw palmetto 160 MG capsule Take 1 capsule by mouth 2 (two) times daily.      selenium 200 mcg Tab Take 1 tablet by mouth Daily.      triamcinolone acetonide 0.1% (KENALOG) 0.1 % cream KETAN EXT AA BID       No facility-administered medications prior to visit.      left in order to progress towards creeping/crawling.    Target Date 03/30/18   Date Met  03/29/18   Progress:      Goal Identifier Rolling   Goal Description Pt will demonstrate a mature supine to prone rolling pattern in both directions with cervical flexion and lateral flexion demonstrating improvements in head control/head righting in order to progress towards other transitional movements such as attaining sitting position.   Target Date 02/22/19   Date Met  02/25/19   Progress:      Goal Identifier Creeping   Goal Description Child will be able to assume 4-pt position IND and complete reciprocal patterning x 5 feet for improving IND with mobility   Target Date 04/08/19   Date Met  05/13/19   Progress:      Goal Identifier Transitions   Goal Description Aubrey will be able to transition up/down from a surface through half kneel bilaterally with 2 hand support in order to demonstrate an improvement in strength and balance to access his home environment safely.   Target Date 12/31/19   Date Met  12/19/19   Progress:      Goal Identifier Walking   Goal Description Aubrey will ambulate independently without UE support x50 feet for access to home environment.(10/31: with compression vest and hip helpers 1 step IND)   Target Date 02/29/20   Date Met      Progress:      Progress Towards Goals: Progress towards goals was limited during this reporting period due to gap in PT treatment due to COVID 19. Pt has met all goals except his ambulation goal. He continues to scoot as his main mode of transportation but can ambulate with 2HHA or with a weighted cart.      Certification date from 5/15/2020 to 7/24/2020.    Jazmin Guzman, PT          I CERTIFY THE NEED FOR THESE SERVICES FURNISHED UNDER        THIS PLAN OF TREATMENT AND WHILE UNDER MY CARE     (Physician co-signature of this document indicates review and certification of the therapy plan).                Referring Provider: Leigh Kapoor MD         Physical Exam   No flowsheet data found.  No flowsheet data found.      Constitutional: The patient appears well-developed and well-nourished. No distress.   Psychiatric: The mood appears not anxious and the affect is not angry, not blunt, not labile and not inappropriate. Speech is not rapid and/or pressured, not delayed, not tangential and not slurred. The patient is not agitated, not aggressive, is not hyperactive, not slowed, not withdrawn, not actively hallucinating and not combative. Thought content is not paranoid and not delusional. Cognition and memory are not impaired. The patient does not express impulsivity or inappropriate judgment and does not exhibit a depressed mood. The patient expresses no homicidal and no suicidal ideation and has no suicidal plans and no homicidal plans. The patient is communicative and exhibits a normal recent memory and normal remote memory. The patient is attentive.     Encounter Diagnoses   Name Primary?    Acquired hypothyroidism Yes    Essential hypertension     Coronary artery disease involving native coronary artery of native heart without angina pectoris     History of CVA (cerebrovascular accident)     CHESTER on CPAP     Mixed hyperlipidemia     Chronic anticoagulation     Acute non-recurrent maxillary sinusitis        PLAN:    I have discontinued Heraclio Wall's selenium, saw palmetto, PROGESTERONE MISC, hydrocortisone, cycloSPORINE, diclofenac sodium, meclizine, triamcinolone acetonide 0.1%, and PROGESTERONE MISC. I am also having him start on doxycycline. Additionally, I am having him maintain his dextran 70-hypromellose, ascorbic acid (vitamin C), cholecalciferol (vitamin D3), cyanocobalamin (vitamin B-12), verapamiL, pantoprazole, fluticasone propionate, fluvastatin XL, DIOVAN, COUMADIN, furosemide, and warfarin.    Diagnoses and all orders for this visit:    Acquired hypothyroidism- Clinically stable, continue present treatment.    Essential  hypertension- stop lasix, monitor BPs and send to me in 1 week.    Coronary artery disease involving native coronary artery of native heart without angina pectoris    History of CVA (cerebrovascular accident)    Chronic anticoagulation- inform coumadin clinic about abic.    Acute non-recurrent maxillary sinusitis  -     doxycycline (VIBRAMYCIN) 100 MG Cap; Take 1 capsule (100 mg total) by mouth 2 (two) times daily. for 10 days        Medications Ordered This Encounter   Medications    doxycycline (VIBRAMYCIN) 100 MG Cap     Sig: Take 1 capsule (100 mg total) by mouth 2 (two) times daily. for 10 days     Dispense:  20 capsule     Refill:  0     Medications Ordered This Encounter   Medications    doxycycline (VIBRAMYCIN) 100 MG Cap     Sig: Take 1 capsule (100 mg total) by mouth 2 (two) times daily. for 10 days     Dispense:  20 capsule     Refill:  0     No orders of the defined types were placed in this encounter.

## 2020-10-28 ENCOUNTER — OFFICE VISIT (OUTPATIENT)
Dept: FAMILY MEDICINE | Facility: CLINIC | Age: 80
End: 2020-10-28
Payer: MEDICARE

## 2020-10-28 ENCOUNTER — TELEPHONE (OUTPATIENT)
Dept: CARDIOLOGY | Facility: CLINIC | Age: 80
End: 2020-10-28

## 2020-10-28 ENCOUNTER — TELEPHONE (OUTPATIENT)
Dept: FAMILY MEDICINE | Facility: CLINIC | Age: 80
End: 2020-10-28

## 2020-10-28 VITALS — SYSTOLIC BLOOD PRESSURE: 144 MMHG | DIASTOLIC BLOOD PRESSURE: 62 MMHG

## 2020-10-28 DIAGNOSIS — I25.10 CORONARY ARTERY DISEASE INVOLVING NATIVE CORONARY ARTERY OF NATIVE HEART WITHOUT ANGINA PECTORIS: ICD-10-CM

## 2020-10-28 DIAGNOSIS — E78.2 MIXED HYPERLIPIDEMIA: Chronic | ICD-10-CM

## 2020-10-28 DIAGNOSIS — Z86.73 HISTORY OF CVA (CEREBROVASCULAR ACCIDENT): Chronic | ICD-10-CM

## 2020-10-28 DIAGNOSIS — E03.9 ACQUIRED HYPOTHYROIDISM: Primary | ICD-10-CM

## 2020-10-28 DIAGNOSIS — Z79.01 CHRONIC ANTICOAGULATION: Chronic | ICD-10-CM

## 2020-10-28 DIAGNOSIS — I10 ESSENTIAL HYPERTENSION: Chronic | ICD-10-CM

## 2020-10-28 DIAGNOSIS — J01.00 ACUTE NON-RECURRENT MAXILLARY SINUSITIS: ICD-10-CM

## 2020-10-28 DIAGNOSIS — G47.33 OSA ON CPAP: Chronic | ICD-10-CM

## 2020-10-28 PROCEDURE — 99213 PR OFFICE/OUTPT VISIT, EST, LEVL III, 20-29 MIN: ICD-10-PCS | Mod: HCNC,95,, | Performed by: INTERNAL MEDICINE

## 2020-10-28 PROCEDURE — 3078F PR MOST RECENT DIASTOLIC BLOOD PRESSURE < 80 MM HG: ICD-10-PCS | Mod: HCNC,CPTII,95, | Performed by: INTERNAL MEDICINE

## 2020-10-28 PROCEDURE — 99213 OFFICE O/P EST LOW 20 MIN: CPT | Mod: HCNC,95,, | Performed by: INTERNAL MEDICINE

## 2020-10-28 PROCEDURE — 1159F MED LIST DOCD IN RCRD: CPT | Mod: HCNC,95,, | Performed by: INTERNAL MEDICINE

## 2020-10-28 PROCEDURE — 3077F SYST BP >= 140 MM HG: CPT | Mod: HCNC,CPTII,95, | Performed by: INTERNAL MEDICINE

## 2020-10-28 PROCEDURE — 3077F PR MOST RECENT SYSTOLIC BLOOD PRESSURE >= 140 MM HG: ICD-10-PCS | Mod: HCNC,CPTII,95, | Performed by: INTERNAL MEDICINE

## 2020-10-28 PROCEDURE — 3078F DIAST BP <80 MM HG: CPT | Mod: HCNC,CPTII,95, | Performed by: INTERNAL MEDICINE

## 2020-10-28 PROCEDURE — 1159F PR MEDICATION LIST DOCUMENTED IN MEDICAL RECORD: ICD-10-PCS | Mod: HCNC,95,, | Performed by: INTERNAL MEDICINE

## 2020-10-28 PROCEDURE — 1101F PR PT FALLS ASSESS DOC 0-1 FALLS W/OUT INJ PAST YR: ICD-10-PCS | Mod: HCNC,CPTII,95, | Performed by: INTERNAL MEDICINE

## 2020-10-28 PROCEDURE — 1101F PT FALLS ASSESS-DOCD LE1/YR: CPT | Mod: HCNC,CPTII,95, | Performed by: INTERNAL MEDICINE

## 2020-10-28 RX ORDER — DOXYCYCLINE 100 MG/1
100 CAPSULE ORAL 2 TIMES DAILY
Qty: 20 CAPSULE | Refills: 0 | Status: SHIPPED | OUTPATIENT
Start: 2020-10-28 | End: 2020-11-07

## 2020-10-28 NOTE — TELEPHONE ENCOUNTER
----- Message from Dayana Castillo sent at 10/28/2020  3:14 PM CDT -----  Contact: Suzette-daughter  Suzette requesting a call back to have pt appt rescheduled to an earlier time. Please call daughter back at [phone]

## 2020-10-28 NOTE — TELEPHONE ENCOUNTER
Elana with Dr. Watson's office  Called to report patient has been prescribed doxycycline 100 mg one capsule twice a day x 10 days for sinusitis today 10/28/20.

## 2020-10-29 ENCOUNTER — LAB VISIT (OUTPATIENT)
Dept: LAB | Facility: HOSPITAL | Age: 80
End: 2020-10-29
Attending: INTERNAL MEDICINE
Payer: MEDICARE

## 2020-10-29 ENCOUNTER — ANTI-COAG VISIT (OUTPATIENT)
Dept: CARDIOLOGY | Facility: CLINIC | Age: 80
End: 2020-10-29
Payer: MEDICARE

## 2020-10-29 DIAGNOSIS — Z79.01 LONG TERM (CURRENT) USE OF ANTICOAGULANTS: ICD-10-CM

## 2020-10-29 DIAGNOSIS — Z86.73 HISTORY OF CVA (CEREBROVASCULAR ACCIDENT): Chronic | ICD-10-CM

## 2020-10-29 LAB
INR PPP: 2.1 (ref 0.8–1.2)
PROTHROMBIN TIME: 21.5 SEC (ref 9–12.5)

## 2020-10-29 PROCEDURE — 93793 PR ANTICOAGULANT MGMT FOR PT TAKING WARFARIN: ICD-10-PCS | Mod: S$GLB,,,

## 2020-10-29 PROCEDURE — 36415 COLL VENOUS BLD VENIPUNCTURE: CPT | Mod: HCNC,PO

## 2020-10-29 PROCEDURE — 93793 ANTICOAG MGMT PT WARFARIN: CPT | Mod: S$GLB,,,

## 2020-10-29 PROCEDURE — 85610 PROTHROMBIN TIME: CPT | Mod: HCNC

## 2020-10-29 NOTE — PROGRESS NOTES
INR at goal. Medications and chart reviewed. No changes noted to necessitate adjustment of warfarin or follow-up plan. See calendar.  INR finally in range, now starting Doxy, we will continue current dose as he has been running below goal for several INR checks.  Recheck INR early next week to verify DDI and possible dose decrease at that time if needed.

## 2020-10-29 NOTE — PROGRESS NOTES
Patient's INR is therapeutic at 2.1.  Lab collected today.  Reports doxycycline 100 mg 1 cap BID started on yesterday for 10 days.  Previous warfarin instructions were followed.  Sent to PharmD for review/dosing.

## 2020-11-03 ENCOUNTER — PATIENT MESSAGE (OUTPATIENT)
Dept: ADMINISTRATIVE | Facility: OTHER | Age: 80
End: 2020-11-03

## 2020-11-03 ENCOUNTER — ANTI-COAG VISIT (OUTPATIENT)
Dept: CARDIOLOGY | Facility: CLINIC | Age: 80
End: 2020-11-03
Payer: MEDICARE

## 2020-11-03 ENCOUNTER — LAB VISIT (OUTPATIENT)
Dept: LAB | Facility: HOSPITAL | Age: 80
End: 2020-11-03
Attending: INTERNAL MEDICINE
Payer: MEDICARE

## 2020-11-03 DIAGNOSIS — Z79.01 LONG TERM (CURRENT) USE OF ANTICOAGULANTS: ICD-10-CM

## 2020-11-03 DIAGNOSIS — Z86.73 HISTORY OF CVA (CEREBROVASCULAR ACCIDENT): Chronic | ICD-10-CM

## 2020-11-03 LAB
INR PPP: 2.2 (ref 0.8–1.2)
PROTHROMBIN TIME: 22.2 SEC (ref 9–12.5)

## 2020-11-03 PROCEDURE — 93793 PR ANTICOAGULANT MGMT FOR PT TAKING WARFARIN: ICD-10-PCS | Mod: S$GLB,,,

## 2020-11-03 PROCEDURE — 85610 PROTHROMBIN TIME: CPT | Mod: HCNC

## 2020-11-03 PROCEDURE — 93793 ANTICOAG MGMT PT WARFARIN: CPT | Mod: S$GLB,,,

## 2020-11-03 PROCEDURE — 36415 COLL VENOUS BLD VENIPUNCTURE: CPT | Mod: HCNC,PO

## 2020-11-03 NOTE — PROGRESS NOTES
An attempt made to reach patient and patient's daughter, Nina Giang - unsuccessful.  Left V/M:  Patient's INR is therapeutic at 2.2.  Will continue same dose of warfarin 1.25 mg every Wednesday and Friday; and 2.5 mg on all other days per week.  Will need to confirm, if patient is still taking doxycycline until Saturday, 11/07/2020.  Please contact the [facility] at [phone].  Recheck on 12/02/2020 (pending) - [facility].

## 2020-11-04 ENCOUNTER — PATIENT MESSAGE (OUTPATIENT)
Dept: FAMILY MEDICINE | Facility: CLINIC | Age: 80
End: 2020-11-04

## 2020-11-04 DIAGNOSIS — I10 ESSENTIAL HYPERTENSION: Primary | ICD-10-CM

## 2020-11-04 RX ORDER — HYDROCHLOROTHIAZIDE 12.5 MG/1
12.5 CAPSULE ORAL DAILY
Qty: 30 CAPSULE | Refills: 11 | Status: SHIPPED | OUTPATIENT
Start: 2020-11-04 | End: 2021-04-21 | Stop reason: SDUPTHER

## 2020-11-16 ENCOUNTER — PATIENT MESSAGE (OUTPATIENT)
Dept: FAMILY MEDICINE | Facility: CLINIC | Age: 80
End: 2020-11-16

## 2020-11-17 NOTE — PROGRESS NOTES
Subjective:      Patient ID: Heraclio Wall is a 80 y.o. male.    Chief Complaint: Follow-up      HPI  Here for f/u medical problems and preventive exam.  Feels low and withdrawn, depressed since loss of wife 10mo ago.  Appetite good.  Weight stable.  No f/c/sw/cough.  No cp/sob/palp.  BMs and urine normal.    HM:  10/20 fluvax, 8/19 HAV, 12/15 [id number], 12/16 [id number], 2/15 TDaP, 3/12 zostavax, 7/18 BMD rep 2y, 7/12 Cscope no repeat, 5/18 FIT neg.     Review of Systems   Constitutional: Negative for appetite change, chills, diaphoresis, fatigue and fever.   HENT: Negative for congestion, ear pain, rhinorrhea and sinus pressure.    Respiratory: Negative for cough and shortness of breath.    Cardiovascular: Negative for chest pain and palpitations.   Gastrointestinal: Negative for abdominal distention, abdominal pain, blood in stool, constipation, diarrhea, nausea and vomiting.   Genitourinary: Negative for difficulty urinating, dysuria, frequency, hematuria and urgency.   Musculoskeletal: Negative for arthralgias.   Skin: Negative for rash.   Neurological: Negative for dizziness and headaches.   Psychiatric/Behavioral: The patient is not nervous/anxious.          Objective:   /60   Pulse 72   Temp 97.3 °F (36.3 °C) (Temporal)   Ht 6' (1.829 m)   Wt 79.7 kg (175 lb 11.3 oz)   SpO2 98%   BMI 23.83 kg/m²     Physical Exam  Constitutional:       Appearance: He is well-developed.   HENT:      Right Ear: External ear normal. Tympanic membrane is not injected.      Left Ear: External ear normal. Tympanic membrane is not injected.   Eyes:      Conjunctiva/sclera: Conjunctivae normal.   Neck:      Musculoskeletal: Normal range of motion and neck supple.      Thyroid: No thyromegaly.   Cardiovascular:      Rate and Rhythm: Normal rate and regular rhythm.      Heart sounds: No murmur. No friction rub. No gallop.    Pulmonary:      Effort: Pulmonary effort is normal.      Breath sounds: Normal breath sounds. No  wheezing or rales.   Abdominal:      General: Bowel sounds are normal.      Palpations: Abdomen is soft. There is no mass.      Tenderness: There is no abdominal tenderness.   Lymphadenopathy:      Cervical: No cervical adenopathy.   Neurological:      Mental Status: He is alert and oriented to person, place, and time.       Results for SHYAM MCHUGH (MRN [id number]) as of 12/1/2020 10:35   Ref. Range 7/30/2020 10:23   WBC Latest Ref Range: 3.90 - 12.70 K/uL 5.95   RBC Latest Ref Range: 4.60 - 6.20 M/uL 4.16 (L)   Hemoglobin Latest Ref Range: 14.0 - 18.0 g/dL 12.9 (L)   Hematocrit Latest Ref Range: 40.0 - 54.0 % 40.4   MCV Latest Ref Range: 82 - 98 fL 97   MCH Latest Ref Range: 27.0 - 31.0 pg 31.0   MCHC Latest Ref Range: 32.0 - 36.0 g/dL 31.9 (L)   RDW Latest Ref Range: 11.5 - 14.5 % 12.4   Platelets Latest Ref Range: 150 - 350 K/uL 218   MPV Latest Ref Range: 9.2 - 12.9 fL 9.7   Gran % Latest Ref Range: 38.0 - 73.0 % 56.9   Lymph % Latest Ref Range: 18.0 - 48.0 % 34.1   Mono % Latest Ref Range: 4.0 - 15.0 % 5.9   Eosinophil % Latest Ref Range: 0.0 - 8.0 % 2.0   Basophil % Latest Ref Range: 0.0 - 1.9 % 0.8   Immature Granulocytes Latest Ref Range: 0.0 - 0.5 % 0.3   Gran # (ANC) Latest Ref Range: 1.8 - 7.7 K/uL 3.4   Lymph # Latest Ref Range: 1.0 - 4.8 K/uL 2.0   Mono # Latest Ref Range: 0.3 - 1.0 K/uL 0.4   Eos # Latest Ref Range: 0.0 - 0.5 K/uL 0.1   Baso # Latest Ref Range: 0.00 - 0.20 K/uL 0.05   Immature Grans (Abs) Latest Ref Range: 0.00 - 0.04 K/uL 0.02   nRBC Latest Ref Range: 0 /100 WBC 0   Differential Method Unknown Automated   Protime Latest Ref Range: 9.0 - 12.5 sec 30.6 (H)   INR Latest Ref Range: 0.8 - 1.2  3.0 (H)   Sodium Latest Ref Range: 136 - 145 mmol/L 140   Potassium Latest Ref Range: 3.5 - 5.1 mmol/L 4.5   Chloride Latest Ref Range: 95 - 110 mmol/L 103   CO2 Latest Ref Range: 23 - 29 mmol/L 27   Anion Gap Latest Ref Range: 8 - 16 mmol/L 10   BUN Latest Ref Range: 8 - 23 mg/dL 11   Creatinine  Latest Ref Range: 0.5 - 1.4 mg/dL 1.4   eGFR if non African American Latest Ref Range: >60 mL/min/1.73 m^2 46.5 (A)   eGFR if African American Latest Ref Range: >60 mL/min/1.73 m^2 53.7 (A)   Glucose Latest Ref Range: 70 - 110 mg/dL 70   Calcium Latest Ref Range: 8.7 - 10.5 mg/dL 9.8   Alkaline Phosphatase Latest Ref Range: 55 - 135 U/L 68   PROTEIN TOTAL Latest Ref Range: 6.0 - 8.4 g/dL 7.7   Albumin Latest Ref Range: 3.5 - 5.2 g/dL 4.5   BILIRUBIN TOTAL Latest Ref Range: 0.1 - 1.0 mg/dL 1.1 (H)   AST Latest Ref Range: 10 - 40 U/L 24   ALT Latest Ref Range: 10 - 44 U/L 17   Triglycerides Latest Ref Range: 30 - 150 mg/dL 122   Cholesterol Latest Ref Range: 120 - 199 mg/dL 163   HDL Latest Ref Range: 40 - 75 mg/dL 50   HDL/Cholesterol Ratio Latest Ref Range: 20.0 - 50.0 % 30.7   LDL Cholesterol External Latest Ref Range: 63.0 - 159.0 mg/dL 88.6   Non-HDL Cholesterol Latest Units: mg/dL 113   Total Cholesterol/HDL Ratio Latest Ref Range: 2.0 - 5.0  3.3   Vit D, 25-Hydroxy Latest Ref Range: 30 - 96 ng/mL 34   TSH Latest Ref Range: 0.400 - 4.000 uIU/mL 1.089         Assessment:       1. Acquired hypothyroidism    2. Age-related osteoporosis without current pathological fracture    3. Essential hypertension    4. Atherosclerosis of aorta    5. Chronic anticoagulation    6. Stage 3a chronic kidney disease    7. History of CVA (cerebrovascular accident)    8. CHESTER on CPAP    9. Mixed hyperlipidemia    10. Encounter for preventive health examination    11. Other depression          Plan:     Acquired hypothyroidism- Clinically stable, continue present treatment.    Age-related osteoporosis without current pathological fracture    Stage 3a chronic kidney disease, Essential hypertension- stable, cont rx.    History of CVA (cerebrovascular accident), Chronic anticoagulation- per Cards.    CHESTER on CPAP, doing well.    Mixed hyperlipidemia, Atherosclerosis of aorta- cont statin.  -     fluvastatin XL (LESCOL XL) 80 mg Tb24; Take  1/2 of 80 mg pill nightly.  Dispense: 45 tablet; Refill: 3    Encounter for preventive health examination- Discussed pt needs to get Shingrix vaccination at pharmacy.    Other depression- start rx, RTC 2mo.  -     citalopram (CELEXA) 10 MG tablet; Take 1 tablet (10 mg total) by mouth every evening.  Dispense: 30 tablet; Refill: 6

## 2020-11-18 NOTE — PROGRESS NOTES
Patient's INR is supra-therapeutic at 3.2.  No significant changes or extra doses reported.  No signs of bleeding noted; advised patient to seek immediate medical attention if he notices any abnormal bleeding.  Instructions given for patient to hold dose of coumadin on today; then resume current dose of 1.25mg on Wednesdays, Fridays and 2.5mg on all other days of the week.  Patient voiced understanding.  Recheck in 3 weeks.      No

## 2020-11-27 ENCOUNTER — PATIENT MESSAGE (OUTPATIENT)
Dept: FAMILY MEDICINE | Facility: CLINIC | Age: 80
End: 2020-11-27

## 2020-11-30 RX ORDER — FLUVASTATIN SODIUM 80 MG/1
TABLET, FILM COATED, EXTENDED RELEASE ORAL DAILY
Qty: 90 TABLET | Refills: 3 | Status: CANCELLED | OUTPATIENT
Start: 2020-11-30 | End: 2021-11-30

## 2020-12-01 ENCOUNTER — LAB VISIT (OUTPATIENT)
Dept: LAB | Facility: HOSPITAL | Age: 80
End: 2020-12-01
Payer: MEDICARE

## 2020-12-01 ENCOUNTER — IMMUNIZATION (OUTPATIENT)
Dept: PHARMACY | Facility: CLINIC | Age: 80
End: 2020-12-01
Payer: MEDICARE

## 2020-12-01 ENCOUNTER — OFFICE VISIT (OUTPATIENT)
Dept: FAMILY MEDICINE | Facility: CLINIC | Age: 80
End: 2020-12-01
Payer: MEDICARE

## 2020-12-01 VITALS
WEIGHT: 175.69 LBS | SYSTOLIC BLOOD PRESSURE: 130 MMHG | HEIGHT: 72 IN | BODY MASS INDEX: 23.8 KG/M2 | DIASTOLIC BLOOD PRESSURE: 60 MMHG | TEMPERATURE: 97 F | OXYGEN SATURATION: 98 % | HEART RATE: 72 BPM

## 2020-12-01 DIAGNOSIS — M81.0 AGE-RELATED OSTEOPOROSIS WITHOUT CURRENT PATHOLOGICAL FRACTURE: ICD-10-CM

## 2020-12-01 DIAGNOSIS — I10 ESSENTIAL HYPERTENSION: Chronic | ICD-10-CM

## 2020-12-01 DIAGNOSIS — F32.89 OTHER DEPRESSION: ICD-10-CM

## 2020-12-01 DIAGNOSIS — I70.0 ATHEROSCLEROSIS OF AORTA: Chronic | ICD-10-CM

## 2020-12-01 DIAGNOSIS — N18.31 STAGE 3A CHRONIC KIDNEY DISEASE: Chronic | ICD-10-CM

## 2020-12-01 DIAGNOSIS — Z00.00 ENCOUNTER FOR PREVENTIVE HEALTH EXAMINATION: ICD-10-CM

## 2020-12-01 DIAGNOSIS — Z79.01 LONG TERM (CURRENT) USE OF ANTICOAGULANTS: ICD-10-CM

## 2020-12-01 DIAGNOSIS — Z86.73 HISTORY OF CVA (CEREBROVASCULAR ACCIDENT): Chronic | ICD-10-CM

## 2020-12-01 DIAGNOSIS — G47.33 OSA ON CPAP: Chronic | ICD-10-CM

## 2020-12-01 DIAGNOSIS — E78.2 MIXED HYPERLIPIDEMIA: Chronic | ICD-10-CM

## 2020-12-01 DIAGNOSIS — E03.9 ACQUIRED HYPOTHYROIDISM: Primary | ICD-10-CM

## 2020-12-01 DIAGNOSIS — Z79.01 CHRONIC ANTICOAGULATION: Chronic | ICD-10-CM

## 2020-12-01 LAB
INR PPP: 2.2 (ref 0.8–1.2)
PROTHROMBIN TIME: 23 SEC (ref 9–12.5)

## 2020-12-01 PROCEDURE — 1126F PR PAIN SEVERITY QUANTIFIED, NO PAIN PRESENT: ICD-10-PCS | Mod: HCNC,S$GLB,, | Performed by: INTERNAL MEDICINE

## 2020-12-01 PROCEDURE — 1101F PT FALLS ASSESS-DOCD LE1/YR: CPT | Mod: HCNC,CPTII,S$GLB, | Performed by: INTERNAL MEDICINE

## 2020-12-01 PROCEDURE — 85610 PROTHROMBIN TIME: CPT | Mod: HCNC

## 2020-12-01 PROCEDURE — 3075F SYST BP GE 130 - 139MM HG: CPT | Mod: HCNC,CPTII,S$GLB, | Performed by: INTERNAL MEDICINE

## 2020-12-01 PROCEDURE — 1101F PR PT FALLS ASSESS DOC 0-1 FALLS W/OUT INJ PAST YR: ICD-10-PCS | Mod: HCNC,CPTII,S$GLB, | Performed by: INTERNAL MEDICINE

## 2020-12-01 PROCEDURE — 1159F MED LIST DOCD IN RCRD: CPT | Mod: HCNC,S$GLB,, | Performed by: INTERNAL MEDICINE

## 2020-12-01 PROCEDURE — 3288F PR FALLS RISK ASSESSMENT DOCUMENTED: ICD-10-PCS | Mod: HCNC,CPTII,S$GLB, | Performed by: INTERNAL MEDICINE

## 2020-12-01 PROCEDURE — 1157F PR ADVANCE CARE PLAN OR EQUIV PRESENT IN MEDICAL RECORD: ICD-10-PCS | Mod: HCNC,S$GLB,, | Performed by: INTERNAL MEDICINE

## 2020-12-01 PROCEDURE — 1159F PR MEDICATION LIST DOCUMENTED IN MEDICAL RECORD: ICD-10-PCS | Mod: HCNC,S$GLB,, | Performed by: INTERNAL MEDICINE

## 2020-12-01 PROCEDURE — 36415 COLL VENOUS BLD VENIPUNCTURE: CPT | Mod: HCNC,PO

## 2020-12-01 PROCEDURE — 99999 PR PBB SHADOW E&M-EST. PATIENT-LVL IV: CPT | Mod: PBBFAC,HCNC,, | Performed by: INTERNAL MEDICINE

## 2020-12-01 PROCEDURE — 3078F DIAST BP <80 MM HG: CPT | Mod: HCNC,CPTII,S$GLB, | Performed by: INTERNAL MEDICINE

## 2020-12-01 PROCEDURE — 99214 OFFICE O/P EST MOD 30 MIN: CPT | Mod: HCNC,S$GLB,, | Performed by: INTERNAL MEDICINE

## 2020-12-01 PROCEDURE — 3078F PR MOST RECENT DIASTOLIC BLOOD PRESSURE < 80 MM HG: ICD-10-PCS | Mod: HCNC,CPTII,S$GLB, | Performed by: INTERNAL MEDICINE

## 2020-12-01 PROCEDURE — 3288F FALL RISK ASSESSMENT DOCD: CPT | Mod: HCNC,CPTII,S$GLB, | Performed by: INTERNAL MEDICINE

## 2020-12-01 PROCEDURE — 1157F ADVNC CARE PLAN IN RCRD: CPT | Mod: HCNC,S$GLB,, | Performed by: INTERNAL MEDICINE

## 2020-12-01 PROCEDURE — 3075F PR MOST RECENT SYSTOLIC BLOOD PRESS GE 130-139MM HG: ICD-10-PCS | Mod: HCNC,CPTII,S$GLB, | Performed by: INTERNAL MEDICINE

## 2020-12-01 PROCEDURE — 99214 PR OFFICE/OUTPT VISIT, EST, LEVL IV, 30-39 MIN: ICD-10-PCS | Mod: HCNC,S$GLB,, | Performed by: INTERNAL MEDICINE

## 2020-12-01 PROCEDURE — 1126F AMNT PAIN NOTED NONE PRSNT: CPT | Mod: HCNC,S$GLB,, | Performed by: INTERNAL MEDICINE

## 2020-12-01 PROCEDURE — 99999 PR PBB SHADOW E&M-EST. PATIENT-LVL IV: ICD-10-PCS | Mod: PBBFAC,HCNC,, | Performed by: INTERNAL MEDICINE

## 2020-12-01 RX ORDER — FLUVASTATIN SODIUM 80 MG/1
TABLET, FILM COATED, EXTENDED RELEASE ORAL
Qty: 45 TABLET | Refills: 3 | Status: SHIPPED | OUTPATIENT
Start: 2020-12-01 | End: 2021-04-21 | Stop reason: SDUPTHER

## 2020-12-01 RX ORDER — CITALOPRAM 10 MG/1
10 TABLET ORAL NIGHTLY
Qty: 30 TABLET | Refills: 6 | Status: SHIPPED | OUTPATIENT
Start: 2020-12-01 | End: 2021-02-01 | Stop reason: SDUPTHER

## 2020-12-02 ENCOUNTER — ANTI-COAG VISIT (OUTPATIENT)
Dept: CARDIOLOGY | Facility: CLINIC | Age: 80
End: 2020-12-02
Payer: MEDICARE

## 2020-12-02 PROCEDURE — 93793 PR ANTICOAGULANT MGMT FOR PT TAKING WARFARIN: ICD-10-PCS | Mod: S$GLB,,,

## 2020-12-02 PROCEDURE — 93793 ANTICOAG MGMT PT WARFARIN: CPT | Mod: S$GLB,,,

## 2020-12-30 ENCOUNTER — LAB VISIT (OUTPATIENT)
Dept: LAB | Facility: HOSPITAL | Age: 80
End: 2020-12-30
Attending: INTERNAL MEDICINE
Payer: MEDICARE

## 2020-12-30 ENCOUNTER — ANTI-COAG VISIT (OUTPATIENT)
Dept: CARDIOLOGY | Facility: CLINIC | Age: 80
End: 2020-12-30
Payer: MEDICARE

## 2020-12-30 DIAGNOSIS — Z79.01 LONG TERM (CURRENT) USE OF ANTICOAGULANTS: ICD-10-CM

## 2020-12-30 DIAGNOSIS — Z86.73 HISTORY OF CVA (CEREBROVASCULAR ACCIDENT): Chronic | ICD-10-CM

## 2020-12-30 LAB
INR PPP: 1.6 (ref 0.8–1.2)
PROTHROMBIN TIME: 16.2 SEC (ref 9–12.5)

## 2020-12-30 PROCEDURE — 36415 COLL VENOUS BLD VENIPUNCTURE: CPT | Mod: HCNC,PO

## 2020-12-30 PROCEDURE — 85610 PROTHROMBIN TIME: CPT | Mod: HCNC

## 2020-12-30 PROCEDURE — 93793 ANTICOAG MGMT PT WARFARIN: CPT | Mod: S$GLB,,,

## 2020-12-30 PROCEDURE — 93793 PR ANTICOAGULANT MGMT FOR PT TAKING WARFARIN: ICD-10-PCS | Mod: S$GLB,,,

## 2020-12-30 NOTE — PROGRESS NOTES
Patient's daughter Nina contacted.  Patient's INR is sub-therapeutic at 1.6.  Nina reports patient ate pecan pie and okra gumbo on 12/25/20.  Nina reports no other changes.  Advised importance of keeping diet consistent.  Instructions given:  Will give boosted dose of 3.75 mg today 12/30/20;  then re-challenge warfarin 1.25 mg on Wednesdays and Fridays; and   2.5 mg all other days.  Recheck in four weeks.  Nina repeated back correctly and verbalizes understanding.

## 2020-12-31 ENCOUNTER — PES CALL (OUTPATIENT)
Dept: ADMINISTRATIVE | Facility: CLINIC | Age: 80
End: 2020-12-31

## 2021-01-14 RX ORDER — WARFARIN 2.5 MG/1
2.5 TABLET ORAL DAILY
Qty: 30 TABLET | Refills: 2 | Status: SHIPPED | OUTPATIENT
Start: 2021-01-14 | End: 2021-04-14 | Stop reason: SDUPTHER

## 2021-01-27 ENCOUNTER — LAB VISIT (OUTPATIENT)
Dept: LAB | Facility: HOSPITAL | Age: 81
End: 2021-01-27
Attending: INTERNAL MEDICINE
Payer: MEDICARE

## 2021-01-27 ENCOUNTER — ANTI-COAG VISIT (OUTPATIENT)
Dept: CARDIOLOGY | Facility: CLINIC | Age: 81
End: 2021-01-27
Payer: MEDICARE

## 2021-01-27 DIAGNOSIS — Z79.01 LONG TERM (CURRENT) USE OF ANTICOAGULANTS: ICD-10-CM

## 2021-01-27 DIAGNOSIS — Z79.01 CHRONIC ANTICOAGULATION: Chronic | ICD-10-CM

## 2021-01-27 PROBLEM — F33.40 RECURRENT MAJOR DEPRESSIVE DISORDER, IN REMISSION: Status: ACTIVE | Noted: 2021-01-27

## 2021-01-27 PROBLEM — F32.A DEPRESSION: Status: ACTIVE | Noted: 2021-01-27

## 2021-01-27 LAB
INR PPP: 1.6 (ref 0.8–1.2)
PROTHROMBIN TIME: 17.1 SEC (ref 9–12.5)

## 2021-01-27 PROCEDURE — 93793 PR ANTICOAGULANT MGMT FOR PT TAKING WARFARIN: ICD-10-PCS | Mod: S$GLB,,,

## 2021-01-27 PROCEDURE — 85610 PROTHROMBIN TIME: CPT

## 2021-01-27 PROCEDURE — 93793 ANTICOAG MGMT PT WARFARIN: CPT | Mod: S$GLB,,,

## 2021-01-27 PROCEDURE — 36415 COLL VENOUS BLD VENIPUNCTURE: CPT | Mod: PO

## 2021-01-28 ENCOUNTER — PES CALL (OUTPATIENT)
Dept: ADMINISTRATIVE | Facility: CLINIC | Age: 81
End: 2021-01-28

## 2021-02-01 ENCOUNTER — OFFICE VISIT (OUTPATIENT)
Dept: FAMILY MEDICINE | Facility: CLINIC | Age: 81
End: 2021-02-01
Payer: MEDICARE

## 2021-02-01 VITALS
WEIGHT: 174.19 LBS | DIASTOLIC BLOOD PRESSURE: 62 MMHG | TEMPERATURE: 99 F | SYSTOLIC BLOOD PRESSURE: 138 MMHG | HEIGHT: 71 IN | BODY MASS INDEX: 24.39 KG/M2 | OXYGEN SATURATION: 98 % | HEART RATE: 93 BPM

## 2021-02-01 DIAGNOSIS — I10 ESSENTIAL HYPERTENSION: Chronic | ICD-10-CM

## 2021-02-01 DIAGNOSIS — F33.40 RECURRENT MAJOR DEPRESSIVE DISORDER, IN REMISSION: Primary | ICD-10-CM

## 2021-02-01 DIAGNOSIS — F32.89 OTHER DEPRESSION: ICD-10-CM

## 2021-02-01 PROCEDURE — 1157F ADVNC CARE PLAN IN RCRD: CPT | Mod: S$GLB,,, | Performed by: INTERNAL MEDICINE

## 2021-02-01 PROCEDURE — 3075F PR MOST RECENT SYSTOLIC BLOOD PRESS GE 130-139MM HG: ICD-10-PCS | Mod: CPTII,S$GLB,, | Performed by: INTERNAL MEDICINE

## 2021-02-01 PROCEDURE — 3078F PR MOST RECENT DIASTOLIC BLOOD PRESSURE < 80 MM HG: ICD-10-PCS | Mod: CPTII,S$GLB,, | Performed by: INTERNAL MEDICINE

## 2021-02-01 PROCEDURE — 1101F PR PT FALLS ASSESS DOC 0-1 FALLS W/OUT INJ PAST YR: ICD-10-PCS | Mod: CPTII,S$GLB,, | Performed by: INTERNAL MEDICINE

## 2021-02-01 PROCEDURE — 1157F PR ADVANCE CARE PLAN OR EQUIV PRESENT IN MEDICAL RECORD: ICD-10-PCS | Mod: S$GLB,,, | Performed by: INTERNAL MEDICINE

## 2021-02-01 PROCEDURE — 1126F AMNT PAIN NOTED NONE PRSNT: CPT | Mod: S$GLB,,, | Performed by: INTERNAL MEDICINE

## 2021-02-01 PROCEDURE — 1159F PR MEDICATION LIST DOCUMENTED IN MEDICAL RECORD: ICD-10-PCS | Mod: S$GLB,,, | Performed by: INTERNAL MEDICINE

## 2021-02-01 PROCEDURE — 99213 OFFICE O/P EST LOW 20 MIN: CPT | Mod: S$GLB,,, | Performed by: INTERNAL MEDICINE

## 2021-02-01 PROCEDURE — 99499 RISK ADDL DX/OHS AUDIT: ICD-10-PCS | Mod: HCNC,S$GLB,, | Performed by: INTERNAL MEDICINE

## 2021-02-01 PROCEDURE — 99499 UNLISTED E&M SERVICE: CPT | Mod: HCNC,S$GLB,, | Performed by: INTERNAL MEDICINE

## 2021-02-01 PROCEDURE — 3288F FALL RISK ASSESSMENT DOCD: CPT | Mod: CPTII,S$GLB,, | Performed by: INTERNAL MEDICINE

## 2021-02-01 PROCEDURE — 99999 PR PBB SHADOW E&M-EST. PATIENT-LVL IV: ICD-10-PCS | Mod: PBBFAC,,, | Performed by: INTERNAL MEDICINE

## 2021-02-01 PROCEDURE — 3075F SYST BP GE 130 - 139MM HG: CPT | Mod: CPTII,S$GLB,, | Performed by: INTERNAL MEDICINE

## 2021-02-01 PROCEDURE — 1126F PR PAIN SEVERITY QUANTIFIED, NO PAIN PRESENT: ICD-10-PCS | Mod: S$GLB,,, | Performed by: INTERNAL MEDICINE

## 2021-02-01 PROCEDURE — 3078F DIAST BP <80 MM HG: CPT | Mod: CPTII,S$GLB,, | Performed by: INTERNAL MEDICINE

## 2021-02-01 PROCEDURE — 99999 PR PBB SHADOW E&M-EST. PATIENT-LVL IV: CPT | Mod: PBBFAC,,, | Performed by: INTERNAL MEDICINE

## 2021-02-01 PROCEDURE — 3288F PR FALLS RISK ASSESSMENT DOCUMENTED: ICD-10-PCS | Mod: CPTII,S$GLB,, | Performed by: INTERNAL MEDICINE

## 2021-02-01 PROCEDURE — 1101F PT FALLS ASSESS-DOCD LE1/YR: CPT | Mod: CPTII,S$GLB,, | Performed by: INTERNAL MEDICINE

## 2021-02-01 PROCEDURE — 99213 PR OFFICE/OUTPT VISIT, EST, LEVL III, 20-29 MIN: ICD-10-PCS | Mod: S$GLB,,, | Performed by: INTERNAL MEDICINE

## 2021-02-01 PROCEDURE — 1159F MED LIST DOCD IN RCRD: CPT | Mod: S$GLB,,, | Performed by: INTERNAL MEDICINE

## 2021-02-01 RX ORDER — CITALOPRAM 20 MG/1
20 TABLET, FILM COATED ORAL NIGHTLY
Qty: 30 TABLET | Refills: 6 | Status: SHIPPED | OUTPATIENT
Start: 2021-02-01 | End: 2021-04-09 | Stop reason: SDUPTHER

## 2021-02-04 ENCOUNTER — IMMUNIZATION (OUTPATIENT)
Dept: PHARMACY | Facility: CLINIC | Age: 81
End: 2021-02-04
Payer: MEDICARE

## 2021-02-04 DIAGNOSIS — Z23 NEED FOR VACCINATION: Primary | ICD-10-CM

## 2021-02-09 ENCOUNTER — PES CALL (OUTPATIENT)
Dept: ADMINISTRATIVE | Facility: CLINIC | Age: 81
End: 2021-02-09

## 2021-02-09 ENCOUNTER — ANTI-COAG VISIT (OUTPATIENT)
Dept: CARDIOLOGY | Facility: CLINIC | Age: 81
End: 2021-02-09
Payer: MEDICARE

## 2021-02-09 ENCOUNTER — LAB VISIT (OUTPATIENT)
Dept: LAB | Facility: HOSPITAL | Age: 81
End: 2021-02-09
Attending: INTERNAL MEDICINE
Payer: MEDICARE

## 2021-02-09 DIAGNOSIS — Z79.01 LONG TERM (CURRENT) USE OF ANTICOAGULANTS: ICD-10-CM

## 2021-02-09 LAB
INR PPP: 1.9 (ref 0.8–1.2)
PROTHROMBIN TIME: 20 SEC (ref 9–12.5)

## 2021-02-09 PROCEDURE — 36415 COLL VENOUS BLD VENIPUNCTURE: CPT | Mod: PO

## 2021-02-09 PROCEDURE — 85610 PROTHROMBIN TIME: CPT

## 2021-02-09 PROCEDURE — 93793 PR ANTICOAGULANT MGMT FOR PT TAKING WARFARIN: ICD-10-PCS | Mod: S$GLB,,,

## 2021-02-09 PROCEDURE — 93793 ANTICOAG MGMT PT WARFARIN: CPT | Mod: S$GLB,,,

## 2021-02-10 ENCOUNTER — PATIENT MESSAGE (OUTPATIENT)
Dept: CARDIOLOGY | Facility: CLINIC | Age: 81
End: 2021-02-10

## 2021-02-10 DIAGNOSIS — I10 ESSENTIAL HYPERTENSION: Chronic | ICD-10-CM

## 2021-02-10 RX ORDER — VERAPAMIL HYDROCHLORIDE 240 MG/1
240 CAPSULE, EXTENDED RELEASE ORAL 2 TIMES DAILY
Qty: 180 CAPSULE | Refills: 3 | Status: SHIPPED | OUTPATIENT
Start: 2021-02-10 | End: 2021-04-21 | Stop reason: SDUPTHER

## 2021-02-13 ENCOUNTER — PATIENT MESSAGE (OUTPATIENT)
Dept: FAMILY MEDICINE | Facility: CLINIC | Age: 81
End: 2021-02-13

## 2021-02-13 DIAGNOSIS — R53.83 MALAISE AND FATIGUE: Primary | ICD-10-CM

## 2021-02-13 DIAGNOSIS — R53.81 MALAISE AND FATIGUE: Primary | ICD-10-CM

## 2021-02-24 ENCOUNTER — LAB VISIT (OUTPATIENT)
Dept: LAB | Facility: HOSPITAL | Age: 81
End: 2021-02-24
Attending: INTERNAL MEDICINE
Payer: MEDICARE

## 2021-02-24 DIAGNOSIS — R53.81 MALAISE AND FATIGUE: ICD-10-CM

## 2021-02-24 DIAGNOSIS — R53.83 MALAISE AND FATIGUE: ICD-10-CM

## 2021-02-24 PROCEDURE — 36415 COLL VENOUS BLD VENIPUNCTURE: CPT | Mod: PO

## 2021-02-24 PROCEDURE — 80053 COMPREHEN METABOLIC PANEL: CPT

## 2021-02-24 PROCEDURE — 84443 ASSAY THYROID STIM HORMONE: CPT

## 2021-02-24 PROCEDURE — 85025 COMPLETE CBC W/AUTO DIFF WBC: CPT

## 2021-02-25 DIAGNOSIS — K21.9 GASTROESOPHAGEAL REFLUX DISEASE WITHOUT ESOPHAGITIS: ICD-10-CM

## 2021-02-25 LAB
ALBUMIN SERPL BCP-MCNC: 4.3 G/DL (ref 3.5–5.2)
ALP SERPL-CCNC: 69 U/L (ref 55–135)
ALT SERPL W/O P-5'-P-CCNC: 12 U/L (ref 10–44)
ANION GAP SERPL CALC-SCNC: 9 MMOL/L (ref 8–16)
AST SERPL-CCNC: 20 U/L (ref 10–40)
BASOPHILS # BLD AUTO: 0.03 K/UL (ref 0–0.2)
BASOPHILS NFR BLD: 0.4 % (ref 0–1.9)
BILIRUB SERPL-MCNC: 0.9 MG/DL (ref 0.1–1)
BUN SERPL-MCNC: 19 MG/DL (ref 8–23)
CALCIUM SERPL-MCNC: 9.1 MG/DL (ref 8.7–10.5)
CHLORIDE SERPL-SCNC: 98 MMOL/L (ref 95–110)
CO2 SERPL-SCNC: 28 MMOL/L (ref 23–29)
CREAT SERPL-MCNC: 1.3 MG/DL (ref 0.5–1.4)
DIFFERENTIAL METHOD: ABNORMAL
EOSINOPHIL # BLD AUTO: 0.1 K/UL (ref 0–0.5)
EOSINOPHIL NFR BLD: 1.3 % (ref 0–8)
ERYTHROCYTE [DISTWIDTH] IN BLOOD BY AUTOMATED COUNT: 12.6 % (ref 11.5–14.5)
EST. GFR  (AFRICAN AMERICAN): 58.7 ML/MIN/1.73 M^2
EST. GFR  (NON AFRICAN AMERICAN): 50.8 ML/MIN/1.73 M^2
GLUCOSE SERPL-MCNC: 93 MG/DL (ref 70–110)
HCT VFR BLD AUTO: 37.7 % (ref 40–54)
HGB BLD-MCNC: 11.9 G/DL (ref 14–18)
IMM GRANULOCYTES # BLD AUTO: 0.02 K/UL (ref 0–0.04)
IMM GRANULOCYTES NFR BLD AUTO: 0.3 % (ref 0–0.5)
LYMPHOCYTES # BLD AUTO: 1.7 K/UL (ref 1–4.8)
LYMPHOCYTES NFR BLD: 24 % (ref 18–48)
MCH RBC QN AUTO: 31.2 PG (ref 27–31)
MCHC RBC AUTO-ENTMCNC: 31.6 G/DL (ref 32–36)
MCV RBC AUTO: 99 FL (ref 82–98)
MONOCYTES # BLD AUTO: 0.4 K/UL (ref 0.3–1)
MONOCYTES NFR BLD: 6 % (ref 4–15)
NEUTROPHILS # BLD AUTO: 4.9 K/UL (ref 1.8–7.7)
NEUTROPHILS NFR BLD: 68 % (ref 38–73)
NRBC BLD-RTO: 0 /100 WBC
PLATELET # BLD AUTO: 195 K/UL (ref 150–350)
PMV BLD AUTO: 9.6 FL (ref 9.2–12.9)
POTASSIUM SERPL-SCNC: 4.1 MMOL/L (ref 3.5–5.1)
PROT SERPL-MCNC: 7.5 G/DL (ref 6–8.4)
RBC # BLD AUTO: 3.82 M/UL (ref 4.6–6.2)
SODIUM SERPL-SCNC: 135 MMOL/L (ref 136–145)
TSH SERPL DL<=0.005 MIU/L-ACNC: 1.08 UIU/ML (ref 0.4–4)
WBC # BLD AUTO: 7.13 K/UL (ref 3.9–12.7)

## 2021-02-26 RX ORDER — PANTOPRAZOLE SODIUM 40 MG/1
TABLET, DELAYED RELEASE ORAL
Qty: 90 TABLET | Refills: 3 | Status: SHIPPED | OUTPATIENT
Start: 2021-02-26 | End: 2021-06-03

## 2021-03-02 ENCOUNTER — ANTI-COAG VISIT (OUTPATIENT)
Dept: CARDIOLOGY | Facility: CLINIC | Age: 81
End: 2021-03-02
Payer: MEDICARE

## 2021-03-02 ENCOUNTER — PATIENT MESSAGE (OUTPATIENT)
Dept: CARDIOLOGY | Facility: CLINIC | Age: 81
End: 2021-03-02

## 2021-03-02 ENCOUNTER — LAB VISIT (OUTPATIENT)
Dept: LAB | Facility: HOSPITAL | Age: 81
End: 2021-03-02
Attending: INTERNAL MEDICINE
Payer: MEDICARE

## 2021-03-02 ENCOUNTER — OFFICE VISIT (OUTPATIENT)
Dept: CARDIOLOGY | Facility: CLINIC | Age: 81
End: 2021-03-02
Payer: MEDICARE

## 2021-03-02 VITALS
WEIGHT: 178.13 LBS | HEIGHT: 61 IN | DIASTOLIC BLOOD PRESSURE: 62 MMHG | SYSTOLIC BLOOD PRESSURE: 134 MMHG | OXYGEN SATURATION: 100 % | HEART RATE: 62 BPM | BODY MASS INDEX: 33.63 KG/M2

## 2021-03-02 DIAGNOSIS — I70.0 ATHEROSCLEROSIS OF AORTA: Chronic | ICD-10-CM

## 2021-03-02 DIAGNOSIS — I25.10 CORONARY ARTERY DISEASE INVOLVING NATIVE CORONARY ARTERY OF NATIVE HEART WITHOUT ANGINA PECTORIS: Primary | ICD-10-CM

## 2021-03-02 DIAGNOSIS — Z86.73 HISTORY OF CVA (CEREBROVASCULAR ACCIDENT): Chronic | ICD-10-CM

## 2021-03-02 DIAGNOSIS — Z79.01 LONG TERM (CURRENT) USE OF ANTICOAGULANTS: ICD-10-CM

## 2021-03-02 DIAGNOSIS — R42 DIZZINESS: ICD-10-CM

## 2021-03-02 DIAGNOSIS — N18.31 STAGE 3A CHRONIC KIDNEY DISEASE: Chronic | ICD-10-CM

## 2021-03-02 DIAGNOSIS — I65.21 STENOSIS OF RIGHT CAROTID ARTERY: ICD-10-CM

## 2021-03-02 DIAGNOSIS — Z79.01 CHRONIC ANTICOAGULATION: Chronic | ICD-10-CM

## 2021-03-02 DIAGNOSIS — K21.9 GASTROESOPHAGEAL REFLUX DISEASE WITHOUT ESOPHAGITIS: Chronic | ICD-10-CM

## 2021-03-02 DIAGNOSIS — G47.33 OSA ON CPAP: Chronic | ICD-10-CM

## 2021-03-02 DIAGNOSIS — R53.82 CHRONIC FATIGUE: ICD-10-CM

## 2021-03-02 DIAGNOSIS — E78.2 MIXED HYPERLIPIDEMIA: Chronic | ICD-10-CM

## 2021-03-02 DIAGNOSIS — I10 ESSENTIAL HYPERTENSION: Chronic | ICD-10-CM

## 2021-03-02 DIAGNOSIS — R00.1 SINUS BRADYCARDIA: ICD-10-CM

## 2021-03-02 LAB
INR PPP: 1.7 (ref 0.8–1.2)
PROTHROMBIN TIME: 18.1 SEC (ref 9–12.5)

## 2021-03-02 PROCEDURE — 85610 PROTHROMBIN TIME: CPT

## 2021-03-02 PROCEDURE — 99499 RISK ADDL DX/OHS AUDIT: ICD-10-PCS | Mod: HCNC,S$GLB,, | Performed by: INTERNAL MEDICINE

## 2021-03-02 PROCEDURE — 3075F SYST BP GE 130 - 139MM HG: CPT | Mod: CPTII,S$GLB,, | Performed by: INTERNAL MEDICINE

## 2021-03-02 PROCEDURE — 99214 PR OFFICE/OUTPT VISIT, EST, LEVL IV, 30-39 MIN: ICD-10-PCS | Mod: S$GLB,,, | Performed by: INTERNAL MEDICINE

## 2021-03-02 PROCEDURE — 1126F PR PAIN SEVERITY QUANTIFIED, NO PAIN PRESENT: ICD-10-PCS | Mod: S$GLB,,, | Performed by: INTERNAL MEDICINE

## 2021-03-02 PROCEDURE — 99499 UNLISTED E&M SERVICE: CPT | Mod: HCNC,S$GLB,, | Performed by: INTERNAL MEDICINE

## 2021-03-02 PROCEDURE — 3075F PR MOST RECENT SYSTOLIC BLOOD PRESS GE 130-139MM HG: ICD-10-PCS | Mod: CPTII,S$GLB,, | Performed by: INTERNAL MEDICINE

## 2021-03-02 PROCEDURE — 1159F PR MEDICATION LIST DOCUMENTED IN MEDICAL RECORD: ICD-10-PCS | Mod: S$GLB,,, | Performed by: INTERNAL MEDICINE

## 2021-03-02 PROCEDURE — 99214 OFFICE O/P EST MOD 30 MIN: CPT | Mod: S$GLB,,, | Performed by: INTERNAL MEDICINE

## 2021-03-02 PROCEDURE — 36415 COLL VENOUS BLD VENIPUNCTURE: CPT

## 2021-03-02 PROCEDURE — 99999 PR PBB SHADOW E&M-EST. PATIENT-LVL III: ICD-10-PCS | Mod: PBBFAC,,, | Performed by: INTERNAL MEDICINE

## 2021-03-02 PROCEDURE — 1157F ADVNC CARE PLAN IN RCRD: CPT | Mod: S$GLB,,, | Performed by: INTERNAL MEDICINE

## 2021-03-02 PROCEDURE — 1126F AMNT PAIN NOTED NONE PRSNT: CPT | Mod: S$GLB,,, | Performed by: INTERNAL MEDICINE

## 2021-03-02 PROCEDURE — 1159F MED LIST DOCD IN RCRD: CPT | Mod: S$GLB,,, | Performed by: INTERNAL MEDICINE

## 2021-03-02 PROCEDURE — 3078F PR MOST RECENT DIASTOLIC BLOOD PRESSURE < 80 MM HG: ICD-10-PCS | Mod: CPTII,S$GLB,, | Performed by: INTERNAL MEDICINE

## 2021-03-02 PROCEDURE — 3078F DIAST BP <80 MM HG: CPT | Mod: CPTII,S$GLB,, | Performed by: INTERNAL MEDICINE

## 2021-03-02 PROCEDURE — 99999 PR PBB SHADOW E&M-EST. PATIENT-LVL III: CPT | Mod: PBBFAC,,, | Performed by: INTERNAL MEDICINE

## 2021-03-02 PROCEDURE — 1157F PR ADVANCE CARE PLAN OR EQUIV PRESENT IN MEDICAL RECORD: ICD-10-PCS | Mod: S$GLB,,, | Performed by: INTERNAL MEDICINE

## 2021-03-04 ENCOUNTER — IMMUNIZATION (OUTPATIENT)
Dept: PHARMACY | Facility: CLINIC | Age: 81
End: 2021-03-04
Payer: MEDICARE

## 2021-03-04 DIAGNOSIS — Z23 NEED FOR VACCINATION: Primary | ICD-10-CM

## 2021-03-09 ENCOUNTER — LAB VISIT (OUTPATIENT)
Dept: LAB | Facility: HOSPITAL | Age: 81
End: 2021-03-09
Attending: INTERNAL MEDICINE
Payer: MEDICARE

## 2021-03-09 ENCOUNTER — ANTI-COAG VISIT (OUTPATIENT)
Dept: CARDIOLOGY | Facility: CLINIC | Age: 81
End: 2021-03-09
Payer: MEDICARE

## 2021-03-09 DIAGNOSIS — Z79.01 LONG TERM (CURRENT) USE OF ANTICOAGULANTS: ICD-10-CM

## 2021-03-09 LAB
INR PPP: 1.9 (ref 0.8–1.2)
PROTHROMBIN TIME: 20.3 SEC (ref 9–12.5)

## 2021-03-09 PROCEDURE — 36415 COLL VENOUS BLD VENIPUNCTURE: CPT | Mod: PO | Performed by: INTERNAL MEDICINE

## 2021-03-09 PROCEDURE — 93793 PR ANTICOAGULANT MGMT FOR PT TAKING WARFARIN: ICD-10-PCS | Mod: S$GLB,,,

## 2021-03-09 PROCEDURE — 85610 PROTHROMBIN TIME: CPT | Performed by: INTERNAL MEDICINE

## 2021-03-09 PROCEDURE — 93793 ANTICOAG MGMT PT WARFARIN: CPT | Mod: S$GLB,,,

## 2021-03-16 ENCOUNTER — LAB VISIT (OUTPATIENT)
Dept: LAB | Facility: HOSPITAL | Age: 81
End: 2021-03-16
Attending: INTERNAL MEDICINE
Payer: MEDICARE

## 2021-03-16 DIAGNOSIS — Z79.01 LONG TERM (CURRENT) USE OF ANTICOAGULANTS: ICD-10-CM

## 2021-03-16 PROCEDURE — 85610 PROTHROMBIN TIME: CPT | Performed by: INTERNAL MEDICINE

## 2021-03-16 PROCEDURE — 36415 COLL VENOUS BLD VENIPUNCTURE: CPT | Mod: PO | Performed by: INTERNAL MEDICINE

## 2021-03-17 ENCOUNTER — PATIENT MESSAGE (OUTPATIENT)
Dept: CARDIOLOGY | Facility: CLINIC | Age: 81
End: 2021-03-17

## 2021-03-17 ENCOUNTER — ANTI-COAG VISIT (OUTPATIENT)
Dept: CARDIOLOGY | Facility: CLINIC | Age: 81
End: 2021-03-17
Payer: MEDICARE

## 2021-03-17 DIAGNOSIS — Z79.01 CHRONIC ANTICOAGULATION: Chronic | ICD-10-CM

## 2021-03-17 LAB
INR PPP: 2.4 (ref 0.8–1.2)
PROTHROMBIN TIME: 25.3 SEC (ref 9–12.5)

## 2021-03-17 PROCEDURE — 93793 ANTICOAG MGMT PT WARFARIN: CPT | Mod: S$GLB,,,

## 2021-03-17 PROCEDURE — 93793 PR ANTICOAGULANT MGMT FOR PT TAKING WARFARIN: ICD-10-PCS | Mod: S$GLB,,,

## 2021-03-31 ENCOUNTER — ANTI-COAG VISIT (OUTPATIENT)
Dept: CARDIOLOGY | Facility: CLINIC | Age: 81
End: 2021-03-31
Payer: MEDICARE

## 2021-03-31 ENCOUNTER — LAB VISIT (OUTPATIENT)
Dept: LAB | Facility: HOSPITAL | Age: 81
End: 2021-03-31
Attending: INTERNAL MEDICINE
Payer: MEDICARE

## 2021-03-31 ENCOUNTER — PATIENT MESSAGE (OUTPATIENT)
Dept: CARDIOLOGY | Facility: CLINIC | Age: 81
End: 2021-03-31

## 2021-03-31 DIAGNOSIS — Z79.01 LONG TERM (CURRENT) USE OF ANTICOAGULANTS: ICD-10-CM

## 2021-03-31 DIAGNOSIS — Z79.01 CHRONIC ANTICOAGULATION: Primary | Chronic | ICD-10-CM

## 2021-03-31 LAB
INR PPP: 2 (ref 0.8–1.2)
PROTHROMBIN TIME: 21.8 SEC (ref 9–12.5)

## 2021-03-31 PROCEDURE — 93793 PR ANTICOAGULANT MGMT FOR PT TAKING WARFARIN: ICD-10-PCS | Mod: S$GLB,,,

## 2021-03-31 PROCEDURE — 85610 PROTHROMBIN TIME: CPT | Performed by: INTERNAL MEDICINE

## 2021-03-31 PROCEDURE — 93793 ANTICOAG MGMT PT WARFARIN: CPT | Mod: S$GLB,,,

## 2021-03-31 PROCEDURE — 36415 COLL VENOUS BLD VENIPUNCTURE: CPT | Mod: PO | Performed by: INTERNAL MEDICINE

## 2021-04-09 DIAGNOSIS — F32.89 OTHER DEPRESSION: ICD-10-CM

## 2021-04-09 RX ORDER — CITALOPRAM 20 MG/1
20 TABLET, FILM COATED ORAL NIGHTLY
Qty: 30 TABLET | Refills: 6 | Status: SHIPPED | OUTPATIENT
Start: 2021-04-09 | End: 2021-04-09 | Stop reason: SDUPTHER

## 2021-04-09 RX ORDER — CITALOPRAM 20 MG/1
20 TABLET, FILM COATED ORAL NIGHTLY
Qty: 30 TABLET | Refills: 6 | Status: SHIPPED | OUTPATIENT
Start: 2021-04-09 | End: 2021-04-21 | Stop reason: SDUPTHER

## 2021-04-12 ENCOUNTER — PATIENT MESSAGE (OUTPATIENT)
Dept: FAMILY MEDICINE | Facility: CLINIC | Age: 81
End: 2021-04-12

## 2021-04-14 DIAGNOSIS — I10 ESSENTIAL HYPERTENSION: ICD-10-CM

## 2021-04-14 RX ORDER — WARFARIN 2.5 MG/1
2.5 TABLET ORAL DAILY
Qty: 30 TABLET | Refills: 2 | Status: SHIPPED | OUTPATIENT
Start: 2021-04-14 | End: 2021-06-22

## 2021-04-15 ENCOUNTER — PATIENT MESSAGE (OUTPATIENT)
Dept: FAMILY MEDICINE | Facility: CLINIC | Age: 81
End: 2021-04-15

## 2021-04-15 RX ORDER — AMOXICILLIN 875 MG/1
875 TABLET, FILM COATED ORAL 2 TIMES DAILY
Qty: 20 TABLET | Refills: 0 | Status: SHIPPED | OUTPATIENT
Start: 2021-04-15 | End: 2021-05-03

## 2021-04-19 DIAGNOSIS — I10 ESSENTIAL HYPERTENSION: Primary | ICD-10-CM

## 2021-04-19 RX ORDER — WARFARIN 2.5 MG/1
2.5 TABLET ORAL DAILY
Qty: 30 TABLET | Refills: 2 | Status: SHIPPED | OUTPATIENT
Start: 2021-04-19 | End: 2021-05-03

## 2021-04-21 ENCOUNTER — PATIENT MESSAGE (OUTPATIENT)
Dept: CARDIOLOGY | Facility: CLINIC | Age: 81
End: 2021-04-21

## 2021-04-21 ENCOUNTER — LAB VISIT (OUTPATIENT)
Dept: LAB | Facility: HOSPITAL | Age: 81
End: 2021-04-21
Attending: INTERNAL MEDICINE
Payer: MEDICARE

## 2021-04-21 ENCOUNTER — PATIENT MESSAGE (OUTPATIENT)
Dept: FAMILY MEDICINE | Facility: CLINIC | Age: 81
End: 2021-04-21

## 2021-04-21 DIAGNOSIS — E78.2 MIXED HYPERLIPIDEMIA: Chronic | ICD-10-CM

## 2021-04-21 DIAGNOSIS — F32.89 OTHER DEPRESSION: ICD-10-CM

## 2021-04-21 DIAGNOSIS — Z79.01 CHRONIC ANTICOAGULATION: Chronic | ICD-10-CM

## 2021-04-21 DIAGNOSIS — I10 ESSENTIAL HYPERTENSION: Chronic | ICD-10-CM

## 2021-04-21 PROCEDURE — 36415 COLL VENOUS BLD VENIPUNCTURE: CPT | Mod: HCNC,PO | Performed by: INTERNAL MEDICINE

## 2021-04-21 RX ORDER — FLUVASTATIN SODIUM 80 MG/1
TABLET, FILM COATED, EXTENDED RELEASE ORAL
Qty: 45 TABLET | Refills: 3 | Status: SHIPPED | OUTPATIENT
Start: 2021-04-21 | End: 2021-04-28 | Stop reason: SDUPTHER

## 2021-04-21 RX ORDER — VERAPAMIL HYDROCHLORIDE 240 MG/1
240 CAPSULE, EXTENDED RELEASE ORAL 2 TIMES DAILY
Qty: 180 CAPSULE | Refills: 3 | Status: SHIPPED | OUTPATIENT
Start: 2021-04-21 | End: 2021-11-01

## 2021-04-21 RX ORDER — CITALOPRAM 20 MG/1
20 TABLET, FILM COATED ORAL NIGHTLY
Qty: 90 TABLET | Refills: 3 | Status: SHIPPED | OUTPATIENT
Start: 2021-04-21 | End: 2022-02-07 | Stop reason: SDUPTHER

## 2021-04-21 RX ORDER — HYDROCHLOROTHIAZIDE 12.5 MG/1
12.5 CAPSULE ORAL DAILY
Qty: 90 CAPSULE | Refills: 3 | Status: SHIPPED | OUTPATIENT
Start: 2021-04-21 | End: 2021-10-07 | Stop reason: SINTOL

## 2021-04-21 RX ORDER — VALSARTAN 80 MG
80 TABLET ORAL 2 TIMES DAILY
Qty: 180 TABLET | Refills: 3 | Status: SHIPPED | OUTPATIENT
Start: 2021-04-21 | End: 2021-11-09 | Stop reason: ALTCHOICE

## 2021-04-25 ENCOUNTER — OFFICE VISIT (OUTPATIENT)
Dept: URGENT CARE | Facility: CLINIC | Age: 81
End: 2021-04-25
Payer: MEDICARE

## 2021-04-25 VITALS
WEIGHT: 178 LBS | DIASTOLIC BLOOD PRESSURE: 70 MMHG | BODY MASS INDEX: 33.61 KG/M2 | HEART RATE: 68 BPM | HEIGHT: 61 IN | SYSTOLIC BLOOD PRESSURE: 148 MMHG | TEMPERATURE: 98 F | OXYGEN SATURATION: 98 %

## 2021-04-25 DIAGNOSIS — J32.0 MAXILLARY SINUSITIS, UNSPECIFIED CHRONICITY: Primary | ICD-10-CM

## 2021-04-25 PROCEDURE — 99214 OFFICE O/P EST MOD 30 MIN: CPT | Mod: S$GLB,,, | Performed by: NURSE PRACTITIONER

## 2021-04-25 PROCEDURE — 1125F AMNT PAIN NOTED PAIN PRSNT: CPT | Mod: S$GLB,,, | Performed by: NURSE PRACTITIONER

## 2021-04-25 PROCEDURE — 1157F ADVNC CARE PLAN IN RCRD: CPT | Mod: S$GLB,,, | Performed by: NURSE PRACTITIONER

## 2021-04-25 PROCEDURE — 1157F PR ADVANCE CARE PLAN OR EQUIV PRESENT IN MEDICAL RECORD: ICD-10-PCS | Mod: S$GLB,,, | Performed by: NURSE PRACTITIONER

## 2021-04-25 PROCEDURE — 99214 PR OFFICE/OUTPT VISIT, EST, LEVL IV, 30-39 MIN: ICD-10-PCS | Mod: S$GLB,,, | Performed by: NURSE PRACTITIONER

## 2021-04-25 PROCEDURE — 1125F PR PAIN SEVERITY QUANTIFIED, PAIN PRESENT: ICD-10-PCS | Mod: S$GLB,,, | Performed by: NURSE PRACTITIONER

## 2021-04-25 RX ORDER — AMOXICILLIN AND CLAVULANATE POTASSIUM 875; 125 MG/1; MG/1
1 TABLET, FILM COATED ORAL 2 TIMES DAILY
Qty: 20 TABLET | Refills: 0 | Status: SHIPPED | OUTPATIENT
Start: 2021-04-25 | End: 2021-05-03

## 2021-04-28 ENCOUNTER — TELEPHONE (OUTPATIENT)
Dept: URGENT CARE | Facility: CLINIC | Age: 81
End: 2021-04-28

## 2021-04-28 ENCOUNTER — PATIENT MESSAGE (OUTPATIENT)
Dept: FAMILY MEDICINE | Facility: CLINIC | Age: 81
End: 2021-04-28

## 2021-04-28 DIAGNOSIS — E78.2 MIXED HYPERLIPIDEMIA: Chronic | ICD-10-CM

## 2021-04-28 RX ORDER — FLUVASTATIN SODIUM 80 MG/1
TABLET, FILM COATED, EXTENDED RELEASE ORAL
Qty: 45 TABLET | Refills: 3 | Status: SHIPPED | OUTPATIENT
Start: 2021-04-28 | End: 2021-06-07 | Stop reason: SDUPTHER

## 2021-04-30 ENCOUNTER — PATIENT MESSAGE (OUTPATIENT)
Dept: OPHTHALMOLOGY | Facility: CLINIC | Age: 81
End: 2021-04-30

## 2021-05-03 ENCOUNTER — ANTI-COAG VISIT (OUTPATIENT)
Dept: CARDIOLOGY | Facility: CLINIC | Age: 81
End: 2021-05-03
Payer: MEDICARE

## 2021-05-03 ENCOUNTER — TELEPHONE (OUTPATIENT)
Dept: FAMILY MEDICINE | Facility: CLINIC | Age: 81
End: 2021-05-03

## 2021-05-03 ENCOUNTER — PATIENT MESSAGE (OUTPATIENT)
Dept: CARDIOLOGY | Facility: CLINIC | Age: 81
End: 2021-05-03

## 2021-05-03 ENCOUNTER — LAB VISIT (OUTPATIENT)
Dept: LAB | Facility: HOSPITAL | Age: 81
End: 2021-05-03
Attending: INTERNAL MEDICINE
Payer: MEDICARE

## 2021-05-03 ENCOUNTER — OFFICE VISIT (OUTPATIENT)
Dept: FAMILY MEDICINE | Facility: CLINIC | Age: 81
End: 2021-05-03
Payer: MEDICARE

## 2021-05-03 VITALS
BODY MASS INDEX: 23.38 KG/M2 | DIASTOLIC BLOOD PRESSURE: 68 MMHG | HEIGHT: 72 IN | OXYGEN SATURATION: 98 % | SYSTOLIC BLOOD PRESSURE: 130 MMHG | HEART RATE: 68 BPM | WEIGHT: 172.63 LBS | TEMPERATURE: 98 F

## 2021-05-03 DIAGNOSIS — F41.9 ANXIETY: ICD-10-CM

## 2021-05-03 DIAGNOSIS — Z79.01 CHRONIC ANTICOAGULATION: Chronic | ICD-10-CM

## 2021-05-03 DIAGNOSIS — J01.00 ACUTE NON-RECURRENT MAXILLARY SINUSITIS: Primary | ICD-10-CM

## 2021-05-03 DIAGNOSIS — F33.40 RECURRENT MAJOR DEPRESSIVE DISORDER, IN REMISSION: ICD-10-CM

## 2021-05-03 DIAGNOSIS — Z79.01 LONG TERM (CURRENT) USE OF ANTICOAGULANTS: ICD-10-CM

## 2021-05-03 DIAGNOSIS — M54.2 NECK PAIN: ICD-10-CM

## 2021-05-03 LAB
INR PPP: 1.6 (ref 0.8–1.2)
PROTHROMBIN TIME: 16.9 SEC (ref 9–12.5)

## 2021-05-03 PROCEDURE — 99214 OFFICE O/P EST MOD 30 MIN: CPT | Mod: HCNC,S$GLB,, | Performed by: INTERNAL MEDICINE

## 2021-05-03 PROCEDURE — 3288F FALL RISK ASSESSMENT DOCD: CPT | Mod: HCNC,CPTII,S$GLB, | Performed by: INTERNAL MEDICINE

## 2021-05-03 PROCEDURE — 1101F PT FALLS ASSESS-DOCD LE1/YR: CPT | Mod: HCNC,CPTII,S$GLB, | Performed by: INTERNAL MEDICINE

## 2021-05-03 PROCEDURE — 99999 PR PBB SHADOW E&M-EST. PATIENT-LVL IV: CPT | Mod: PBBFAC,HCNC,, | Performed by: INTERNAL MEDICINE

## 2021-05-03 PROCEDURE — 99214 PR OFFICE/OUTPT VISIT, EST, LEVL IV, 30-39 MIN: ICD-10-PCS | Mod: HCNC,S$GLB,, | Performed by: INTERNAL MEDICINE

## 2021-05-03 PROCEDURE — 1101F PR PT FALLS ASSESS DOC 0-1 FALLS W/OUT INJ PAST YR: ICD-10-PCS | Mod: HCNC,CPTII,S$GLB, | Performed by: INTERNAL MEDICINE

## 2021-05-03 PROCEDURE — 93793 ANTICOAG MGMT PT WARFARIN: CPT | Mod: S$GLB,,,

## 2021-05-03 PROCEDURE — 1126F PR PAIN SEVERITY QUANTIFIED, NO PAIN PRESENT: ICD-10-PCS | Mod: HCNC,S$GLB,, | Performed by: INTERNAL MEDICINE

## 2021-05-03 PROCEDURE — 1157F PR ADVANCE CARE PLAN OR EQUIV PRESENT IN MEDICAL RECORD: ICD-10-PCS | Mod: HCNC,S$GLB,, | Performed by: INTERNAL MEDICINE

## 2021-05-03 PROCEDURE — 99999 PR PBB SHADOW E&M-EST. PATIENT-LVL IV: ICD-10-PCS | Mod: PBBFAC,HCNC,, | Performed by: INTERNAL MEDICINE

## 2021-05-03 PROCEDURE — 36415 COLL VENOUS BLD VENIPUNCTURE: CPT | Mod: HCNC,PO | Performed by: INTERNAL MEDICINE

## 2021-05-03 PROCEDURE — 1159F PR MEDICATION LIST DOCUMENTED IN MEDICAL RECORD: ICD-10-PCS | Mod: HCNC,S$GLB,, | Performed by: INTERNAL MEDICINE

## 2021-05-03 PROCEDURE — 85610 PROTHROMBIN TIME: CPT | Mod: HCNC | Performed by: INTERNAL MEDICINE

## 2021-05-03 PROCEDURE — 93793 PR ANTICOAGULANT MGMT FOR PT TAKING WARFARIN: ICD-10-PCS | Mod: S$GLB,,,

## 2021-05-03 PROCEDURE — 1126F AMNT PAIN NOTED NONE PRSNT: CPT | Mod: HCNC,S$GLB,, | Performed by: INTERNAL MEDICINE

## 2021-05-03 PROCEDURE — 1159F MED LIST DOCD IN RCRD: CPT | Mod: HCNC,S$GLB,, | Performed by: INTERNAL MEDICINE

## 2021-05-03 PROCEDURE — 3288F PR FALLS RISK ASSESSMENT DOCUMENTED: ICD-10-PCS | Mod: HCNC,CPTII,S$GLB, | Performed by: INTERNAL MEDICINE

## 2021-05-03 PROCEDURE — 1157F ADVNC CARE PLAN IN RCRD: CPT | Mod: HCNC,S$GLB,, | Performed by: INTERNAL MEDICINE

## 2021-05-03 RX ORDER — DOXYCYCLINE 100 MG/1
100 CAPSULE ORAL 2 TIMES DAILY
Qty: 20 CAPSULE | Refills: 0 | Status: SHIPPED | OUTPATIENT
Start: 2021-05-03 | End: 2021-05-13

## 2021-05-03 RX ORDER — BUSPIRONE HYDROCHLORIDE 5 MG/1
5 TABLET ORAL 2 TIMES DAILY PRN
Qty: 40 TABLET | Refills: 4 | Status: SHIPPED | OUTPATIENT
Start: 2021-05-03 | End: 2021-05-03 | Stop reason: SDUPTHER

## 2021-05-03 RX ORDER — BUSPIRONE HYDROCHLORIDE 5 MG/1
5 TABLET ORAL 2 TIMES DAILY PRN
Qty: 40 TABLET | Refills: 4 | Status: SHIPPED | OUTPATIENT
Start: 2021-05-03 | End: 2021-09-22

## 2021-05-03 RX ORDER — METHYLPREDNISOLONE 4 MG/1
TABLET ORAL
Qty: 1 PACKAGE | Refills: 0 | Status: SHIPPED | OUTPATIENT
Start: 2021-05-03 | End: 2021-07-27 | Stop reason: ALTCHOICE

## 2021-05-03 RX ORDER — DOXYCYCLINE 100 MG/1
100 CAPSULE ORAL 2 TIMES DAILY
Qty: 20 CAPSULE | Refills: 0 | Status: SHIPPED | OUTPATIENT
Start: 2021-05-03 | End: 2021-05-03 | Stop reason: SDUPTHER

## 2021-05-03 RX ORDER — METHYLPREDNISOLONE 4 MG/1
TABLET ORAL
Qty: 1 PACKAGE | Refills: 0 | Status: SHIPPED | OUTPATIENT
Start: 2021-05-03 | End: 2021-05-03 | Stop reason: SDUPTHER

## 2021-05-05 ENCOUNTER — TELEPHONE (OUTPATIENT)
Dept: FAMILY MEDICINE | Facility: CLINIC | Age: 81
End: 2021-05-05

## 2021-05-06 ENCOUNTER — TELEPHONE (OUTPATIENT)
Dept: OPHTHALMOLOGY | Facility: CLINIC | Age: 81
End: 2021-05-06

## 2021-05-06 ENCOUNTER — PATIENT MESSAGE (OUTPATIENT)
Dept: FAMILY MEDICINE | Facility: CLINIC | Age: 81
End: 2021-05-06

## 2021-05-06 ENCOUNTER — PATIENT MESSAGE (OUTPATIENT)
Dept: OPHTHALMOLOGY | Facility: CLINIC | Age: 81
End: 2021-05-06

## 2021-05-06 DIAGNOSIS — M35.01 KERATITIS SICCA, BILATERAL: Primary | ICD-10-CM

## 2021-05-06 RX ORDER — CYCLOSPORINE 0.5 MG/ML
1 EMULSION OPHTHALMIC 2 TIMES DAILY
COMMUNITY
End: 2021-05-06 | Stop reason: SDUPTHER

## 2021-05-07 RX ORDER — CYCLOSPORINE 0.5 MG/ML
1 EMULSION OPHTHALMIC 2 TIMES DAILY
Qty: 180 EACH | Refills: 4 | Status: SHIPPED | OUTPATIENT
Start: 2021-05-07 | End: 2021-12-29

## 2021-05-10 ENCOUNTER — LAB VISIT (OUTPATIENT)
Dept: LAB | Facility: HOSPITAL | Age: 81
End: 2021-05-10
Attending: INTERNAL MEDICINE
Payer: MEDICARE

## 2021-05-10 ENCOUNTER — PATIENT MESSAGE (OUTPATIENT)
Dept: CARDIOLOGY | Facility: CLINIC | Age: 81
End: 2021-05-10

## 2021-05-10 ENCOUNTER — ANTI-COAG VISIT (OUTPATIENT)
Dept: CARDIOLOGY | Facility: CLINIC | Age: 81
End: 2021-05-10
Payer: MEDICARE

## 2021-05-10 DIAGNOSIS — Z79.01 LONG TERM (CURRENT) USE OF ANTICOAGULANTS: ICD-10-CM

## 2021-05-10 DIAGNOSIS — Z79.01 CHRONIC ANTICOAGULATION: Chronic | ICD-10-CM

## 2021-05-10 LAB
INR PPP: 1.5 (ref 0.8–1.2)
PROTHROMBIN TIME: 16.1 SEC (ref 9–12.5)

## 2021-05-10 PROCEDURE — 36415 COLL VENOUS BLD VENIPUNCTURE: CPT | Mod: HCNC,PO | Performed by: INTERNAL MEDICINE

## 2021-05-10 PROCEDURE — 85610 PROTHROMBIN TIME: CPT | Mod: HCNC | Performed by: INTERNAL MEDICINE

## 2021-05-10 PROCEDURE — 93793 PR ANTICOAGULANT MGMT FOR PT TAKING WARFARIN: ICD-10-PCS | Mod: S$GLB,,,

## 2021-05-10 PROCEDURE — 93793 ANTICOAG MGMT PT WARFARIN: CPT | Mod: S$GLB,,,

## 2021-05-12 ENCOUNTER — PATIENT MESSAGE (OUTPATIENT)
Dept: OPHTHALMOLOGY | Facility: CLINIC | Age: 81
End: 2021-05-12

## 2021-05-24 ENCOUNTER — LAB VISIT (OUTPATIENT)
Dept: LAB | Facility: HOSPITAL | Age: 81
End: 2021-05-24
Attending: INTERNAL MEDICINE
Payer: MEDICARE

## 2021-05-24 ENCOUNTER — PATIENT MESSAGE (OUTPATIENT)
Dept: CARDIOLOGY | Facility: CLINIC | Age: 81
End: 2021-05-24

## 2021-05-24 ENCOUNTER — ANTI-COAG VISIT (OUTPATIENT)
Dept: CARDIOLOGY | Facility: CLINIC | Age: 81
End: 2021-05-24
Payer: MEDICARE

## 2021-05-24 DIAGNOSIS — Z79.01 LONG TERM (CURRENT) USE OF ANTICOAGULANTS: ICD-10-CM

## 2021-05-24 DIAGNOSIS — Z79.01 CHRONIC ANTICOAGULATION: Chronic | ICD-10-CM

## 2021-05-24 LAB
INR PPP: 2.3 (ref 0.8–1.2)
PROTHROMBIN TIME: 24.1 SEC (ref 9–12.5)

## 2021-05-24 PROCEDURE — 36415 COLL VENOUS BLD VENIPUNCTURE: CPT | Mod: HCNC,PO | Performed by: INTERNAL MEDICINE

## 2021-05-24 PROCEDURE — 85610 PROTHROMBIN TIME: CPT | Mod: HCNC | Performed by: INTERNAL MEDICINE

## 2021-05-24 PROCEDURE — 93793 ANTICOAG MGMT PT WARFARIN: CPT | Mod: S$GLB,,,

## 2021-05-24 PROCEDURE — 93793 PR ANTICOAGULANT MGMT FOR PT TAKING WARFARIN: ICD-10-PCS | Mod: S$GLB,,,

## 2021-06-07 DIAGNOSIS — E78.2 MIXED HYPERLIPIDEMIA: Chronic | ICD-10-CM

## 2021-06-07 RX ORDER — FLUVASTATIN SODIUM 80 MG/1
TABLET, FILM COATED, EXTENDED RELEASE ORAL
Qty: 45 TABLET | Refills: 3 | Status: SHIPPED | OUTPATIENT
Start: 2021-06-07 | End: 2021-06-08

## 2021-06-15 ENCOUNTER — ANTI-COAG VISIT (OUTPATIENT)
Dept: CARDIOLOGY | Facility: CLINIC | Age: 81
End: 2021-06-15
Payer: MEDICARE

## 2021-06-15 ENCOUNTER — LAB VISIT (OUTPATIENT)
Dept: LAB | Facility: HOSPITAL | Age: 81
End: 2021-06-15
Attending: INTERNAL MEDICINE
Payer: MEDICARE

## 2021-06-15 DIAGNOSIS — Z79.01 LONG TERM (CURRENT) USE OF ANTICOAGULANTS: ICD-10-CM

## 2021-06-15 DIAGNOSIS — Z79.01 CHRONIC ANTICOAGULATION: Chronic | ICD-10-CM

## 2021-06-15 LAB
INR PPP: 3.5 (ref 0.8–1.2)
PROTHROMBIN TIME: 36.3 SEC (ref 9–12.5)

## 2021-06-15 PROCEDURE — 93793 ANTICOAG MGMT PT WARFARIN: CPT | Mod: S$GLB,,,

## 2021-06-15 PROCEDURE — 93793 PR ANTICOAGULANT MGMT FOR PT TAKING WARFARIN: ICD-10-PCS | Mod: S$GLB,,,

## 2021-06-15 PROCEDURE — 85610 PROTHROMBIN TIME: CPT | Mod: HCNC | Performed by: INTERNAL MEDICINE

## 2021-06-15 PROCEDURE — 36415 COLL VENOUS BLD VENIPUNCTURE: CPT | Mod: HCNC,PO | Performed by: INTERNAL MEDICINE

## 2021-06-29 ENCOUNTER — ANTI-COAG VISIT (OUTPATIENT)
Dept: CARDIOLOGY | Facility: CLINIC | Age: 81
End: 2021-06-29
Payer: MEDICARE

## 2021-06-29 ENCOUNTER — LAB VISIT (OUTPATIENT)
Dept: LAB | Facility: HOSPITAL | Age: 81
End: 2021-06-29
Attending: INTERNAL MEDICINE
Payer: MEDICARE

## 2021-06-29 ENCOUNTER — PATIENT MESSAGE (OUTPATIENT)
Dept: CARDIOLOGY | Facility: CLINIC | Age: 81
End: 2021-06-29

## 2021-06-29 DIAGNOSIS — Z79.01 CHRONIC ANTICOAGULATION: Chronic | ICD-10-CM

## 2021-06-29 DIAGNOSIS — Z79.01 LONG TERM (CURRENT) USE OF ANTICOAGULANTS: ICD-10-CM

## 2021-06-29 LAB
INR PPP: 2.3 (ref 0.8–1.2)
PROTHROMBIN TIME: 24.8 SEC (ref 9–12.5)

## 2021-06-29 PROCEDURE — 36415 COLL VENOUS BLD VENIPUNCTURE: CPT | Mod: HCNC,PO | Performed by: INTERNAL MEDICINE

## 2021-06-29 PROCEDURE — 85610 PROTHROMBIN TIME: CPT | Mod: HCNC | Performed by: INTERNAL MEDICINE

## 2021-06-29 PROCEDURE — 93793 ANTICOAG MGMT PT WARFARIN: CPT | Mod: S$GLB,,,

## 2021-06-29 PROCEDURE — 93793 PR ANTICOAGULANT MGMT FOR PT TAKING WARFARIN: ICD-10-PCS | Mod: S$GLB,,,

## 2021-07-07 ENCOUNTER — TELEPHONE (OUTPATIENT)
Dept: FAMILY MEDICINE | Facility: CLINIC | Age: 81
End: 2021-07-07

## 2021-07-07 DIAGNOSIS — E78.2 MIXED HYPERLIPIDEMIA: Chronic | ICD-10-CM

## 2021-07-07 RX ORDER — FLUVASTATIN 40 MG/1
40 CAPSULE ORAL NIGHTLY
Qty: 90 CAPSULE | Refills: 3 | Status: SHIPPED | OUTPATIENT
Start: 2021-07-07 | End: 2021-11-09

## 2021-07-15 NOTE — TELEPHONE ENCOUNTER
Standing order, recheck pt/inr in 4 wks.    Update med card, 2.5mg all days except 5mg on Mon   Walking - Indoors allowed

## 2021-07-27 ENCOUNTER — ANTI-COAG VISIT (OUTPATIENT)
Dept: CARDIOLOGY | Facility: CLINIC | Age: 81
End: 2021-07-27
Payer: MEDICARE

## 2021-07-27 ENCOUNTER — LAB VISIT (OUTPATIENT)
Dept: LAB | Facility: HOSPITAL | Age: 81
End: 2021-07-27
Attending: INTERNAL MEDICINE
Payer: MEDICARE

## 2021-07-27 ENCOUNTER — PATIENT MESSAGE (OUTPATIENT)
Dept: CARDIOLOGY | Facility: CLINIC | Age: 81
End: 2021-07-27

## 2021-07-27 DIAGNOSIS — Z79.01 CHRONIC ANTICOAGULATION: Chronic | ICD-10-CM

## 2021-07-27 DIAGNOSIS — Z79.01 LONG TERM (CURRENT) USE OF ANTICOAGULANTS: ICD-10-CM

## 2021-07-27 LAB
INR PPP: 3 (ref 0.8–1.2)
PROTHROMBIN TIME: 30.8 SEC (ref 9–12.5)

## 2021-07-27 PROCEDURE — 93793 ANTICOAG MGMT PT WARFARIN: CPT | Mod: S$GLB,,,

## 2021-07-27 PROCEDURE — 36415 COLL VENOUS BLD VENIPUNCTURE: CPT | Mod: HCNC,PO | Performed by: INTERNAL MEDICINE

## 2021-07-27 PROCEDURE — 93793 PR ANTICOAGULANT MGMT FOR PT TAKING WARFARIN: ICD-10-PCS | Mod: S$GLB,,,

## 2021-07-27 PROCEDURE — 85610 PROTHROMBIN TIME: CPT | Mod: HCNC | Performed by: INTERNAL MEDICINE

## 2021-08-11 ENCOUNTER — OFFICE VISIT (OUTPATIENT)
Dept: FAMILY MEDICINE | Facility: CLINIC | Age: 81
End: 2021-08-11
Payer: MEDICARE

## 2021-08-11 ENCOUNTER — PATIENT MESSAGE (OUTPATIENT)
Dept: FAMILY MEDICINE | Facility: CLINIC | Age: 81
End: 2021-08-11

## 2021-08-11 VITALS
TEMPERATURE: 98 F | BODY MASS INDEX: 23.44 KG/M2 | SYSTOLIC BLOOD PRESSURE: 132 MMHG | HEART RATE: 71 BPM | RESPIRATION RATE: 18 BRPM | HEIGHT: 72 IN | DIASTOLIC BLOOD PRESSURE: 70 MMHG | OXYGEN SATURATION: 98 % | WEIGHT: 173.06 LBS

## 2021-08-11 DIAGNOSIS — I10 ESSENTIAL HYPERTENSION: ICD-10-CM

## 2021-08-11 DIAGNOSIS — G47.33 OSA ON CPAP: ICD-10-CM

## 2021-08-11 DIAGNOSIS — E78.2 MIXED HYPERLIPIDEMIA: ICD-10-CM

## 2021-08-11 DIAGNOSIS — Z86.73 HISTORY OF CVA (CEREBROVASCULAR ACCIDENT): ICD-10-CM

## 2021-08-11 DIAGNOSIS — N18.31 STAGE 3A CHRONIC KIDNEY DISEASE: ICD-10-CM

## 2021-08-11 DIAGNOSIS — Z79.01 CHRONIC ANTICOAGULATION: ICD-10-CM

## 2021-08-11 DIAGNOSIS — Z29.9 PREVENTIVE MEASURE: ICD-10-CM

## 2021-08-11 DIAGNOSIS — F41.9 ANXIETY: Primary | ICD-10-CM

## 2021-08-11 DIAGNOSIS — E03.9 ACQUIRED HYPOTHYROIDISM: ICD-10-CM

## 2021-08-11 PROCEDURE — 99499 RISK ADDL DX/OHS AUDIT: ICD-10-PCS | Mod: HCNC,S$GLB,, | Performed by: INTERNAL MEDICINE

## 2021-08-11 PROCEDURE — 99499 UNLISTED E&M SERVICE: CPT | Mod: HCNC,S$GLB,, | Performed by: INTERNAL MEDICINE

## 2021-08-11 PROCEDURE — 1157F ADVNC CARE PLAN IN RCRD: CPT | Mod: HCNC,CPTII,S$GLB, | Performed by: INTERNAL MEDICINE

## 2021-08-11 PROCEDURE — 1157F PR ADVANCE CARE PLAN OR EQUIV PRESENT IN MEDICAL RECORD: ICD-10-PCS | Mod: HCNC,CPTII,S$GLB, | Performed by: INTERNAL MEDICINE

## 2021-08-11 PROCEDURE — 99214 PR OFFICE/OUTPT VISIT, EST, LEVL IV, 30-39 MIN: ICD-10-PCS | Mod: HCNC,S$GLB,, | Performed by: INTERNAL MEDICINE

## 2021-08-11 PROCEDURE — 99999 PR PBB SHADOW E&M-EST. PATIENT-LVL IV: CPT | Mod: PBBFAC,HCNC,, | Performed by: INTERNAL MEDICINE

## 2021-08-11 PROCEDURE — 3075F SYST BP GE 130 - 139MM HG: CPT | Mod: HCNC,CPTII,S$GLB, | Performed by: INTERNAL MEDICINE

## 2021-08-11 PROCEDURE — 1101F PR PT FALLS ASSESS DOC 0-1 FALLS W/OUT INJ PAST YR: ICD-10-PCS | Mod: HCNC,CPTII,S$GLB, | Performed by: INTERNAL MEDICINE

## 2021-08-11 PROCEDURE — 1159F PR MEDICATION LIST DOCUMENTED IN MEDICAL RECORD: ICD-10-PCS | Mod: HCNC,CPTII,S$GLB, | Performed by: INTERNAL MEDICINE

## 2021-08-11 PROCEDURE — 1126F AMNT PAIN NOTED NONE PRSNT: CPT | Mod: HCNC,CPTII,S$GLB, | Performed by: INTERNAL MEDICINE

## 2021-08-11 PROCEDURE — 1126F PR PAIN SEVERITY QUANTIFIED, NO PAIN PRESENT: ICD-10-PCS | Mod: HCNC,CPTII,S$GLB, | Performed by: INTERNAL MEDICINE

## 2021-08-11 PROCEDURE — 99999 PR PBB SHADOW E&M-EST. PATIENT-LVL IV: ICD-10-PCS | Mod: PBBFAC,HCNC,, | Performed by: INTERNAL MEDICINE

## 2021-08-11 PROCEDURE — 1101F PT FALLS ASSESS-DOCD LE1/YR: CPT | Mod: HCNC,CPTII,S$GLB, | Performed by: INTERNAL MEDICINE

## 2021-08-11 PROCEDURE — 1159F MED LIST DOCD IN RCRD: CPT | Mod: HCNC,CPTII,S$GLB, | Performed by: INTERNAL MEDICINE

## 2021-08-11 PROCEDURE — 3078F PR MOST RECENT DIASTOLIC BLOOD PRESSURE < 80 MM HG: ICD-10-PCS | Mod: HCNC,CPTII,S$GLB, | Performed by: INTERNAL MEDICINE

## 2021-08-11 PROCEDURE — 3075F PR MOST RECENT SYSTOLIC BLOOD PRESS GE 130-139MM HG: ICD-10-PCS | Mod: HCNC,CPTII,S$GLB, | Performed by: INTERNAL MEDICINE

## 2021-08-11 PROCEDURE — 3078F DIAST BP <80 MM HG: CPT | Mod: HCNC,CPTII,S$GLB, | Performed by: INTERNAL MEDICINE

## 2021-08-11 PROCEDURE — 99214 OFFICE O/P EST MOD 30 MIN: CPT | Mod: HCNC,S$GLB,, | Performed by: INTERNAL MEDICINE

## 2021-08-11 PROCEDURE — 3288F PR FALLS RISK ASSESSMENT DOCUMENTED: ICD-10-PCS | Mod: HCNC,CPTII,S$GLB, | Performed by: INTERNAL MEDICINE

## 2021-08-11 PROCEDURE — 3288F FALL RISK ASSESSMENT DOCD: CPT | Mod: HCNC,CPTII,S$GLB, | Performed by: INTERNAL MEDICINE

## 2021-08-11 RX ORDER — CEPHALEXIN 500 MG/1
500 CAPSULE ORAL 2 TIMES DAILY
COMMUNITY
End: 2021-09-21

## 2021-08-20 ENCOUNTER — TELEPHONE (OUTPATIENT)
Dept: CARDIOLOGY | Facility: CLINIC | Age: 81
End: 2021-08-20

## 2021-08-20 DIAGNOSIS — I10 ESSENTIAL HYPERTENSION: Primary | ICD-10-CM

## 2021-08-25 ENCOUNTER — PATIENT MESSAGE (OUTPATIENT)
Dept: CARDIOLOGY | Facility: CLINIC | Age: 81
End: 2021-08-25

## 2021-08-27 ENCOUNTER — TELEPHONE (OUTPATIENT)
Dept: CARDIOLOGY | Facility: CLINIC | Age: 81
End: 2021-08-27

## 2021-08-27 ENCOUNTER — OFFICE VISIT (OUTPATIENT)
Dept: DERMATOLOGY | Facility: CLINIC | Age: 81
End: 2021-08-27
Payer: MEDICARE

## 2021-08-27 ENCOUNTER — PATIENT MESSAGE (OUTPATIENT)
Dept: CARDIOLOGY | Facility: CLINIC | Age: 81
End: 2021-08-27

## 2021-08-27 DIAGNOSIS — L82.0 INFLAMED SEBORRHEIC KERATOSIS: ICD-10-CM

## 2021-08-27 DIAGNOSIS — D49.2 SKIN NEOPLASM: Primary | ICD-10-CM

## 2021-08-27 DIAGNOSIS — L98.8 EROSIVE PUSTULAR DERMATOSIS OF SCALP: ICD-10-CM

## 2021-08-27 PROCEDURE — 88341 IMHCHEM/IMCYTCHM EA ADD ANTB: CPT | Mod: 26,HCNC,, | Performed by: DERMATOLOGY

## 2021-08-27 PROCEDURE — 11102 TANGNTL BX SKIN SINGLE LES: CPT | Mod: 59,HCNC,S$GLB, | Performed by: DERMATOLOGY

## 2021-08-27 PROCEDURE — 99999 PR PBB SHADOW E&M-EST. PATIENT-LVL IV: ICD-10-PCS | Mod: PBBFAC,HCNC,, | Performed by: DERMATOLOGY

## 2021-08-27 PROCEDURE — 1159F PR MEDICATION LIST DOCUMENTED IN MEDICAL RECORD: ICD-10-PCS | Mod: HCNC,CPTII,S$GLB, | Performed by: DERMATOLOGY

## 2021-08-27 PROCEDURE — 88342 IMHCHEM/IMCYTCHM 1ST ANTB: CPT | Mod: HCNC | Performed by: DERMATOLOGY

## 2021-08-27 PROCEDURE — 99213 OFFICE O/P EST LOW 20 MIN: CPT | Mod: 25,HCNC,S$GLB, | Performed by: DERMATOLOGY

## 2021-08-27 PROCEDURE — 88341 PR IHC OR ICC EACH ADD'L SINGLE ANTIBODY  STAINPR: ICD-10-PCS | Mod: 26,HCNC,, | Performed by: DERMATOLOGY

## 2021-08-27 PROCEDURE — 1160F PR REVIEW ALL MEDS BY PRESCRIBER/CLIN PHARMACIST DOCUMENTED: ICD-10-PCS | Mod: HCNC,CPTII,S$GLB, | Performed by: DERMATOLOGY

## 2021-08-27 PROCEDURE — 99999 PR PBB SHADOW E&M-EST. PATIENT-LVL IV: CPT | Mod: PBBFAC,HCNC,, | Performed by: DERMATOLOGY

## 2021-08-27 PROCEDURE — 99213 PR OFFICE/OUTPT VISIT, EST, LEVL III, 20-29 MIN: ICD-10-PCS | Mod: 25,HCNC,S$GLB, | Performed by: DERMATOLOGY

## 2021-08-27 PROCEDURE — 11102 PR TANGENTIAL BIOPSY, SKIN, SINGLE LESION: ICD-10-PCS | Mod: 59,HCNC,S$GLB, | Performed by: DERMATOLOGY

## 2021-08-27 PROCEDURE — 88305 TISSUE EXAM BY PATHOLOGIST: CPT | Mod: HCNC | Performed by: DERMATOLOGY

## 2021-08-27 PROCEDURE — 17110 PR DESTRUCTION BENIGN LESIONS UP TO 14: ICD-10-PCS | Mod: HCNC,S$GLB,, | Performed by: DERMATOLOGY

## 2021-08-27 PROCEDURE — 88305 TISSUE EXAM BY PATHOLOGIST: ICD-10-PCS | Mod: 26,HCNC,, | Performed by: DERMATOLOGY

## 2021-08-27 PROCEDURE — 1159F MED LIST DOCD IN RCRD: CPT | Mod: HCNC,CPTII,S$GLB, | Performed by: DERMATOLOGY

## 2021-08-27 PROCEDURE — 88342 CHG IMMUNOCYTOCHEMISTRY: ICD-10-PCS | Mod: 26,HCNC,, | Performed by: DERMATOLOGY

## 2021-08-27 PROCEDURE — 1160F RVW MEDS BY RX/DR IN RCRD: CPT | Mod: HCNC,CPTII,S$GLB, | Performed by: DERMATOLOGY

## 2021-08-27 PROCEDURE — 88342 IMHCHEM/IMCYTCHM 1ST ANTB: CPT | Mod: 26,HCNC,, | Performed by: DERMATOLOGY

## 2021-08-27 PROCEDURE — 88305 TISSUE EXAM BY PATHOLOGIST: CPT | Mod: 26,HCNC,, | Performed by: DERMATOLOGY

## 2021-08-27 PROCEDURE — 1126F AMNT PAIN NOTED NONE PRSNT: CPT | Mod: HCNC,CPTII,S$GLB, | Performed by: DERMATOLOGY

## 2021-08-27 PROCEDURE — 1126F PR PAIN SEVERITY QUANTIFIED, NO PAIN PRESENT: ICD-10-PCS | Mod: HCNC,CPTII,S$GLB, | Performed by: DERMATOLOGY

## 2021-08-27 PROCEDURE — 1157F PR ADVANCE CARE PLAN OR EQUIV PRESENT IN MEDICAL RECORD: ICD-10-PCS | Mod: HCNC,CPTII,S$GLB, | Performed by: DERMATOLOGY

## 2021-08-27 PROCEDURE — 1157F ADVNC CARE PLAN IN RCRD: CPT | Mod: HCNC,CPTII,S$GLB, | Performed by: DERMATOLOGY

## 2021-08-27 PROCEDURE — 88341 IMHCHEM/IMCYTCHM EA ADD ANTB: CPT | Mod: HCNC | Performed by: DERMATOLOGY

## 2021-08-27 PROCEDURE — 17110 DESTRUCTION B9 LES UP TO 14: CPT | Mod: HCNC,S$GLB,, | Performed by: DERMATOLOGY

## 2021-09-01 ENCOUNTER — PATIENT MESSAGE (OUTPATIENT)
Dept: FAMILY MEDICINE | Facility: CLINIC | Age: 81
End: 2021-09-01

## 2021-09-08 ENCOUNTER — LAB VISIT (OUTPATIENT)
Dept: LAB | Facility: HOSPITAL | Age: 81
End: 2021-09-08
Attending: INTERNAL MEDICINE
Payer: MEDICARE

## 2021-09-08 DIAGNOSIS — Z79.01 CHRONIC ANTICOAGULATION: Chronic | ICD-10-CM

## 2021-09-08 LAB
INR PPP: 2.6 (ref 0.8–1.2)
PROTHROMBIN TIME: 26.6 SEC (ref 9–12.5)

## 2021-09-08 PROCEDURE — 85610 PROTHROMBIN TIME: CPT | Mod: HCNC | Performed by: INTERNAL MEDICINE

## 2021-09-08 PROCEDURE — 36415 COLL VENOUS BLD VENIPUNCTURE: CPT | Mod: HCNC,PO | Performed by: INTERNAL MEDICINE

## 2021-09-09 ENCOUNTER — ANTI-COAG VISIT (OUTPATIENT)
Dept: CARDIOLOGY | Facility: CLINIC | Age: 81
End: 2021-09-09
Payer: MEDICARE

## 2021-09-09 DIAGNOSIS — Z86.73 HISTORY OF CVA (CEREBROVASCULAR ACCIDENT): Chronic | ICD-10-CM

## 2021-09-09 DIAGNOSIS — Z79.01 LONG TERM (CURRENT) USE OF ANTICOAGULANTS: ICD-10-CM

## 2021-09-09 PROCEDURE — 93793 ANTICOAG MGMT PT WARFARIN: CPT | Mod: S$GLB,,,

## 2021-09-09 PROCEDURE — 93793 PR ANTICOAGULANT MGMT FOR PT TAKING WARFARIN: ICD-10-PCS | Mod: S$GLB,,,

## 2021-09-20 ENCOUNTER — PATIENT MESSAGE (OUTPATIENT)
Dept: FAMILY MEDICINE | Facility: CLINIC | Age: 81
End: 2021-09-20

## 2021-09-21 LAB
COMMENT: NORMAL
FINAL PATHOLOGIC DIAGNOSIS: NORMAL
GROSS: NORMAL
Lab: NORMAL
MICROSCOPIC EXAM: NORMAL

## 2021-09-22 ENCOUNTER — PATIENT MESSAGE (OUTPATIENT)
Dept: DERMATOLOGY | Facility: CLINIC | Age: 81
End: 2021-09-22

## 2021-09-22 ENCOUNTER — PATIENT MESSAGE (OUTPATIENT)
Dept: PULMONOLOGY | Facility: CLINIC | Age: 81
End: 2021-09-22

## 2021-09-22 ENCOUNTER — OFFICE VISIT (OUTPATIENT)
Dept: FAMILY MEDICINE | Facility: CLINIC | Age: 81
End: 2021-09-22
Payer: MEDICARE

## 2021-09-22 ENCOUNTER — PATIENT MESSAGE (OUTPATIENT)
Dept: CARDIOLOGY | Facility: CLINIC | Age: 81
End: 2021-09-22

## 2021-09-22 VITALS
HEART RATE: 65 BPM | RESPIRATION RATE: 18 BRPM | BODY MASS INDEX: 21.84 KG/M2 | SYSTOLIC BLOOD PRESSURE: 140 MMHG | TEMPERATURE: 98 F | WEIGHT: 161.25 LBS | HEIGHT: 72 IN | OXYGEN SATURATION: 99 % | DIASTOLIC BLOOD PRESSURE: 60 MMHG

## 2021-09-22 DIAGNOSIS — S41.111A LACERATION OF RIGHT UPPER EXTREMITY, INITIAL ENCOUNTER: ICD-10-CM
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
## 2021-09-22 DIAGNOSIS — R42 DIZZINESS: ICD-10-CM

## 2021-09-22 DIAGNOSIS — S01.81XA LACERATION OF FOREHEAD, INITIAL ENCOUNTER: ICD-10-CM

## 2021-09-22 DIAGNOSIS — S00.83XA CONTUSION OF FACE, INITIAL ENCOUNTER: ICD-10-CM

## 2021-09-22 PROCEDURE — 3078F PR MOST RECENT DIASTOLIC BLOOD PRESSURE < 80 MM HG: ICD-10-PCS | Mod: HCNC,CPTII,S$GLB, | Performed by: REGISTERED NURSE

## 2021-09-22 PROCEDURE — 3288F FALL RISK ASSESSMENT DOCD: CPT | Mod: HCNC,CPTII,S$GLB, | Performed by: REGISTERED NURSE

## 2021-09-22 PROCEDURE — 1126F AMNT PAIN NOTED NONE PRSNT: CPT | Mod: HCNC,CPTII,S$GLB, | Performed by: REGISTERED NURSE

## 2021-09-22 PROCEDURE — 3077F PR MOST RECENT SYSTOLIC BLOOD PRESSURE >= 140 MM HG: ICD-10-PCS | Mod: HCNC,CPTII,S$GLB, | Performed by: REGISTERED NURSE

## 2021-09-22 PROCEDURE — 1157F ADVNC CARE PLAN IN RCRD: CPT | Mod: HCNC,CPTII,S$GLB, | Performed by: REGISTERED NURSE

## 2021-09-22 PROCEDURE — 1101F PT FALLS ASSESS-DOCD LE1/YR: CPT | Mod: HCNC,CPTII,S$GLB, | Performed by: REGISTERED NURSE

## 2021-09-22 PROCEDURE — 1101F PR PT FALLS ASSESS DOC 0-1 FALLS W/OUT INJ PAST YR: ICD-10-PCS | Mod: HCNC,CPTII,S$GLB, | Performed by: REGISTERED NURSE

## 2021-09-22 PROCEDURE — 3078F DIAST BP <80 MM HG: CPT | Mod: HCNC,CPTII,S$GLB, | Performed by: REGISTERED NURSE

## 2021-09-22 PROCEDURE — 1126F PR PAIN SEVERITY QUANTIFIED, NO PAIN PRESENT: ICD-10-PCS | Mod: HCNC,CPTII,S$GLB, | Performed by: REGISTERED NURSE

## 2021-09-22 PROCEDURE — 99214 PR OFFICE/OUTPT VISIT, EST, LEVL IV, 30-39 MIN: ICD-10-PCS | Mod: HCNC,S$GLB,, | Performed by: REGISTERED NURSE

## 2021-09-22 PROCEDURE — 3288F PR FALLS RISK ASSESSMENT DOCUMENTED: ICD-10-PCS | Mod: HCNC,CPTII,S$GLB, | Performed by: REGISTERED NURSE

## 2021-09-22 PROCEDURE — 99999 PR PBB SHADOW E&M-EST. PATIENT-LVL IV: ICD-10-PCS | Mod: PBBFAC,HCNC,, | Performed by: REGISTERED NURSE

## 2021-09-22 PROCEDURE — 1157F PR ADVANCE CARE PLAN OR EQUIV PRESENT IN MEDICAL RECORD: ICD-10-PCS | Mod: HCNC,CPTII,S$GLB, | Performed by: REGISTERED NURSE

## 2021-09-22 PROCEDURE — 3077F SYST BP >= 140 MM HG: CPT | Mod: HCNC,CPTII,S$GLB, | Performed by: REGISTERED NURSE

## 2021-09-22 PROCEDURE — 99999 PR PBB SHADOW E&M-EST. PATIENT-LVL IV: CPT | Mod: PBBFAC,HCNC,, | Performed by: REGISTERED NURSE

## 2021-09-22 PROCEDURE — 99214 OFFICE O/P EST MOD 30 MIN: CPT | Mod: HCNC,S$GLB,, | Performed by: REGISTERED NURSE

## 2021-09-24 ENCOUNTER — TELEPHONE (OUTPATIENT)
Dept: DERMATOLOGY | Facility: CLINIC | Age: 81
End: 2021-09-24

## 2021-09-27 ENCOUNTER — PATIENT MESSAGE (OUTPATIENT)
Dept: CARDIOLOGY | Facility: CLINIC | Age: 81
End: 2021-09-27

## 2021-09-27 DIAGNOSIS — E78.2 MIXED HYPERLIPIDEMIA: ICD-10-CM

## 2021-09-27 DIAGNOSIS — M79.604 LEG PAIN, BILATERAL: ICD-10-CM

## 2021-09-27 DIAGNOSIS — R00.1 SINUS BRADYCARDIA: ICD-10-CM

## 2021-09-27 DIAGNOSIS — I70.0 ATHEROSCLEROSIS OF AORTA: ICD-10-CM

## 2021-09-27 DIAGNOSIS — I10 ESSENTIAL HYPERTENSION: Primary | ICD-10-CM

## 2021-09-27 DIAGNOSIS — I25.10 CORONARY ARTERY DISEASE INVOLVING NATIVE CORONARY ARTERY OF NATIVE HEART WITHOUT ANGINA PECTORIS: ICD-10-CM

## 2021-09-27 DIAGNOSIS — M79.605 LEG PAIN, BILATERAL: ICD-10-CM

## 2021-09-30 ENCOUNTER — PATIENT MESSAGE (OUTPATIENT)
Dept: PULMONOLOGY | Facility: CLINIC | Age: 81
End: 2021-09-30

## 2021-10-06 ENCOUNTER — PATIENT OUTREACH (OUTPATIENT)
Dept: ADMINISTRATIVE | Facility: OTHER | Age: 81
End: 2021-10-06

## 2021-10-06 ENCOUNTER — ANTI-COAG VISIT (OUTPATIENT)
Dept: CARDIOLOGY | Facility: CLINIC | Age: 81
End: 2021-10-06
Payer: MEDICARE

## 2021-10-06 ENCOUNTER — PATIENT MESSAGE (OUTPATIENT)
Dept: CARDIOLOGY | Facility: CLINIC | Age: 81
End: 2021-10-06

## 2021-10-06 ENCOUNTER — LAB VISIT (OUTPATIENT)
Dept: LAB | Facility: HOSPITAL | Age: 81
End: 2021-10-06
Attending: INTERNAL MEDICINE
Payer: MEDICARE

## 2021-10-06 ENCOUNTER — PATIENT MESSAGE (OUTPATIENT)
Dept: DERMATOLOGY | Facility: CLINIC | Age: 81
End: 2021-10-06

## 2021-10-06 DIAGNOSIS — Z79.01 CHRONIC ANTICOAGULATION: Chronic | ICD-10-CM

## 2021-10-06 DIAGNOSIS — Z79.01 LONG TERM (CURRENT) USE OF ANTICOAGULANTS: ICD-10-CM

## 2021-10-06 LAB
INR PPP: 2.9 (ref 0.8–1.2)
PROTHROMBIN TIME: 30.2 SEC (ref 9–12.5)

## 2021-10-06 PROCEDURE — 93793 ANTICOAG MGMT PT WARFARIN: CPT | Mod: S$GLB,,,

## 2021-10-06 PROCEDURE — 36415 COLL VENOUS BLD VENIPUNCTURE: CPT | Mod: HCNC,PO | Performed by: INTERNAL MEDICINE

## 2021-10-06 PROCEDURE — 93793 PR ANTICOAGULANT MGMT FOR PT TAKING WARFARIN: ICD-10-PCS | Mod: S$GLB,,,

## 2021-10-06 PROCEDURE — 85610 PROTHROMBIN TIME: CPT | Mod: HCNC | Performed by: INTERNAL MEDICINE

## 2021-10-07 ENCOUNTER — OFFICE VISIT (OUTPATIENT)
Dept: CARDIOLOGY | Facility: CLINIC | Age: 81
End: 2021-10-07
Payer: MEDICARE

## 2021-10-07 ENCOUNTER — HOSPITAL ENCOUNTER (OUTPATIENT)
Dept: CARDIOLOGY | Facility: HOSPITAL | Age: 81
Discharge: HOME | End: 2021-10-07
Payer: MEDICARE

## 2021-10-07 VITALS
OXYGEN SATURATION: 98 % | DIASTOLIC BLOOD PRESSURE: 58 MMHG | SYSTOLIC BLOOD PRESSURE: 130 MMHG | HEART RATE: 70 BPM | HEIGHT: 72 IN | WEIGHT: 177 LBS | BODY MASS INDEX: 23.98 KG/M2

## 2021-10-07 DIAGNOSIS — I70.0 ATHEROSCLEROSIS OF AORTA: Chronic | ICD-10-CM

## 2021-10-07 DIAGNOSIS — G47.33 OSA ON CPAP: Chronic | ICD-10-CM

## 2021-10-07 DIAGNOSIS — I25.10 CORONARY ARTERY DISEASE INVOLVING NATIVE CORONARY ARTERY OF NATIVE HEART WITHOUT ANGINA PECTORIS: ICD-10-CM

## 2021-10-07 DIAGNOSIS — R42 DIZZINESS: Primary | ICD-10-CM

## 2021-10-07 DIAGNOSIS — N18.31 STAGE 3A CHRONIC KIDNEY DISEASE: Chronic | ICD-10-CM

## 2021-10-07 DIAGNOSIS — E78.2 MIXED HYPERLIPIDEMIA: Chronic | ICD-10-CM

## 2021-10-07 DIAGNOSIS — R00.1 SINUS BRADYCARDIA: ICD-10-CM

## 2021-10-07 DIAGNOSIS — R42 DIZZINESS: ICD-10-CM

## 2021-10-07 DIAGNOSIS — Z86.73 HISTORY OF CVA (CEREBROVASCULAR ACCIDENT): Chronic | ICD-10-CM

## 2021-10-07 DIAGNOSIS — I65.21 STENOSIS OF RIGHT CAROTID ARTERY: ICD-10-CM

## 2021-10-07 DIAGNOSIS — I10 ESSENTIAL HYPERTENSION: Chronic | ICD-10-CM

## 2021-10-07 PROCEDURE — 99999 PR PBB SHADOW E&M-EST. PATIENT-LVL IV: CPT | Mod: PBBFAC,HCNC,, | Performed by: PHYSICIAN ASSISTANT

## 2021-10-07 PROCEDURE — 1157F ADVNC CARE PLAN IN RCRD: CPT | Mod: HCNC,CPTII,S$GLB, | Performed by: PHYSICIAN ASSISTANT

## 2021-10-07 PROCEDURE — 1159F MED LIST DOCD IN RCRD: CPT | Mod: HCNC,CPTII,S$GLB, | Performed by: PHYSICIAN ASSISTANT

## 2021-10-07 PROCEDURE — 93005 ELECTROCARDIOGRAM TRACING: CPT | Mod: HCNC

## 2021-10-07 PROCEDURE — 99214 OFFICE O/P EST MOD 30 MIN: CPT | Mod: HCNC,S$GLB,, | Performed by: PHYSICIAN ASSISTANT

## 2021-10-07 PROCEDURE — 93010 EKG 12-LEAD: ICD-10-PCS | Mod: HCNC,,, | Performed by: INTERNAL MEDICINE

## 2021-10-07 PROCEDURE — 1160F RVW MEDS BY RX/DR IN RCRD: CPT | Mod: HCNC,CPTII,S$GLB, | Performed by: PHYSICIAN ASSISTANT

## 2021-10-07 PROCEDURE — 1159F PR MEDICATION LIST DOCUMENTED IN MEDICAL RECORD: ICD-10-PCS | Mod: HCNC,CPTII,S$GLB, | Performed by: PHYSICIAN ASSISTANT

## 2021-10-07 PROCEDURE — 3075F SYST BP GE 130 - 139MM HG: CPT | Mod: HCNC,CPTII,S$GLB, | Performed by: PHYSICIAN ASSISTANT

## 2021-10-07 PROCEDURE — 93010 ELECTROCARDIOGRAM REPORT: CPT | Mod: HCNC,,, | Performed by: INTERNAL MEDICINE

## 2021-10-07 PROCEDURE — 1157F PR ADVANCE CARE PLAN OR EQUIV PRESENT IN MEDICAL RECORD: ICD-10-PCS | Mod: HCNC,CPTII,S$GLB, | Performed by: PHYSICIAN ASSISTANT

## 2021-10-07 PROCEDURE — 3078F PR MOST RECENT DIASTOLIC BLOOD PRESSURE < 80 MM HG: ICD-10-PCS | Mod: HCNC,CPTII,S$GLB, | Performed by: PHYSICIAN ASSISTANT

## 2021-10-07 PROCEDURE — 3075F PR MOST RECENT SYSTOLIC BLOOD PRESS GE 130-139MM HG: ICD-10-PCS | Mod: HCNC,CPTII,S$GLB, | Performed by: PHYSICIAN ASSISTANT

## 2021-10-07 PROCEDURE — 1160F PR REVIEW ALL MEDS BY PRESCRIBER/CLIN PHARMACIST DOCUMENTED: ICD-10-PCS | Mod: HCNC,CPTII,S$GLB, | Performed by: PHYSICIAN ASSISTANT

## 2021-10-07 PROCEDURE — 3078F DIAST BP <80 MM HG: CPT | Mod: HCNC,CPTII,S$GLB, | Performed by: PHYSICIAN ASSISTANT

## 2021-10-07 PROCEDURE — 99214 PR OFFICE/OUTPT VISIT, EST, LEVL IV, 30-39 MIN: ICD-10-PCS | Mod: HCNC,S$GLB,, | Performed by: PHYSICIAN ASSISTANT

## 2021-10-07 PROCEDURE — 99999 PR PBB SHADOW E&M-EST. PATIENT-LVL IV: ICD-10-PCS | Mod: PBBFAC,HCNC,, | Performed by: PHYSICIAN ASSISTANT

## 2021-10-08 ENCOUNTER — PATIENT MESSAGE (OUTPATIENT)
Dept: DERMATOLOGY | Facility: CLINIC | Age: 81
End: 2021-10-08

## 2021-10-11 ENCOUNTER — TELEPHONE (OUTPATIENT)
Dept: CARDIOLOGY | Facility: HOSPITAL | Age: 81
End: 2021-10-11

## 2021-10-12 ENCOUNTER — PROCEDURE VISIT (OUTPATIENT)
Dept: DERMATOLOGY | Facility: CLINIC | Age: 81
End: 2021-10-12
Payer: MEDICARE

## 2021-10-12 VITALS
DIASTOLIC BLOOD PRESSURE: 70 MMHG | HEART RATE: 63 BPM | SYSTOLIC BLOOD PRESSURE: 132 MMHG | WEIGHT: 177 LBS | BODY MASS INDEX: 23.98 KG/M2 | HEIGHT: 72 IN

## 2021-10-12 DIAGNOSIS — D48.5 ATYPICAL FIBROXANTHOMA OF SKIN OF SCALP: Primary | ICD-10-CM

## 2021-10-12 DIAGNOSIS — D49.2 SKIN NEOPLASM: ICD-10-CM

## 2021-10-12 PROCEDURE — 99213 PR OFFICE/OUTPT VISIT, EST, LEVL III, 20-29 MIN: ICD-10-PCS | Mod: 25,HCNC,S$GLB, | Performed by: DERMATOLOGY

## 2021-10-12 PROCEDURE — 17311 MOHS 1 STAGE H/N/HF/G: CPT | Mod: HCNC,S$GLB,, | Performed by: DERMATOLOGY

## 2021-10-12 PROCEDURE — 11102 TANGNTL BX SKIN SINGLE LES: CPT | Mod: 59,HCNC,S$GLB, | Performed by: DERMATOLOGY

## 2021-10-12 PROCEDURE — 17312: ICD-10-PCS | Mod: HCNC,S$GLB,, | Performed by: DERMATOLOGY

## 2021-10-12 PROCEDURE — 88305 TISSUE EXAM BY PATHOLOGIST: ICD-10-PCS | Mod: 26,HCNC,, | Performed by: PATHOLOGY

## 2021-10-12 PROCEDURE — 88305 TISSUE EXAM BY PATHOLOGIST: CPT | Mod: HCNC | Performed by: PATHOLOGY

## 2021-10-12 PROCEDURE — 99213 OFFICE O/P EST LOW 20 MIN: CPT | Mod: 25,HCNC,S$GLB, | Performed by: DERMATOLOGY

## 2021-10-12 PROCEDURE — 17311: ICD-10-PCS | Mod: HCNC,S$GLB,, | Performed by: DERMATOLOGY

## 2021-10-12 PROCEDURE — 17312 MOHS ADDL STAGE: CPT | Mod: HCNC,S$GLB,, | Performed by: DERMATOLOGY

## 2021-10-12 PROCEDURE — 11102 PR TANGENTIAL BIOPSY, SKIN, SINGLE LESION: ICD-10-PCS | Mod: 59,HCNC,S$GLB, | Performed by: DERMATOLOGY

## 2021-10-12 PROCEDURE — 88305 TISSUE EXAM BY PATHOLOGIST: CPT | Mod: 26,HCNC,, | Performed by: PATHOLOGY

## 2021-10-12 RX ORDER — DOXYCYCLINE 100 MG/1
100 CAPSULE ORAL 2 TIMES DAILY
Qty: 20 CAPSULE | Refills: 0 | Status: SHIPPED | OUTPATIENT
Start: 2021-10-12 | End: 2021-10-12 | Stop reason: SDUPTHER

## 2021-10-12 RX ORDER — DOXYCYCLINE 100 MG/1
100 CAPSULE ORAL 2 TIMES DAILY
Qty: 20 CAPSULE | Refills: 0 | Status: SHIPPED | OUTPATIENT
Start: 2021-10-12 | End: 2021-10-22

## 2021-10-14 ENCOUNTER — PATIENT MESSAGE (OUTPATIENT)
Dept: CARDIOLOGY | Facility: CLINIC | Age: 81
End: 2021-10-14
Payer: MEDICARE

## 2021-10-14 ENCOUNTER — PATIENT MESSAGE (OUTPATIENT)
Dept: FAMILY MEDICINE | Facility: CLINIC | Age: 81
End: 2021-10-14

## 2021-10-14 ENCOUNTER — PATIENT MESSAGE (OUTPATIENT)
Dept: FAMILY MEDICINE | Facility: CLINIC | Age: 81
End: 2021-10-14
Payer: MEDICARE

## 2021-10-14 DIAGNOSIS — R53.81 PHYSICAL DEBILITY: Primary | ICD-10-CM

## 2021-10-15 ENCOUNTER — TELEPHONE (OUTPATIENT)
Dept: FAMILY MEDICINE | Facility: CLINIC | Age: 81
End: 2021-10-15

## 2021-10-15 PROCEDURE — G0180 PR HOME HEALTH MD CERTIFICATION: ICD-10-PCS | Mod: ,,, | Performed by: INTERNAL MEDICINE

## 2021-10-15 PROCEDURE — G0180 MD CERTIFICATION HHA PATIENT: HCPCS | Mod: ,,, | Performed by: INTERNAL MEDICINE

## 2021-10-18 ENCOUNTER — PATIENT MESSAGE (OUTPATIENT)
Dept: CARDIOLOGY | Facility: CLINIC | Age: 81
End: 2021-10-18
Payer: MEDICARE

## 2021-10-18 LAB
FINAL PATHOLOGIC DIAGNOSIS: NORMAL
GROSS: NORMAL
Lab: NORMAL
MICROSCOPIC EXAM: NORMAL

## 2021-10-19 ENCOUNTER — PATIENT MESSAGE (OUTPATIENT)
Dept: DERMATOLOGY | Facility: CLINIC | Age: 81
End: 2021-10-19

## 2021-10-21 ENCOUNTER — PATIENT MESSAGE (OUTPATIENT)
Dept: DERMATOLOGY | Facility: CLINIC | Age: 81
End: 2021-10-21
Payer: MEDICARE

## 2021-10-21 ENCOUNTER — EXTERNAL HOME HEALTH (OUTPATIENT)
Dept: HOME HEALTH SERVICES | Facility: HOSPITAL | Age: 81
End: 2021-10-21
Payer: MEDICARE

## 2021-10-22 ENCOUNTER — EXTERNAL HOSPITAL ADMISSION (OUTPATIENT)
Dept: ADMINISTRATIVE | Facility: CLINIC | Age: 81
End: 2021-10-22

## 2021-10-22 ENCOUNTER — PATIENT OUTREACH (OUTPATIENT)
Dept: ADMINISTRATIVE | Facility: HOSPITAL | Age: 81
End: 2021-10-22

## 2021-10-25 ENCOUNTER — CLINICAL SUPPORT (OUTPATIENT)
Dept: DERMATOLOGY | Facility: CLINIC | Age: 81
End: 2021-10-25
Payer: MEDICARE

## 2021-10-25 ENCOUNTER — DOCUMENT SCAN (OUTPATIENT)
Dept: HOME HEALTH SERVICES | Facility: HOSPITAL | Age: 81
End: 2021-10-25
Payer: MEDICARE

## 2021-10-25 ENCOUNTER — TELEPHONE (OUTPATIENT)
Dept: DERMATOLOGY | Facility: CLINIC | Age: 81
End: 2021-10-25
Payer: MEDICARE

## 2021-10-25 PROCEDURE — 99999 PR PBB SHADOW E&M-EST. PATIENT-LVL I: CPT | Mod: PBBFAC,HCNC,,

## 2021-10-25 PROCEDURE — 99999 PR PBB SHADOW E&M-EST. PATIENT-LVL I: ICD-10-PCS | Mod: PBBFAC,HCNC,,

## 2021-10-29 ENCOUNTER — TELEPHONE (OUTPATIENT)
Dept: FAMILY MEDICINE | Facility: CLINIC | Age: 81
End: 2021-10-29
Payer: MEDICARE

## 2021-10-29 ENCOUNTER — PATIENT OUTREACH (OUTPATIENT)
Dept: ADMINISTRATIVE | Facility: HOSPITAL | Age: 81
End: 2021-10-29
Payer: MEDICARE

## 2021-11-01 ENCOUNTER — OFFICE VISIT (OUTPATIENT)
Dept: FAMILY MEDICINE | Facility: CLINIC | Age: 81
End: 2021-11-01
Payer: MEDICARE

## 2021-11-01 ENCOUNTER — TELEPHONE (OUTPATIENT)
Dept: FAMILY MEDICINE | Facility: CLINIC | Age: 81
End: 2021-11-01
Payer: MEDICARE

## 2021-11-01 ENCOUNTER — HOSPITAL ENCOUNTER (OUTPATIENT)
Dept: RADIOLOGY | Facility: HOSPITAL | Age: 81
Discharge: HOME | End: 2021-11-01
Attending: REGISTERED NURSE
Payer: MEDICARE

## 2021-11-01 VITALS
OXYGEN SATURATION: 100 % | BODY MASS INDEX: 26.01 KG/M2 | WEIGHT: 192 LBS | TEMPERATURE: 97 F | RESPIRATION RATE: 18 BRPM | HEIGHT: 72 IN | SYSTOLIC BLOOD PRESSURE: 138 MMHG | HEART RATE: 69 BPM | DIASTOLIC BLOOD PRESSURE: 74 MMHG

## 2021-11-01 DIAGNOSIS — M79.661 PAIN AND SWELLING OF RIGHT LOWER LEG: ICD-10-CM

## 2021-11-01 DIAGNOSIS — R29.6 FREQUENT FALLS: ICD-10-CM

## 2021-11-01 DIAGNOSIS — K64.9 HEMORRHOIDS, UNSPECIFIED HEMORRHOID TYPE: ICD-10-CM

## 2021-11-01 DIAGNOSIS — R79.1 SUPRATHERAPEUTIC INR: ICD-10-CM

## 2021-11-01 DIAGNOSIS — S80.11XA HEMATOMA OF RIGHT LOWER EXTREMITY, INITIAL ENCOUNTER: ICD-10-CM

## 2021-11-01 DIAGNOSIS — M79.89 PAIN AND SWELLING OF RIGHT LOWER LEG: ICD-10-CM

## 2021-11-01 DIAGNOSIS — Z09 HOSPITAL DISCHARGE FOLLOW-UP: Primary | ICD-10-CM

## 2021-11-01 DIAGNOSIS — D64.9 SYMPTOMATIC ANEMIA: ICD-10-CM

## 2021-11-01 PROCEDURE — 3075F PR MOST RECENT SYSTOLIC BLOOD PRESS GE 130-139MM HG: ICD-10-PCS | Mod: HCNC,CPTII,S$GLB, | Performed by: REGISTERED NURSE

## 2021-11-01 PROCEDURE — 3288F PR FALLS RISK ASSESSMENT DOCUMENTED: ICD-10-PCS | Mod: HCNC,CPTII,S$GLB, | Performed by: REGISTERED NURSE

## 2021-11-01 PROCEDURE — 99214 OFFICE O/P EST MOD 30 MIN: CPT | Mod: HCNC,S$GLB,, | Performed by: REGISTERED NURSE

## 2021-11-01 PROCEDURE — 93971 US LOWER EXTREMITY VEINS RIGHT: ICD-10-PCS | Mod: 26,HCNC,RT, | Performed by: RADIOLOGY

## 2021-11-01 PROCEDURE — 3078F PR MOST RECENT DIASTOLIC BLOOD PRESSURE < 80 MM HG: ICD-10-PCS | Mod: HCNC,CPTII,S$GLB, | Performed by: REGISTERED NURSE

## 2021-11-01 PROCEDURE — 3075F SYST BP GE 130 - 139MM HG: CPT | Mod: HCNC,CPTII,S$GLB, | Performed by: REGISTERED NURSE

## 2021-11-01 PROCEDURE — 93971 EXTREMITY STUDY: CPT | Mod: 26,HCNC,RT, | Performed by: RADIOLOGY

## 2021-11-01 PROCEDURE — 3288F FALL RISK ASSESSMENT DOCD: CPT | Mod: HCNC,CPTII,S$GLB, | Performed by: REGISTERED NURSE

## 2021-11-01 PROCEDURE — 99214 PR OFFICE/OUTPT VISIT, EST, LEVL IV, 30-39 MIN: ICD-10-PCS | Mod: HCNC,S$GLB,, | Performed by: REGISTERED NURSE

## 2021-11-01 PROCEDURE — 1100F PTFALLS ASSESS-DOCD GE2>/YR: CPT | Mod: HCNC,CPTII,S$GLB, | Performed by: REGISTERED NURSE

## 2021-11-01 PROCEDURE — 1125F PR PAIN SEVERITY QUANTIFIED, PAIN PRESENT: ICD-10-PCS | Mod: HCNC,CPTII,S$GLB, | Performed by: REGISTERED NURSE

## 2021-11-01 PROCEDURE — 1125F AMNT PAIN NOTED PAIN PRSNT: CPT | Mod: HCNC,CPTII,S$GLB, | Performed by: REGISTERED NURSE

## 2021-11-01 PROCEDURE — 93971 EXTREMITY STUDY: CPT | Mod: TC,HCNC,RT

## 2021-11-01 PROCEDURE — 99999 PR PBB SHADOW E&M-EST. PATIENT-LVL V: ICD-10-PCS | Mod: PBBFAC,HCNC,, | Performed by: REGISTERED NURSE

## 2021-11-01 PROCEDURE — 3078F DIAST BP <80 MM HG: CPT | Mod: HCNC,CPTII,S$GLB, | Performed by: REGISTERED NURSE

## 2021-11-01 PROCEDURE — 1100F PR PT FALLS ASSESS DOC 2+ FALLS/FALL W/INJURY/YR: ICD-10-PCS | Mod: HCNC,CPTII,S$GLB, | Performed by: REGISTERED NURSE

## 2021-11-01 PROCEDURE — 1157F ADVNC CARE PLAN IN RCRD: CPT | Mod: HCNC,CPTII,S$GLB, | Performed by: REGISTERED NURSE

## 2021-11-01 PROCEDURE — 99999 PR PBB SHADOW E&M-EST. PATIENT-LVL V: CPT | Mod: PBBFAC,HCNC,, | Performed by: REGISTERED NURSE

## 2021-11-01 PROCEDURE — 1157F PR ADVANCE CARE PLAN OR EQUIV PRESENT IN MEDICAL RECORD: ICD-10-PCS | Mod: HCNC,CPTII,S$GLB, | Performed by: REGISTERED NURSE

## 2021-11-01 RX ORDER — VALSARTAN 80 MG/1
1 TABLET ORAL DAILY
COMMUNITY
Start: 2021-10-22 | End: 2022-02-24 | Stop reason: SDUPTHER

## 2021-11-02 ENCOUNTER — DOCUMENT SCAN (OUTPATIENT)
Dept: HOME HEALTH SERVICES | Facility: HOSPITAL | Age: 81
End: 2021-11-02
Payer: MEDICARE

## 2021-11-04 ENCOUNTER — TELEPHONE (OUTPATIENT)
Dept: FAMILY MEDICINE | Facility: CLINIC | Age: 81
End: 2021-11-04
Payer: MEDICARE

## 2021-11-04 DIAGNOSIS — R53.81 PHYSICAL DEBILITY: Primary | ICD-10-CM

## 2021-11-04 RX ORDER — CALCIUM CARBONATE 160(400)MG
TABLET,CHEWABLE ORAL
Qty: 1 EACH | Refills: 0 | Status: SHIPPED | OUTPATIENT
Start: 2021-11-04 | End: 2021-12-13

## 2021-11-07 ENCOUNTER — PATIENT OUTREACH (OUTPATIENT)
Dept: ADMINISTRATIVE | Facility: OTHER | Age: 81
End: 2021-11-07
Payer: MEDICARE

## 2021-11-09 ENCOUNTER — HOSPITAL ENCOUNTER (OUTPATIENT)
Dept: CARDIOLOGY | Facility: HOSPITAL | Age: 81
Discharge: HOME | End: 2021-11-09
Attending: INTERNAL MEDICINE
Payer: MEDICARE

## 2021-11-09 ENCOUNTER — ANTI-COAG VISIT (OUTPATIENT)
Dept: CARDIOLOGY | Facility: CLINIC | Age: 81
End: 2021-11-09
Payer: MEDICARE

## 2021-11-09 ENCOUNTER — OFFICE VISIT (OUTPATIENT)
Dept: CARDIOLOGY | Facility: CLINIC | Age: 81
End: 2021-11-09
Payer: MEDICARE

## 2021-11-09 VITALS
HEART RATE: 62 BPM | HEIGHT: 72 IN | BODY MASS INDEX: 25.33 KG/M2 | DIASTOLIC BLOOD PRESSURE: 68 MMHG | OXYGEN SATURATION: 99 % | SYSTOLIC BLOOD PRESSURE: 128 MMHG | WEIGHT: 187 LBS

## 2021-11-09 DIAGNOSIS — R53.82 CHRONIC FATIGUE: ICD-10-CM

## 2021-11-09 DIAGNOSIS — I65.21 STENOSIS OF RIGHT CAROTID ARTERY: ICD-10-CM

## 2021-11-09 DIAGNOSIS — E78.2 MIXED HYPERLIPIDEMIA: Chronic | ICD-10-CM

## 2021-11-09 DIAGNOSIS — N18.31 STAGE 3A CHRONIC KIDNEY DISEASE: Chronic | ICD-10-CM

## 2021-11-09 DIAGNOSIS — I70.0 ATHEROSCLEROSIS OF AORTA: Chronic | ICD-10-CM

## 2021-11-09 DIAGNOSIS — I10 ESSENTIAL HYPERTENSION: Chronic | ICD-10-CM

## 2021-11-09 DIAGNOSIS — R00.1 SINUS BRADYCARDIA: ICD-10-CM

## 2021-11-09 DIAGNOSIS — R42 DIZZINESS: Primary | ICD-10-CM

## 2021-11-09 DIAGNOSIS — Z79.01 CHRONIC ANTICOAGULATION: Chronic | ICD-10-CM

## 2021-11-09 DIAGNOSIS — G47.33 OSA ON CPAP: Chronic | ICD-10-CM

## 2021-11-09 DIAGNOSIS — I25.10 CORONARY ARTERY DISEASE INVOLVING NATIVE CORONARY ARTERY OF NATIVE HEART WITHOUT ANGINA PECTORIS: ICD-10-CM

## 2021-11-09 DIAGNOSIS — Z86.73 HISTORY OF CVA (CEREBROVASCULAR ACCIDENT): Chronic | ICD-10-CM

## 2021-11-09 DIAGNOSIS — I10 ESSENTIAL HYPERTENSION: ICD-10-CM

## 2021-11-09 PROCEDURE — 1157F PR ADVANCE CARE PLAN OR EQUIV PRESENT IN MEDICAL RECORD: ICD-10-PCS | Mod: HCNC,CPTII,S$GLB, | Performed by: INTERNAL MEDICINE

## 2021-11-09 PROCEDURE — 99214 PR OFFICE/OUTPT VISIT, EST, LEVL IV, 30-39 MIN: ICD-10-PCS | Mod: HCNC,25,S$GLB, | Performed by: INTERNAL MEDICINE

## 2021-11-09 PROCEDURE — 3074F SYST BP LT 130 MM HG: CPT | Mod: HCNC,CPTII,S$GLB, | Performed by: INTERNAL MEDICINE

## 2021-11-09 PROCEDURE — 99999 PR PBB SHADOW E&M-EST. PATIENT-LVL III: CPT | Mod: PBBFAC,HCNC,, | Performed by: INTERNAL MEDICINE

## 2021-11-09 PROCEDURE — 3288F PR FALLS RISK ASSESSMENT DOCUMENTED: ICD-10-PCS | Mod: HCNC,CPTII,S$GLB, | Performed by: INTERNAL MEDICINE

## 2021-11-09 PROCEDURE — 1157F ADVNC CARE PLAN IN RCRD: CPT | Mod: HCNC,CPTII,S$GLB, | Performed by: INTERNAL MEDICINE

## 2021-11-09 PROCEDURE — 3078F DIAST BP <80 MM HG: CPT | Mod: HCNC,CPTII,S$GLB, | Performed by: INTERNAL MEDICINE

## 2021-11-09 PROCEDURE — 1126F AMNT PAIN NOTED NONE PRSNT: CPT | Mod: HCNC,CPTII,S$GLB, | Performed by: INTERNAL MEDICINE

## 2021-11-09 PROCEDURE — 3288F FALL RISK ASSESSMENT DOCD: CPT | Mod: HCNC,CPTII,S$GLB, | Performed by: INTERNAL MEDICINE

## 2021-11-09 PROCEDURE — 1160F RVW MEDS BY RX/DR IN RCRD: CPT | Mod: HCNC,CPTII,S$GLB, | Performed by: INTERNAL MEDICINE

## 2021-11-09 PROCEDURE — 93010 EKG 12-LEAD: ICD-10-PCS | Mod: HCNC,,, | Performed by: INTERNAL MEDICINE

## 2021-11-09 PROCEDURE — 93005 ELECTROCARDIOGRAM TRACING: CPT | Mod: HCNC

## 2021-11-09 PROCEDURE — 99214 OFFICE O/P EST MOD 30 MIN: CPT | Mod: HCNC,25,S$GLB, | Performed by: INTERNAL MEDICINE

## 2021-11-09 PROCEDURE — 1159F MED LIST DOCD IN RCRD: CPT | Mod: HCNC,CPTII,S$GLB, | Performed by: INTERNAL MEDICINE

## 2021-11-09 PROCEDURE — 1160F PR REVIEW ALL MEDS BY PRESCRIBER/CLIN PHARMACIST DOCUMENTED: ICD-10-PCS | Mod: HCNC,CPTII,S$GLB, | Performed by: INTERNAL MEDICINE

## 2021-11-09 PROCEDURE — 3074F PR MOST RECENT SYSTOLIC BLOOD PRESSURE < 130 MM HG: ICD-10-PCS | Mod: HCNC,CPTII,S$GLB, | Performed by: INTERNAL MEDICINE

## 2021-11-09 PROCEDURE — 1126F PR PAIN SEVERITY QUANTIFIED, NO PAIN PRESENT: ICD-10-PCS | Mod: HCNC,CPTII,S$GLB, | Performed by: INTERNAL MEDICINE

## 2021-11-09 PROCEDURE — 3078F PR MOST RECENT DIASTOLIC BLOOD PRESSURE < 80 MM HG: ICD-10-PCS | Mod: HCNC,CPTII,S$GLB, | Performed by: INTERNAL MEDICINE

## 2021-11-09 PROCEDURE — 1159F PR MEDICATION LIST DOCUMENTED IN MEDICAL RECORD: ICD-10-PCS | Mod: HCNC,CPTII,S$GLB, | Performed by: INTERNAL MEDICINE

## 2021-11-09 PROCEDURE — 1100F PTFALLS ASSESS-DOCD GE2>/YR: CPT | Mod: HCNC,CPTII,S$GLB, | Performed by: INTERNAL MEDICINE

## 2021-11-09 PROCEDURE — 1100F PR PT FALLS ASSESS DOC 2+ FALLS/FALL W/INJURY/YR: ICD-10-PCS | Mod: HCNC,CPTII,S$GLB, | Performed by: INTERNAL MEDICINE

## 2021-11-09 PROCEDURE — 99999 PR PBB SHADOW E&M-EST. PATIENT-LVL III: ICD-10-PCS | Mod: PBBFAC,HCNC,, | Performed by: INTERNAL MEDICINE

## 2021-11-09 PROCEDURE — 93010 ELECTROCARDIOGRAM REPORT: CPT | Mod: HCNC,,, | Performed by: INTERNAL MEDICINE

## 2021-11-12 ENCOUNTER — PATIENT MESSAGE (OUTPATIENT)
Dept: FAMILY MEDICINE | Facility: CLINIC | Age: 81
End: 2021-11-12
Payer: MEDICARE

## 2021-11-15 ENCOUNTER — PATIENT MESSAGE (OUTPATIENT)
Dept: FAMILY MEDICINE | Facility: CLINIC | Age: 81
End: 2021-11-15
Payer: MEDICARE

## 2021-11-16 ENCOUNTER — TELEPHONE (OUTPATIENT)
Dept: CARDIOLOGY | Facility: CLINIC | Age: 81
End: 2021-11-16
Payer: MEDICARE

## 2021-11-18 ENCOUNTER — TELEPHONE (OUTPATIENT)
Dept: FAMILY MEDICINE | Facility: CLINIC | Age: 81
End: 2021-11-18
Payer: MEDICARE

## 2021-11-18 DIAGNOSIS — R53.81 PHYSICAL DEBILITY: Primary | ICD-10-CM

## 2021-11-19 ENCOUNTER — TELEPHONE (OUTPATIENT)
Dept: INTERNAL MEDICINE | Facility: CLINIC | Age: 81
End: 2021-11-19
Payer: MEDICARE

## 2021-11-19 ENCOUNTER — TELEPHONE (OUTPATIENT)
Dept: FAMILY MEDICINE | Facility: CLINIC | Age: 81
End: 2021-11-19
Payer: MEDICARE

## 2021-11-19 ENCOUNTER — LAB VISIT (OUTPATIENT)
Dept: LAB | Facility: HOSPITAL | Age: 81
End: 2021-11-19
Attending: INTERNAL MEDICINE
Payer: MEDICARE

## 2021-11-19 DIAGNOSIS — N18.30 CHRONIC KIDNEY DISEASE, STAGE III (MODERATE): ICD-10-CM

## 2021-11-19 DIAGNOSIS — N39.0 URINARY TRACT INFECTION, SITE NOT SPECIFIED: Primary | ICD-10-CM

## 2021-11-19 LAB
BILIRUB UR QL STRIP: NEGATIVE
CLARITY UR REFRACT.AUTO: CLEAR
COLOR UR AUTO: YELLOW
GLUCOSE UR QL STRIP: NEGATIVE
HGB UR QL STRIP: NEGATIVE
KETONES UR QL STRIP: NEGATIVE
LEUKOCYTE ESTERASE UR QL STRIP: NEGATIVE
NITRITE UR QL STRIP: NEGATIVE
PH UR STRIP: 7 [PH] (ref 5–8)
PROT UR QL STRIP: NEGATIVE
SP GR UR STRIP: 1.02 (ref 1–1.03)
URN SPEC COLLECT METH UR: NORMAL

## 2021-11-19 PROCEDURE — 87086 URINE CULTURE/COLONY COUNT: CPT | Mod: HCNC | Performed by: INTERNAL MEDICINE

## 2021-11-19 PROCEDURE — 81003 URINALYSIS AUTO W/O SCOPE: CPT | Mod: HCNC | Performed by: INTERNAL MEDICINE

## 2021-11-21 LAB — BACTERIA UR CULT: NORMAL

## 2021-11-22 ENCOUNTER — TELEPHONE (OUTPATIENT)
Dept: FAMILY MEDICINE | Facility: CLINIC | Age: 81
End: 2021-11-22
Payer: MEDICARE

## 2021-11-23 ENCOUNTER — DOCUMENT SCAN (OUTPATIENT)
Dept: HOME HEALTH SERVICES | Facility: HOSPITAL | Age: 81
End: 2021-11-23
Payer: MEDICARE

## 2021-11-24 ENCOUNTER — DOCUMENT SCAN (OUTPATIENT)
Dept: HOME HEALTH SERVICES | Facility: HOSPITAL | Age: 81
End: 2021-11-24
Payer: MEDICARE

## 2021-11-26 ENCOUNTER — TELEPHONE (OUTPATIENT)
Dept: FAMILY MEDICINE | Facility: CLINIC | Age: 81
End: 2021-11-26
Payer: MEDICARE

## 2021-11-29 ENCOUNTER — TELEPHONE (OUTPATIENT)
Dept: FAMILY MEDICINE | Facility: CLINIC | Age: 81
End: 2021-11-29

## 2021-11-29 ENCOUNTER — OFFICE VISIT (OUTPATIENT)
Dept: SURGERY | Facility: CLINIC | Age: 81
End: 2021-11-29
Payer: MEDICARE

## 2021-11-29 ENCOUNTER — DOCUMENT SCAN (OUTPATIENT)
Dept: HOME HEALTH SERVICES | Facility: HOSPITAL | Age: 81
End: 2021-11-29
Payer: MEDICARE

## 2021-11-29 ENCOUNTER — OFFICE VISIT (OUTPATIENT)
Dept: FAMILY MEDICINE | Facility: CLINIC | Age: 81
End: 2021-11-29
Payer: MEDICARE

## 2021-11-29 VITALS
DIASTOLIC BLOOD PRESSURE: 60 MMHG | OXYGEN SATURATION: 96 % | HEART RATE: 74 BPM | SYSTOLIC BLOOD PRESSURE: 150 MMHG | WEIGHT: 170.75 LBS | BODY MASS INDEX: 23.13 KG/M2 | TEMPERATURE: 97 F | HEIGHT: 72 IN | RESPIRATION RATE: 18 BRPM

## 2021-11-29 VITALS
WEIGHT: 171.06 LBS | SYSTOLIC BLOOD PRESSURE: 147 MMHG | TEMPERATURE: 98 F | BODY MASS INDEX: 23.2 KG/M2 | DIASTOLIC BLOOD PRESSURE: 77 MMHG | HEART RATE: 68 BPM

## 2021-11-29 DIAGNOSIS — R53.81 PHYSICAL DEBILITY: Primary | ICD-10-CM

## 2021-11-29 DIAGNOSIS — R29.6 FREQUENT FALLS: ICD-10-CM

## 2021-11-29 DIAGNOSIS — S80.11XA HEMATOMA OF RIGHT LOWER EXTREMITY, INITIAL ENCOUNTER: ICD-10-CM

## 2021-11-29 DIAGNOSIS — I10 HYPERTENSION, UNCONTROLLED: Primary | ICD-10-CM

## 2021-11-29 PROCEDURE — 1157F PR ADVANCE CARE PLAN OR EQUIV PRESENT IN MEDICAL RECORD: ICD-10-PCS | Mod: HCNC,CPTII,S$GLB, | Performed by: REGISTERED NURSE

## 2021-11-29 PROCEDURE — 99999 PR PBB SHADOW E&M-EST. PATIENT-LVL V: CPT | Mod: PBBFAC,HCNC,, | Performed by: REGISTERED NURSE

## 2021-11-29 PROCEDURE — 1157F PR ADVANCE CARE PLAN OR EQUIV PRESENT IN MEDICAL RECORD: ICD-10-PCS | Mod: HCNC,CPTII,S$GLB, | Performed by: SURGERY

## 2021-11-29 PROCEDURE — 1157F ADVNC CARE PLAN IN RCRD: CPT | Mod: HCNC,CPTII,S$GLB, | Performed by: REGISTERED NURSE

## 2021-11-29 PROCEDURE — 99203 OFFICE O/P NEW LOW 30 MIN: CPT | Mod: HCNC,S$GLB,, | Performed by: SURGERY

## 2021-11-29 PROCEDURE — 1157F ADVNC CARE PLAN IN RCRD: CPT | Mod: HCNC,CPTII,S$GLB, | Performed by: SURGERY

## 2021-11-29 PROCEDURE — 99203 PR OFFICE/OUTPT VISIT, NEW, LEVL III, 30-44 MIN: ICD-10-PCS | Mod: HCNC,S$GLB,, | Performed by: SURGERY

## 2021-11-29 PROCEDURE — 99214 PR OFFICE/OUTPT VISIT, EST, LEVL IV, 30-39 MIN: ICD-10-PCS | Mod: HCNC,S$GLB,, | Performed by: REGISTERED NURSE

## 2021-11-29 PROCEDURE — 99214 OFFICE O/P EST MOD 30 MIN: CPT | Mod: HCNC,S$GLB,, | Performed by: REGISTERED NURSE

## 2021-11-29 PROCEDURE — 99999 PR PBB SHADOW E&M-EST. PATIENT-LVL IV: ICD-10-PCS | Mod: PBBFAC,HCNC,, | Performed by: SURGERY

## 2021-11-29 PROCEDURE — 99999 PR PBB SHADOW E&M-EST. PATIENT-LVL IV: CPT | Mod: PBBFAC,HCNC,, | Performed by: SURGERY

## 2021-11-29 PROCEDURE — 99999 PR PBB SHADOW E&M-EST. PATIENT-LVL V: ICD-10-PCS | Mod: PBBFAC,HCNC,, | Performed by: REGISTERED NURSE

## 2021-11-29 RX ORDER — MECLIZINE HYDROCHLORIDE 50 MG/1
25 TABLET ORAL 3 TIMES DAILY PRN
COMMUNITY
End: 2021-12-13

## 2021-11-29 RX ORDER — HYDROCHLOROTHIAZIDE 12.5 MG/1
12.5 CAPSULE ORAL DAILY
COMMUNITY
End: 2022-02-07 | Stop reason: SDUPTHER

## 2021-11-29 RX ORDER — FLUVASTATIN SODIUM 80 MG/1
80 TABLET, FILM COATED, EXTENDED RELEASE ORAL NIGHTLY
COMMUNITY
End: 2022-03-08 | Stop reason: SDUPTHER

## 2021-12-01 ENCOUNTER — LAB VISIT (OUTPATIENT)
Dept: LAB | Facility: HOSPITAL | Age: 81
End: 2021-12-01
Attending: INTERNAL MEDICINE
Payer: MEDICARE

## 2021-12-01 DIAGNOSIS — E03.9 ACQUIRED HYPOTHYROIDISM: ICD-10-CM

## 2021-12-01 DIAGNOSIS — E78.2 MIXED HYPERLIPIDEMIA: ICD-10-CM

## 2021-12-01 DIAGNOSIS — Z29.9 PREVENTIVE MEASURE: ICD-10-CM

## 2021-12-01 LAB
ALBUMIN SERPL BCP-MCNC: 3.8 G/DL (ref 3.5–5.2)
ALP SERPL-CCNC: 63 U/L (ref 55–135)
ALT SERPL W/O P-5'-P-CCNC: 23 U/L (ref 10–44)
ANION GAP SERPL CALC-SCNC: 12 MMOL/L (ref 8–16)
AST SERPL-CCNC: 33 U/L (ref 10–40)
BASOPHILS # BLD AUTO: 0.04 K/UL (ref 0–0.2)
BASOPHILS NFR BLD: 0.8 % (ref 0–1.9)
BILIRUB SERPL-MCNC: 0.8 MG/DL (ref 0.1–1)
BUN SERPL-MCNC: 12 MG/DL (ref 8–23)
CALCIUM SERPL-MCNC: 10 MG/DL (ref 8.7–10.5)
CHLORIDE SERPL-SCNC: 97 MMOL/L (ref 95–110)
CHOLEST SERPL-MCNC: 165 MG/DL (ref 120–199)
CHOLEST/HDLC SERPL: 4.1 {RATIO} (ref 2–5)
CO2 SERPL-SCNC: 26 MMOL/L (ref 23–29)
CREAT SERPL-MCNC: 1 MG/DL (ref 0.5–1.4)
DIFFERENTIAL METHOD: ABNORMAL
EOSINOPHIL # BLD AUTO: 0.3 K/UL (ref 0–0.5)
EOSINOPHIL NFR BLD: 5.9 % (ref 0–8)
ERYTHROCYTE [DISTWIDTH] IN BLOOD BY AUTOMATED COUNT: 14.2 % (ref 11.5–14.5)
EST. GFR  (AFRICAN AMERICAN): >60 ML/MIN/1.73 M^2
EST. GFR  (NON AFRICAN AMERICAN): >60 ML/MIN/1.73 M^2
GLUCOSE SERPL-MCNC: 94 MG/DL (ref 70–110)
HCT VFR BLD AUTO: 40.8 % (ref 40–54)
HDLC SERPL-MCNC: 40 MG/DL (ref 40–75)
HDLC SERPL: 24.2 % (ref 20–50)
HGB BLD-MCNC: 13.3 G/DL (ref 14–18)
IMM GRANULOCYTES # BLD AUTO: 0.01 K/UL (ref 0–0.04)
IMM GRANULOCYTES NFR BLD AUTO: 0.2 % (ref 0–0.5)
LDLC SERPL CALC-MCNC: 83.2 MG/DL (ref 63–159)
LYMPHOCYTES # BLD AUTO: 1.6 K/UL (ref 1–4.8)
LYMPHOCYTES NFR BLD: 31.5 % (ref 18–48)
MCH RBC QN AUTO: 29.9 PG (ref 27–31)
MCHC RBC AUTO-ENTMCNC: 32.6 G/DL (ref 32–36)
MCV RBC AUTO: 92 FL (ref 82–98)
MONOCYTES # BLD AUTO: 0.4 K/UL (ref 0.3–1)
MONOCYTES NFR BLD: 6.8 % (ref 4–15)
NEUTROPHILS # BLD AUTO: 2.8 K/UL (ref 1.8–7.7)
NEUTROPHILS NFR BLD: 54.8 % (ref 38–73)
NONHDLC SERPL-MCNC: 125 MG/DL
NRBC BLD-RTO: 0 /100 WBC
PLATELET # BLD AUTO: 211 K/UL (ref 150–450)
PMV BLD AUTO: 10.1 FL (ref 9.2–12.9)
POTASSIUM SERPL-SCNC: 4.8 MMOL/L (ref 3.5–5.1)
PROT SERPL-MCNC: 7.2 G/DL (ref 6–8.4)
RBC # BLD AUTO: 4.45 M/UL (ref 4.6–6.2)
SODIUM SERPL-SCNC: 135 MMOL/L (ref 136–145)
TRIGL SERPL-MCNC: 209 MG/DL (ref 30–150)
TSH SERPL DL<=0.005 MIU/L-ACNC: 0.86 UIU/ML (ref 0.4–4)
WBC # BLD AUTO: 5.11 K/UL (ref 3.9–12.7)

## 2021-12-01 PROCEDURE — 80061 LIPID PANEL: CPT | Mod: HCNC | Performed by: INTERNAL MEDICINE

## 2021-12-01 PROCEDURE — 84443 ASSAY THYROID STIM HORMONE: CPT | Mod: HCNC | Performed by: INTERNAL MEDICINE

## 2021-12-01 PROCEDURE — 80053 COMPREHEN METABOLIC PANEL: CPT | Mod: HCNC | Performed by: INTERNAL MEDICINE

## 2021-12-01 PROCEDURE — 36415 COLL VENOUS BLD VENIPUNCTURE: CPT | Mod: HCNC,PO | Performed by: INTERNAL MEDICINE

## 2021-12-01 PROCEDURE — 85025 COMPLETE CBC W/AUTO DIFF WBC: CPT | Mod: HCNC | Performed by: INTERNAL MEDICINE

## 2021-12-02 ENCOUNTER — PES CALL (OUTPATIENT)
Dept: ADMINISTRATIVE | Facility: CLINIC | Age: 81
End: 2021-12-02
Payer: MEDICARE

## 2021-12-07 ENCOUNTER — OFFICE VISIT (OUTPATIENT)
Dept: CARDIOLOGY | Facility: CLINIC | Age: 81
End: 2021-12-07
Payer: MEDICARE

## 2021-12-07 VITALS
DIASTOLIC BLOOD PRESSURE: 68 MMHG | WEIGHT: 170.44 LBS | OXYGEN SATURATION: 98 % | HEART RATE: 64 BPM | BODY MASS INDEX: 23.11 KG/M2 | SYSTOLIC BLOOD PRESSURE: 116 MMHG

## 2021-12-07 DIAGNOSIS — I70.0 ATHEROSCLEROSIS OF AORTA: ICD-10-CM

## 2021-12-07 DIAGNOSIS — E78.2 MIXED HYPERLIPIDEMIA: ICD-10-CM

## 2021-12-07 DIAGNOSIS — I65.21 STENOSIS OF RIGHT CAROTID ARTERY: ICD-10-CM

## 2021-12-07 DIAGNOSIS — N18.31 STAGE 3A CHRONIC KIDNEY DISEASE: ICD-10-CM

## 2021-12-07 DIAGNOSIS — Z86.73 HISTORY OF CVA (CEREBROVASCULAR ACCIDENT): ICD-10-CM

## 2021-12-07 DIAGNOSIS — I10 ESSENTIAL HYPERTENSION: Primary | ICD-10-CM

## 2021-12-07 DIAGNOSIS — I10 HYPERTENSION, UNCONTROLLED: ICD-10-CM

## 2021-12-07 DIAGNOSIS — R53.82 CHRONIC FATIGUE: ICD-10-CM

## 2021-12-07 DIAGNOSIS — Z79.01 CHRONIC ANTICOAGULATION: ICD-10-CM

## 2021-12-07 DIAGNOSIS — R00.1 SINUS BRADYCARDIA: ICD-10-CM

## 2021-12-07 PROCEDURE — 1157F PR ADVANCE CARE PLAN OR EQUIV PRESENT IN MEDICAL RECORD: ICD-10-PCS | Mod: HCNC,CPTII,S$GLB, | Performed by: INTERNAL MEDICINE

## 2021-12-07 PROCEDURE — 99214 PR OFFICE/OUTPT VISIT, EST, LEVL IV, 30-39 MIN: ICD-10-PCS | Mod: HCNC,S$GLB,, | Performed by: INTERNAL MEDICINE

## 2021-12-07 PROCEDURE — 99999 PR PBB SHADOW E&M-EST. PATIENT-LVL III: CPT | Mod: PBBFAC,HCNC,, | Performed by: INTERNAL MEDICINE

## 2021-12-07 PROCEDURE — 1157F ADVNC CARE PLAN IN RCRD: CPT | Mod: HCNC,CPTII,S$GLB, | Performed by: INTERNAL MEDICINE

## 2021-12-07 PROCEDURE — 99214 OFFICE O/P EST MOD 30 MIN: CPT | Mod: HCNC,S$GLB,, | Performed by: INTERNAL MEDICINE

## 2021-12-07 PROCEDURE — 99999 PR PBB SHADOW E&M-EST. PATIENT-LVL III: ICD-10-PCS | Mod: PBBFAC,HCNC,, | Performed by: INTERNAL MEDICINE

## 2021-12-07 RX ORDER — HYDROCHLOROTHIAZIDE 12.5 MG/1
12.5 TABLET ORAL DAILY
COMMUNITY
Start: 2021-11-22 | End: 2021-12-13 | Stop reason: SDUPTHER

## 2021-12-13 ENCOUNTER — OFFICE VISIT (OUTPATIENT)
Dept: HOME HEALTH SERVICES | Facility: CLINIC | Age: 81
End: 2021-12-13
Payer: MEDICARE

## 2021-12-13 VITALS
TEMPERATURE: 97 F | HEIGHT: 72 IN | BODY MASS INDEX: 23.03 KG/M2 | WEIGHT: 170 LBS | HEART RATE: 58 BPM | DIASTOLIC BLOOD PRESSURE: 75 MMHG | OXYGEN SATURATION: 99 % | SYSTOLIC BLOOD PRESSURE: 140 MMHG

## 2021-12-13 DIAGNOSIS — G47.33 OSA ON CPAP: Chronic | ICD-10-CM

## 2021-12-13 DIAGNOSIS — D64.9 ANEMIA, UNSPECIFIED TYPE: Chronic | ICD-10-CM

## 2021-12-13 DIAGNOSIS — I70.0 ATHEROSCLEROSIS OF AORTA: Chronic | ICD-10-CM

## 2021-12-13 DIAGNOSIS — I65.21 STENOSIS OF RIGHT CAROTID ARTERY: ICD-10-CM

## 2021-12-13 DIAGNOSIS — R00.1 SINUS BRADYCARDIA: ICD-10-CM

## 2021-12-13 DIAGNOSIS — Z86.73 HISTORY OF CVA (CEREBROVASCULAR ACCIDENT): Chronic | ICD-10-CM

## 2021-12-13 DIAGNOSIS — R42 DIZZINESS: ICD-10-CM

## 2021-12-13 DIAGNOSIS — E78.2 MIXED HYPERLIPIDEMIA: Chronic | ICD-10-CM

## 2021-12-13 DIAGNOSIS — K21.9 GASTROESOPHAGEAL REFLUX DISEASE WITHOUT ESOPHAGITIS: Chronic | ICD-10-CM

## 2021-12-13 DIAGNOSIS — Z74.09 OTHER REDUCED MOBILITY: ICD-10-CM

## 2021-12-13 DIAGNOSIS — E03.9 ACQUIRED HYPOTHYROIDISM: ICD-10-CM

## 2021-12-13 DIAGNOSIS — F33.40 RECURRENT MAJOR DEPRESSIVE DISORDER, IN REMISSION: ICD-10-CM

## 2021-12-13 DIAGNOSIS — M81.0 AGE-RELATED OSTEOPOROSIS WITHOUT CURRENT PATHOLOGICAL FRACTURE: ICD-10-CM

## 2021-12-13 DIAGNOSIS — Z00.00 ENCOUNTER FOR PREVENTIVE HEALTH EXAMINATION: Primary | ICD-10-CM

## 2021-12-13 DIAGNOSIS — N18.31 STAGE 3A CHRONIC KIDNEY DISEASE: Chronic | ICD-10-CM

## 2021-12-13 DIAGNOSIS — I10 ESSENTIAL HYPERTENSION: Chronic | ICD-10-CM

## 2021-12-13 DIAGNOSIS — J30.1 SEASONAL ALLERGIC RHINITIS DUE TO POLLEN: ICD-10-CM

## 2021-12-13 DIAGNOSIS — I25.10 CORONARY ARTERY DISEASE INVOLVING NATIVE CORONARY ARTERY OF NATIVE HEART WITHOUT ANGINA PECTORIS: ICD-10-CM

## 2021-12-13 DIAGNOSIS — N40.0 BENIGN PROSTATIC HYPERPLASIA WITHOUT LOWER URINARY TRACT SYMPTOMS: Chronic | ICD-10-CM

## 2021-12-13 PROCEDURE — G0439 PPPS, SUBSEQ VISIT: HCPCS | Mod: S$GLB,,, | Performed by: NURSE PRACTITIONER

## 2021-12-13 PROCEDURE — 1157F ADVNC CARE PLAN IN RCRD: CPT | Mod: CPTII,S$GLB,, | Performed by: NURSE PRACTITIONER

## 2021-12-13 PROCEDURE — 1157F PR ADVANCE CARE PLAN OR EQUIV PRESENT IN MEDICAL RECORD: ICD-10-PCS | Mod: CPTII,S$GLB,, | Performed by: NURSE PRACTITIONER

## 2021-12-13 PROCEDURE — G0439 PR MEDICARE ANNUAL WELLNESS SUBSEQUENT VISIT: ICD-10-PCS | Mod: S$GLB,,, | Performed by: NURSE PRACTITIONER

## 2021-12-13 RX ORDER — DIAPER,BRIEF,INFANT-TODD,DISP
1 EACH MISCELLANEOUS 2 TIMES DAILY
COMMUNITY
End: 2022-05-02

## 2021-12-13 RX ORDER — LORATADINE 10 MG/1
10 TABLET ORAL DAILY
COMMUNITY
End: 2022-05-06 | Stop reason: SDUPTHER

## 2021-12-13 RX ORDER — ACETAMINOPHEN 500 MG
500 TABLET ORAL 2 TIMES DAILY PRN
COMMUNITY

## 2021-12-14 ENCOUNTER — PATIENT MESSAGE (OUTPATIENT)
Dept: ADMINISTRATIVE | Facility: OTHER | Age: 81
End: 2021-12-14
Payer: MEDICARE

## 2021-12-14 PROCEDURE — G0179 MD RECERTIFICATION HHA PT: HCPCS | Mod: ,,, | Performed by: INTERNAL MEDICINE

## 2021-12-14 PROCEDURE — G0179 PR HOME HEALTH MD RECERTIFICATION: ICD-10-PCS | Mod: ,,, | Performed by: INTERNAL MEDICINE

## 2021-12-15 ENCOUNTER — PATIENT MESSAGE (OUTPATIENT)
Dept: FAMILY MEDICINE | Facility: CLINIC | Age: 81
End: 2021-12-15

## 2021-12-15 ENCOUNTER — OFFICE VISIT (OUTPATIENT)
Dept: FAMILY MEDICINE | Facility: CLINIC | Age: 81
End: 2021-12-15
Payer: MEDICARE

## 2021-12-15 ENCOUNTER — HOSPITAL ENCOUNTER (OUTPATIENT)
Dept: RADIOLOGY | Facility: HOSPITAL | Age: 81
Discharge: HOME | End: 2021-12-15
Attending: INTERNAL MEDICINE
Payer: MEDICARE

## 2021-12-15 VITALS
SYSTOLIC BLOOD PRESSURE: 124 MMHG | BODY MASS INDEX: 23.29 KG/M2 | OXYGEN SATURATION: 99 % | TEMPERATURE: 98 F | DIASTOLIC BLOOD PRESSURE: 68 MMHG | HEART RATE: 65 BPM | RESPIRATION RATE: 16 BRPM | WEIGHT: 171.75 LBS

## 2021-12-15 DIAGNOSIS — R05.9 COUGH: ICD-10-CM

## 2021-12-15 DIAGNOSIS — I10 ESSENTIAL HYPERTENSION: Chronic | ICD-10-CM

## 2021-12-15 DIAGNOSIS — I25.10 CORONARY ARTERY DISEASE INVOLVING NATIVE CORONARY ARTERY OF NATIVE HEART WITHOUT ANGINA PECTORIS: ICD-10-CM

## 2021-12-15 DIAGNOSIS — Z79.01 CHRONIC ANTICOAGULATION: Chronic | ICD-10-CM

## 2021-12-15 DIAGNOSIS — G47.33 OSA ON CPAP: Chronic | ICD-10-CM

## 2021-12-15 DIAGNOSIS — M81.0 AGE-RELATED OSTEOPOROSIS WITHOUT CURRENT PATHOLOGICAL FRACTURE: ICD-10-CM

## 2021-12-15 DIAGNOSIS — E78.2 MIXED HYPERLIPIDEMIA: Chronic | ICD-10-CM

## 2021-12-15 DIAGNOSIS — Z86.73 HISTORY OF CVA (CEREBROVASCULAR ACCIDENT): Chronic | ICD-10-CM

## 2021-12-15 DIAGNOSIS — Z00.00 ENCOUNTER FOR PREVENTIVE HEALTH EXAMINATION: ICD-10-CM

## 2021-12-15 DIAGNOSIS — N18.31 STAGE 3A CHRONIC KIDNEY DISEASE: Chronic | ICD-10-CM

## 2021-12-15 DIAGNOSIS — E03.9 ACQUIRED HYPOTHYROIDISM: Primary | ICD-10-CM

## 2021-12-15 DIAGNOSIS — F33.40 RECURRENT MAJOR DEPRESSIVE DISORDER, IN REMISSION: ICD-10-CM

## 2021-12-15 PROCEDURE — 71046 X-RAY EXAM CHEST 2 VIEWS: CPT | Mod: 26,HCNC,, | Performed by: RADIOLOGY

## 2021-12-15 PROCEDURE — 99999 PR PBB SHADOW E&M-EST. PATIENT-LVL IV: ICD-10-PCS | Mod: PBBFAC,HCNC,, | Performed by: INTERNAL MEDICINE

## 2021-12-15 PROCEDURE — 71046 X-RAY EXAM CHEST 2 VIEWS: CPT | Mod: TC,HCNC,FY,PO

## 2021-12-15 PROCEDURE — 1157F ADVNC CARE PLAN IN RCRD: CPT | Mod: HCNC,CPTII,S$GLB, | Performed by: INTERNAL MEDICINE

## 2021-12-15 PROCEDURE — 1157F PR ADVANCE CARE PLAN OR EQUIV PRESENT IN MEDICAL RECORD: ICD-10-PCS | Mod: HCNC,CPTII,S$GLB, | Performed by: INTERNAL MEDICINE

## 2021-12-15 PROCEDURE — 99499 UNLISTED E&M SERVICE: CPT | Mod: S$GLB,,, | Performed by: INTERNAL MEDICINE

## 2021-12-15 PROCEDURE — 99214 OFFICE O/P EST MOD 30 MIN: CPT | Mod: HCNC,S$GLB,, | Performed by: INTERNAL MEDICINE

## 2021-12-15 PROCEDURE — 99499 RISK ADDL DX/OHS AUDIT: ICD-10-PCS | Mod: S$GLB,,, | Performed by: INTERNAL MEDICINE

## 2021-12-15 PROCEDURE — 99999 PR PBB SHADOW E&M-EST. PATIENT-LVL IV: CPT | Mod: PBBFAC,HCNC,, | Performed by: INTERNAL MEDICINE

## 2021-12-15 PROCEDURE — 99214 PR OFFICE/OUTPT VISIT, EST, LEVL IV, 30-39 MIN: ICD-10-PCS | Mod: HCNC,S$GLB,, | Performed by: INTERNAL MEDICINE

## 2021-12-15 PROCEDURE — 71046 XR CHEST PA AND LATERAL: ICD-10-PCS | Mod: 26,HCNC,, | Performed by: RADIOLOGY

## 2021-12-20 ENCOUNTER — EXTERNAL HOME HEALTH (OUTPATIENT)
Dept: HOME HEALTH SERVICES | Facility: HOSPITAL | Age: 81
End: 2021-12-20
Payer: MEDICARE

## 2021-12-29 ENCOUNTER — TELEPHONE (OUTPATIENT)
Dept: FAMILY MEDICINE | Facility: CLINIC | Age: 81
End: 2021-12-29
Payer: MEDICARE

## 2021-12-29 DIAGNOSIS — R53.81 PHYSICAL DEBILITY: Primary | ICD-10-CM

## 2022-01-11 ENCOUNTER — OFFICE VISIT (OUTPATIENT)
Dept: CARDIOLOGY | Facility: CLINIC | Age: 82
End: 2022-01-11
Payer: MEDICARE

## 2022-01-11 VITALS
BODY MASS INDEX: 23.59 KG/M2 | DIASTOLIC BLOOD PRESSURE: 80 MMHG | SYSTOLIC BLOOD PRESSURE: 130 MMHG | HEART RATE: 77 BPM | OXYGEN SATURATION: 97 % | WEIGHT: 173.94 LBS

## 2022-01-11 DIAGNOSIS — Z79.01 CHRONIC ANTICOAGULATION: ICD-10-CM

## 2022-01-11 DIAGNOSIS — N18.31 STAGE 3A CHRONIC KIDNEY DISEASE: ICD-10-CM

## 2022-01-11 DIAGNOSIS — I10 ESSENTIAL HYPERTENSION: Primary | ICD-10-CM

## 2022-01-11 DIAGNOSIS — R00.1 SINUS BRADYCARDIA: ICD-10-CM

## 2022-01-11 DIAGNOSIS — I65.21 STENOSIS OF RIGHT CAROTID ARTERY: ICD-10-CM

## 2022-01-11 DIAGNOSIS — I70.0 ATHEROSCLEROSIS OF AORTA: ICD-10-CM

## 2022-01-11 DIAGNOSIS — E78.2 MIXED HYPERLIPIDEMIA: ICD-10-CM

## 2022-01-11 DIAGNOSIS — G47.33 OSA ON CPAP: ICD-10-CM

## 2022-01-11 DIAGNOSIS — Z86.73 HISTORY OF CVA (CEREBROVASCULAR ACCIDENT): ICD-10-CM

## 2022-01-11 PROCEDURE — 3075F PR MOST RECENT SYSTOLIC BLOOD PRESS GE 130-139MM HG: ICD-10-PCS | Mod: HCNC,CPTII,S$GLB, | Performed by: INTERNAL MEDICINE

## 2022-01-11 PROCEDURE — 3288F PR FALLS RISK ASSESSMENT DOCUMENTED: ICD-10-PCS | Mod: HCNC,CPTII,S$GLB, | Performed by: INTERNAL MEDICINE

## 2022-01-11 PROCEDURE — 99499 RISK ADDL DX/OHS AUDIT: ICD-10-PCS | Mod: S$GLB,,, | Performed by: INTERNAL MEDICINE

## 2022-01-11 PROCEDURE — 3079F DIAST BP 80-89 MM HG: CPT | Mod: HCNC,CPTII,S$GLB, | Performed by: INTERNAL MEDICINE

## 2022-01-11 PROCEDURE — 1157F ADVNC CARE PLAN IN RCRD: CPT | Mod: HCNC,CPTII,S$GLB, | Performed by: INTERNAL MEDICINE

## 2022-01-11 PROCEDURE — 99999 PR PBB SHADOW E&M-EST. PATIENT-LVL IV: ICD-10-PCS | Mod: PBBFAC,HCNC,, | Performed by: INTERNAL MEDICINE

## 2022-01-11 PROCEDURE — 1160F RVW MEDS BY RX/DR IN RCRD: CPT | Mod: HCNC,CPTII,S$GLB, | Performed by: INTERNAL MEDICINE

## 2022-01-11 PROCEDURE — 1157F PR ADVANCE CARE PLAN OR EQUIV PRESENT IN MEDICAL RECORD: ICD-10-PCS | Mod: HCNC,CPTII,S$GLB, | Performed by: INTERNAL MEDICINE

## 2022-01-11 PROCEDURE — 3075F SYST BP GE 130 - 139MM HG: CPT | Mod: HCNC,CPTII,S$GLB, | Performed by: INTERNAL MEDICINE

## 2022-01-11 PROCEDURE — 1126F AMNT PAIN NOTED NONE PRSNT: CPT | Mod: HCNC,CPTII,S$GLB, | Performed by: INTERNAL MEDICINE

## 2022-01-11 PROCEDURE — 99499 UNLISTED E&M SERVICE: CPT | Mod: S$GLB,,, | Performed by: INTERNAL MEDICINE

## 2022-01-11 PROCEDURE — 99214 OFFICE O/P EST MOD 30 MIN: CPT | Mod: HCNC,S$GLB,, | Performed by: INTERNAL MEDICINE

## 2022-01-11 PROCEDURE — 99999 PR PBB SHADOW E&M-EST. PATIENT-LVL IV: CPT | Mod: PBBFAC,HCNC,, | Performed by: INTERNAL MEDICINE

## 2022-01-11 PROCEDURE — 1101F PR PT FALLS ASSESS DOC 0-1 FALLS W/OUT INJ PAST YR: ICD-10-PCS | Mod: HCNC,CPTII,S$GLB, | Performed by: INTERNAL MEDICINE

## 2022-01-11 PROCEDURE — 1160F PR REVIEW ALL MEDS BY PRESCRIBER/CLIN PHARMACIST DOCUMENTED: ICD-10-PCS | Mod: HCNC,CPTII,S$GLB, | Performed by: INTERNAL MEDICINE

## 2022-01-11 PROCEDURE — 1159F PR MEDICATION LIST DOCUMENTED IN MEDICAL RECORD: ICD-10-PCS | Mod: HCNC,CPTII,S$GLB, | Performed by: INTERNAL MEDICINE

## 2022-01-11 PROCEDURE — 3079F PR MOST RECENT DIASTOLIC BLOOD PRESSURE 80-89 MM HG: ICD-10-PCS | Mod: HCNC,CPTII,S$GLB, | Performed by: INTERNAL MEDICINE

## 2022-01-11 PROCEDURE — 99214 PR OFFICE/OUTPT VISIT, EST, LEVL IV, 30-39 MIN: ICD-10-PCS | Mod: HCNC,S$GLB,, | Performed by: INTERNAL MEDICINE

## 2022-01-11 PROCEDURE — 3288F FALL RISK ASSESSMENT DOCD: CPT | Mod: HCNC,CPTII,S$GLB, | Performed by: INTERNAL MEDICINE

## 2022-01-11 PROCEDURE — 1101F PT FALLS ASSESS-DOCD LE1/YR: CPT | Mod: HCNC,CPTII,S$GLB, | Performed by: INTERNAL MEDICINE

## 2022-01-11 PROCEDURE — 1159F MED LIST DOCD IN RCRD: CPT | Mod: HCNC,CPTII,S$GLB, | Performed by: INTERNAL MEDICINE

## 2022-01-11 PROCEDURE — 1126F PR PAIN SEVERITY QUANTIFIED, NO PAIN PRESENT: ICD-10-PCS | Mod: HCNC,CPTII,S$GLB, | Performed by: INTERNAL MEDICINE

## 2022-01-11 NOTE — PROGRESS NOTES
Subjective:   Patient ID:  Heraclio Wall is a 81 y.o. male who presents for follow-up of No chief complaint on file.  Patient usually sees Dr. Wells and last visit have the following evaluation:HPI Pt presents for eval.  His current medical conditions include CAD, DD, HTN, CRI, GERD, carotid artery disease, hyperlipidemia.  Nonsmoker.  Past history pertinent for following:  H/o  CVA 7/13 and was put on Plavix (was on ASA at time). He had admission 5/14 for splenic infarct and had surgery. Dr. Metz did abdominal angio with report showing no evidence of mesenteric stenosis. SAIRA showed no intracardiac source of emboli. He was d/cd home on asa, plavix and coumadin was started. He is now on coumadin only.  - Stress MPI Jan 2017.  He is enrolled in digital HTN program.   Echo June 2020 normal LVEF, mild-mod TR, mild MR.  Carotid u/s 6/20 no stenosis noted.  ecg 6/16/20 sinus mannie 51 bpm, nonspecific t wave abnl, 1 av block.   toprol stopped last appt 6/20 due to low BP issues.  Now here.  Has had falls, low BP.  Admitted to [facility].  Needed PRBC for severe hematomas right flank, right leg.   Echo 10/21 normal EF, DD.   Most of his meds stopped.   Coumadin stopped due to injuries, fall risk.  Pt saw Cards mid level last month.  HCTZ stopped due to orthostatic dizziness.  Pt then saw PCP clinic few weeks ago, Protonix and coumadin stopped.  Diovan changed to daily dosing.   Verapamil also stopped.  He has chronic dizziness.  Orthostatic dizziness is stable.   No cp.  No CHF sxs.  ecg today 11/9/21 personally reviewed: NSR, normal ECG.  HTN is good today.   Compliant w CPAP.  CRI stable, creatinine 1.4 on 10/21 labs.  Lipids 7/20: LDL 88, nl TG, nl HDL.  On statin tx.     This visit finds the patient feeling well.  Blood pressure is a little low he is somewhat fatigued.  We will drop back on the current medications now include Diovan 80 mg a.m. as well as hydrochlorothiazide 12.5 mg.  I like to see his blood pressure  around 130-40/80 if possible.  The patient is on dietary supplements as well and is stabilizing.  No lightheadedness dizziness he continues to be a fall risk.  Overall stable no chest pain today.       This visit finds the patient doing much better with blood pressure control he has gained 4 lb seems to be having much better improvement in strength and duration of exercise.  He will be entering into outpatient physical rehabilitation seems to be doing much better today.      Review of Systems   Constitutional: Negative for chills, diaphoresis, night sweats, weight gain and weight loss.   HENT: Negative for congestion, hoarse voice, sore throat and stridor.    Eyes: Negative for double vision and pain.   Cardiovascular: Negative for chest pain, claudication, cyanosis, dyspnea on exertion, irregular heartbeat, leg swelling, near-syncope, orthopnea, palpitations, paroxysmal nocturnal dyspnea and syncope.   Respiratory: Negative for cough, hemoptysis, shortness of breath, sleep disturbances due to breathing, snoring, sputum production and wheezing.    Endocrine: Negative for cold intolerance, heat intolerance and polydipsia.   Hematologic/Lymphatic: Negative for bleeding problem. Does not bruise/bleed easily.   Skin: Negative for color change, dry skin and rash.   Musculoskeletal: Negative for joint swelling and muscle cramps.   Gastrointestinal: Negative for bloating, abdominal pain, constipation, diarrhea, dysphagia, melena, nausea and vomiting.   Genitourinary: Negative for flank pain and urgency.   Neurological: Negative for dizziness, focal weakness, headaches, light-headedness, loss of balance, seizures and weakness.   Psychiatric/Behavioral: Negative for altered mental status and memory loss. The patient is not nervous/anxious.      Family History   Problem Relation Age of Onset    Hypertension Mother     Heart disease Mother     Stroke Mother     Glaucoma Mother     Cataracts Mother     Hyperlipidemia  Mother     Hypertension Father     Peripheral vascular disease Father     Aortic aneurysm Father     Cancer Sister 64        breast 64, tongue 79    Stroke Sister     Hearing loss Sister     Diabetes Neg Hx     Blindness Neg Hx     Macular degeneration Neg Hx     Retinal detachment Neg Hx     Strabismus Neg Hx     Asthma Neg Hx     COPD Neg Hx     Mental illness Neg Hx     Mental retardation Neg Hx     Drug abuse Neg Hx     Alcohol abuse Neg Hx     Kidney disease Neg Hx      Past Medical History:   Diagnosis Date    Age-related osteoporosis without current pathological fracture 2/15/2019    Anemia     Anxiety     Aspiration pneumonia     Aspiration pneumonia 12/21/2018    Back pain     had some pain occasionally r/t a fall    Bilateral hearing loss     Cancer     skin cancer    Chronic anticoagulation 11/30/2015    Coumadin     Coronary artery disease involving native coronary artery of native heart without angina pectoris     Depression 1/27/2021    Disorder of kidney and ureter     Diverticulosis     DJD (degenerative joint disease)     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    Hernia, diaphragmatic 6/12/12    Hyperlipidemia     Hypertension     Hypogonadism male     Lens replaced by other means 10/3/2013    Pterygium - Right Eye 10/3/2013    Sleep apnea     Splenic infarct 5/4/14    Stroke 7/31/13    left lacunar    Unspecified vitamin D deficiency      Social History     Socioeconomic History    Marital status:      Spouse name: nita    Number of children: 5   Occupational History    Occupation: retired chemical work   Tobacco Use    Smoking status: Never Smoker    Smokeless tobacco: Never Used   Substance and Sexual Activity    Alcohol use: No    Drug use: No    Sexual activity: Never   Social History Narrative    , lives with wife . 5 children, in good health.  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  He has a Living Will( copy is scanned in EMR).     Current Outpatient Medications on File Prior to Visit   Medication Sig Dispense Refill    acetaminophen (TYLENOL) 500 MG tablet Take 500 mg by mouth 2 (two) times daily as needed for Pain.      citalopram (CELEXA) 20 MG tablet Take 1 tablet (20 mg total) by mouth every evening. (Patient taking differently: Take 20 mg by mouth once daily.) 90 tablet 3    dextran 70-hypromellose (TEARS) ophthalmic solution Place 1 drop into both eyes Use as needed.      fluticasone propionate (FLONASE) 50 mcg/actuation nasal spray 2 sprays (100 mcg total) by Each Nostril route once daily. 16 g 6    fluvastatin XL (LESCOL XL) 80 mg Tb24 Take 80 mg by mouth every evening.      glycerin/witch hazel leaf (HEMORRHOID TOP) Apply 1 application topically 2 (two) times daily as needed.      hydroCHLOROthiazide (MICROZIDE) 12.5 mg capsule Take 12.5 mg by mouth once daily.      hydrocortisone 0.5 % cream Apply 1 application topically 2 (two) times daily.      loratadine (CLARITIN) 10 mg tablet Take 10 mg by mouth once daily.      pramoxine-hydrocortisone (EPIFOAM) 1-1 % foam Apply topically 3 (three) times daily. 10 g 1    valsartan (DIOVAN) 80 MG tablet Take 1 tablet by mouth once daily. For b/p 130/80 or greater       No current facility-administered medications on file prior to visit.     Review of patient's allergies indicates:   Allergen Reactions    Milk Shortness Of Breath     Diarrhea    Olive extract Anaphylaxis    Tea tree Anaphylaxis    Apple Other (See Comments)     Other reaction(s): sinus problems    Benicar [olmesartan] Other (See Comments)     Stomach upset  Diarrhea    Cardizem [diltiazem hcl] Other (See Comments)     Hallucinations    Clonidine      Dry mouth, pounding in the ears    Clopidogrel Other (See Comments)     Generic only-felt bad [okay with Branded Plavix}    Dicyclomine Other (See Comments)     lightheaded    Hydralazine analogues       tachycardia    Lipitor [atorvastatin] Other (See Comments)     muscle spasms    Lopressor [metoprolol tartrate]      Felt bad question milk in it    Norvasc [amlodipine] Other (See Comments)     Headache  Flushing (skin)    Pineapple Other (See Comments)     sinus problems    Bactrim [sulfamethoxazole-trimethoprim] Itching and Rash       Objective:     Physical Exam  Eyes:      Pupils: Pupils are equal, round, and reactive to light.   Neck:      Trachea: No tracheal deviation.   Cardiovascular:      Rate and Rhythm: Normal rate and regular rhythm.      Pulses: Intact distal pulses.           Carotid pulses are 2+ on the right side and 2+ on the left side.       Radial pulses are 2+ on the right side and 2+ on the left side.        Femoral pulses are 2+ on the right side and 2+ on the left side.       Popliteal pulses are 2+ on the right side and 2+ on the left side.        Dorsalis pedis pulses are 2+ on the right side and 2+ on the left side.        Posterior tibial pulses are 2+ on the right side and 2+ on the left side.      Heart sounds: Normal heart sounds. No murmur heard.  No friction rub. No gallop.    Pulmonary:      Effort: Pulmonary effort is normal. No respiratory distress.      Breath sounds: Normal breath sounds. No stridor. No wheezing or rales.   Chest:      Chest wall: No tenderness.   Abdominal:      General: There is no distension.      Tenderness: There is no abdominal tenderness. There is no rebound.   Musculoskeletal:         General: No tenderness or edema.      Cervical back: Normal range of motion.   Skin:     General: Skin is warm and dry.   Neurological:      Mental Status: He is alert and oriented to person, place, and time.       yr ago 2 yr ago 3 yr ago 4 yr ago 5 yr ago 6 yr ago      Cholesterol 120 - 199 mg/dL 165  163 CM  137 CM  162 CM  152 CM  141 CM  131 CM    Comment: The National Cholesterol Education Program (NCEP) has set the   following guidelines (reference ranges) for  Cholesterol:   Optimal.....................<200 mg/dL   Borderline High.............200-239 mg/dL   High........................> or = 240 mg/dL    Triglycerides 30 - 150 mg/dL 209 High   122 CM  113 CM  160 High  CM  171 High  CM  148 CM  88 CM    Comment: The National Cholesterol Education Program (NCEP) has set the   following guidelines (reference values) for triglycerides:   Normal......................<150 mg/dL   Borderline High.............150-199 mg/dL   High........................200-499 mg/dL    HDL 40 - 75 mg/dL 40  50 CM  42 CM  42 CM  40 CM  37 Low  CM  44 CM    Comment: The National Cholesterol Education Program (NCEP) has set the   following guidelines (reference values) for HDL Cholesterol:   Low...............<40 mg/dL   Optimal...........>60 mg/dL    LDL Cholesterol 63.0 - 159.0 mg/dL 83.2  88.6 CM  72.4 CM  88.0 CM           Assessment:     1. Essential hypertension    2. Stenosis of right carotid artery    3. CHESTER on CPAP    4. Chronic anticoagulation    5. Stage 3a chronic kidney disease    6. Mixed hyperlipidemia    7. Atherosclerosis of aorta    8. Sinus bradycardia    9. History of CVA (cerebrovascular accident)        Plan:     Essential hypertension    Stenosis of right carotid artery    CHESTER on CPAP    Chronic anticoagulation    Stage 3a chronic kidney disease    Mixed hyperlipidemia    Atherosclerosis of aorta    Sinus bradycardia    History of CVA (cerebrovascular accident)      Impression 1. Hypertension under much better control blood pressures are not quite as fluctuant is a were before he seems to be improving  2. Bradycardia stable  3 history of CVA stable current medications  Overall doing well going into rehabilitation and feeling much improved gaining weight and feeling much more stable this time.

## 2022-02-02 NOTE — TELEPHONE ENCOUNTER
No new care gaps identified.  Powered by [employer]. Reference number: [id number].   2/02/2022 6:11:15 AM CST

## 2022-02-07 DIAGNOSIS — F32.89 OTHER DEPRESSION: ICD-10-CM

## 2022-02-07 RX ORDER — CITALOPRAM 20 MG/1
20 TABLET, FILM COATED ORAL NIGHTLY
Qty: 90 TABLET | Refills: 3 | Status: SHIPPED | OUTPATIENT
Start: 2022-02-07 | End: 2022-05-02

## 2022-02-07 NOTE — TELEPHONE ENCOUNTER
No new care gaps identified.  Powered by [employer] by [employer]. Reference number: [id number].   2/07/2022 1:40:45 PM CST

## 2022-02-13 RX ORDER — HYDROCHLOROTHIAZIDE 12.5 MG/1
CAPSULE ORAL
Qty: 90 CAPSULE | OUTPATIENT
Start: 2022-02-13

## 2022-02-13 NOTE — TELEPHONE ENCOUNTER
The requested medication has already been refilled. Closing this encounter and removing duplicate request.

## 2022-02-23 ENCOUNTER — PATIENT MESSAGE (OUTPATIENT)
Dept: CARDIOLOGY | Facility: CLINIC | Age: 82
End: 2022-02-23
Payer: MEDICARE

## 2022-02-24 NOTE — TELEPHONE ENCOUNTER
No new care gaps identified.  Powered by [employer] by [employer]. Reference number: [id number].   2/24/2022 1:29:25 PM CST

## 2022-02-28 ENCOUNTER — PATIENT MESSAGE (OUTPATIENT)
Dept: CARDIOLOGY | Facility: CLINIC | Age: 82
End: 2022-02-28
Payer: MEDICARE

## 2022-02-28 RX ORDER — VALSARTAN 80 MG
TABLET ORAL
Qty: 180 TABLET | OUTPATIENT
Start: 2022-02-28

## 2022-02-28 NOTE — TELEPHONE ENCOUNTER
Bradley DC. Inappropriate Request    Refill Authorization Note   Heraclio Wall  is requesting a refill authorization.  Brief Assessment and Rationale for Refill:  Quick Discontinue  Medication Therapy Plan:  Daily dosage changed by Gera Wells MD. on 11/9/21 for reason therapy completed. Pt. now taking 1 tab(80 mg) once daily.    Medication Reconciliation Completed:  No      Comments:   Pended Medication(s)       Requested Prescriptions     Pending Prescriptions Disp Refills    DIOVAN 80 mg tablet [Pharmacy Med Name: DIOVAN 80 MG Tablet] 180 tablet      Sig: TAKE 1 TABLET TWICE DAILY        Duplicate Pended Encounter(s)/ Last Prescribed Details: (includes pharmacy & prescriber details)   DIOVAN 80 mg tablet [[id number]]  DISCONTINUED    Order Details  Dose: 80 mg Route: Oral Frequency: 2 times daily   Dispense Quantity: 180 tablet Refills: 3    Note to Pharmacy: .         Sig: Take 1 tablet (80 mg total) by mouth 2 (two) times daily.   Patient not taking: Reported on 11/9/2021         Start Date: 04/21/21 End Date: 11/09/21 (ordered for 730 doses)   Discontinued by: Gera Wells MD on 11/9/2021 14:21   Reason: Therapy completed         Written Date: 04/21/21 Expiration Date: 04/21/22       Diagnosis Association: Essential hypertension (I10)   Original Order:  DIOVAN 80 mg tablet [[id number]]     Providers    Authorizing Provider:    Bibi Watson MD   [address] [place] [address]   Phone:  [phone]   Fax:  [phone]   KEVIN #:  [id number]   NPI:  [id number]        Ordering User:  Bibi Watson MD          Pharmacy    [facility] [address]   [address], [facility] [address]   Phone:  [phone]  Fax:  [phone]   KEVIN #:  --   KALIN Reason: --       Outpatient Medication Detail     Disp Refills Start End KALIN   DIOVAN 80 mg tablet (Discontinued) 180 tablet 3 4/21/2021 11/9/2021 Yes   Sig - Route: Take 1 tablet (80 mg total) by  mouth 2 (two) times daily. - Oral   Patient not taking: Reported on 11/9/2021        Sent to pharmacy as: DIOVAN 80 mg tablet   Class: Normal   Notes to Pharmacy: .   Reason for Discontinue: Therapy completed   Order: [id number]   Date/Time Signed: 4/21/2021 15:39       E-Prescribing Status: Receipt confirmed by pharmacy (4/21/2021  3:39 PM CDT)                Note composed:1:35 PM 02/28/2022

## 2022-04-02 ENCOUNTER — PATIENT MESSAGE (OUTPATIENT)
Dept: INTERNAL MEDICINE | Facility: CLINIC | Age: 82
End: 2022-04-02
Payer: MEDICARE

## 2022-04-04 ENCOUNTER — OFFICE VISIT (OUTPATIENT)
Dept: INTERNAL MEDICINE | Facility: CLINIC | Age: 82
End: 2022-04-04
Payer: MEDICARE

## 2022-04-04 VITALS
DIASTOLIC BLOOD PRESSURE: 70 MMHG | WEIGHT: 179.25 LBS | HEART RATE: 59 BPM | BODY MASS INDEX: 24.31 KG/M2 | TEMPERATURE: 98 F | OXYGEN SATURATION: 98 % | SYSTOLIC BLOOD PRESSURE: 138 MMHG

## 2022-04-04 DIAGNOSIS — R19.7 DIARRHEA, UNSPECIFIED TYPE: Primary | ICD-10-CM

## 2022-04-04 DIAGNOSIS — G47.33 OSA ON CPAP: ICD-10-CM

## 2022-04-04 DIAGNOSIS — I10 ESSENTIAL HYPERTENSION: ICD-10-CM

## 2022-04-04 PROCEDURE — 1125F PR PAIN SEVERITY QUANTIFIED, PAIN PRESENT: ICD-10-PCS | Mod: CPTII,S$GLB,, | Performed by: NURSE PRACTITIONER

## 2022-04-04 PROCEDURE — 3075F SYST BP GE 130 - 139MM HG: CPT | Mod: CPTII,S$GLB,, | Performed by: NURSE PRACTITIONER

## 2022-04-04 PROCEDURE — 3078F PR MOST RECENT DIASTOLIC BLOOD PRESSURE < 80 MM HG: ICD-10-PCS | Mod: CPTII,S$GLB,, | Performed by: NURSE PRACTITIONER

## 2022-04-04 PROCEDURE — 1160F PR REVIEW ALL MEDS BY PRESCRIBER/CLIN PHARMACIST DOCUMENTED: ICD-10-PCS | Mod: CPTII,S$GLB,, | Performed by: NURSE PRACTITIONER

## 2022-04-04 PROCEDURE — 1157F PR ADVANCE CARE PLAN OR EQUIV PRESENT IN MEDICAL RECORD: ICD-10-PCS | Mod: CPTII,S$GLB,, | Performed by: NURSE PRACTITIONER

## 2022-04-04 PROCEDURE — 99214 PR OFFICE/OUTPT VISIT, EST, LEVL IV, 30-39 MIN: ICD-10-PCS | Mod: S$GLB,,, | Performed by: NURSE PRACTITIONER

## 2022-04-04 PROCEDURE — 1157F ADVNC CARE PLAN IN RCRD: CPT | Mod: CPTII,S$GLB,, | Performed by: NURSE PRACTITIONER

## 2022-04-04 PROCEDURE — 1159F MED LIST DOCD IN RCRD: CPT | Mod: CPTII,S$GLB,, | Performed by: NURSE PRACTITIONER

## 2022-04-04 PROCEDURE — 3288F FALL RISK ASSESSMENT DOCD: CPT | Mod: CPTII,S$GLB,, | Performed by: NURSE PRACTITIONER

## 2022-04-04 PROCEDURE — 3075F PR MOST RECENT SYSTOLIC BLOOD PRESS GE 130-139MM HG: ICD-10-PCS | Mod: CPTII,S$GLB,, | Performed by: NURSE PRACTITIONER

## 2022-04-04 PROCEDURE — 1100F PTFALLS ASSESS-DOCD GE2>/YR: CPT | Mod: CPTII,S$GLB,, | Performed by: NURSE PRACTITIONER

## 2022-04-04 PROCEDURE — 1159F PR MEDICATION LIST DOCUMENTED IN MEDICAL RECORD: ICD-10-PCS | Mod: CPTII,S$GLB,, | Performed by: NURSE PRACTITIONER

## 2022-04-04 PROCEDURE — 3078F DIAST BP <80 MM HG: CPT | Mod: CPTII,S$GLB,, | Performed by: NURSE PRACTITIONER

## 2022-04-04 PROCEDURE — 99999 PR PBB SHADOW E&M-EST. PATIENT-LVL IV: CPT | Mod: PBBFAC,,, | Performed by: NURSE PRACTITIONER

## 2022-04-04 PROCEDURE — 1100F PR PT FALLS ASSESS DOC 2+ FALLS/FALL W/INJURY/YR: ICD-10-PCS | Mod: CPTII,S$GLB,, | Performed by: NURSE PRACTITIONER

## 2022-04-04 PROCEDURE — 99999 PR PBB SHADOW E&M-EST. PATIENT-LVL IV: ICD-10-PCS | Mod: PBBFAC,,, | Performed by: NURSE PRACTITIONER

## 2022-04-04 PROCEDURE — 3288F PR FALLS RISK ASSESSMENT DOCUMENTED: ICD-10-PCS | Mod: CPTII,S$GLB,, | Performed by: NURSE PRACTITIONER

## 2022-04-04 PROCEDURE — 1160F RVW MEDS BY RX/DR IN RCRD: CPT | Mod: CPTII,S$GLB,, | Performed by: NURSE PRACTITIONER

## 2022-04-04 PROCEDURE — 1125F AMNT PAIN NOTED PAIN PRSNT: CPT | Mod: CPTII,S$GLB,, | Performed by: NURSE PRACTITIONER

## 2022-04-04 PROCEDURE — 99214 OFFICE O/P EST MOD 30 MIN: CPT | Mod: S$GLB,,, | Performed by: NURSE PRACTITIONER

## 2022-04-04 RX ORDER — DIAPER,BRIEF,INFANT-TODD,DISP
EACH MISCELLANEOUS
COMMUNITY
End: 2022-05-02

## 2022-04-04 RX ORDER — PANTOPRAZOLE SODIUM 40 MG/1
40 TABLET, DELAYED RELEASE ORAL DAILY
COMMUNITY
End: 2022-05-02 | Stop reason: SDUPTHER

## 2022-04-04 NOTE — PATIENT INSTRUCTIONS
Continue daily probiotic  Add gas x over the counter as needed  Eliminate daily  Continue protonix   Follow up 4 weeks

## 2022-04-04 NOTE — PROGRESS NOTES
Subjective:       Patient ID: Heraclio Wall is a 82 y.o. male.    Chief Complaint: Diarrhea, Abdominal Pain, and Gas    Patient here with gastro concern with his caregiver  Having diarrhea at least twice a week  Today reg bm, yesterday no bm, sat diarrhea x 2 with weakness, Friday- diarrhea  Patient gets diarrhea when he eats ice cream, admits he likes ice cream  Has stopped dairy and changed diet  Restarted protonix last night (had been off since oct)  Recently started daily probiotic   Staying hydrated, also using gatorade, body armor   Hospitalized for fall in oct, bp meds had to be titrated so recent visits have been more geared towads       Diarrhea   Associated symptoms include abdominal pain. Pertinent negatives include no arthralgias, chills, fever, headaches or myalgias.   Abdominal Pain  Associated symptoms include diarrhea. Pertinent negatives include no arthralgias, fever, headaches or myalgias.       /70   Pulse (!) 59   Temp 97.6 °F (36.4 °C)   Wt 81.3 kg (179 lb 3.7 oz)   SpO2 98%   BMI 24.31 kg/m²     Review of Systems   Constitutional: Negative for activity change, appetite change, chills, diaphoresis, fatigue, fever and unexpected weight change.   HENT: Negative.    Eyes: Negative.    Respiratory: Negative for apnea, chest tightness, shortness of breath and stridor.    Cardiovascular: Negative for chest pain, palpitations and leg swelling.   Gastrointestinal: Positive for abdominal pain and diarrhea.   Endocrine: Negative.    Genitourinary: Negative.    Musculoskeletal: Negative for arthralgias and myalgias.   Skin: Negative for color change, pallor, rash and wound.   Allergic/Immunologic: Negative.    Neurological: Negative for dizziness, facial asymmetry, light-headedness and headaches.   Hematological: Negative for adenopathy.   Psychiatric/Behavioral: Negative for agitation and behavioral problems.       Objective:      Physical Exam  Vitals and nursing note reviewed.    Constitutional:       General: He is not in acute distress.     Appearance: He is well-developed. He is not diaphoretic.   HENT:      Head: Normocephalic and atraumatic.   Cardiovascular:      Rate and Rhythm: Normal rate and regular rhythm.      Heart sounds: Normal heart sounds.   Pulmonary:      Effort: Pulmonary effort is normal. No respiratory distress.      Breath sounds: Normal breath sounds.   Abdominal:      General: Abdomen is flat. Bowel sounds are normal. There is no distension.      Palpations: There is no mass.      Tenderness: There is no abdominal tenderness. There is no guarding or rebound.      Hernia: No hernia is present.   Skin:     General: Skin is warm and dry.      Findings: No rash.   Neurological:      Mental Status: He is alert and oriented to person, place, and time.   Psychiatric:         Behavior: Behavior normal.         Thought Content: Thought content normal.         Judgment: Judgment normal.         Assessment:       1. Diarrhea, unspecified type    2. Essential hypertension    3. CHESTER on CPAP        Plan:       Heraclio was seen today for diarrhea, abdominal pain and gas.    Diagnoses and all orders for this visit:    Diarrhea, unspecified type    Essential hypertension  Stable on hctz    CHESTER on CPAP  Stable, compliant    Patient Instructions   Continue daily probiotic  Add gas x over the counter as needed  Eliminate daily  Continue protonix   Follow up 4 weeks with Dr. Avilez to [facility]

## 2022-04-06 ENCOUNTER — PATIENT MESSAGE (OUTPATIENT)
Dept: CARDIOLOGY | Facility: CLINIC | Age: 82
End: 2022-04-06
Payer: MEDICARE

## 2022-04-11 ENCOUNTER — TELEPHONE (OUTPATIENT)
Dept: FAMILY MEDICINE | Facility: CLINIC | Age: 82
End: 2022-04-11
Payer: MEDICARE

## 2022-04-11 NOTE — TELEPHONE ENCOUNTER
----- Message from Anat Roman sent at 4/11/2022  1:40 PM CDT -----  Contact: Karlene/DEDE Soler called regarding a medicare plan of care that has been faxed several times and she would like the status, please give her a call back at [phone].      Thanks  kb

## 2022-04-25 ENCOUNTER — PATIENT OUTREACH (OUTPATIENT)
Dept: ADMINISTRATIVE | Facility: CLINIC | Age: 82
End: 2022-04-25
Payer: MEDICARE

## 2022-04-25 ENCOUNTER — EXTERNAL HOSPITAL ADMISSION (OUTPATIENT)
Dept: ADMINISTRATIVE | Facility: CLINIC | Age: 82
End: 2022-04-25
Payer: MEDICARE

## 2022-04-25 NOTE — PROGRESS NOTES
Also Taking Vit C3, C, and antibiotic ordered Amox/ clav 875-125 mg BID for 6 days        C3 nurse spoke with Heraclio Wall ( caregiver Conchita )  for a TCC post hospital discharge follow up call. The patient does not have a scheduled HOSFU appointment with Bibi Morse MD  within 5-7  days post hospital discharge date 04/22/2022 . C3 nurse was unable to schedule HOSFU appointment in [facility].

## 2022-04-26 ENCOUNTER — TELEPHONE (OUTPATIENT)
Dept: INTERNAL MEDICINE | Facility: CLINIC | Age: 82
End: 2022-04-26
Payer: MEDICARE

## 2022-04-26 ENCOUNTER — PATIENT MESSAGE (OUTPATIENT)
Dept: FAMILY MEDICINE | Facility: CLINIC | Age: 82
End: 2022-04-26
Payer: MEDICARE

## 2022-04-26 DIAGNOSIS — J18.9 PNEUMONIA DUE TO INFECTIOUS ORGANISM, UNSPECIFIED LATERALITY, UNSPECIFIED PART OF LUNG: Primary | ICD-10-CM

## 2022-05-02 ENCOUNTER — OFFICE VISIT (OUTPATIENT)
Dept: INTERNAL MEDICINE | Facility: CLINIC | Age: 82
End: 2022-05-02
Payer: MEDICARE

## 2022-05-02 ENCOUNTER — LAB VISIT (OUTPATIENT)
Dept: LAB | Facility: HOSPITAL | Age: 82
End: 2022-05-02
Attending: FAMILY MEDICINE
Payer: MEDICARE

## 2022-05-02 VITALS
HEIGHT: 72 IN | TEMPERATURE: 98 F | DIASTOLIC BLOOD PRESSURE: 70 MMHG | WEIGHT: 173.75 LBS | BODY MASS INDEX: 23.53 KG/M2 | SYSTOLIC BLOOD PRESSURE: 150 MMHG | HEART RATE: 61 BPM

## 2022-05-02 DIAGNOSIS — F33.40 RECURRENT MAJOR DEPRESSIVE DISORDER, IN REMISSION: ICD-10-CM

## 2022-05-02 DIAGNOSIS — I10 ESSENTIAL HYPERTENSION: Chronic | ICD-10-CM

## 2022-05-02 DIAGNOSIS — I10 ESSENTIAL HYPERTENSION: Primary | Chronic | ICD-10-CM

## 2022-05-02 PROBLEM — Z00.00 ROUTINE GENERAL MEDICAL EXAMINATION AT A HEALTH CARE FACILITY: Status: ACTIVE | Noted: 2022-05-02

## 2022-05-02 LAB
ALBUMIN SERPL BCP-MCNC: 3.8 G/DL (ref 3.5–5.2)
ALP SERPL-CCNC: 56 U/L (ref 55–135)
ALT SERPL W/O P-5'-P-CCNC: 19 U/L (ref 10–44)
ANION GAP SERPL CALC-SCNC: 11 MMOL/L (ref 8–16)
AST SERPL-CCNC: 19 U/L (ref 10–40)
BILIRUB SERPL-MCNC: 0.4 MG/DL (ref 0.1–1)
BUN SERPL-MCNC: 8 MG/DL (ref 8–23)
CALCIUM SERPL-MCNC: 9.6 MG/DL (ref 8.7–10.5)
CHLORIDE SERPL-SCNC: 104 MMOL/L (ref 95–110)
CHOLEST SERPL-MCNC: 153 MG/DL (ref 120–199)
CHOLEST/HDLC SERPL: 5.1 {RATIO} (ref 2–5)
CO2 SERPL-SCNC: 25 MMOL/L (ref 23–29)
CREAT SERPL-MCNC: 1.2 MG/DL (ref 0.5–1.4)
EST. GFR  (AFRICAN AMERICAN): >60 ML/MIN/1.73 M^2
EST. GFR  (NON AFRICAN AMERICAN): 55.2 ML/MIN/1.73 M^2
GLUCOSE SERPL-MCNC: 89 MG/DL (ref 70–110)
HDLC SERPL-MCNC: 30 MG/DL (ref 40–75)
HDLC SERPL: 19.6 % (ref 20–50)
LDLC SERPL CALC-MCNC: 77.2 MG/DL (ref 63–159)
NONHDLC SERPL-MCNC: 123 MG/DL
POTASSIUM SERPL-SCNC: 4.8 MMOL/L (ref 3.5–5.1)
PROT SERPL-MCNC: 7.1 G/DL (ref 6–8.4)
SODIUM SERPL-SCNC: 140 MMOL/L (ref 136–145)
TRIGL SERPL-MCNC: 229 MG/DL (ref 30–150)
TSH SERPL DL<=0.005 MIU/L-ACNC: 0.84 UIU/ML (ref 0.4–4)

## 2022-05-02 PROCEDURE — 3288F PR FALLS RISK ASSESSMENT DOCUMENTED: ICD-10-PCS | Mod: CPTII,S$GLB,, | Performed by: FAMILY MEDICINE

## 2022-05-02 PROCEDURE — 99999 PR PBB SHADOW E&M-EST. PATIENT-LVL III: CPT | Mod: PBBFAC,,, | Performed by: FAMILY MEDICINE

## 2022-05-02 PROCEDURE — 3077F PR MOST RECENT SYSTOLIC BLOOD PRESSURE >= 140 MM HG: ICD-10-PCS | Mod: CPTII,S$GLB,, | Performed by: FAMILY MEDICINE

## 2022-05-02 PROCEDURE — 99499 RISK ADDL DX/OHS AUDIT: ICD-10-PCS | Mod: HCNC,S$GLB,, | Performed by: FAMILY MEDICINE

## 2022-05-02 PROCEDURE — 1160F PR REVIEW ALL MEDS BY PRESCRIBER/CLIN PHARMACIST DOCUMENTED: ICD-10-PCS | Mod: CPTII,S$GLB,, | Performed by: FAMILY MEDICINE

## 2022-05-02 PROCEDURE — 99214 OFFICE O/P EST MOD 30 MIN: CPT | Mod: S$GLB,,, | Performed by: FAMILY MEDICINE

## 2022-05-02 PROCEDURE — 1101F PT FALLS ASSESS-DOCD LE1/YR: CPT | Mod: CPTII,S$GLB,, | Performed by: FAMILY MEDICINE

## 2022-05-02 PROCEDURE — 1160F RVW MEDS BY RX/DR IN RCRD: CPT | Mod: CPTII,S$GLB,, | Performed by: FAMILY MEDICINE

## 2022-05-02 PROCEDURE — 1157F ADVNC CARE PLAN IN RCRD: CPT | Mod: CPTII,S$GLB,, | Performed by: FAMILY MEDICINE

## 2022-05-02 PROCEDURE — 80053 COMPREHEN METABOLIC PANEL: CPT | Performed by: FAMILY MEDICINE

## 2022-05-02 PROCEDURE — 3288F FALL RISK ASSESSMENT DOCD: CPT | Mod: CPTII,S$GLB,, | Performed by: FAMILY MEDICINE

## 2022-05-02 PROCEDURE — 99999 PR PBB SHADOW E&M-EST. PATIENT-LVL III: ICD-10-PCS | Mod: PBBFAC,,, | Performed by: FAMILY MEDICINE

## 2022-05-02 PROCEDURE — 3078F PR MOST RECENT DIASTOLIC BLOOD PRESSURE < 80 MM HG: ICD-10-PCS | Mod: CPTII,S$GLB,, | Performed by: FAMILY MEDICINE

## 2022-05-02 PROCEDURE — 99214 PR OFFICE/OUTPT VISIT, EST, LEVL IV, 30-39 MIN: ICD-10-PCS | Mod: S$GLB,,, | Performed by: FAMILY MEDICINE

## 2022-05-02 PROCEDURE — 3077F SYST BP >= 140 MM HG: CPT | Mod: CPTII,S$GLB,, | Performed by: FAMILY MEDICINE

## 2022-05-02 PROCEDURE — 99499 UNLISTED E&M SERVICE: CPT | Mod: HCNC,S$GLB,, | Performed by: FAMILY MEDICINE

## 2022-05-02 PROCEDURE — 1126F PR PAIN SEVERITY QUANTIFIED, NO PAIN PRESENT: ICD-10-PCS | Mod: CPTII,S$GLB,, | Performed by: FAMILY MEDICINE

## 2022-05-02 PROCEDURE — 1159F PR MEDICATION LIST DOCUMENTED IN MEDICAL RECORD: ICD-10-PCS | Mod: CPTII,S$GLB,, | Performed by: FAMILY MEDICINE

## 2022-05-02 PROCEDURE — 1157F PR ADVANCE CARE PLAN OR EQUIV PRESENT IN MEDICAL RECORD: ICD-10-PCS | Mod: CPTII,S$GLB,, | Performed by: FAMILY MEDICINE

## 2022-05-02 PROCEDURE — 3078F DIAST BP <80 MM HG: CPT | Mod: CPTII,S$GLB,, | Performed by: FAMILY MEDICINE

## 2022-05-02 PROCEDURE — 1159F MED LIST DOCD IN RCRD: CPT | Mod: CPTII,S$GLB,, | Performed by: FAMILY MEDICINE

## 2022-05-02 PROCEDURE — 1126F AMNT PAIN NOTED NONE PRSNT: CPT | Mod: CPTII,S$GLB,, | Performed by: FAMILY MEDICINE

## 2022-05-02 PROCEDURE — 85025 COMPLETE CBC W/AUTO DIFF WBC: CPT | Performed by: FAMILY MEDICINE

## 2022-05-02 PROCEDURE — 84443 ASSAY THYROID STIM HORMONE: CPT | Performed by: FAMILY MEDICINE

## 2022-05-02 PROCEDURE — 1101F PR PT FALLS ASSESS DOC 0-1 FALLS W/OUT INJ PAST YR: ICD-10-PCS | Mod: CPTII,S$GLB,, | Performed by: FAMILY MEDICINE

## 2022-05-02 PROCEDURE — 36415 COLL VENOUS BLD VENIPUNCTURE: CPT | Mod: PO | Performed by: FAMILY MEDICINE

## 2022-05-02 PROCEDURE — 80061 LIPID PANEL: CPT | Performed by: FAMILY MEDICINE

## 2022-05-02 RX ORDER — ACETAMINOPHEN 500 MG
5000 TABLET ORAL
COMMUNITY

## 2022-05-02 RX ORDER — FLUTICASONE PROPIONATE 50 MCG
2 SPRAY, SUSPENSION (ML) NASAL DAILY
Qty: 16 G | Refills: 6 | Status: SHIPPED | OUTPATIENT
Start: 2022-05-02 | End: 2022-10-26

## 2022-05-02 RX ORDER — PANTOPRAZOLE SODIUM 40 MG/1
40 TABLET, DELAYED RELEASE ORAL DAILY
Qty: 90 TABLET | Refills: 1 | Status: SHIPPED | OUTPATIENT
Start: 2022-05-02 | End: 2022-05-06 | Stop reason: SDUPTHER

## 2022-05-02 RX ORDER — VALSARTAN 80 MG
80 TABLET ORAL 2 TIMES DAILY
Qty: 180 TABLET | Refills: 1 | Status: SHIPPED | OUTPATIENT
Start: 2022-05-02 | End: 2022-05-06

## 2022-05-02 NOTE — PROGRESS NOTES
Subjective:       Patient ID: Heraclio Wall is a 82 y.o. male.    Chief Complaint: No chief complaint on file.    Pt est care with me today    Hypertension  This is a chronic problem. The current episode started more than 1 year ago. The problem has been waxing and waning since onset. Pertinent negatives include no chest pain or shortness of breath. There are no associated agents to hypertension. Risk factors for coronary artery disease include male gender. Past treatments include angiotensin blockers.     Review of Systems   Respiratory: Negative for shortness of breath.    Cardiovascular: Negative for chest pain.   Gastrointestinal: Negative for abdominal pain.       Objective:      Physical Exam  Vitals and nursing note reviewed.   Constitutional:       General: He is not in acute distress.     Appearance: Normal appearance. He is well-developed. He is not diaphoretic.   HENT:      Head: Normocephalic and atraumatic.   Pulmonary:      Effort: Pulmonary effort is normal. No respiratory distress.      Breath sounds: Normal breath sounds. No wheezing.   Skin:     General: Skin is warm and dry.      Findings: No erythema or rash.   Neurological:      Mental Status: He is alert.         Assessment:       1. Essential hypertension    2. Recurrent major depressive disorder, in remission        Plan:     Problem List Items Addressed This Visit        Psychiatric    Recurrent major depressive disorder, in remission    Current Assessment & Plan     Chronic, Stable, cont Celexa.              Cardiac/Vascular    Essential hypertension - Primary (Chronic)    Current Assessment & Plan     Chronic, Stable, cont Diovan,           Relevant Medications    DIOVAN 80 mg tablet    Other Relevant Orders    CBC Auto Differential    Comprehensive Metabolic Panel    Lipid Panel    TSH

## 2022-05-03 ENCOUNTER — PATIENT MESSAGE (OUTPATIENT)
Dept: INTERNAL MEDICINE | Facility: CLINIC | Age: 82
End: 2022-05-03
Payer: MEDICARE

## 2022-05-03 LAB
BASOPHILS # BLD AUTO: 0.05 K/UL (ref 0–0.2)
BASOPHILS NFR BLD: 0.6 % (ref 0–1.9)
DIFFERENTIAL METHOD: ABNORMAL
EOSINOPHIL # BLD AUTO: 0.2 K/UL (ref 0–0.5)
EOSINOPHIL NFR BLD: 1.9 % (ref 0–8)
ERYTHROCYTE [DISTWIDTH] IN BLOOD BY AUTOMATED COUNT: 11.5 % (ref 11.5–14.5)
HCT VFR BLD AUTO: 37.6 % (ref 40–54)
HGB BLD-MCNC: 11.7 G/DL (ref 14–18)
IMM GRANULOCYTES # BLD AUTO: 0.02 K/UL (ref 0–0.04)
IMM GRANULOCYTES NFR BLD AUTO: 0.3 % (ref 0–0.5)
LYMPHOCYTES # BLD AUTO: 1.7 K/UL (ref 1–4.8)
LYMPHOCYTES NFR BLD: 22.2 % (ref 18–48)
MCH RBC QN AUTO: 32.2 PG (ref 27–31)
MCHC RBC AUTO-ENTMCNC: 31.1 G/DL (ref 32–36)
MCV RBC AUTO: 104 FL (ref 82–98)
MONOCYTES # BLD AUTO: 0.5 K/UL (ref 0.3–1)
MONOCYTES NFR BLD: 6.1 % (ref 4–15)
NEUTROPHILS # BLD AUTO: 5.4 K/UL (ref 1.8–7.7)
NEUTROPHILS NFR BLD: 68.9 % (ref 38–73)
NRBC BLD-RTO: 0 /100 WBC
PLATELET # BLD AUTO: 472 K/UL (ref 150–450)
PMV BLD AUTO: 9.1 FL (ref 9.2–12.9)
RBC # BLD AUTO: 3.63 M/UL (ref 4.6–6.2)
WBC # BLD AUTO: 7.81 K/UL (ref 3.9–12.7)

## 2022-05-03 RX ORDER — PANTOPRAZOLE SODIUM 40 MG/1
40 TABLET, DELAYED RELEASE ORAL DAILY
Qty: 90 TABLET | Refills: 1 | Status: CANCELLED | OUTPATIENT
Start: 2022-05-03

## 2022-05-05 ENCOUNTER — TELEPHONE (OUTPATIENT)
Dept: INTERNAL MEDICINE | Facility: CLINIC | Age: 82
End: 2022-05-05
Payer: MEDICARE

## 2022-05-05 ENCOUNTER — PATIENT MESSAGE (OUTPATIENT)
Dept: INTERNAL MEDICINE | Facility: CLINIC | Age: 82
End: 2022-05-05
Payer: MEDICARE

## 2022-05-05 NOTE — TELEPHONE ENCOUNTER
MD Fatmata Giraldo Staff 3 minutes ago (3:42 PM)         Pt can come in tomm or urgent care    Message text

## 2022-05-05 NOTE — TELEPHONE ENCOUNTER
Spoke to Braxton at home health and she directed me to call his daughter Suzette.  Spoke to daughter and pt is scheduled for tomorrow at 10:20

## 2022-05-05 NOTE — TELEPHONE ENCOUNTER
----- Message from Jojo Singleton sent at 5/5/2022  3:17 PM CDT -----  Braxton with Physical Therapy [facility] stated the pt have an elevated blood pressure after his medicine was changed. The top number is running 160 or a little higher. Call Braxton number is [phone]. Thx. EL

## 2022-05-06 ENCOUNTER — TELEPHONE (OUTPATIENT)
Dept: CARDIOLOGY | Facility: CLINIC | Age: 82
End: 2022-05-06
Payer: MEDICARE

## 2022-05-06 ENCOUNTER — PATIENT MESSAGE (OUTPATIENT)
Dept: INTERNAL MEDICINE | Facility: CLINIC | Age: 82
End: 2022-05-06

## 2022-05-06 ENCOUNTER — OFFICE VISIT (OUTPATIENT)
Dept: INTERNAL MEDICINE | Facility: CLINIC | Age: 82
End: 2022-05-06
Payer: MEDICARE

## 2022-05-06 VITALS
HEIGHT: 72 IN | BODY MASS INDEX: 23.62 KG/M2 | SYSTOLIC BLOOD PRESSURE: 160 MMHG | HEART RATE: 61 BPM | WEIGHT: 174.38 LBS | TEMPERATURE: 99 F | DIASTOLIC BLOOD PRESSURE: 70 MMHG

## 2022-05-06 DIAGNOSIS — I10 ESSENTIAL HYPERTENSION: ICD-10-CM

## 2022-05-06 DIAGNOSIS — F32.89 OTHER DEPRESSION: ICD-10-CM

## 2022-05-06 DIAGNOSIS — I10 ESSENTIAL HYPERTENSION: Primary | ICD-10-CM

## 2022-05-06 PROBLEM — F32.A DEPRESSION: Status: ACTIVE | Noted: 2022-05-06

## 2022-05-06 PROCEDURE — 99214 OFFICE O/P EST MOD 30 MIN: CPT | Mod: S$GLB,,, | Performed by: FAMILY MEDICINE

## 2022-05-06 PROCEDURE — 3077F SYST BP >= 140 MM HG: CPT | Mod: CPTII,S$GLB,, | Performed by: FAMILY MEDICINE

## 2022-05-06 PROCEDURE — 1126F PR PAIN SEVERITY QUANTIFIED, NO PAIN PRESENT: ICD-10-PCS | Mod: CPTII,S$GLB,, | Performed by: FAMILY MEDICINE

## 2022-05-06 PROCEDURE — 1101F PT FALLS ASSESS-DOCD LE1/YR: CPT | Mod: CPTII,S$GLB,, | Performed by: FAMILY MEDICINE

## 2022-05-06 PROCEDURE — 99214 PR OFFICE/OUTPT VISIT, EST, LEVL IV, 30-39 MIN: ICD-10-PCS | Mod: S$GLB,,, | Performed by: FAMILY MEDICINE

## 2022-05-06 PROCEDURE — 1159F PR MEDICATION LIST DOCUMENTED IN MEDICAL RECORD: ICD-10-PCS | Mod: CPTII,S$GLB,, | Performed by: FAMILY MEDICINE

## 2022-05-06 PROCEDURE — 1159F MED LIST DOCD IN RCRD: CPT | Mod: CPTII,S$GLB,, | Performed by: FAMILY MEDICINE

## 2022-05-06 PROCEDURE — 1157F ADVNC CARE PLAN IN RCRD: CPT | Mod: CPTII,S$GLB,, | Performed by: FAMILY MEDICINE

## 2022-05-06 PROCEDURE — 99999 PR PBB SHADOW E&M-EST. PATIENT-LVL III: CPT | Mod: PBBFAC,,, | Performed by: FAMILY MEDICINE

## 2022-05-06 PROCEDURE — 1157F PR ADVANCE CARE PLAN OR EQUIV PRESENT IN MEDICAL RECORD: ICD-10-PCS | Mod: CPTII,S$GLB,, | Performed by: FAMILY MEDICINE

## 2022-05-06 PROCEDURE — 1126F AMNT PAIN NOTED NONE PRSNT: CPT | Mod: CPTII,S$GLB,, | Performed by: FAMILY MEDICINE

## 2022-05-06 PROCEDURE — 1160F RVW MEDS BY RX/DR IN RCRD: CPT | Mod: CPTII,S$GLB,, | Performed by: FAMILY MEDICINE

## 2022-05-06 PROCEDURE — 3077F PR MOST RECENT SYSTOLIC BLOOD PRESSURE >= 140 MM HG: ICD-10-PCS | Mod: CPTII,S$GLB,, | Performed by: FAMILY MEDICINE

## 2022-05-06 PROCEDURE — 3078F PR MOST RECENT DIASTOLIC BLOOD PRESSURE < 80 MM HG: ICD-10-PCS | Mod: CPTII,S$GLB,, | Performed by: FAMILY MEDICINE

## 2022-05-06 PROCEDURE — 3078F DIAST BP <80 MM HG: CPT | Mod: CPTII,S$GLB,, | Performed by: FAMILY MEDICINE

## 2022-05-06 PROCEDURE — 3288F FALL RISK ASSESSMENT DOCD: CPT | Mod: CPTII,S$GLB,, | Performed by: FAMILY MEDICINE

## 2022-05-06 PROCEDURE — 1101F PR PT FALLS ASSESS DOC 0-1 FALLS W/OUT INJ PAST YR: ICD-10-PCS | Mod: CPTII,S$GLB,, | Performed by: FAMILY MEDICINE

## 2022-05-06 PROCEDURE — 99999 PR PBB SHADOW E&M-EST. PATIENT-LVL III: ICD-10-PCS | Mod: PBBFAC,,, | Performed by: FAMILY MEDICINE

## 2022-05-06 PROCEDURE — 1160F PR REVIEW ALL MEDS BY PRESCRIBER/CLIN PHARMACIST DOCUMENTED: ICD-10-PCS | Mod: CPTII,S$GLB,, | Performed by: FAMILY MEDICINE

## 2022-05-06 PROCEDURE — 3288F PR FALLS RISK ASSESSMENT DOCUMENTED: ICD-10-PCS | Mod: CPTII,S$GLB,, | Performed by: FAMILY MEDICINE

## 2022-05-06 RX ORDER — VALSARTAN 80 MG/1
TABLET ORAL
COMMUNITY
Start: 2022-02-14 | End: 2022-05-06

## 2022-05-06 RX ORDER — PANTOPRAZOLE SODIUM 40 MG/1
TABLET, DELAYED RELEASE ORAL
COMMUNITY
Start: 2022-02-14 | End: 2022-05-16

## 2022-05-06 RX ORDER — PANTOPRAZOLE SODIUM 40 MG/1
40 TABLET, DELAYED RELEASE ORAL DAILY
Qty: 90 TABLET | Refills: 1 | Status: SHIPPED | OUTPATIENT
Start: 2022-05-06 | End: 2022-05-16 | Stop reason: SDUPTHER

## 2022-05-06 RX ORDER — LORATADINE 10 MG/1
10 TABLET ORAL DAILY
Qty: 90 TABLET | Refills: 1 | Status: SHIPPED | OUTPATIENT
Start: 2022-05-06

## 2022-05-06 RX ORDER — FLUTICASONE PROPIONATE 50 UG/1
POWDER, METERED RESPIRATORY (INHALATION)
COMMUNITY
Start: 2022-02-14

## 2022-05-06 RX ORDER — BENZONATATE 200 MG/1
CAPSULE ORAL
COMMUNITY
Start: 2022-04-22 | End: 2022-07-22 | Stop reason: ALTCHOICE

## 2022-05-06 RX ORDER — VALSARTAN 160 MG/1
160 TABLET ORAL EVERY MORNING
Qty: 90 TABLET | Refills: 0 | Status: SHIPPED | OUTPATIENT
Start: 2022-05-06 | End: 2022-05-06 | Stop reason: SDUPTHER

## 2022-05-06 RX ORDER — CITALOPRAM 20 MG/1
20 TABLET, FILM COATED ORAL NIGHTLY
Qty: 90 TABLET | Refills: 1 | Status: CANCELLED | OUTPATIENT
Start: 2022-05-06 | End: 2022-06-05

## 2022-05-06 RX ORDER — ONDANSETRON 4 MG/1
TABLET, ORALLY DISINTEGRATING ORAL
COMMUNITY
Start: 2022-04-22

## 2022-05-06 RX ORDER — CITALOPRAM 40 MG/1
40 TABLET, FILM COATED ORAL DAILY
Qty: 90 TABLET | Refills: 0 | Status: SHIPPED | OUTPATIENT
Start: 2022-05-06 | End: 2022-07-20 | Stop reason: SDUPTHER

## 2022-05-06 RX ORDER — L.ACID/L.CASEI/B.BIF/B.LON/FOS 2B CELL-50
CAPSULE ORAL
COMMUNITY
Start: 2022-02-14

## 2022-05-06 RX ORDER — CITALOPRAM 40 MG/1
40 TABLET, FILM COATED ORAL DAILY
Qty: 90 TABLET | Refills: 0 | Status: SHIPPED | OUTPATIENT
Start: 2022-05-06 | End: 2022-05-06 | Stop reason: SDUPTHER

## 2022-05-06 RX ORDER — VALSARTAN 160 MG/1
160 TABLET ORAL EVERY MORNING
Qty: 90 TABLET | Refills: 0 | Status: SHIPPED | OUTPATIENT
Start: 2022-05-06 | End: 2022-05-12 | Stop reason: SDUPTHER

## 2022-05-06 RX ORDER — VALSARTAN 80 MG/1
80 TABLET ORAL NIGHTLY
Qty: 90 TABLET | Refills: 0 | Status: SHIPPED | OUTPATIENT
Start: 2022-05-06 | End: 2022-05-12 | Stop reason: SDUPTHER

## 2022-05-06 RX ORDER — CITALOPRAM 20 MG/1
TABLET, FILM COATED ORAL
COMMUNITY
Start: 2022-01-10 | End: 2022-05-06

## 2022-05-06 RX ORDER — VALSARTAN 80 MG
80 TABLET ORAL 2 TIMES DAILY
Qty: 180 TABLET | Refills: 1 | Status: CANCELLED | OUTPATIENT
Start: 2022-05-06

## 2022-05-06 NOTE — TELEPHONE ENCOUNTER
Spoke with Paris from  and informed her that Dr. Metz is Ok with continuing to hold HCTZ. Paris verbalized understanding and will call back with any further questions or concerns.    ----- Message from Ja Metz MD sent at 5/6/2022  4:13 PM CDT -----  Ok can hold  ----- Message -----  From: Na Wilkes LPN  Sent: 5/6/2022  10:07 AM CDT  To: Ja Metz MD    The nurse said that it was held by someone for some reason, and she just wanted to make sure he didn't need to be back on it.   ----- Message -----  From: Ja Metz MD  Sent: 5/6/2022   4:24 AM CDT  To: Na Wilkes LPN    He was not on HCTZ during clinic visit  ----- Message -----  From: Na Wilkes LPN  Sent: 5/5/2022   1:56 PM CDT  To: MD Paris Elizondo from [facility] is wanting to know if/when to resume pt's HCTZ. States that pt doesn't have any swelling right now, just wanting to clarify when you wanted him to start back. Please advise.  ----- Message -----  From: oDugie Hardin  Sent: 5/5/2022  11:48 AM CDT  To: Isaías GIPSON Staff    Type: Patient Call Back         Who called:Paris Mares with [facility]          What is the request in detail:Regarding a few questions and concerns          Can the clinic reply by [facility]?no          Would the patient rather a call back or a response via [facility]?call back          Best call back number:[phone]          Additional Information:           Thank You

## 2022-05-06 NOTE — TELEPHONE ENCOUNTER
meds were sent to [facility] on todays visit. Pt would like meds to be sent to [facility].     meds pended

## 2022-05-06 NOTE — PROGRESS NOTES
Subjective:       Patient ID: Heraclio Wall is a 82 y.o. male.    Chief Complaint: Hypertension and Anxiety    Hypertension  This is a chronic problem. The current episode started more than 1 year ago. The problem has been gradually worsening since onset. The problem is uncontrolled. Associated symptoms include anxiety. Pertinent negatives include no blurred vision, chest pain or shortness of breath. There are no associated agents to hypertension.     Review of Systems   Eyes: Negative for blurred vision.   Respiratory: Negative for shortness of breath.    Cardiovascular: Negative for chest pain.   Gastrointestinal: Negative for abdominal pain.       Objective:      Physical Exam  Vitals and nursing note reviewed.   Constitutional:       General: He is not in acute distress.     Appearance: Normal appearance. He is well-developed. He is not diaphoretic.   HENT:      Head: Normocephalic and atraumatic.   Pulmonary:      Effort: Pulmonary effort is normal. No respiratory distress.      Breath sounds: Normal breath sounds. No wheezing.   Skin:     General: Skin is warm and dry.      Findings: No erythema or rash.   Neurological:      Mental Status: He is alert.         Assessment:       1. Essential hypertension    2. Other depression        Plan:     Problem List Items Addressed This Visit        Psychiatric    Depression    Relevant Medications    citalopram (CELEXA) 40 MG tablet       Cardiac/Vascular    Essential hypertension - Primary (Chronic)    Current Assessment & Plan     Chronic, worsening           Relevant Medications    valsartan (DIOVAN) 160 MG tablet    valsartan (DIOVAN) 80 MG tablet    Other Relevant Orders    [facility] En[facility] Order (Completed)    [facility] Assign Onboarding Questionnaires (Completed)

## 2022-05-12 ENCOUNTER — PATIENT MESSAGE (OUTPATIENT)
Dept: INTERNAL MEDICINE | Facility: CLINIC | Age: 82
End: 2022-05-12
Payer: MEDICARE

## 2022-05-12 DIAGNOSIS — I10 ESSENTIAL HYPERTENSION: ICD-10-CM

## 2022-05-12 NOTE — TELEPHONE ENCOUNTER
Prescription was sent to wrong pharmacy. I will cancel the one sent to the local pharmacy  Rx is pended

## 2022-05-13 ENCOUNTER — TELEPHONE (OUTPATIENT)
Dept: INTERNAL MEDICINE | Facility: CLINIC | Age: 82
End: 2022-05-13
Payer: MEDICARE

## 2022-05-13 NOTE — TELEPHONE ENCOUNTER
----- Message from Luna Rosas sent at 5/13/2022  9:26 AM CDT -----  .Type:  Needs Medical Advice    Who Called:  Toyin from [facility]  @ [phone]  Symptoms (please be specific)  How long has patient had these symptoms:   Pharmacy name and phone #:   Would the patient rather a call back or a response via [facility]?   Best Call Back Number:    Additional Information: [facility]  is requesting additional occupational therapy orders. Please call.

## 2022-05-16 ENCOUNTER — PATIENT MESSAGE (OUTPATIENT)
Dept: INTERNAL MEDICINE | Facility: CLINIC | Age: 82
End: 2022-05-16
Payer: MEDICARE

## 2022-05-16 RX ORDER — VALSARTAN 80 MG/1
80 TABLET ORAL NIGHTLY
Qty: 90 TABLET | Refills: 0 | Status: SHIPPED | OUTPATIENT
Start: 2022-05-16 | End: 2022-05-26

## 2022-05-16 RX ORDER — PANTOPRAZOLE SODIUM 40 MG/1
40 TABLET, DELAYED RELEASE ORAL DAILY
Qty: 90 TABLET | Refills: 0 | Status: SHIPPED | OUTPATIENT
Start: 2022-05-16 | End: 2022-07-20 | Stop reason: SDUPTHER

## 2022-05-16 RX ORDER — VALSARTAN 160 MG/1
160 TABLET ORAL EVERY MORNING
Qty: 90 TABLET | Refills: 0 | Status: SHIPPED | OUTPATIENT
Start: 2022-05-16 | End: 2022-05-26

## 2022-05-26 ENCOUNTER — TELEPHONE (OUTPATIENT)
Dept: CARDIOLOGY | Facility: CLINIC | Age: 82
End: 2022-05-26
Payer: MEDICARE

## 2022-05-26 ENCOUNTER — OFFICE VISIT (OUTPATIENT)
Dept: CARDIOLOGY | Facility: CLINIC | Age: 82
End: 2022-05-26
Payer: MEDICARE

## 2022-05-26 VITALS
OXYGEN SATURATION: 100 % | BODY MASS INDEX: 23.57 KG/M2 | HEIGHT: 72 IN | HEART RATE: 69 BPM | DIASTOLIC BLOOD PRESSURE: 80 MMHG | WEIGHT: 174 LBS | SYSTOLIC BLOOD PRESSURE: 136 MMHG

## 2022-05-26 DIAGNOSIS — E78.2 MIXED HYPERLIPIDEMIA: Chronic | ICD-10-CM

## 2022-05-26 DIAGNOSIS — G47.33 OSA ON CPAP: Chronic | ICD-10-CM

## 2022-05-26 DIAGNOSIS — I70.0 ATHEROSCLEROSIS OF AORTA: Chronic | ICD-10-CM

## 2022-05-26 DIAGNOSIS — R53.82 CHRONIC FATIGUE: ICD-10-CM

## 2022-05-26 DIAGNOSIS — I10 ESSENTIAL HYPERTENSION: Chronic | ICD-10-CM

## 2022-05-26 DIAGNOSIS — Z86.73 HISTORY OF CVA (CEREBROVASCULAR ACCIDENT): Primary | Chronic | ICD-10-CM

## 2022-05-26 PROCEDURE — 99499 UNLISTED E&M SERVICE: CPT | Mod: HCNC,S$GLB,, | Performed by: INTERNAL MEDICINE

## 2022-05-26 PROCEDURE — 99999 PR PBB SHADOW E&M-EST. PATIENT-LVL IV: ICD-10-PCS | Mod: PBBFAC,,, | Performed by: INTERNAL MEDICINE

## 2022-05-26 PROCEDURE — 99499 RISK ADDL DX/OHS AUDIT: ICD-10-PCS | Mod: HCNC,S$GLB,, | Performed by: INTERNAL MEDICINE

## 2022-05-26 PROCEDURE — 1126F AMNT PAIN NOTED NONE PRSNT: CPT | Mod: CPTII,S$GLB,, | Performed by: INTERNAL MEDICINE

## 2022-05-26 PROCEDURE — 3075F SYST BP GE 130 - 139MM HG: CPT | Mod: CPTII,S$GLB,, | Performed by: INTERNAL MEDICINE

## 2022-05-26 PROCEDURE — 1157F ADVNC CARE PLAN IN RCRD: CPT | Mod: CPTII,S$GLB,, | Performed by: INTERNAL MEDICINE

## 2022-05-26 PROCEDURE — 1159F MED LIST DOCD IN RCRD: CPT | Mod: CPTII,S$GLB,, | Performed by: INTERNAL MEDICINE

## 2022-05-26 PROCEDURE — 1159F PR MEDICATION LIST DOCUMENTED IN MEDICAL RECORD: ICD-10-PCS | Mod: CPTII,S$GLB,, | Performed by: INTERNAL MEDICINE

## 2022-05-26 PROCEDURE — 1101F PT FALLS ASSESS-DOCD LE1/YR: CPT | Mod: CPTII,S$GLB,, | Performed by: INTERNAL MEDICINE

## 2022-05-26 PROCEDURE — 3288F PR FALLS RISK ASSESSMENT DOCUMENTED: ICD-10-PCS | Mod: CPTII,S$GLB,, | Performed by: INTERNAL MEDICINE

## 2022-05-26 PROCEDURE — 1160F PR REVIEW ALL MEDS BY PRESCRIBER/CLIN PHARMACIST DOCUMENTED: ICD-10-PCS | Mod: CPTII,S$GLB,, | Performed by: INTERNAL MEDICINE

## 2022-05-26 PROCEDURE — 3075F PR MOST RECENT SYSTOLIC BLOOD PRESS GE 130-139MM HG: ICD-10-PCS | Mod: CPTII,S$GLB,, | Performed by: INTERNAL MEDICINE

## 2022-05-26 PROCEDURE — 3079F DIAST BP 80-89 MM HG: CPT | Mod: CPTII,S$GLB,, | Performed by: INTERNAL MEDICINE

## 2022-05-26 PROCEDURE — 1157F PR ADVANCE CARE PLAN OR EQUIV PRESENT IN MEDICAL RECORD: ICD-10-PCS | Mod: CPTII,S$GLB,, | Performed by: INTERNAL MEDICINE

## 2022-05-26 PROCEDURE — 99214 OFFICE O/P EST MOD 30 MIN: CPT | Mod: S$GLB,,, | Performed by: INTERNAL MEDICINE

## 2022-05-26 PROCEDURE — 1101F PR PT FALLS ASSESS DOC 0-1 FALLS W/OUT INJ PAST YR: ICD-10-PCS | Mod: CPTII,S$GLB,, | Performed by: INTERNAL MEDICINE

## 2022-05-26 PROCEDURE — 1126F PR PAIN SEVERITY QUANTIFIED, NO PAIN PRESENT: ICD-10-PCS | Mod: CPTII,S$GLB,, | Performed by: INTERNAL MEDICINE

## 2022-05-26 PROCEDURE — 3288F FALL RISK ASSESSMENT DOCD: CPT | Mod: CPTII,S$GLB,, | Performed by: INTERNAL MEDICINE

## 2022-05-26 PROCEDURE — 99999 PR PBB SHADOW E&M-EST. PATIENT-LVL IV: CPT | Mod: PBBFAC,,, | Performed by: INTERNAL MEDICINE

## 2022-05-26 PROCEDURE — 3079F PR MOST RECENT DIASTOLIC BLOOD PRESSURE 80-89 MM HG: ICD-10-PCS | Mod: CPTII,S$GLB,, | Performed by: INTERNAL MEDICINE

## 2022-05-26 PROCEDURE — 99214 PR OFFICE/OUTPT VISIT, EST, LEVL IV, 30-39 MIN: ICD-10-PCS | Mod: S$GLB,,, | Performed by: INTERNAL MEDICINE

## 2022-05-26 PROCEDURE — 1160F RVW MEDS BY RX/DR IN RCRD: CPT | Mod: CPTII,S$GLB,, | Performed by: INTERNAL MEDICINE

## 2022-05-26 RX ORDER — VALSARTAN 160 MG/1
160 TABLET ORAL EVERY MORNING
Qty: 90 TABLET | Refills: 0
Start: 2022-05-26 | End: 2022-06-07 | Stop reason: SDUPTHER

## 2022-05-26 NOTE — TELEPHONE ENCOUNTER
Placed phone call to patient on both numbers and daughter on both number to see if he was coning to appointment today. Left message on his voicemail.

## 2022-05-26 NOTE — PROGRESS NOTES
Subjective:   Patient ID:  Heraclio Wall is a 82 y.o. male who presents for follow-up of No chief complaint on file.  Last clinic note:  Patient usually sees Dr. Wells and last visit have the following evaluation:HPI Pt presents for eval.  His current medical conditions include CAD, DD, HTN, CRI, GERD, carotid artery disease, hyperlipidemia.  Nonsmoker.  Past history pertinent for following:  H/o  CVA 7/13 and was put on Plavix (was on ASA at time). He had admission 5/14 for splenic infarct and had surgery. Dr. Metz did abdominal angio with report showing no evidence of mesenteric stenosis. SAIRA showed no intracardiac source of emboli. He was d/cd home on asa, plavix and coumadin was started. He is now on coumadin only.  - Stress MPI Jan 2017.  He is enrolled in digital HTN program.   Echo June 2020 normal LVEF, mild-mod TR, mild MR.  Carotid u/s 6/20 no stenosis noted.  ecg 6/16/20 sinus mannie 51 bpm, nonspecific t wave abnl, 1 av block.   toprol stopped last appt 6/20 due to low BP issues.      Pts BP labile at home he states  - 168/74-86 P 60s  Patient denies CP, angina or anginal equivalent.  Pt with some weight loss.  Hypertension  This is a chronic problem. The current episode started more than 1 year ago. The problem has been waxing and waning since onset. Pertinent negatives include no chest pain, palpitations or shortness of breath. Past treatments include angiotensin blockers. The current treatment provides moderate improvement. There are no compliance problems.    Hyperlipidemia  This is a chronic problem. The current episode started more than 1 year ago. Pertinent negatives include no chest pain or shortness of breath. Current antihyperlipidemic treatment includes diet change. The current treatment provides mild improvement of lipids. Compliance problems include medication side effects.        Review of Systems   Constitutional: Negative. Negative for weight gain.   HENT: Negative.    Eyes:  Negative.    Cardiovascular: Negative.  Negative for chest pain, leg swelling and palpitations.   Respiratory: Negative.  Negative for shortness of breath.    Endocrine: Negative.    Hematologic/Lymphatic: Negative.    Skin: Negative.    Musculoskeletal: Negative for muscle weakness.   Gastrointestinal: Negative.    Genitourinary: Negative.    Neurological: Negative.  Negative for dizziness.   Psychiatric/Behavioral: Negative.    Allergic/Immunologic: Negative.      Family History   Problem Relation Age of Onset    Hypertension Mother     Heart disease Mother     Stroke Mother     Glaucoma Mother     Cataracts Mother     Hyperlipidemia Mother     Hypertension Father     Peripheral vascular disease Father     Aortic aneurysm Father     Cancer Sister 64        breast 64, tongue 79    Stroke Sister     Hearing loss Sister     Diabetes Neg Hx     Blindness Neg Hx     Macular degeneration Neg Hx     Retinal detachment Neg Hx     Strabismus Neg Hx     Asthma Neg Hx     COPD Neg Hx     Mental illness Neg Hx     Mental retardation Neg Hx     Drug abuse Neg Hx     Alcohol abuse Neg Hx     Kidney disease Neg Hx      Past Medical History:   Diagnosis Date    Age-related osteoporosis without current pathological fracture 2/15/2019    Anemia     Anxiety     Aspiration pneumonia     Aspiration pneumonia 12/21/2018    Back pain     had some pain occasionally r/t a fall    Bilateral hearing loss     Cancer     skin cancer    Chronic anticoagulation 11/30/2015    Coumadin     Coronary artery disease involving native coronary artery of native heart without angina pectoris     Depression 1/27/2021    Disorder of kidney and ureter     Diverticulosis     DJD (degenerative joint disease)     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    Hernia, diaphragmatic 6/12/12    Hyperlipidemia     Hypertension     Hypogonadism male     Lens replaced by other means 10/3/2013    Pterygium -  Right Eye 10/3/2013    Sleep apnea     Splenic infarct 5/4/14    Stroke 7/31/13    left lacunar    Unspecified vitamin D deficiency      Social History     Socioeconomic History    Marital status:      Spouse name: nita    Number of children: 5   Occupational History    Occupation: retired chemical work   Tobacco Use    Smoking status: Never Smoker    Smokeless tobacco: Never Used   Substance and Sexual Activity    Alcohol use: No    Drug use: No    Sexual activity: Not Currently   Social History Narrative    , lives with wife . 5 children, in good health.  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  He has a Living Will( copy is scanned in EMR).     Current Outpatient Medications on File Prior to Visit   Medication Sig Dispense Refill    acetaminophen (TYLENOL) 500 MG tablet Take 500 mg by mouth 2 (two) times daily as needed for Pain.      benzonatate (TESSALON) 200 MG capsule TAKE 1 CAPSULE BY MOUTH EVERY 8 HOURS FOR UP TO 7 DAYS AS NEEDED FOR COUGH      cholecalciferol, vitamin D3, 125 mcg (5,000 unit) Tab Take 5,000 Units by mouth.      citalopram (CELEXA) 40 MG tablet Take 1 tablet (40 mg total) by mouth once daily. 90 tablet 0    dextran 70-hypromellose (TEARS) ophthalmic solution Place 1 drop into both eyes Use as needed.      fluticasone propionate (FLONASE) 50 mcg/actuation nasal spray 2 sprays (100 mcg total) by Each Nostril route once daily. 16 g 6    fluticasone propionate (FLOVENT DISKUS) 50 mcg/actuation DsDv 2 spray 2 TIMES DAILY (route: nasal)      fluvastatin XL (LESCOL XL) 80 mg Tb24 Take 1 tablet (80 mg total) by mouth every evening. 90 tablet 2    glycerin/witch hazel leaf (HEMORRHOID TOP) Apply 1 application topically 2 (two) times daily as needed.      L.acid-L.casei-B.bif-B.tono-FOS (PROBIOTIC BLEND) 2 billion cell-50 mg Cap 1 capsule DAILY (route: oral)      loratadine (CLARITIN) 10 mg tablet Take 1 tablet (10 mg total) by mouth  once daily. 90 tablet 1    ondansetron (ZOFRAN-ODT) 4 MG TbDL DISSOLVE ONE TABLET BY MOUTH EVERY 8 HOURS AS NEEDED FOR NAUSEA      pantoprazole (PROTONIX) 40 MG tablet Take 1 tablet (40 mg total) by mouth once daily. 90 tablet 0    valsartan (DIOVAN) 160 MG tablet Take 1 tablet (160 mg total) by mouth every morning. 90 tablet 0    valsartan (DIOVAN) 80 MG tablet Take 1 tablet (80 mg total) by mouth every evening. 90 tablet 0    vitamin D3-folic acid 125 mcg (5,000 unit)-1 mg Tab 1 tablet BEDTIME (route: oral)       No current facility-administered medications on file prior to visit.     Review of patient's allergies indicates:   Allergen Reactions    Milk Shortness Of Breath     Diarrhea    Olive extract Anaphylaxis    Tea tree Anaphylaxis    Apple Other (See Comments)     Other reaction(s): sinus problems    Benicar [olmesartan] Other (See Comments)     Stomach upset  Diarrhea    Cardizem [diltiazem hcl] Other (See Comments)     Hallucinations    Clonidine      Dry mouth, pounding in the ears    Clopidogrel Other (See Comments)     Generic only-felt bad [okay with Branded Plavix}    Dicyclomine Other (See Comments)     lightheaded    Hydralazine analogues      tachycardia    Lipitor [atorvastatin] Other (See Comments)     muscle spasms    Lopressor [metoprolol tartrate]      Felt bad question milk in it    Norvasc [amlodipine] Other (See Comments)     Headache  Flushing (skin)    Pineapple Other (See Comments)     sinus problems    Bactrim [sulfamethoxazole-trimethoprim] Itching and Rash       Objective:     Physical Exam  Vitals and nursing note reviewed.   Constitutional:       Appearance: He is well-developed.   HENT:      Head: Normocephalic and atraumatic.   Eyes:      Conjunctiva/sclera: Conjunctivae normal.      Pupils: Pupils are equal, round, and reactive to light.   Cardiovascular:      Rate and Rhythm: Normal rate and regular rhythm.      Pulses: Intact distal pulses.      Heart  sounds: Normal heart sounds.   Pulmonary:      Effort: Pulmonary effort is normal.      Breath sounds: Normal breath sounds.   Abdominal:      General: Bowel sounds are normal.      Palpations: Abdomen is soft.   Musculoskeletal:      Cervical back: Normal range of motion and neck supple.   Skin:     General: Skin is warm and dry.   Neurological:      Mental Status: He is alert and oriented to person, place, and time.         Assessment:     1. History of CVA (cerebrovascular accident)    2. Essential hypertension    3. Mixed hyperlipidemia    4. Atherosclerosis of aorta    5. Chronic fatigue    6. CHESTER on CPAP        Plan:     History of CVA (cerebrovascular accident)    Essential hypertension    Mixed hyperlipidemia    Atherosclerosis of aorta    Chronic fatigue    CHESTER on CPAP      Try valsartan 160 am 80 ( 1/2 tablet) pm  Follow Dr. Wells

## 2022-05-31 ENCOUNTER — TELEPHONE (OUTPATIENT)
Dept: INTERNAL MEDICINE | Facility: CLINIC | Age: 82
End: 2022-05-31
Payer: MEDICARE

## 2022-05-31 DIAGNOSIS — R30.0 DYSURIA: Primary | ICD-10-CM

## 2022-05-31 NOTE — TELEPHONE ENCOUNTER
----- Message from Johanna Titus sent at 5/31/2022 12:32 PM CDT -----  Contact: Ms. Toledo Direct# [phone]  Ms. Toledo patients Nurse at [facility] is calling in regards to her wanting to put in an order for a urine test for the patient please.     Ms. Toledo said that the patient have been having accidents.

## 2022-06-07 ENCOUNTER — OFFICE VISIT (OUTPATIENT)
Dept: INTERNAL MEDICINE | Facility: CLINIC | Age: 82
End: 2022-06-07
Payer: MEDICARE

## 2022-06-07 ENCOUNTER — DOCUMENT SCAN (OUTPATIENT)
Dept: HOME HEALTH SERVICES | Facility: HOSPITAL | Age: 82
End: 2022-06-07
Payer: MEDICARE

## 2022-06-07 VITALS
BODY MASS INDEX: 23.59 KG/M2 | TEMPERATURE: 98 F | OXYGEN SATURATION: 98 % | WEIGHT: 173.94 LBS | DIASTOLIC BLOOD PRESSURE: 62 MMHG | HEART RATE: 79 BPM | SYSTOLIC BLOOD PRESSURE: 132 MMHG

## 2022-06-07 DIAGNOSIS — N39.0 COMPLICATED UTI (URINARY TRACT INFECTION): ICD-10-CM

## 2022-06-07 DIAGNOSIS — F32.89 OTHER DEPRESSION: ICD-10-CM

## 2022-06-07 DIAGNOSIS — I10 ESSENTIAL HYPERTENSION: Primary | Chronic | ICD-10-CM

## 2022-06-07 DIAGNOSIS — E78.2 MIXED HYPERLIPIDEMIA: Chronic | ICD-10-CM

## 2022-06-07 PROBLEM — F33.40 RECURRENT MAJOR DEPRESSIVE DISORDER, IN REMISSION: Status: RESOLVED | Noted: 2021-01-27 | Resolved: 2022-06-07

## 2022-06-07 PROCEDURE — 1160F RVW MEDS BY RX/DR IN RCRD: CPT | Mod: CPTII,S$GLB,, | Performed by: FAMILY MEDICINE

## 2022-06-07 PROCEDURE — 99214 OFFICE O/P EST MOD 30 MIN: CPT | Mod: S$GLB,,, | Performed by: FAMILY MEDICINE

## 2022-06-07 PROCEDURE — 99999 PR PBB SHADOW E&M-EST. PATIENT-LVL III: ICD-10-PCS | Mod: PBBFAC,,, | Performed by: FAMILY MEDICINE

## 2022-06-07 PROCEDURE — 1126F AMNT PAIN NOTED NONE PRSNT: CPT | Mod: CPTII,S$GLB,, | Performed by: FAMILY MEDICINE

## 2022-06-07 PROCEDURE — 99214 PR OFFICE/OUTPT VISIT, EST, LEVL IV, 30-39 MIN: ICD-10-PCS | Mod: S$GLB,,, | Performed by: FAMILY MEDICINE

## 2022-06-07 PROCEDURE — 1157F ADVNC CARE PLAN IN RCRD: CPT | Mod: CPTII,S$GLB,, | Performed by: FAMILY MEDICINE

## 2022-06-07 PROCEDURE — 1101F PR PT FALLS ASSESS DOC 0-1 FALLS W/OUT INJ PAST YR: ICD-10-PCS | Mod: CPTII,S$GLB,, | Performed by: FAMILY MEDICINE

## 2022-06-07 PROCEDURE — 99999 PR PBB SHADOW E&M-EST. PATIENT-LVL III: CPT | Mod: PBBFAC,,, | Performed by: FAMILY MEDICINE

## 2022-06-07 PROCEDURE — 3075F SYST BP GE 130 - 139MM HG: CPT | Mod: CPTII,S$GLB,, | Performed by: FAMILY MEDICINE

## 2022-06-07 PROCEDURE — 3078F DIAST BP <80 MM HG: CPT | Mod: CPTII,S$GLB,, | Performed by: FAMILY MEDICINE

## 2022-06-07 PROCEDURE — 3288F PR FALLS RISK ASSESSMENT DOCUMENTED: ICD-10-PCS | Mod: CPTII,S$GLB,, | Performed by: FAMILY MEDICINE

## 2022-06-07 PROCEDURE — 1160F PR REVIEW ALL MEDS BY PRESCRIBER/CLIN PHARMACIST DOCUMENTED: ICD-10-PCS | Mod: CPTII,S$GLB,, | Performed by: FAMILY MEDICINE

## 2022-06-07 PROCEDURE — 3075F PR MOST RECENT SYSTOLIC BLOOD PRESS GE 130-139MM HG: ICD-10-PCS | Mod: CPTII,S$GLB,, | Performed by: FAMILY MEDICINE

## 2022-06-07 PROCEDURE — 3078F PR MOST RECENT DIASTOLIC BLOOD PRESSURE < 80 MM HG: ICD-10-PCS | Mod: CPTII,S$GLB,, | Performed by: FAMILY MEDICINE

## 2022-06-07 PROCEDURE — 3288F FALL RISK ASSESSMENT DOCD: CPT | Mod: CPTII,S$GLB,, | Performed by: FAMILY MEDICINE

## 2022-06-07 PROCEDURE — 1101F PT FALLS ASSESS-DOCD LE1/YR: CPT | Mod: CPTII,S$GLB,, | Performed by: FAMILY MEDICINE

## 2022-06-07 PROCEDURE — 1159F MED LIST DOCD IN RCRD: CPT | Mod: CPTII,S$GLB,, | Performed by: FAMILY MEDICINE

## 2022-06-07 PROCEDURE — 1126F PR PAIN SEVERITY QUANTIFIED, NO PAIN PRESENT: ICD-10-PCS | Mod: CPTII,S$GLB,, | Performed by: FAMILY MEDICINE

## 2022-06-07 PROCEDURE — 1159F PR MEDICATION LIST DOCUMENTED IN MEDICAL RECORD: ICD-10-PCS | Mod: CPTII,S$GLB,, | Performed by: FAMILY MEDICINE

## 2022-06-07 PROCEDURE — 1157F PR ADVANCE CARE PLAN OR EQUIV PRESENT IN MEDICAL RECORD: ICD-10-PCS | Mod: CPTII,S$GLB,, | Performed by: FAMILY MEDICINE

## 2022-06-07 RX ORDER — CEPHALEXIN 500 MG/1
500 CAPSULE ORAL 4 TIMES DAILY
COMMUNITY
Start: 2022-06-01 | End: 2022-06-11

## 2022-06-07 RX ORDER — CITALOPRAM 20 MG/1
20 TABLET, FILM COATED ORAL DAILY
COMMUNITY
Start: 2022-02-20 | End: 2022-10-18

## 2022-06-07 RX ORDER — VALSARTAN 160 MG/1
160 TABLET ORAL EVERY MORNING
COMMUNITY
Start: 2022-05-02 | End: 2022-07-20

## 2022-06-07 NOTE — PROGRESS NOTES
Subjective:       Patient ID: Heraclio Wall is a 82 y.o. male.    Chief Complaint: Urinary Tract Infection    UTI - went to [place], Bolivar catheter in place.    Hypertension  This is a chronic problem. The current episode started more than 1 year ago. The problem has been resolved since onset. The problem is controlled. Pertinent negatives include no anxiety, blurred vision, chest pain, headaches or shortness of breath. There are no associated agents to hypertension. Risk factors for coronary artery disease include male gender.     Review of Systems   Eyes: Negative for blurred vision.   Respiratory: Negative for shortness of breath.    Cardiovascular: Negative for chest pain.   Gastrointestinal: Negative for abdominal pain.   Neurological: Negative for headaches.       Objective:      Physical Exam  Vitals and nursing note reviewed.   Constitutional:       General: He is not in acute distress.     Appearance: Normal appearance. He is well-developed. He is not diaphoretic.      Comments: In wheelchair   HENT:      Head: Normocephalic and atraumatic.   Pulmonary:      Effort: Pulmonary effort is normal. No respiratory distress.      Breath sounds: Normal breath sounds. No wheezing.   Skin:     General: Skin is warm and dry.      Findings: No erythema or rash.   Neurological:      Mental Status: He is alert.         Assessment:       1. Essential hypertension    2. Mixed hyperlipidemia    3. Other depression    4. Complicated UTI (urinary tract infection)        Plan:     Problem List Items Addressed This Visit        Psychiatric    Depression    Overview     Chronic, Stable, cont celexa              Cardiac/Vascular    Essential hypertension - Primary (Chronic)    Overview     Chronic, Stable, cont diovan           Mixed hyperlipidemia (Chronic)       Renal/    Complicated UTI (urinary tract infection)    Current Assessment & Plan     On keflex for 7 days.           Relevant Orders    Ambulatory  referral/consult to Urology

## 2022-06-08 ENCOUNTER — TELEPHONE (OUTPATIENT)
Dept: UROLOGY | Facility: CLINIC | Age: 82
End: 2022-06-08
Payer: MEDICARE

## 2022-06-08 NOTE — TELEPHONE ENCOUNTER
----- Message from Ansley Tolbret sent at 6/8/2022  9:24 AM CDT -----  Contact: Tiff pt [phone]--  Patient is calling for Medical Advice regarding:--Leg weakness possible due to the recurrent UTI--    How long has patient had these symptoms:-- 8-days-    Pharmacy name and phone#:--   [facility] - [facility] [address]  [address] [address]  Phone: [phone] Fax: [phone]      Would like response via [employer]:-- Call back--    Comments:Pt care giver Tiff states that pt daughter would like pt to be seen ASAP for the symptoms listed above. I tried to schedule but nothing coming up. Please call to advise.

## 2022-06-08 NOTE — TELEPHONE ENCOUNTER
Called pt, phone rings once then goes to message that voicemail isnt set up, so unable to leave a message.

## 2022-06-09 ENCOUNTER — PATIENT MESSAGE (OUTPATIENT)
Dept: UROLOGY | Facility: CLINIC | Age: 82
End: 2022-06-09
Payer: MEDICARE

## 2022-06-15 ENCOUNTER — PATIENT MESSAGE (OUTPATIENT)
Dept: INTERNAL MEDICINE | Facility: CLINIC | Age: 82
End: 2022-06-15
Payer: MEDICARE

## 2022-06-15 ENCOUNTER — PATIENT MESSAGE (OUTPATIENT)
Dept: CARDIOLOGY | Facility: CLINIC | Age: 82
End: 2022-06-15
Payer: MEDICARE

## 2022-06-16 ENCOUNTER — OFFICE VISIT (OUTPATIENT)
Dept: UROLOGY | Facility: CLINIC | Age: 82
End: 2022-06-16
Payer: MEDICARE

## 2022-06-16 VITALS
HEIGHT: 72 IN | SYSTOLIC BLOOD PRESSURE: 150 MMHG | WEIGHT: 169.31 LBS | DIASTOLIC BLOOD PRESSURE: 68 MMHG | BODY MASS INDEX: 22.93 KG/M2

## 2022-06-16 DIAGNOSIS — N40.1 BPH WITH OBSTRUCTION/LOWER URINARY TRACT SYMPTOMS: Primary | ICD-10-CM

## 2022-06-16 DIAGNOSIS — N39.0 COMPLICATED UTI (URINARY TRACT INFECTION): ICD-10-CM

## 2022-06-16 DIAGNOSIS — G47.33 OSA ON CPAP: Chronic | ICD-10-CM

## 2022-06-16 DIAGNOSIS — R35.81 NOCTURNAL POLYURIA: ICD-10-CM

## 2022-06-16 DIAGNOSIS — N13.8 BPH WITH OBSTRUCTION/LOWER URINARY TRACT SYMPTOMS: Primary | ICD-10-CM

## 2022-06-16 DIAGNOSIS — N39.44 NOCTURNAL ENURESIS: ICD-10-CM

## 2022-06-16 PROCEDURE — 3077F SYST BP >= 140 MM HG: CPT | Mod: CPTII,S$GLB,, | Performed by: UROLOGY

## 2022-06-16 PROCEDURE — 1126F PR PAIN SEVERITY QUANTIFIED, NO PAIN PRESENT: ICD-10-PCS | Mod: CPTII,S$GLB,, | Performed by: UROLOGY

## 2022-06-16 PROCEDURE — 99999 PR PBB SHADOW E&M-EST. PATIENT-LVL III: CPT | Mod: PBBFAC,,, | Performed by: UROLOGY

## 2022-06-16 PROCEDURE — 99204 OFFICE O/P NEW MOD 45 MIN: CPT | Mod: S$GLB,,, | Performed by: UROLOGY

## 2022-06-16 PROCEDURE — 51798 US URINE CAPACITY MEASURE: CPT | Mod: S$GLB,,, | Performed by: UROLOGY

## 2022-06-16 PROCEDURE — 3288F PR FALLS RISK ASSESSMENT DOCUMENTED: ICD-10-PCS | Mod: CPTII,S$GLB,, | Performed by: UROLOGY

## 2022-06-16 PROCEDURE — 3078F DIAST BP <80 MM HG: CPT | Mod: CPTII,S$GLB,, | Performed by: UROLOGY

## 2022-06-16 PROCEDURE — 51798 PR MEAS,POST-VOID RES,US,NON-IMAGING: ICD-10-PCS | Mod: S$GLB,,, | Performed by: UROLOGY

## 2022-06-16 PROCEDURE — 1157F PR ADVANCE CARE PLAN OR EQUIV PRESENT IN MEDICAL RECORD: ICD-10-PCS | Mod: CPTII,S$GLB,, | Performed by: UROLOGY

## 2022-06-16 PROCEDURE — 1157F ADVNC CARE PLAN IN RCRD: CPT | Mod: CPTII,S$GLB,, | Performed by: UROLOGY

## 2022-06-16 PROCEDURE — 99999 PR PBB SHADOW E&M-EST. PATIENT-LVL III: ICD-10-PCS | Mod: PBBFAC,,, | Performed by: UROLOGY

## 2022-06-16 PROCEDURE — 1100F PR PT FALLS ASSESS DOC 2+ FALLS/FALL W/INJURY/YR: ICD-10-PCS | Mod: CPTII,S$GLB,, | Performed by: UROLOGY

## 2022-06-16 PROCEDURE — 3078F PR MOST RECENT DIASTOLIC BLOOD PRESSURE < 80 MM HG: ICD-10-PCS | Mod: CPTII,S$GLB,, | Performed by: UROLOGY

## 2022-06-16 PROCEDURE — 99204 PR OFFICE/OUTPT VISIT, NEW, LEVL IV, 45-59 MIN: ICD-10-PCS | Mod: S$GLB,,, | Performed by: UROLOGY

## 2022-06-16 PROCEDURE — 3077F PR MOST RECENT SYSTOLIC BLOOD PRESSURE >= 140 MM HG: ICD-10-PCS | Mod: CPTII,S$GLB,, | Performed by: UROLOGY

## 2022-06-16 PROCEDURE — 1126F AMNT PAIN NOTED NONE PRSNT: CPT | Mod: CPTII,S$GLB,, | Performed by: UROLOGY

## 2022-06-16 PROCEDURE — 1100F PTFALLS ASSESS-DOCD GE2>/YR: CPT | Mod: CPTII,S$GLB,, | Performed by: UROLOGY

## 2022-06-16 PROCEDURE — 3288F FALL RISK ASSESSMENT DOCD: CPT | Mod: CPTII,S$GLB,, | Performed by: UROLOGY

## 2022-06-16 RX ORDER — VALSARTAN 80 MG/1
80 TABLET ORAL NIGHTLY
COMMUNITY
End: 2022-08-04

## 2022-06-16 RX ORDER — FINASTERIDE 5 MG/1
5 TABLET, FILM COATED ORAL DAILY
Qty: 30 TABLET | Refills: 11 | Status: SHIPPED | OUTPATIENT
Start: 2022-06-16 | End: 2022-06-16 | Stop reason: SDUPTHER

## 2022-06-16 RX ORDER — CYCLOSPORINE 0.5 MG/ML
1 EMULSION OPHTHALMIC 2 TIMES DAILY
COMMUNITY

## 2022-06-16 RX ORDER — FINASTERIDE 5 MG/1
5 TABLET, FILM COATED ORAL DAILY
Qty: 30 TABLET | Refills: 11 | Status: SHIPPED | OUTPATIENT
Start: 2022-06-16 | End: 2022-09-14 | Stop reason: SDUPTHER

## 2022-06-16 NOTE — PROGRESS NOTES
Chief Complaint   Patient presents with    Other     New patient/ Er F/u urine retention         History of Present Illness:   Heraclio Wall is a 82 y.o. male here for evaluation of Other (New patient/ Er F/u urine retention)    6/16/22- Pt is an 81yo male who was seen in the [facility] ED approximately 2 weeks ago complaining of inability to urinate and nocturnal enuresis. Pt was getting leg weakness and urine sample was ordered through home health. He was unable to produce a urine specimen for a day and a half and ultimately an ambulance was called. However, when he was laying in the bed, he is having nocturnal enuresis all night long. Bolivar was placed in the ED and 150cc was drained from his bladder. He was placed on 10 days of keflex. UA at that time only showed blood. Culture showed <10,000 cfu Enterococcus. Bolivar was removed 6 days later.     During the day, he drinks quite a bit, but can't urinate. He does feel the urge to urinate during the day, per his report. Caregiver states that he states that he doesn't need to go. Changes pull ups Q3 hours during the night. Has been ongoing for at least 10 months, but worse over the last 6 months. Following treatment with antibiotic, he was feeling better and able to walk again. He does wear his C-pap.     Review of Systems   Constitutional: Negative for chills and fever.   Genitourinary: Positive for enuresis.   Musculoskeletal: Positive for gait problem.   Neurological: Negative for weakness.   All other systems reviewed and are negative.        Past Medical History:   Diagnosis Date    Age-related osteoporosis without current pathological fracture 2/15/2019    Anemia     Anxiety     Aspiration pneumonia     Aspiration pneumonia 12/21/2018    Back pain     had some pain occasionally r/t a fall    Bilateral hearing loss     Cancer     skin cancer    Chronic anticoagulation 11/30/2015    Coumadin     Coronary artery disease involving native coronary artery of  native heart without angina pectoris     Depression 1/27/2021    Disorder of kidney and ureter     Diverticulosis     DJD (degenerative joint disease)     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    Hernia, diaphragmatic 6/12/12    Hyperlipidemia     Hypertension     Hypogonadism male     Lens replaced by other means 10/3/2013    Pterygium - Right Eye 10/3/2013    Recurrent major depressive disorder, in remission 1/27/2021    Sleep apnea     Splenic infarct 5/4/14    Stroke 7/31/13    left lacunar    Unspecified vitamin D deficiency        Past Surgical History:   Procedure Laterality Date    ADENOIDECTOMY  1942    CHOLECYSTECTOMY  2/2015    CIRCUMCISION, PRIMARY  1938    EYE SURGERY Right 02/15/2012    cataract w/IOL  Dr Kevin    EYE SURGERY Left 03/29/2012    cataract w/IOL    SMALL INTESTINE SURGERY  5/4/2014    TONSILLECTOMY  1942       Family History   Problem Relation Age of Onset    Hypertension Mother     Heart disease Mother     Stroke Mother     Glaucoma Mother     Cataracts Mother     Hyperlipidemia Mother     Hypertension Father     Peripheral vascular disease Father     Aortic aneurysm Father     Cancer Sister 64        breast 64, tongue 79    Stroke Sister     Hearing loss Sister     Diabetes Neg Hx     Blindness Neg Hx     Macular degeneration Neg Hx     Retinal detachment Neg Hx     Strabismus Neg Hx     Asthma Neg Hx     COPD Neg Hx     Mental illness Neg Hx     Mental retardation Neg Hx     Drug abuse Neg Hx     Alcohol abuse Neg Hx     Kidney disease Neg Hx        Social History     Tobacco Use    Smoking status: Never Smoker    Smokeless tobacco: Never Used   Substance Use Topics    Alcohol use: No    Drug use: No       Current Outpatient Medications   Medication Sig Dispense Refill    acetaminophen (TYLENOL) 500 MG tablet Take 500 mg by mouth 2 (two) times daily as needed for Pain.      cholecalciferol, vitamin D3, 125 mcg  (5,000 unit) Tab Take 5,000 Units by mouth.      citalopram (CELEXA) 20 MG tablet Take 40 mg by mouth once daily.      cycloSPORINE (RESTASIS) 0.05 % ophthalmic emulsion Place 1 drop into both eyes 2 (two) times daily.      fluticasone propionate (FLONASE) 50 mcg/actuation nasal spray 2 sprays (100 mcg total) by Each Nostril route once daily. 16 g 6    fluvastatin XL (LESCOL XL) 80 mg Tb24 Take 1 tablet (80 mg total) by mouth every evening. 90 tablet 2    glycerin/witch hazel leaf (HEMORRHOID TOP) Apply 1 application topically 2 (two) times daily as needed.      L.acid-L.casei-B.bif-B.tono-FOS (PROBIOTIC BLEND) 2 billion cell-50 mg Cap 1 capsule DAILY (route: oral)      loratadine (CLARITIN) 10 mg tablet Take 1 tablet (10 mg total) by mouth once daily. 90 tablet 1    ondansetron (ZOFRAN-ODT) 4 MG TbDL DISSOLVE ONE TABLET BY MOUTH EVERY 8 HOURS AS NEEDED FOR NAUSEA      pantoprazole (PROTONIX) 40 MG tablet Take 1 tablet (40 mg total) by mouth once daily. 90 tablet 0    valsartan (DIOVAN) 160 MG tablet Take 160 mg by mouth every morning.      valsartan (DIOVAN) 80 MG tablet Take 80 mg by mouth every evening.      vitamin D3-folic acid 125 mcg (5,000 unit)-1 mg Tab 1 tablet BEDTIME (route: oral)      benzonatate (TESSALON) 200 MG capsule TAKE 1 CAPSULE BY MOUTH EVERY 8 HOURS FOR UP TO 7 DAYS AS NEEDED FOR COUGH      dextran 70-hypromellose (TEARS) ophthalmic solution Place 1 drop into both eyes Use as needed.      finasteride (PROSCAR) 5 mg tablet Take 1 tablet (5 mg total) by mouth once daily. 30 tablet 11    fluticasone propionate (FLOVENT DISKUS) 50 mcg/actuation DsDv 2 spray 2 TIMES DAILY (route: nasal)       No current facility-administered medications for this visit.       Review of patient's allergies indicates:   Allergen Reactions    Milk Shortness Of Breath     Diarrhea    Olive extract Anaphylaxis    Tea tree Anaphylaxis    Apple Other (See Comments)     Other reaction(s): sinus problems     Benicar [olmesartan] Other (See Comments)     Stomach upset  Diarrhea    Cardizem [diltiazem hcl] Other (See Comments)     Hallucinations    Clonidine      Dry mouth, pounding in the ears    Clopidogrel Other (See Comments)     Generic only-felt bad [okay with Branded Plavix}    Dicyclomine Other (See Comments)     lightheaded    Hydralazine analogues      tachycardia    Lipitor [atorvastatin] Other (See Comments)     muscle spasms    Lopressor [metoprolol tartrate]      Felt bad question milk in it    Norvasc [amlodipine] Other (See Comments)     Headache  Flushing (skin)    Pineapple Other (See Comments)     sinus problems    Bactrim [sulfamethoxazole-trimethoprim] Itching and Rash       Physical Exam  Vitals:    06/16/22 1544   BP: (!) 150/68       General: Well-developed, well-nourished in no acute distress  HEENT: Normocephalic, atraumatic, Extraocular movements intact  Neck: supple, trachea midline, no cervical or supraclavicular lymphadenopathy  Respirations: even and unlabored  Back: midline spine, no CVA tenderness  Abdomen: soft, Non-tender, non-distended, no organomegaly or palpable masses, no rebound or guarding  Rectal: 6/16/22-40g prostate, no nodules or tenderness. 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 Nocturnal enuresis     3. CHESTER on CPAP     4. Nocturnal polyuria         Plan:  BPH with obstruction/lower urinary tract symptoms    Nocturnal  enuresis    CHESTER on CPAP    Nocturnal polyuria    Other orders  -     Discontinue: finasteride (PROSCAR) 5 mg tablet; Take 1 tablet (5 mg total) by mouth once daily.  Dispense: 30 tablet; Refill: 11  -     finasteride (PROSCAR) 5 mg tablet; Take 1 tablet (5 mg total) by mouth once daily.  Dispense: 30 tablet; Refill: 11    Nocturnal polyuria could be related to CHESTER. May need to get voiding diary with pad weights.     Follow up in about 3 months (around 9/16/2022).

## 2022-06-20 ENCOUNTER — LAB VISIT (OUTPATIENT)
Dept: LAB | Facility: HOSPITAL | Age: 82
End: 2022-06-20
Attending: FAMILY MEDICINE
Payer: MEDICARE

## 2022-06-20 DIAGNOSIS — R30.0 DYSURIA: ICD-10-CM

## 2022-06-20 PROCEDURE — 81003 URINALYSIS AUTO W/O SCOPE: CPT | Performed by: FAMILY MEDICINE

## 2022-06-21 LAB
BILIRUB UR QL STRIP: NEGATIVE
CLARITY UR REFRACT.AUTO: CLEAR
COLOR UR AUTO: YELLOW
GLUCOSE UR QL STRIP: NEGATIVE
HGB UR QL STRIP: NEGATIVE
KETONES UR QL STRIP: NEGATIVE
LEUKOCYTE ESTERASE UR QL STRIP: NEGATIVE
NITRITE UR QL STRIP: NEGATIVE
PH UR STRIP: 6 [PH] (ref 5–8)
PROT UR QL STRIP: NEGATIVE
SP GR UR STRIP: 1.01 (ref 1–1.03)
URN SPEC COLLECT METH UR: NORMAL

## 2022-06-22 ENCOUNTER — PATIENT MESSAGE (OUTPATIENT)
Dept: UROLOGY | Facility: CLINIC | Age: 82
End: 2022-06-22
Payer: MEDICARE

## 2022-06-22 PROCEDURE — G0179 PR HOME HEALTH MD RECERTIFICATION: ICD-10-PCS | Mod: ,,, | Performed by: FAMILY MEDICINE

## 2022-06-22 PROCEDURE — G0179 MD RECERTIFICATION HHA PT: HCPCS | Mod: ,,, | Performed by: FAMILY MEDICINE

## 2022-06-23 ENCOUNTER — TELEPHONE (OUTPATIENT)
Dept: INTERNAL MEDICINE | Facility: CLINIC | Age: 82
End: 2022-06-23
Payer: MEDICARE

## 2022-06-23 DIAGNOSIS — R41.3 MEMORY CHANGES: Primary | ICD-10-CM

## 2022-06-23 NOTE — TELEPHONE ENCOUNTER
Per Dr Avilez, He can issue a referral to a neurologist without patient coming in. I talked with Mr Hernandez's daughter, Suzette and she said this would be great. Suzette called me back 15 minutes later and let me know that she did schedule an appointment with Dr Pringle at [facility] on 07/23/2022 . I will fax referral once it's signed by Dr Avilez

## 2022-06-24 ENCOUNTER — DOCUMENT SCAN (OUTPATIENT)
Dept: HOME HEALTH SERVICES | Facility: HOSPITAL | Age: 82
End: 2022-06-24
Payer: MEDICARE

## 2022-06-24 PROBLEM — Z00.00 ROUTINE GENERAL MEDICAL EXAMINATION AT A HEALTH CARE FACILITY: Status: RESOLVED | Noted: 2022-05-02 | Resolved: 2022-06-24

## 2022-06-24 PROBLEM — R41.3 MEMORY CHANGES: Status: ACTIVE | Noted: 2022-06-24

## 2022-07-01 ENCOUNTER — EXTERNAL HOME HEALTH (OUTPATIENT)
Dept: HOME HEALTH SERVICES | Facility: HOSPITAL | Age: 82
End: 2022-07-01
Payer: MEDICARE

## 2022-07-01 ENCOUNTER — DOCUMENT SCAN (OUTPATIENT)
Dept: HOME HEALTH SERVICES | Facility: HOSPITAL | Age: 82
End: 2022-07-01
Payer: MEDICARE

## 2022-07-22 ENCOUNTER — OFFICE VISIT (OUTPATIENT)
Dept: INTERNAL MEDICINE | Facility: CLINIC | Age: 82
End: 2022-07-22
Payer: MEDICARE

## 2022-07-22 VITALS
HEART RATE: 86 BPM | TEMPERATURE: 98 F | DIASTOLIC BLOOD PRESSURE: 60 MMHG | BODY MASS INDEX: 23.11 KG/M2 | WEIGHT: 170.63 LBS | SYSTOLIC BLOOD PRESSURE: 138 MMHG | HEIGHT: 72 IN

## 2022-07-22 DIAGNOSIS — I10 ESSENTIAL HYPERTENSION: ICD-10-CM

## 2022-07-22 DIAGNOSIS — R29.898 LEG WEAKNESS, BILATERAL: Primary | ICD-10-CM

## 2022-07-22 DIAGNOSIS — G47.33 OSA ON CPAP: ICD-10-CM

## 2022-07-22 DIAGNOSIS — R26.81 GAIT INSTABILITY: ICD-10-CM

## 2022-07-22 PROCEDURE — 1101F PR PT FALLS ASSESS DOC 0-1 FALLS W/OUT INJ PAST YR: ICD-10-PCS | Mod: CPTII,S$GLB,, | Performed by: NURSE PRACTITIONER

## 2022-07-22 PROCEDURE — 99214 PR OFFICE/OUTPT VISIT, EST, LEVL IV, 30-39 MIN: ICD-10-PCS | Mod: S$GLB,,, | Performed by: NURSE PRACTITIONER

## 2022-07-22 PROCEDURE — 3288F FALL RISK ASSESSMENT DOCD: CPT | Mod: CPTII,S$GLB,, | Performed by: NURSE PRACTITIONER

## 2022-07-22 PROCEDURE — 3075F PR MOST RECENT SYSTOLIC BLOOD PRESS GE 130-139MM HG: ICD-10-PCS | Mod: CPTII,S$GLB,, | Performed by: NURSE PRACTITIONER

## 2022-07-22 PROCEDURE — 3288F PR FALLS RISK ASSESSMENT DOCUMENTED: ICD-10-PCS | Mod: CPTII,S$GLB,, | Performed by: NURSE PRACTITIONER

## 2022-07-22 PROCEDURE — 3078F DIAST BP <80 MM HG: CPT | Mod: CPTII,S$GLB,, | Performed by: NURSE PRACTITIONER

## 2022-07-22 PROCEDURE — 1159F MED LIST DOCD IN RCRD: CPT | Mod: CPTII,S$GLB,, | Performed by: NURSE PRACTITIONER

## 2022-07-22 PROCEDURE — 3078F PR MOST RECENT DIASTOLIC BLOOD PRESSURE < 80 MM HG: ICD-10-PCS | Mod: CPTII,S$GLB,, | Performed by: NURSE PRACTITIONER

## 2022-07-22 PROCEDURE — 1126F AMNT PAIN NOTED NONE PRSNT: CPT | Mod: CPTII,S$GLB,, | Performed by: NURSE PRACTITIONER

## 2022-07-22 PROCEDURE — 3075F SYST BP GE 130 - 139MM HG: CPT | Mod: CPTII,S$GLB,, | Performed by: NURSE PRACTITIONER

## 2022-07-22 PROCEDURE — 99214 OFFICE O/P EST MOD 30 MIN: CPT | Mod: S$GLB,,, | Performed by: NURSE PRACTITIONER

## 2022-07-22 PROCEDURE — 1157F PR ADVANCE CARE PLAN OR EQUIV PRESENT IN MEDICAL RECORD: ICD-10-PCS | Mod: CPTII,S$GLB,, | Performed by: NURSE PRACTITIONER

## 2022-07-22 PROCEDURE — 1159F PR MEDICATION LIST DOCUMENTED IN MEDICAL RECORD: ICD-10-PCS | Mod: CPTII,S$GLB,, | Performed by: NURSE PRACTITIONER

## 2022-07-22 PROCEDURE — 1126F PR PAIN SEVERITY QUANTIFIED, NO PAIN PRESENT: ICD-10-PCS | Mod: CPTII,S$GLB,, | Performed by: NURSE PRACTITIONER

## 2022-07-22 PROCEDURE — 99999 PR PBB SHADOW E&M-EST. PATIENT-LVL IV: CPT | Mod: PBBFAC,,, | Performed by: NURSE PRACTITIONER

## 2022-07-22 PROCEDURE — 1157F ADVNC CARE PLAN IN RCRD: CPT | Mod: CPTII,S$GLB,, | Performed by: NURSE PRACTITIONER

## 2022-07-22 PROCEDURE — 99999 PR PBB SHADOW E&M-EST. PATIENT-LVL IV: ICD-10-PCS | Mod: PBBFAC,,, | Performed by: NURSE PRACTITIONER

## 2022-07-22 PROCEDURE — 1101F PT FALLS ASSESS-DOCD LE1/YR: CPT | Mod: CPTII,S$GLB,, | Performed by: NURSE PRACTITIONER

## 2022-07-22 RX ORDER — CEPHALEXIN 500 MG/1
CAPSULE ORAL
COMMUNITY
Start: 2022-07-17 | End: 2022-10-18 | Stop reason: ALTCHOICE

## 2022-07-22 RX ORDER — VALSARTAN AND HYDROCHLOROTHIAZIDE 160; 25 MG/1; MG/1
TABLET ORAL
COMMUNITY
Start: 2022-05-27 | End: 2022-07-22 | Stop reason: ALTCHOICE

## 2022-07-22 NOTE — PROGRESS NOTES
Subjective:       Patient ID: Heraclio Wall is a 82 y.o. male.    Chief Complaint: Hospital Follow Up    Patient here with caregiver with leg weakness x 2 months  Went to Emergency Room 1 month ago with leg weakness, was dx with Urinary Tract Infection,   Last weekend had weakness in legs, went to Emergency Room, tests were ran, cxr done, was given keflex for nasal congestion  Patient can stand fine one minute, will walk to bathroom with no difficulty then 30 min later will have weakness and not able to stand to get into a wheelchair  Weakness progresses based off the time of day, worse at night and worse when he is tired  Patient had a big fall 1 year ago, had home health with physical therapy, he recently got released from physical therapy and weakness has progressed since      /60   Pulse 86   Temp 97.9 °F (36.6 °C)   Ht 6' (1.829 m)   Wt 77.4 kg (170 lb 10.2 oz)   BMI 23.14 kg/m²     Review of Systems   Constitutional: Positive for activity change. Negative for appetite change, chills, diaphoresis, fatigue, fever and unexpected weight change.   HENT: Negative.    Eyes: Negative.    Respiratory: Negative for apnea, chest tightness, shortness of breath and stridor.    Cardiovascular: Negative for chest pain, palpitations and leg swelling.   Gastrointestinal: Negative.    Endocrine: Negative.    Genitourinary: Negative.    Musculoskeletal: Positive for gait problem. Negative for arthralgias and myalgias.   Skin: Negative for color change, pallor, rash and wound.   Allergic/Immunologic: Negative.    Neurological: Positive for weakness. Negative for dizziness, facial asymmetry, light-headedness and headaches.   Hematological: Negative for adenopathy.   Psychiatric/Behavioral: Negative for agitation and behavioral problems.       Objective:      Physical Exam  Vitals and nursing note reviewed.   Constitutional:       General: He is not in acute distress.     Appearance: He is well-developed. He is not  diaphoretic.   HENT:      Head: Normocephalic and atraumatic.   Cardiovascular:      Rate and Rhythm: Normal rate and regular rhythm.      Heart sounds: Normal heart sounds.   Pulmonary:      Effort: Pulmonary effort is normal. No respiratory distress.      Breath sounds: Normal breath sounds.   Musculoskeletal:      Comments: In wheelchair   Skin:     General: Skin is warm and dry.      Findings: No rash.   Neurological:      Mental Status: He is alert and oriented to person, place, and time.   Psychiatric:         Behavior: Behavior normal.         Thought Content: Thought content normal.         Judgment: Judgment normal.         Assessment:       1. Leg weakness, bilateral    2. Gait instability    3. Essential hypertension    4. CHESTER on CPAP        Plan:       Heraclio was seen today for hospital follow up.    Diagnoses and all orders for this visit:    Leg weakness, bilateral  -     SUBSEQUENT HOME HEALTH ORDERS    Gait instability  -     SUBSEQUENT HOME HEALTH ORDERS    Essential hypertension  Stable on valsartan    CHESTER on CPAP  Stable    add back physical therapy to hh orders  Orders placed

## 2022-08-04 ENCOUNTER — OFFICE VISIT (OUTPATIENT)
Dept: CARDIOLOGY | Facility: CLINIC | Age: 82
End: 2022-08-04
Payer: MEDICARE

## 2022-08-04 VITALS
WEIGHT: 168.88 LBS | OXYGEN SATURATION: 98 % | HEIGHT: 72 IN | BODY MASS INDEX: 22.87 KG/M2 | DIASTOLIC BLOOD PRESSURE: 80 MMHG | SYSTOLIC BLOOD PRESSURE: 140 MMHG | HEART RATE: 60 BPM

## 2022-08-04 DIAGNOSIS — R00.1 SINUS BRADYCARDIA: ICD-10-CM

## 2022-08-04 DIAGNOSIS — Z86.73 HISTORY OF CVA (CEREBROVASCULAR ACCIDENT): Primary | Chronic | ICD-10-CM

## 2022-08-04 DIAGNOSIS — I10 ESSENTIAL HYPERTENSION: Chronic | ICD-10-CM

## 2022-08-04 DIAGNOSIS — I25.10 ATHEROSCLEROSIS OF NATIVE CORONARY ARTERY OF NATIVE HEART WITHOUT ANGINA PECTORIS: ICD-10-CM

## 2022-08-04 DIAGNOSIS — E78.2 MIXED HYPERLIPIDEMIA: Chronic | ICD-10-CM

## 2022-08-04 DIAGNOSIS — G47.33 OSA ON CPAP: Chronic | ICD-10-CM

## 2022-08-04 PROCEDURE — 3077F SYST BP >= 140 MM HG: CPT | Mod: CPTII,S$GLB,, | Performed by: INTERNAL MEDICINE

## 2022-08-04 PROCEDURE — 99214 PR OFFICE/OUTPT VISIT, EST, LEVL IV, 30-39 MIN: ICD-10-PCS | Mod: S$GLB,,, | Performed by: INTERNAL MEDICINE

## 2022-08-04 PROCEDURE — 3288F FALL RISK ASSESSMENT DOCD: CPT | Mod: CPTII,S$GLB,, | Performed by: INTERNAL MEDICINE

## 2022-08-04 PROCEDURE — 1126F PR PAIN SEVERITY QUANTIFIED, NO PAIN PRESENT: ICD-10-PCS | Mod: CPTII,S$GLB,, | Performed by: INTERNAL MEDICINE

## 2022-08-04 PROCEDURE — 1157F PR ADVANCE CARE PLAN OR EQUIV PRESENT IN MEDICAL RECORD: ICD-10-PCS | Mod: CPTII,S$GLB,, | Performed by: INTERNAL MEDICINE

## 2022-08-04 PROCEDURE — 1101F PR PT FALLS ASSESS DOC 0-1 FALLS W/OUT INJ PAST YR: ICD-10-PCS | Mod: CPTII,S$GLB,, | Performed by: INTERNAL MEDICINE

## 2022-08-04 PROCEDURE — 1159F MED LIST DOCD IN RCRD: CPT | Mod: CPTII,S$GLB,, | Performed by: INTERNAL MEDICINE

## 2022-08-04 PROCEDURE — 3079F PR MOST RECENT DIASTOLIC BLOOD PRESSURE 80-89 MM HG: ICD-10-PCS | Mod: CPTII,S$GLB,, | Performed by: INTERNAL MEDICINE

## 2022-08-04 PROCEDURE — 3288F PR FALLS RISK ASSESSMENT DOCUMENTED: ICD-10-PCS | Mod: CPTII,S$GLB,, | Performed by: INTERNAL MEDICINE

## 2022-08-04 PROCEDURE — 99999 PR PBB SHADOW E&M-EST. PATIENT-LVL IV: CPT | Mod: PBBFAC,,, | Performed by: INTERNAL MEDICINE

## 2022-08-04 PROCEDURE — 99214 OFFICE O/P EST MOD 30 MIN: CPT | Mod: S$GLB,,, | Performed by: INTERNAL MEDICINE

## 2022-08-04 PROCEDURE — 1159F PR MEDICATION LIST DOCUMENTED IN MEDICAL RECORD: ICD-10-PCS | Mod: CPTII,S$GLB,, | Performed by: INTERNAL MEDICINE

## 2022-08-04 PROCEDURE — 1126F AMNT PAIN NOTED NONE PRSNT: CPT | Mod: CPTII,S$GLB,, | Performed by: INTERNAL MEDICINE

## 2022-08-04 PROCEDURE — 1157F ADVNC CARE PLAN IN RCRD: CPT | Mod: CPTII,S$GLB,, | Performed by: INTERNAL MEDICINE

## 2022-08-04 PROCEDURE — 99999 PR PBB SHADOW E&M-EST. PATIENT-LVL IV: ICD-10-PCS | Mod: PBBFAC,,, | Performed by: INTERNAL MEDICINE

## 2022-08-04 PROCEDURE — 1101F PT FALLS ASSESS-DOCD LE1/YR: CPT | Mod: CPTII,S$GLB,, | Performed by: INTERNAL MEDICINE

## 2022-08-04 PROCEDURE — 3077F PR MOST RECENT SYSTOLIC BLOOD PRESSURE >= 140 MM HG: ICD-10-PCS | Mod: CPTII,S$GLB,, | Performed by: INTERNAL MEDICINE

## 2022-08-04 PROCEDURE — 3079F DIAST BP 80-89 MM HG: CPT | Mod: CPTII,S$GLB,, | Performed by: INTERNAL MEDICINE

## 2022-08-04 RX ORDER — VALSARTAN 160 MG/1
160 TABLET ORAL 2 TIMES DAILY
Qty: 60 TABLET | Refills: 11 | Status: SHIPPED | OUTPATIENT
Start: 2022-08-04 | End: 2023-08-16

## 2022-08-04 RX ORDER — MEMANTINE HYDROCHLORIDE 5 MG/1
TABLET ORAL
COMMUNITY
Start: 2022-07-29

## 2022-08-04 NOTE — PROGRESS NOTES
Subjective:   Patient ID:  Heraclio Wall is a 82 y.o. male who presents for follow-up of Follow-up  valsartan  adjusted last clinic visit  BP improved , still labile, avg -150  Patient denies CP, angina or anginal equivalent.    Hypertension  This is a chronic problem. The current episode started more than 1 year ago. The problem has been waxing and waning since onset. Pertinent negatives include no chest pain, palpitations or shortness of breath. Past treatments include angiotensin blockers. The current treatment provides moderate improvement. There are no compliance problems.    Hyperlipidemia  This is a chronic problem. The current episode started more than 1 year ago. Pertinent negatives include no chest pain or shortness of breath. Current antihyperlipidemic treatment includes diet change. The current treatment provides mild improvement of lipids. Compliance problems include medication side effects.        Review of Systems   Constitutional: Negative. Negative for weight gain.   HENT: Negative.    Eyes: Negative.    Cardiovascular: Negative.  Negative for chest pain, leg swelling and palpitations.   Respiratory: Negative.  Negative for shortness of breath.    Endocrine: Negative.    Hematologic/Lymphatic: Negative.    Skin: Negative.    Musculoskeletal: Negative for muscle weakness.   Gastrointestinal: Negative.    Genitourinary: Negative.    Neurological: Negative.  Negative for dizziness.   Psychiatric/Behavioral: Negative.    Allergic/Immunologic: Negative.      Family History   Problem Relation Age of Onset    Hypertension Mother     Heart disease Mother     Stroke Mother     Glaucoma Mother     Cataracts Mother     Hyperlipidemia Mother     Hypertension Father     Peripheral vascular disease Father     Aortic aneurysm Father     Cancer Sister 64        breast 64, tongue 79    Stroke Sister     Hearing loss Sister     Diabetes Neg Hx     Blindness Neg Hx     Macular degeneration Neg  Hx     Retinal detachment Neg Hx     Strabismus Neg Hx     Asthma Neg Hx     COPD Neg Hx     Mental illness Neg Hx     Mental retardation Neg Hx     Drug abuse Neg Hx     Alcohol abuse Neg Hx     Kidney disease Neg Hx      Past Medical History:   Diagnosis Date    Age-related osteoporosis without current pathological fracture 2/15/2019    Anemia     Anxiety     Aspiration pneumonia     Aspiration pneumonia 12/21/2018    Back pain     had some pain occasionally r/t a fall    Bilateral hearing loss     Cancer     skin cancer    Chronic anticoagulation 11/30/2015    Coumadin     Coronary artery disease involving native coronary artery of native heart without angina pectoris     Depression 1/27/2021    Disorder of kidney and ureter     Diverticulosis     DJD (degenerative joint disease)     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    Hernia, diaphragmatic 6/12/12    Hyperlipidemia     Hypertension     Hypogonadism male     Lens replaced by other means 10/3/2013    Pterygium - Right Eye 10/3/2013    Recurrent major depressive disorder, in remission 1/27/2021    Routine general medical examination at a health care facility 5/2/2022    Sleep apnea     Splenic infarct 5/4/14    Stroke 7/31/13    left lacunar    Unspecified vitamin D deficiency      Social History     Socioeconomic History    Marital status:      Spouse name: nita    Number of children: 5   Occupational History    Occupation: retired chemical work   Tobacco Use    Smoking status: Never Smoker    Smokeless tobacco: Never Used   Substance and Sexual Activity    Alcohol use: No    Drug use: No    Sexual activity: Not Currently   Social History Narrative    , lives with wife . 5 children, in good health.  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  He has a Living Will( copy is scanned in EMR).     Current Outpatient Medications on File Prior to Visit    Medication Sig Dispense Refill    acetaminophen (TYLENOL) 500 MG tablet Take 500 mg by mouth 2 (two) times daily as needed for Pain.      cholecalciferol, vitamin D3, 125 mcg (5,000 unit) Tab Take 5,000 Units by mouth.      citalopram (CELEXA) 20 MG tablet Take 40 mg by mouth once daily.      citalopram (CELEXA) 40 MG tablet TAKE 1 TABLET ONE TIME DAILY 90 tablet 0    cycloSPORINE (RESTASIS) 0.05 % ophthalmic emulsion Place 1 drop into both eyes 2 (two) times daily.      dextran 70-hypromellose (TEARS) ophthalmic solution Place 1 drop into both eyes Use as needed.      finasteride (PROSCAR) 5 mg tablet Take 1 tablet (5 mg total) by mouth once daily. 30 tablet 11    fluticasone propionate (FLONASE) 50 mcg/actuation nasal spray 2 sprays (100 mcg total) by Each Nostril route once daily. 16 g 6    fluticasone propionate (FLOVENT DISKUS) 50 mcg/actuation DsDv 2 spray 2 TIMES DAILY (route: nasal)      fluvastatin XL (LESCOL XL) 80 mg Tb24 Take 1 tablet (80 mg total) by mouth every evening. 90 tablet 2    L.acid-L.casei-B.bif-B.tono-FOS (PROBIOTIC BLEND) 2 billion cell-50 mg Cap 1 capsule DAILY (route: oral)      loratadine (CLARITIN) 10 mg tablet Take 1 tablet (10 mg total) by mouth once daily. 90 tablet 1    memantine (NAMENDA) 5 MG Tab Take 1 tablet at night X 7 days, then increase to 1 tablet AM and 1 tablet PM      ondansetron (ZOFRAN-ODT) 4 MG TbDL DISSOLVE ONE TABLET BY MOUTH EVERY 8 HOURS AS NEEDED FOR NAUSEA      pantoprazole (PROTONIX) 40 MG tablet TAKE 1 TABLET EVERY DAY 90 tablet 0    valsartan (DIOVAN) 160 MG tablet TAKE 1 TABLET EVERY MORNING 90 tablet 1    vitamin D3-folic acid 125 mcg (5,000 unit)-1 mg Tab 1 tablet BEDTIME (route: oral)      cephALEXin (KEFLEX) 500 MG capsule 1 capsule 3 TIMES DAILY (route: oral)      glycerin/witch hazel leaf (HEMORRHOID TOP) Apply 1 application topically 2 (two) times daily as needed.      valsartan (DIOVAN) 80 MG tablet Take 80 mg by mouth every  evening.       No current facility-administered medications on file prior to visit.     Review of patient's allergies indicates:   Allergen Reactions    Milk Shortness Of Breath     Diarrhea    Olive extract Anaphylaxis    Tea tree Anaphylaxis    Apple Other (See Comments)     Other reaction(s): sinus problems    Benicar [olmesartan] Other (See Comments)     Stomach upset  Diarrhea    Cardizem [diltiazem hcl] Other (See Comments)     Hallucinations    Clonidine      Dry mouth, pounding in the ears    Clopidogrel Other (See Comments)     Generic only-felt bad [okay with Branded Plavix}    Dicyclomine Other (See Comments)     lightheaded    Hydralazine analogues      tachycardia    Lipitor [atorvastatin] Other (See Comments)     muscle spasms    Lopressor [metoprolol tartrate]      Felt bad question milk in it    Norvasc [amlodipine] Other (See Comments)     Headache  Flushing (skin)    Pineapple Other (See Comments)     sinus problems    Bactrim [sulfamethoxazole-trimethoprim] Itching and Rash       Objective:     Physical Exam  Vitals and nursing note reviewed.   Constitutional:       Appearance: He is well-developed.   HENT:      Head: Normocephalic and atraumatic.   Eyes:      Conjunctiva/sclera: Conjunctivae normal.      Pupils: Pupils are equal, round, and reactive to light.   Cardiovascular:      Rate and Rhythm: Normal rate and regular rhythm.      Pulses: Intact distal pulses.      Heart sounds: Normal heart sounds.   Pulmonary:      Effort: Pulmonary effort is normal.      Breath sounds: Normal breath sounds.   Abdominal:      General: Bowel sounds are normal.      Palpations: Abdomen is soft.   Musculoskeletal:      Cervical back: Normal range of motion and neck supple.   Skin:     General: Skin is warm and dry.   Neurological:      Mental Status: He is alert and oriented to person, place, and time.         Assessment:     1. History of CVA (cerebrovascular accident)    2. Sinus bradycardia     3. Atherosclerosis of native coronary artery of native heart without angina pectoris    4. Essential hypertension    5. Mixed hyperlipidemia    6. CHESTER on CPAP        Plan:     History of CVA (cerebrovascular accident)    Sinus bradycardia    Atherosclerosis of native coronary artery of native heart without angina pectoris    Essential hypertension    Mixed hyperlipidemia    CHESTER on CPAP      Increase valsartan 160 bid-htn  BP diary

## 2022-08-08 ENCOUNTER — DOCUMENT SCAN (OUTPATIENT)
Dept: HOME HEALTH SERVICES | Facility: HOSPITAL | Age: 82
End: 2022-08-08
Payer: MEDICARE

## 2022-08-11 ENCOUNTER — DOCUMENT SCAN (OUTPATIENT)
Dept: HOME HEALTH SERVICES | Facility: HOSPITAL | Age: 82
End: 2022-08-11
Payer: MEDICARE

## 2022-08-15 ENCOUNTER — TELEPHONE (OUTPATIENT)
Dept: INTERNAL MEDICINE | Facility: CLINIC | Age: 82
End: 2022-08-15
Payer: MEDICARE

## 2022-08-15 DIAGNOSIS — R26.81 GAIT INSTABILITY: Primary | ICD-10-CM

## 2022-08-15 NOTE — TELEPHONE ENCOUNTER
Chiara from [facility]/ is requesting PT and OT for patient go to as an outpatient. He is very unsteady on his feet and getst tired and out of breath very easy. Pt has been to [facility] on [address] and would like to go back there. Referral is pending.

## 2022-08-15 NOTE — TELEPHONE ENCOUNTER
----- Message from Ruth Rocha sent at 8/15/2022  3:05 PM CDT -----  Contact: Raghu/ [facility]  Raghu with [facility] is calling to speak to the nurse regarding regarding orders. Reports needing orders for Outpatient Physical and Occupational therapy to evaluation and treat. Please give Raghu a call back at [phone].

## 2022-08-18 ENCOUNTER — TELEPHONE (OUTPATIENT)
Dept: INTERNAL MEDICINE | Facility: CLINIC | Age: 82
End: 2022-08-18
Payer: MEDICARE

## 2022-08-18 NOTE — TELEPHONE ENCOUNTER
----- Message from Roxi Flores sent at 8/18/2022 10:37 AM CDT -----  Contact: Paris/ [facility]  Paris is calling to follow up on the orders for the Outpatient therapy, please call her at [phone].    Thanks  Td

## 2022-08-26 ENCOUNTER — DOCUMENT SCAN (OUTPATIENT)
Dept: HOME HEALTH SERVICES | Facility: HOSPITAL | Age: 82
End: 2022-08-26
Payer: MEDICARE

## 2022-08-29 ENCOUNTER — DOCUMENT SCAN (OUTPATIENT)
Dept: HOME HEALTH SERVICES | Facility: HOSPITAL | Age: 82
End: 2022-08-29
Payer: MEDICARE

## 2022-09-06 ENCOUNTER — PATIENT MESSAGE (OUTPATIENT)
Dept: INTERNAL MEDICINE | Facility: CLINIC | Age: 82
End: 2022-09-06
Payer: MEDICARE

## 2022-09-09 ENCOUNTER — TELEPHONE (OUTPATIENT)
Dept: INTERNAL MEDICINE | Facility: CLINIC | Age: 82
End: 2022-09-09
Payer: MEDICARE

## 2022-09-09 NOTE — TELEPHONE ENCOUNTER
Pt's daughter called and said that [facility] , states that they didn't receive the PT referral from August. I re faxed the referral with the insurance information .

## 2022-09-14 ENCOUNTER — PATIENT MESSAGE (OUTPATIENT)
Dept: PULMONOLOGY | Facility: CLINIC | Age: 82
End: 2022-09-14
Payer: MEDICARE

## 2022-09-14 ENCOUNTER — OFFICE VISIT (OUTPATIENT)
Dept: UROLOGY | Facility: CLINIC | Age: 82
End: 2022-09-14
Payer: MEDICARE

## 2022-09-14 VITALS
HEIGHT: 72 IN | DIASTOLIC BLOOD PRESSURE: 70 MMHG | BODY MASS INDEX: 22.31 KG/M2 | WEIGHT: 164.69 LBS | SYSTOLIC BLOOD PRESSURE: 160 MMHG

## 2022-09-14 DIAGNOSIS — N39.44 NOCTURNAL ENURESIS: Primary | ICD-10-CM

## 2022-09-14 DIAGNOSIS — N13.8 BPH WITH OBSTRUCTION/LOWER URINARY TRACT SYMPTOMS: ICD-10-CM

## 2022-09-14 DIAGNOSIS — G47.33 OSA (OBSTRUCTIVE SLEEP APNEA): ICD-10-CM

## 2022-09-14 DIAGNOSIS — N40.1 BPH WITH OBSTRUCTION/LOWER URINARY TRACT SYMPTOMS: ICD-10-CM

## 2022-09-14 PROCEDURE — 1126F AMNT PAIN NOTED NONE PRSNT: CPT | Mod: CPTII,S$GLB,, | Performed by: UROLOGY

## 2022-09-14 PROCEDURE — 3077F PR MOST RECENT SYSTOLIC BLOOD PRESSURE >= 140 MM HG: ICD-10-PCS | Mod: CPTII,S$GLB,, | Performed by: UROLOGY

## 2022-09-14 PROCEDURE — 1159F PR MEDICATION LIST DOCUMENTED IN MEDICAL RECORD: ICD-10-PCS | Mod: CPTII,S$GLB,, | Performed by: UROLOGY

## 2022-09-14 PROCEDURE — 3288F PR FALLS RISK ASSESSMENT DOCUMENTED: ICD-10-PCS | Mod: CPTII,S$GLB,, | Performed by: UROLOGY

## 2022-09-14 PROCEDURE — 1101F PR PT FALLS ASSESS DOC 0-1 FALLS W/OUT INJ PAST YR: ICD-10-PCS | Mod: CPTII,S$GLB,, | Performed by: UROLOGY

## 2022-09-14 PROCEDURE — 3288F FALL RISK ASSESSMENT DOCD: CPT | Mod: CPTII,S$GLB,, | Performed by: UROLOGY

## 2022-09-14 PROCEDURE — 1101F PT FALLS ASSESS-DOCD LE1/YR: CPT | Mod: CPTII,S$GLB,, | Performed by: UROLOGY

## 2022-09-14 PROCEDURE — 3078F DIAST BP <80 MM HG: CPT | Mod: CPTII,S$GLB,, | Performed by: UROLOGY

## 2022-09-14 PROCEDURE — 3077F SYST BP >= 140 MM HG: CPT | Mod: CPTII,S$GLB,, | Performed by: UROLOGY

## 2022-09-14 PROCEDURE — 99214 OFFICE O/P EST MOD 30 MIN: CPT | Mod: S$GLB,,, | Performed by: UROLOGY

## 2022-09-14 PROCEDURE — 1126F PR PAIN SEVERITY QUANTIFIED, NO PAIN PRESENT: ICD-10-PCS | Mod: CPTII,S$GLB,, | Performed by: UROLOGY

## 2022-09-14 PROCEDURE — 99999 PR PBB SHADOW E&M-EST. PATIENT-LVL IV: ICD-10-PCS | Mod: PBBFAC,,, | Performed by: UROLOGY

## 2022-09-14 PROCEDURE — 1157F ADVNC CARE PLAN IN RCRD: CPT | Mod: CPTII,S$GLB,, | Performed by: UROLOGY

## 2022-09-14 PROCEDURE — 1159F MED LIST DOCD IN RCRD: CPT | Mod: CPTII,S$GLB,, | Performed by: UROLOGY

## 2022-09-14 PROCEDURE — 99214 PR OFFICE/OUTPT VISIT, EST, LEVL IV, 30-39 MIN: ICD-10-PCS | Mod: S$GLB,,, | Performed by: UROLOGY

## 2022-09-14 PROCEDURE — 3078F PR MOST RECENT DIASTOLIC BLOOD PRESSURE < 80 MM HG: ICD-10-PCS | Mod: CPTII,S$GLB,, | Performed by: UROLOGY

## 2022-09-14 PROCEDURE — 1157F PR ADVANCE CARE PLAN OR EQUIV PRESENT IN MEDICAL RECORD: ICD-10-PCS | Mod: CPTII,S$GLB,, | Performed by: UROLOGY

## 2022-09-14 PROCEDURE — 99999 PR PBB SHADOW E&M-EST. PATIENT-LVL IV: CPT | Mod: PBBFAC,,, | Performed by: UROLOGY

## 2022-09-14 RX ORDER — FINASTERIDE 5 MG/1
5 TABLET, FILM COATED ORAL DAILY
Qty: 30 TABLET | Refills: 11 | Status: SHIPPED | OUTPATIENT
Start: 2022-09-14 | End: 2023-09-14

## 2022-09-14 RX ORDER — RIVASTIGMINE TARTRATE 1.5 MG/1
CAPSULE ORAL
COMMUNITY
Start: 2022-09-07

## 2022-09-14 NOTE — PROGRESS NOTES
Chief Complaint   Patient presents with    Follow-up           History of Present Illness:   Heraclio Wall is a 82 y.o. male here for evaluation of Follow-up    9/14/22- On finasteride. Denies dysuria. Pt doesn't feel like he has any difficulty. Pt has nocturnal enuresis. Aid reports that during the day, they try to get him to urinate, but he doesn't seem to need to. Most of his urination is during the night. No recent UTIs. He continues to wear his C-pap, but doesn't fit well. Sleeping through the night but soaking diapers and pads.    6/16/22- Pt is an 83yo male who was seen in the [facility] ED approximately 2 weeks ago complaining of inability to urinate and nocturnal enuresis. Pt was getting leg weakness and urine sample was ordered through home health. He was unable to produce a urine specimen for a day and a half and ultimately an ambulance was called. However, when he was laying in the bed, he is having nocturnal enuresis all night long. Bolivar was placed in the ED and 150cc was drained from his bladder. He was placed on 10 days of keflex. UA at that time only showed blood. Culture showed <10,000 cfu Enterococcus. Bolivar was removed 6 days later.     During the day, he drinks quite a bit, but can't urinate. He does feel the urge to urinate during the day, per his report. Caregiver states that he states that he doesn't need to go. Changes pull ups Q3 hours during the night. Has been ongoing for at least 10 months, but worse over the last 6 months. Following treatment with antibiotic, he was feeling better and able to walk again. He does wear his C-pap.     Review of Systems   Constitutional:  Negative for chills and fever.   Genitourinary:  Positive for enuresis.   Musculoskeletal:  Positive for gait problem.   Neurological:  Negative for weakness.   All other systems reviewed and are negative.      Past Medical History:   Diagnosis Date    Age-related osteoporosis without current pathological fracture 2/15/2019     Anemia     Anxiety     Aspiration pneumonia     Aspiration pneumonia 12/21/2018    Back pain     had some pain occasionally r/t a fall    Bilateral hearing loss     Cancer     skin cancer    Chronic anticoagulation 11/30/2015    Coumadin     Coronary artery disease involving native coronary artery of native heart without angina pectoris     Depression 1/27/2021    Disorder of kidney and ureter     Diverticulosis     DJD (degenerative joint disease)     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    Hernia, diaphragmatic 6/12/12    Hyperlipidemia     Hypertension     Hypogonadism male     Lens replaced by other means 10/3/2013    Pterygium - Right Eye 10/3/2013    Recurrent major depressive disorder, in remission 1/27/2021    Routine general medical examination at a health care facility 5/2/2022    Sleep apnea     Splenic infarct 5/4/14    Stroke 7/31/13    left lacunar    Unspecified vitamin D deficiency        Past Surgical History:   Procedure Laterality Date    ADENOIDECTOMY  1942    CHOLECYSTECTOMY  2/2015    CIRCUMCISION, PRIMARY  1938    EYE SURGERY Right 02/15/2012    cataract w/IOL  Dr Kevin    EYE SURGERY Left 03/29/2012    cataract w/IOL    SMALL INTESTINE SURGERY  5/4/2014    TONSILLECTOMY  1942       Family History   Problem Relation Age of Onset    Hypertension Mother     Heart disease Mother     Stroke Mother     Glaucoma Mother     Cataracts Mother     Hyperlipidemia Mother     Hypertension Father     Peripheral vascular disease Father     Aortic aneurysm Father     Cancer Sister 64        breast 64, tongue 79    Stroke Sister     Hearing loss Sister     Diabetes Neg Hx     Blindness Neg Hx     Macular degeneration Neg Hx     Retinal detachment Neg Hx     Strabismus Neg Hx     Asthma Neg Hx     COPD Neg Hx     Mental illness Neg Hx     Mental retardation Neg Hx     Drug abuse Neg Hx     Alcohol abuse Neg Hx     Kidney disease Neg Hx        Social History     Tobacco Use    Smoking  status: Never    Smokeless tobacco: Never   Substance Use Topics    Alcohol use: No    Drug use: No       Current Outpatient Medications   Medication Sig Dispense Refill    acetaminophen (TYLENOL) 500 MG tablet Take 500 mg by mouth 2 (two) times daily as needed for Pain.      cholecalciferol, vitamin D3, 125 mcg (5,000 unit) Tab Take 5,000 Units by mouth.      citalopram (CELEXA) 40 MG tablet TAKE 1 TABLET ONE TIME DAILY 90 tablet 0    cycloSPORINE (RESTASIS) 0.05 % ophthalmic emulsion Place 1 drop into both eyes 2 (two) times daily.      dextran 70-hypromellose (TEARS) ophthalmic solution Place 1 drop into both eyes Use as needed.      fluticasone propionate (FLONASE) 50 mcg/actuation nasal spray 2 sprays (100 mcg total) by Each Nostril route once daily. 16 g 6    fluticasone propionate (FLOVENT DISKUS) 50 mcg/actuation DsDv 2 spray 2 TIMES DAILY (route: nasal)      fluvastatin XL (LESCOL XL) 80 mg Tb24 Take 1 tablet (80 mg total) by mouth every evening. 90 tablet 2    glycerin/witch hazel leaf (HEMORRHOID TOP) Apply 1 application topically 2 (two) times daily as needed.      loratadine (CLARITIN) 10 mg tablet Take 1 tablet (10 mg total) by mouth once daily. 90 tablet 1    pantoprazole (PROTONIX) 40 MG tablet TAKE 1 TABLET EVERY DAY 90 tablet 0    rivastigmine tartrate (EXELON) 1.5 MG capsule Take 1 capsule at night X 7 days, then increase to 1 capsule AM and 1 capsule at night      valsartan (DIOVAN) 160 MG tablet Take 1 tablet (160 mg total) by mouth 2 (two) times daily. 60 tablet 11    vitamin D3-folic acid 125 mcg (5,000 unit)-1 mg Tab 1 tablet BEDTIME (route: oral)      cephALEXin (KEFLEX) 500 MG capsule 1 capsule 3 TIMES DAILY (route: oral)      citalopram (CELEXA) 20 MG tablet Take 40 mg by mouth once daily.      finasteride (PROSCAR) 5 mg tablet Take 1 tablet (5 mg total) by mouth once daily. 30 tablet 11    L.acid-L.casei-B.bif-B.tono-FOS (PROBIOTIC BLEND) 2 billion cell-50 mg Cap 1 capsule DAILY (route:  oral)      memantine (NAMENDA) 5 MG Tab Take 1 tablet at night X 7 days, then increase to 1 tablet AM and 1 tablet PM      ondansetron (ZOFRAN-ODT) 4 MG TbDL DISSOLVE ONE TABLET BY MOUTH EVERY 8 HOURS AS NEEDED FOR NAUSEA       No current facility-administered medications for this visit.       Review of patient's allergies indicates:   Allergen Reactions    Milk Shortness Of Breath     Diarrhea    Olive extract Anaphylaxis    Tea tree Anaphylaxis    Apple Other (See Comments)     Other reaction(s): sinus problems    Benicar [olmesartan] Other (See Comments)     Stomach upset  Diarrhea    Cardizem [diltiazem hcl] Other (See Comments)     Hallucinations    Clonidine      Dry mouth, pounding in the ears    Clopidogrel Other (See Comments)     Generic only-felt bad [okay with Branded Plavix}    Dicyclomine Other (See Comments)     lightheaded    Hydralazine analogues      tachycardia    Lipitor [atorvastatin] Other (See Comments)     muscle spasms    Lopressor [metoprolol tartrate]      Felt bad question milk in it    Norvasc [amlodipine] Other (See Comments)     Headache  Flushing (skin)    Pineapple Other (See Comments)     sinus problems    Bactrim [sulfamethoxazole-trimethoprim] Itching and Rash       Physical Exam  Vitals:    09/14/22 1334   BP: (!) 160/70         General: Well-developed, well-nourished in no acute distress  HEENT: Normocephalic, atraumatic, Extraocular movements intact  Neck: supple, trachea midline, no cervical or supraclavicular lymphadenopathy  Respirations: even and unlabored  Back: midline spine, no CVA tenderness  Abdomen: soft, Non-tender, non-distended, no organomegaly or palpable masses, no rebound or guarding  Rectal: 6/16/22-40g prostate, no nodules or tenderness. No gross blood  Extremities: atraumatic, moves all equally, no clubbing, cyanosis or edema  Psych: normal affect  Skin: warm and dry, no lesions  Neuro: Alert and oriented, Cranial nerves II-XII grossly  intact    Urinalysis  Pt unable to give sample    Bladder scan (not PVR): 34cc 6/16/22    Lab Results   Component Value Date    PSA 0.60 02/06/2018    PSA 1.5 11/02/2016    PSA 0.58 11/12/2015       Testosterone, Total   Date/Time Value Ref Range Status   02/06/2018 09:28  195.0 - 1138.0 ng/dL Final       Assessment:   1. Nocturnal enuresis        2. CHESTER (obstructive sleep apnea)  Ambulatory referral/consult to Sleep Disorders      3. BPH with obstruction/lower urinary tract symptoms              Plan:  Nocturnal enuresis    CHESTER (obstructive sleep apnea)  -     Ambulatory referral/consult to Sleep Disorders; Future; Expected date: 09/21/2022    BPH with obstruction/lower urinary tract symptoms    Other orders  -     finasteride (PROSCAR) 5 mg tablet; Take 1 tablet (5 mg total) by mouth once daily.  Dispense: 30 tablet; Refill: 11    Nocturnal polyuria could be related to CHESTER. Will have him see sleep specialist about better fitting C-pap.   Continue finasteride  Discussed with pt that I would not recommend [facility] pharmacologic therapy to him, given his lack of bother.   Follow up in about 1 year (around 9/14/2023).

## 2022-10-03 ENCOUNTER — TELEPHONE (OUTPATIENT)
Dept: INTERNAL MEDICINE | Facility: CLINIC | Age: 82
End: 2022-10-03
Payer: MEDICARE

## 2022-10-03 NOTE — TELEPHONE ENCOUNTER
I talked with pt's caregiver. He has dementia and sundown syndrome. For a little while now, he gets really anxious and fidgety in the PM. His family is requesting medication for anxiety . I informed the caregiver, that Dr Avilez will need to see him in our office for this. She will let his daughter know this . I scheduled an appointment for him on 10/18/2022.

## 2022-10-03 NOTE — TELEPHONE ENCOUNTER
----- Message from Conchita Ramsey sent at 10/3/2022  9:40 AM CDT -----  Contact: Care giver, Conchita, [phone]  Calling to request medication for anxiety. Please advise. Thanks.      [id number] - [place] - [address] [place] [facility] [place] [address]  [address]  [place] [id number]  Phone: [phone] Fax: [phone]

## 2022-10-18 ENCOUNTER — LAB VISIT (OUTPATIENT)
Dept: LAB | Facility: HOSPITAL | Age: 82
End: 2022-10-18
Attending: FAMILY MEDICINE
Payer: MEDICARE

## 2022-10-18 ENCOUNTER — OFFICE VISIT (OUTPATIENT)
Dept: INTERNAL MEDICINE | Facility: CLINIC | Age: 82
End: 2022-10-18
Payer: MEDICARE

## 2022-10-18 VITALS
SYSTOLIC BLOOD PRESSURE: 122 MMHG | DIASTOLIC BLOOD PRESSURE: 74 MMHG | OXYGEN SATURATION: 99 % | BODY MASS INDEX: 22.6 KG/M2 | HEART RATE: 60 BPM | WEIGHT: 166.69 LBS

## 2022-10-18 DIAGNOSIS — K21.9 GASTROESOPHAGEAL REFLUX DISEASE WITHOUT ESOPHAGITIS: Chronic | ICD-10-CM

## 2022-10-18 DIAGNOSIS — E78.2 MIXED HYPERLIPIDEMIA: Chronic | ICD-10-CM

## 2022-10-18 DIAGNOSIS — E03.9 ACQUIRED HYPOTHYROIDISM: ICD-10-CM

## 2022-10-18 DIAGNOSIS — F32.89 OTHER DEPRESSION: ICD-10-CM

## 2022-10-18 DIAGNOSIS — I10 ESSENTIAL HYPERTENSION: Chronic | ICD-10-CM

## 2022-10-18 DIAGNOSIS — F41.1 GAD (GENERALIZED ANXIETY DISORDER): Primary | ICD-10-CM

## 2022-10-18 PROBLEM — N39.0 COMPLICATED UTI (URINARY TRACT INFECTION): Status: RESOLVED | Noted: 2022-06-07 | Resolved: 2022-10-18

## 2022-10-18 LAB
T4 FREE SERPL-MCNC: 0.84 NG/DL (ref 0.71–1.51)
TSH SERPL DL<=0.005 MIU/L-ACNC: 1.31 UIU/ML (ref 0.4–4)

## 2022-10-18 PROCEDURE — 84443 ASSAY THYROID STIM HORMONE: CPT | Performed by: FAMILY MEDICINE

## 2022-10-18 PROCEDURE — G0008 ADMIN INFLUENZA VIRUS VAC: HCPCS | Mod: S$GLB,,, | Performed by: FAMILY MEDICINE

## 2022-10-18 PROCEDURE — 36415 COLL VENOUS BLD VENIPUNCTURE: CPT | Mod: PO | Performed by: FAMILY MEDICINE

## 2022-10-18 PROCEDURE — 1157F PR ADVANCE CARE PLAN OR EQUIV PRESENT IN MEDICAL RECORD: ICD-10-PCS | Mod: CPTII,S$GLB,, | Performed by: FAMILY MEDICINE

## 2022-10-18 PROCEDURE — 99999 PR PBB SHADOW E&M-EST. PATIENT-LVL IV: ICD-10-PCS | Mod: PBBFAC,,, | Performed by: FAMILY MEDICINE

## 2022-10-18 PROCEDURE — 3288F FALL RISK ASSESSMENT DOCD: CPT | Mod: CPTII,S$GLB,, | Performed by: FAMILY MEDICINE

## 2022-10-18 PROCEDURE — 99214 OFFICE O/P EST MOD 30 MIN: CPT | Mod: S$GLB,,, | Performed by: FAMILY MEDICINE

## 2022-10-18 PROCEDURE — 99999 PR PBB SHADOW E&M-EST. PATIENT-LVL IV: CPT | Mod: PBBFAC,,, | Performed by: FAMILY MEDICINE

## 2022-10-18 PROCEDURE — 3288F PR FALLS RISK ASSESSMENT DOCUMENTED: ICD-10-PCS | Mod: CPTII,S$GLB,, | Performed by: FAMILY MEDICINE

## 2022-10-18 PROCEDURE — 1159F PR MEDICATION LIST DOCUMENTED IN MEDICAL RECORD: ICD-10-PCS | Mod: CPTII,S$GLB,, | Performed by: FAMILY MEDICINE

## 2022-10-18 PROCEDURE — 3074F SYST BP LT 130 MM HG: CPT | Mod: CPTII,S$GLB,, | Performed by: FAMILY MEDICINE

## 2022-10-18 PROCEDURE — 1101F PR PT FALLS ASSESS DOC 0-1 FALLS W/OUT INJ PAST YR: ICD-10-PCS | Mod: CPTII,S$GLB,, | Performed by: FAMILY MEDICINE

## 2022-10-18 PROCEDURE — 1126F PR PAIN SEVERITY QUANTIFIED, NO PAIN PRESENT: ICD-10-PCS | Mod: CPTII,S$GLB,, | Performed by: FAMILY MEDICINE

## 2022-10-18 PROCEDURE — 1126F AMNT PAIN NOTED NONE PRSNT: CPT | Mod: CPTII,S$GLB,, | Performed by: FAMILY MEDICINE

## 2022-10-18 PROCEDURE — 3074F PR MOST RECENT SYSTOLIC BLOOD PRESSURE < 130 MM HG: ICD-10-PCS | Mod: CPTII,S$GLB,, | Performed by: FAMILY MEDICINE

## 2022-10-18 PROCEDURE — 90694 FLU VACCINE - QUADRIVALENT - ADJUVANTED: ICD-10-PCS | Mod: S$GLB,,, | Performed by: FAMILY MEDICINE

## 2022-10-18 PROCEDURE — G0008 FLU VACCINE - QUADRIVALENT - ADJUVANTED: ICD-10-PCS | Mod: S$GLB,,, | Performed by: FAMILY MEDICINE

## 2022-10-18 PROCEDURE — 1101F PT FALLS ASSESS-DOCD LE1/YR: CPT | Mod: CPTII,S$GLB,, | Performed by: FAMILY MEDICINE

## 2022-10-18 PROCEDURE — 3078F PR MOST RECENT DIASTOLIC BLOOD PRESSURE < 80 MM HG: ICD-10-PCS | Mod: CPTII,S$GLB,, | Performed by: FAMILY MEDICINE

## 2022-10-18 PROCEDURE — 84439 ASSAY OF FREE THYROXINE: CPT | Performed by: FAMILY MEDICINE

## 2022-10-18 PROCEDURE — 90694 VACC AIIV4 NO PRSRV 0.5ML IM: CPT | Mod: S$GLB,,, | Performed by: FAMILY MEDICINE

## 2022-10-18 PROCEDURE — 1159F MED LIST DOCD IN RCRD: CPT | Mod: CPTII,S$GLB,, | Performed by: FAMILY MEDICINE

## 2022-10-18 PROCEDURE — 3078F DIAST BP <80 MM HG: CPT | Mod: CPTII,S$GLB,, | Performed by: FAMILY MEDICINE

## 2022-10-18 PROCEDURE — 99214 PR OFFICE/OUTPT VISIT, EST, LEVL IV, 30-39 MIN: ICD-10-PCS | Mod: S$GLB,,, | Performed by: FAMILY MEDICINE

## 2022-10-18 PROCEDURE — 1157F ADVNC CARE PLAN IN RCRD: CPT | Mod: CPTII,S$GLB,, | Performed by: FAMILY MEDICINE

## 2022-10-18 RX ORDER — CITALOPRAM 40 MG/1
40 TABLET, FILM COATED ORAL DAILY
Qty: 90 TABLET | Refills: 1 | Status: SHIPPED | OUTPATIENT
Start: 2022-10-18 | End: 2022-12-02

## 2022-10-18 RX ORDER — PANTOPRAZOLE SODIUM 40 MG/1
40 TABLET, DELAYED RELEASE ORAL DAILY
Qty: 90 TABLET | Refills: 1 | Status: SHIPPED | OUTPATIENT
Start: 2022-10-18 | End: 2022-12-02

## 2022-10-18 RX ORDER — BUSPIRONE HYDROCHLORIDE 5 MG/1
5 TABLET ORAL 2 TIMES DAILY PRN
Qty: 90 TABLET | Refills: 1 | Status: SHIPPED | OUTPATIENT
Start: 2022-10-18 | End: 2023-02-02

## 2022-10-18 NOTE — PROGRESS NOTES
Subjective:       Patient ID: Heraclio Wall is a 82 y.o. male.    Chief Complaint: Anxiety    Anxiety  Presents for initial visit. Onset was more than 5 years ago. The problem has been gradually worsening. Symptoms include irritability and nervous/anxious behavior. Patient reports no chest pain or shortness of breath. Symptoms occur constantly. The severity of symptoms is moderate. The symptoms are aggravated by family issues.       Review of Systems   Constitutional:  Positive for irritability.   Respiratory:  Negative for shortness of breath.    Cardiovascular:  Negative for chest pain.   Gastrointestinal:  Negative for abdominal pain.   Psychiatric/Behavioral:  The patient is nervous/anxious.      Objective:      Physical Exam  Vitals and nursing note reviewed.   Constitutional:       General: He is not in acute distress.     Appearance: He is well-developed. He is not diaphoretic.   HENT:      Head: Normocephalic and atraumatic.   Cardiovascular:      Rate and Rhythm: Normal rate and regular rhythm.      Heart sounds: Normal heart sounds.   Pulmonary:      Effort: Pulmonary effort is normal. No respiratory distress.      Breath sounds: Normal breath sounds.   Musculoskeletal:      Comments: In wheelchair   Skin:     General: Skin is warm and dry.      Findings: No rash.   Neurological:      Mental Status: He is alert and oriented to person, place, and time.   Psychiatric:         Behavior: Behavior normal.         Thought Content: Thought content normal.       Assessment:       1. CINTHYA (generalized anxiety disorder)    2. Essential hypertension    3. Mixed hyperlipidemia    4. Other depression    5. Acquired hypothyroidism    6. Gastroesophageal reflux disease without esophagitis        Plan:     Problem List Items Addressed This Visit          Psychiatric    CINTHYA (generalized anxiety disorder) - Primary       Cardiac/Vascular    Essential hypertension (Chronic)    Overview     Chronic, Stable, cont diovan          Mixed hyperlipidemia (Chronic)       Endocrine    Acquired hypothyroidism    Relevant Orders    TSH    T4, Free       GI    GERD (gastroesophageal reflux disease) (Chronic)    Overview     Chronic, Stable, cont Protonix         Relevant Medications    pantoprazole (PROTONIX) 40 MG tablet     Other Visit Diagnoses       Other depression        Relevant Medications    citalopram (CELEXA) 40 MG tablet

## 2022-10-24 ENCOUNTER — TELEPHONE (OUTPATIENT)
Dept: INTERNAL MEDICINE | Facility: CLINIC | Age: 82
End: 2022-10-24
Payer: MEDICARE

## 2022-10-24 NOTE — TELEPHONE ENCOUNTER
Returned call, cant leave message at #. Pt will need to come for PPD test to be placed ( not an injection) and then will have to return 48-72 hours later for it to be read. Cant be read on Thursdays

## 2022-10-24 NOTE — TELEPHONE ENCOUNTER
----- Message from Moo Gonzalez sent at 10/24/2022 11:55 AM CDT -----  Contact: [phone]  Anitha is calling in regards to scheduling pt an appt for a TB shot. She stated that pt is being admitted into an living facility and they are requesting the shot. Please call pt back at [phone]. Thanks KB

## 2022-10-24 NOTE — TELEPHONE ENCOUNTER
----- Message from Moo Gonzalez sent at 10/24/2022 11:55 AM CDT -----  Contact: [phone]  Anitha is calling in regards to scheduling pt an appt for a TB shot. She stated that pt is being admitted into an living facility and they are requesting the shot. Please call pt back at [phone]. Thanks KB

## 2022-10-25 ENCOUNTER — PATIENT MESSAGE (OUTPATIENT)
Dept: INTERNAL MEDICINE | Facility: CLINIC | Age: 82
End: 2022-10-25
Payer: MEDICARE

## 2022-11-01 ENCOUNTER — HOSPITAL ENCOUNTER (OUTPATIENT)
Dept: RADIOLOGY | Facility: HOSPITAL | Age: 82
Discharge: HOME | End: 2022-11-01
Attending: FAMILY MEDICINE
Payer: MEDICARE

## 2022-11-01 ENCOUNTER — OFFICE VISIT (OUTPATIENT)
Dept: INTERNAL MEDICINE | Facility: CLINIC | Age: 82
End: 2022-11-01
Payer: MEDICARE

## 2022-11-01 VITALS
HEART RATE: 64 BPM | HEIGHT: 72 IN | WEIGHT: 165.13 LBS | DIASTOLIC BLOOD PRESSURE: 80 MMHG | BODY MASS INDEX: 22.37 KG/M2 | TEMPERATURE: 98 F | SYSTOLIC BLOOD PRESSURE: 150 MMHG

## 2022-11-01 DIAGNOSIS — I10 ESSENTIAL HYPERTENSION: Primary | Chronic | ICD-10-CM

## 2022-11-01 DIAGNOSIS — Z02.9 ADMINISTRATIVE ENCOUNTER: ICD-10-CM

## 2022-11-01 DIAGNOSIS — E78.2 MIXED HYPERLIPIDEMIA: Chronic | ICD-10-CM

## 2022-11-01 DIAGNOSIS — R54 AGE-RELATED PHYSICAL DEBILITY: ICD-10-CM

## 2022-11-01 DIAGNOSIS — R41.3 MEMORY CHANGES: ICD-10-CM

## 2022-11-01 DIAGNOSIS — K21.9 GASTROESOPHAGEAL REFLUX DISEASE WITHOUT ESOPHAGITIS: Chronic | ICD-10-CM

## 2022-11-01 PROCEDURE — 1159F MED LIST DOCD IN RCRD: CPT | Mod: CPTII,S$GLB,, | Performed by: FAMILY MEDICINE

## 2022-11-01 PROCEDURE — 3079F PR MOST RECENT DIASTOLIC BLOOD PRESSURE 80-89 MM HG: ICD-10-PCS | Mod: CPTII,S$GLB,, | Performed by: FAMILY MEDICINE

## 2022-11-01 PROCEDURE — 71046 X-RAY EXAM CHEST 2 VIEWS: CPT | Mod: 26,,, | Performed by: RADIOLOGY

## 2022-11-01 PROCEDURE — 99214 OFFICE O/P EST MOD 30 MIN: CPT | Mod: S$GLB,,, | Performed by: FAMILY MEDICINE

## 2022-11-01 PROCEDURE — 1126F PR PAIN SEVERITY QUANTIFIED, NO PAIN PRESENT: ICD-10-PCS | Mod: CPTII,S$GLB,, | Performed by: FAMILY MEDICINE

## 2022-11-01 PROCEDURE — 99214 PR OFFICE/OUTPT VISIT, EST, LEVL IV, 30-39 MIN: ICD-10-PCS | Mod: S$GLB,,, | Performed by: FAMILY MEDICINE

## 2022-11-01 PROCEDURE — 3077F SYST BP >= 140 MM HG: CPT | Mod: CPTII,S$GLB,, | Performed by: FAMILY MEDICINE

## 2022-11-01 PROCEDURE — 1126F AMNT PAIN NOTED NONE PRSNT: CPT | Mod: CPTII,S$GLB,, | Performed by: FAMILY MEDICINE

## 2022-11-01 PROCEDURE — 86580 POCT TB SKIN TEST: ICD-10-PCS | Mod: S$GLB,,, | Performed by: FAMILY MEDICINE

## 2022-11-01 PROCEDURE — 1157F PR ADVANCE CARE PLAN OR EQUIV PRESENT IN MEDICAL RECORD: ICD-10-PCS | Mod: CPTII,S$GLB,, | Performed by: FAMILY MEDICINE

## 2022-11-01 PROCEDURE — 86580 TB INTRADERMAL TEST: CPT | Mod: S$GLB,,, | Performed by: FAMILY MEDICINE

## 2022-11-01 PROCEDURE — 71046 X-RAY EXAM CHEST 2 VIEWS: CPT | Mod: TC,FY,PO

## 2022-11-01 PROCEDURE — 99999 PR PBB SHADOW E&M-EST. PATIENT-LVL V: ICD-10-PCS | Mod: PBBFAC,,, | Performed by: FAMILY MEDICINE

## 2022-11-01 PROCEDURE — 99999 PR PBB SHADOW E&M-EST. PATIENT-LVL V: CPT | Mod: PBBFAC,,, | Performed by: FAMILY MEDICINE

## 2022-11-01 PROCEDURE — 1157F ADVNC CARE PLAN IN RCRD: CPT | Mod: CPTII,S$GLB,, | Performed by: FAMILY MEDICINE

## 2022-11-01 PROCEDURE — 1159F PR MEDICATION LIST DOCUMENTED IN MEDICAL RECORD: ICD-10-PCS | Mod: CPTII,S$GLB,, | Performed by: FAMILY MEDICINE

## 2022-11-01 PROCEDURE — 3079F DIAST BP 80-89 MM HG: CPT | Mod: CPTII,S$GLB,, | Performed by: FAMILY MEDICINE

## 2022-11-01 PROCEDURE — 71046 XR CHEST PA AND LATERAL: ICD-10-PCS | Mod: 26,,, | Performed by: RADIOLOGY

## 2022-11-01 PROCEDURE — 3077F PR MOST RECENT SYSTOLIC BLOOD PRESSURE >= 140 MM HG: ICD-10-PCS | Mod: CPTII,S$GLB,, | Performed by: FAMILY MEDICINE

## 2022-11-01 NOTE — PROGRESS NOTES
Subjective:       Patient ID: Heraclio Wall is a 82 y.o. male.    Chief Complaint: Hypertension    Discussed chronic issues and for home paperwork for nursing home placement    Review of Systems   Respiratory:  Negative for shortness of breath.    Cardiovascular:  Negative for chest pain.   Gastrointestinal:  Negative for abdominal pain.     Objective:      Physical Exam  Vitals and nursing note reviewed.   Constitutional:       General: He is not in acute distress.     Appearance: He is well-developed. He is not diaphoretic.   HENT:      Head: Normocephalic and atraumatic.   Cardiovascular:      Rate and Rhythm: Normal rate and regular rhythm.      Heart sounds: Normal heart sounds.   Pulmonary:      Effort: Pulmonary effort is normal. No respiratory distress.      Breath sounds: Normal breath sounds.   Musculoskeletal:      Comments: In wheelchair   Skin:     General: Skin is warm and dry.      Findings: No rash.   Neurological:      Mental Status: He is alert and oriented to person, place, and time.       Assessment:       1. Essential hypertension    2. Mixed hyperlipidemia    3. Gastroesophageal reflux disease without esophagitis    4. Administrative encounter    5. Age-related physical debility    6. Memory changes          Plan:     Problem List Items Addressed This Visit          Neuro    Memory changes    Current Assessment & Plan     Ref HH for eval and treatment            Cardiac/Vascular    Essential hypertension - Primary (Chronic)    Overview     Chronic, Stable, cont diovan         Mixed hyperlipidemia (Chronic)       GI    GERD (gastroesophageal reflux disease) (Chronic)    Overview     Chronic, Stable, cont Protonix            Other    Administrative encounter    Age-related physical debility

## 2022-11-04 ENCOUNTER — CLINICAL SUPPORT (OUTPATIENT)
Dept: INTERNAL MEDICINE | Facility: CLINIC | Age: 82
End: 2022-11-04
Payer: MEDICARE

## 2022-11-04 DIAGNOSIS — Z11.1 ENCOUNTER FOR PPD SKIN TEST READING: Primary | ICD-10-CM

## 2022-11-04 LAB
TB INDURATION - 48 HR READ: NORMAL
TB INDURATION - 72 HR READ: 0 MM
TB SKIN TEST - 48 HR READ: NORMAL
TB SKIN TEST - 72 HR READ: NEGATIVE

## 2022-11-04 NOTE — PROGRESS NOTES
Pt came in for PPD test read.  Test negative.  Pt given a copy of test results and paperwork that was filled out by Dr. Avilez.

## 2022-11-19 ENCOUNTER — PES CALL (OUTPATIENT)
Dept: ADMINISTRATIVE | Facility: CLINIC | Age: 82
End: 2022-11-19
Payer: MEDICARE

## 2022-12-01 DIAGNOSIS — K21.9 GASTROESOPHAGEAL REFLUX DISEASE WITHOUT ESOPHAGITIS: Chronic | ICD-10-CM

## 2022-12-01 DIAGNOSIS — F32.89 OTHER DEPRESSION: ICD-10-CM

## 2022-12-02 RX ORDER — PANTOPRAZOLE SODIUM 40 MG/1
TABLET, DELAYED RELEASE ORAL
Qty: 90 TABLET | Refills: 1 | Status: SHIPPED | OUTPATIENT
Start: 2022-12-02

## 2022-12-02 RX ORDER — CITALOPRAM 40 MG/1
TABLET, FILM COATED ORAL
Qty: 90 TABLET | Refills: 1 | Status: SHIPPED | OUTPATIENT
Start: 2022-12-02

## 2022-12-20 ENCOUNTER — TELEPHONE (OUTPATIENT)
Dept: INTERNAL MEDICINE | Facility: CLINIC | Age: 82
End: 2022-12-20
Payer: MEDICARE

## 2022-12-20 NOTE — TELEPHONE ENCOUNTER
----- Message from Ernst Bryant sent at 12/20/2022 11:37 AM CST -----  Contact: Tino Epperson  Type:  Patient Returning Call    Who Called: Tino Epperson  Who Left Message for Patient: Radha  Does the patient know what this is regarding? Patient's physical therapy   Would the patient rather a call back or a response via [facility]?   Best Call Back Number: [phone]  Additional Information:

## 2022-12-20 NOTE — TELEPHONE ENCOUNTER
----- Message from Roman Breen sent at 12/20/2022  8:25 AM CST -----  Contact: [facility] Aeli with [facility] is asking for an return call in reference to questions they have concerning pt ,please call back at [phone] option #2 Thx CJ

## 2022-12-20 NOTE — TELEPHONE ENCOUNTER
Called given number for [facility] and was unable to speak w anyone.  I was on hold when the phone disconnected.

## 2022-12-22 ENCOUNTER — TELEPHONE (OUTPATIENT)
Dept: INTERNAL MEDICINE | Facility: CLINIC | Age: 82
End: 2022-12-22
Payer: MEDICARE

## 2022-12-22 NOTE — TELEPHONE ENCOUNTER
Spoke to Volodymyr at [facility] and he states they are waiting for an order be signed by Dr Avilez and faxed back over.  Advised Volodymyr I have not received this fax and to please resend it.    Given fax number: [phone]

## 2022-12-22 NOTE — TELEPHONE ENCOUNTER
----- Message from Christa Mcmillan sent at 12/22/2022 11:17 AM CST -----  Contact: marie/ jada [facility]omez[facility]  Marie with rehab is calling to speak with the nurse regarding questions and concerns. Reports needing to know if the request was sent for the insurance. Please give marie a call back at [phone] option #2   Thanks eleno

## 2022-12-28 ENCOUNTER — TELEPHONE (OUTPATIENT)
Dept: INTERNAL MEDICINE | Facility: CLINIC | Age: 82
End: 2022-12-28
Payer: MEDICARE

## 2022-12-28 NOTE — TELEPHONE ENCOUNTER
----- Message from Sara Scales sent at 12/27/2022 11:33 AM CST -----  Contact: Marie/ [facility] is calling to request orders for the patient to have therapy services. PT/OT/ST. Please fax orders to . They need a referral first due to insurance     Thanks  LJ

## 2022-12-29 ENCOUNTER — TELEPHONE (OUTPATIENT)
Dept: PRIMARY CARE CLINIC | Facility: CLINIC | Age: 82
End: 2022-12-29
Payer: MEDICARE

## 2022-12-29 DIAGNOSIS — R41.3 MEMORY CHANGES: Primary | ICD-10-CM

## 2022-12-29 DIAGNOSIS — Z86.73 HISTORY OF CVA (CEREBROVASCULAR ACCIDENT): Chronic | ICD-10-CM

## 2022-12-29 NOTE — TELEPHONE ENCOUNTER
----- Message from Moo Gonzalez sent at 12/29/2022 10:57 AM CST -----  Contact: Tino Meehan is calling in regards to getting orders for pt. Pt is needing physical therapy/occupational therapy and speech. Please fax it over to [phone], the call back number is [phone]. Thanks KB

## 2023-01-02 ENCOUNTER — PATIENT MESSAGE (OUTPATIENT)
Dept: INTERNAL MEDICINE | Facility: CLINIC | Age: 83
End: 2023-01-02
Payer: MEDICARE

## 2023-01-10 ENCOUNTER — TELEPHONE (OUTPATIENT)
Dept: INTERNAL MEDICINE | Facility: CLINIC | Age: 83
End: 2023-01-10
Payer: MEDICARE

## 2023-01-19 ENCOUNTER — TELEPHONE (OUTPATIENT)
Dept: ADMINISTRATIVE | Facility: HOSPITAL | Age: 83
End: 2023-01-19
Payer: MEDICARE

## 2023-02-07 DIAGNOSIS — Z00.00 ENCOUNTER FOR MEDICARE ANNUAL WELLNESS EXAM: ICD-10-CM

## 2023-02-09 DIAGNOSIS — Z00.00 ENCOUNTER FOR MEDICARE ANNUAL WELLNESS EXAM: ICD-10-CM

## 2023-06-17 ENCOUNTER — TELEPHONE (OUTPATIENT)
Dept: ADMINISTRATIVE | Facility: HOSPITAL | Age: 83
End: 2023-06-17
Payer: MEDICARE

## 2023-08-15 ENCOUNTER — PATIENT MESSAGE (OUTPATIENT)
Dept: INTERNAL MEDICINE | Facility: CLINIC | Age: 83
End: 2023-08-15
Payer: MEDICARE

## 2023-08-16 RX ORDER — VALSARTAN 160 MG/1
160 TABLET ORAL 2 TIMES DAILY
Qty: 180 TABLET | Refills: 3 | Status: SHIPPED | OUTPATIENT
Start: 2023-08-16

## 2023-09-07 ENCOUNTER — PATIENT MESSAGE (OUTPATIENT)
Dept: ADMINISTRATIVE | Facility: HOSPITAL | Age: 83
End: 2023-09-07
Payer: MEDICARE

## 2023-10-30 RX ORDER — FINASTERIDE 5 MG/1
5 TABLET, FILM COATED ORAL DAILY
Qty: 30 TABLET | Refills: 10 | OUTPATIENT
Start: 2023-10-30 | End: 2024-10-29

## 2024-01-03 ENCOUNTER — PATIENT OUTREACH (OUTPATIENT)
Dept: ADMINISTRATIVE | Facility: CLINIC | Age: 84
End: 2024-01-03
Payer: MEDICARE